# Patient Record
Sex: FEMALE | Race: WHITE | NOT HISPANIC OR LATINO | Employment: UNEMPLOYED | URBAN - METROPOLITAN AREA
[De-identification: names, ages, dates, MRNs, and addresses within clinical notes are randomized per-mention and may not be internally consistent; named-entity substitution may affect disease eponyms.]

---

## 2017-01-04 ENCOUNTER — OFFICE VISIT (OUTPATIENT)
Dept: HEPATOLOGY | Facility: CLINIC | Age: 47
End: 2017-01-04
Payer: MEDICAID

## 2017-01-04 VITALS
HEIGHT: 61 IN | BODY MASS INDEX: 20.39 KG/M2 | WEIGHT: 108 LBS | HEART RATE: 97 BPM | SYSTOLIC BLOOD PRESSURE: 117 MMHG | OXYGEN SATURATION: 98 % | DIASTOLIC BLOOD PRESSURE: 67 MMHG | TEMPERATURE: 99 F | RESPIRATION RATE: 18 BRPM

## 2017-01-04 DIAGNOSIS — K70.31 ALCOHOLIC CIRRHOSIS OF LIVER WITH ASCITES: Primary | ICD-10-CM

## 2017-01-04 PROCEDURE — 99999 PR PBB SHADOW E&M-EST. PATIENT-LVL IV: CPT | Mod: PBBFAC,,, | Performed by: INTERNAL MEDICINE

## 2017-01-04 PROCEDURE — 99214 OFFICE O/P EST MOD 30 MIN: CPT | Mod: PBBFAC | Performed by: INTERNAL MEDICINE

## 2017-01-04 PROCEDURE — 99214 OFFICE O/P EST MOD 30 MIN: CPT | Mod: S$PBB,,, | Performed by: INTERNAL MEDICINE

## 2017-01-04 RX ORDER — MIDODRINE HYDROCHLORIDE 10 MG/1
10 TABLET ORAL DAILY
COMMUNITY
Start: 2016-12-09 | End: 2017-11-30

## 2017-01-04 RX ORDER — BUSPIRONE HYDROCHLORIDE 10 MG/1
10 TABLET ORAL 2 TIMES DAILY
COMMUNITY
Start: 2016-11-06 | End: 2017-08-23

## 2017-01-04 RX ORDER — SPIRONOLACTONE 50 MG/1
50 TABLET, FILM COATED ORAL DAILY
COMMUNITY
Start: 2016-12-18 | End: 2017-11-30

## 2017-01-04 RX ORDER — FUROSEMIDE 40 MG/1
40 TABLET ORAL EVERY OTHER DAY
Status: ON HOLD | COMMUNITY
Start: 2017-01-02 | End: 2017-12-02

## 2017-01-04 RX ORDER — LACTULOSE 10 G/15ML
10 SOLUTION ORAL; RECTAL DAILY
COMMUNITY
Start: 2016-12-26 | End: 2017-11-30

## 2017-01-04 NOTE — LETTER
January 4, 2017      Hi Buitrago MD  [address]  [facility] [address]           [facility] - Hepatology  [address]  [facility] [address]  Phone: [phone]  Fax: [phone]          Patient: Annette Esparza   MR Number: [id number]   YOB: 1970   Date of Visit: 1/4/2017       Dear Dr. iH Buitrago:    Thank you for referring Annette Esparza to me for evaluation. Attached you will find relevant portions of my assessment and plan of care.    If you have questions, please do not hesitate to call me. I look forward to following Annette Esparza along with you.    Sincerely,    Denia Johnson MD    Enclosure  CC:  No Recipients    If you would like to receive this communication electronically, please contact [email or web address] or [phone] to request more information on tic Link access.    For providers and/or their staff who would like to refer a patient to [facility], please contact us through our one-stop-shop provider referral line, [facility] , at [phone].    If you feel you have received this communication in error or would no longer like to receive these types of communications, please e-mail [email or web address]

## 2017-01-04 NOTE — MR AVS SNAPSHOT
[facility] - Hepatology  [address]  [facility] [address]  Phone: [phone]  Fax: [phone]                  Annette Esparza   2017 10:00 AM   Office Visit    Description:  Female : 1970   Provider:  Denia Johnson MD   Department:  [facility]atology           Reason for Visit     Alcoholic cirrhosis of liver with ascites           Diagnoses this Visit        Comments    Alcoholic cirrhosis of liver with ascites    -  Primary            To Do List           Goals (5 Years of Data)     None      [facility] On Call     [facility] On Call Nurse Care Line -  Assistance  Registered nurses in the [facility] On Call Center provide clinical advisement, health education, appointment booking, and other advisory services.  Call for this free service at [phone].             Medications           STOP taking these medications     oxycodone (ROXICODONE) 5 MG immediate release tablet Take 1 tablet (5 mg total) by mouth every 6 (six) hours as needed.           Verify that the below list of medications is an accurate representation of the medications you are currently taking.  If none reported, the list may be blank. If incorrect, please contact your healthcare provider. Carry this list with you in case of emergency.           Current Medications     ascorbic acid (VITAMIN C) 100 MG tablet Take 100 mg by mouth once daily.    buPROPion (WELLBUTRIN SR) 150 MG TBSR 12 hr tablet Take 1 tablet (150 mg total) by mouth every morning.    busPIRone (BUSPAR) 10 MG tablet Take 10 mg by mouth 2 (two) times daily.    ergocalciferol (ERGOCALCIFEROL) 50,000 unit Cap Take 1 capsule (50,000 Units total) by mouth every 7 days.    ferrous gluconate (FERGON) 325 MG Tab Take 1 tablet (325 mg total) by mouth 3 (three) times daily with meals.    fluticasone-salmeterol 100-50 mcg/dose (ADVAIR) 100-50 mcg/dose diskus inhaler Inhale 1 puff into the lungs 2 (two) times daily.    folic acid (FOLVITE) 1 MG tablet Take 1  "tablet (1 mg total) by mouth once daily.    furosemide (LASIX) 40 MG tablet Take 40 mg by mouth once daily.    hydrocodone-acetaminophen 10-325mg (NORCO)  mg Tab Take 1 tablet by mouth every 12 (twelve) hours as needed for Pain.    lactulose (CHRONULAC) 10 gram/15 mL solution Take 10 g by mouth once daily.    levothyroxine (SYNTHROID) 75 MCG tablet Take 1 tablet (75 mcg total) by mouth once daily.    midodrine (PROAMATINE) 10 MG tablet Take 10 mg by mouth once daily.    mirtazapine (REMERON) 30 MG tablet Take 1 tablet (30 mg total) by mouth every evening.    spironolactone (ALDACTONE) 50 MG tablet Take 50 mg by mouth once daily.    alprazolam (XANAX) 0.25 MG tablet Take 1 tablet (0.25 mg total) by mouth 3 (three) times daily as needed for Anxiety.           Clinical Reference Information           Vital Signs - Last Recorded  Most recent update: 1/4/2017  9:31 AM by Ravindra Muñiz MA    BP Pulse Temp Resp Ht Wt    117/67 (BP Location: Left arm, Patient Position: Sitting) 97 99.2 °F (37.3 °C) (Oral) 18 5' 1" (1.549 m) 49 kg (108 lb 0.4 oz)    LMP SpO2 BMI          (LMP Unknown) 98% 20.41 kg/m2        Blood Pressure          Most Recent Value    BP  117/67      Allergies as of 1/4/2017     Morphine      Immunizations Administered on Date of Encounter - 1/4/2017     None      Orders Placed During Today's Visit     Future Labs/Procedures Expected by Expires    US Liver with Doppler  1/4/2017 1/4/2018      Instructions    You have alcoholic cirrhosis, your liver is improving with the avoidance of alcohol.    No change in fluid pills.    Recommend psychiatry and counseling to discuss use of xanax and alcohol counseling.  You would benefit from long-term management for depression/anxiety without use of benzodiazepine such as xanax.    Return to clinic in 3 months with ultrasound for same day.       Smoking Cessation     If you would like to quit smoking:   You may be eligible for free services if you are a " [facility] and started smoking cigarettes before September 1, 1988.  Call the Smoking Cessation [employer] (SCT) toll free at (622) 191-7728 or [phone] [phone].   Call 0-909-QUIT-NOW if you do not meet the above criteria.

## 2017-01-04 NOTE — LETTER
January 4, 2017    Annette Esparza  [address]  [place] [address]             [facility]  [address]  [facility] [address]  Phone: [phone]  Fax: [phone] To Whom it May Concern,    Ms. Esparza is currently under ongoing medical care for a chronic condition which makes her unable to work at this time.  There is improvement in her condition and it is unclear if and when she will be able to return to work full-time but we will make continual assessments of her physical ability.           If you have any questions or concerns, please don't hesitate to call.    Sincerely,        Denia Johnson MD

## 2017-01-04 NOTE — PROGRESS NOTES
Hepatology Consult Note    Referring provider: Dr. Hi Buitrago    Chief complaint:   Chief Complaint   Patient presents with    Alcoholic cirrhosis of liver with ascites       HPI:  Annette Esparza is a 46 y.o. female that presents to hepatology clinic for management of alcoholic cirrhosis.  She is alone.     Patient first learned of the presence of liver disease in 9/2014.  Given uncertainty regarding presence of cirrhosis, she underwent liver biopsy in 10/2014 which confirmed the presence of cirrhosis.  Serologic w/u was negative and etiology is alcoholic liver disease.  The patient continued to consume alcohol until 11/2016.    The patient had a recent hospitalization from 11/28-12/9/16 with issues of volume overload and ascites formation.  Required LVP during hospitalization.  Discharge summary reviewed.  Complications during course include HRS, pneumonia and alcohol withdrawal.  All of these issues are currently resolved.      Since discharge the patient denies any further alcohol consumption.  She is not undergoing any formal rehabilitation.  She is currently residing with her parents.  Patient denies GI bleeding, recurrence of ascites, encephalopathy and jaundice.  Maintained on furosemide 40mg and spironolactone 50mg daily and reports compliance with low sodium diet.      Patient's major complaints at this time are regarding abdominal pain.  Reports constant pain in RUQ and lower quadrants with new stabbing pain (4-5 days) throughout abdomen and in the back.  Not associated with food intake or position.      Cirrhosis HCM:  Hepatitis A vaccination:  Immune   Hepatitis B vaccination: immune   HCC screening: CT from 4/2016  Variceal screening: EGD 6/2016 - no varices present   Pneumococcal vaccination: immune 2016  Influenza vaccination: immune 2016      Patient Active Problem List   Diagnosis    Gallstones    Alcohol dependence in remission    Vitamin D deficiency    Folate deficiency    Iron  deficiency anemia due to chronic blood loss    Portal hypertensive gastropathy    Alcoholic cirrhosis of liver with ascites    Anxiety    Cigarette nicotine dependence without complication    Hyperbilirubinemia    Ascites due to alcoholic cirrhosis    ANDREW (acute kidney injury)    Hepatorenal syndrome    Centrilobular emphysema    Mild single current episode of major depressive disorder       Past Medical History   Diagnosis Date    Acute hypoxemic respiratory failure 2016    ANDREW (acute kidney injury) 2016    Alcohol dependence in remission     Alcoholic cirrhosis of liver with ascites 10/22/2015    Anxiety     Ascites due to alcoholic cirrhosis 2016    Centrilobular emphysema 2016    Cigarette nicotine dependence without complication 2016    Folate deficiency 10/22/2015    Gallstones     Hepatorenal syndrome 2016    Hyperbilirubinemia 2016    Hyponatremia 2016    Iron deficiency anemia due to chronic blood loss 10/22/2015    Portal hypertensive gastropathy 10/22/2015    Subclinical hypothyroidism 10/22/2015       Past Surgical History   Procedure Laterality Date     section      Tonsillectomy      Adenoidectomy      Hysterectomy      Appendectomy      Upper gastrointestinal endoscopy      Liver biopsy      Tubal ligation      Colonoscopy         Family History   Problem Relation Age of Onset    Rheum arthritis Father     Cancer Mother     No Known Problems Sister     No Known Problems Brother     Colon cancer Neg Hx        Social History     Social History    Marital status:      Spouse name: N/A    Number of children: N/A    Years of education: N/A     Social History Main Topics    Smoking status: Current Every Day Smoker     Packs/day: 1.00     Years: 35.00     Types: Cigarettes    Smokeless tobacco: Never Used    Alcohol use No      Comment: hx etoh-quit 2015    Drug use: No    Sexual activity: Yes      Partners: Male     Other Topics Concern    Not on file     Social History Narrative   Patient currently residing with parents.  Not able to work current job as a .  Plan for disability hearing 2/2017.    Current Outpatient Prescriptions   Medication Sig Dispense Refill    ascorbic acid (VITAMIN C) 100 MG tablet Take 100 mg by mouth once daily.      buPROPion (WELLBUTRIN SR) 150 MG TBSR 12 hr tablet Take 1 tablet (150 mg total) by mouth every morning. 90 tablet 4    busPIRone (BUSPAR) 10 MG tablet Take 10 mg by mouth 2 (two) times daily.      ergocalciferol (ERGOCALCIFEROL) 50,000 unit Cap Take 1 capsule (50,000 Units total) by mouth every 7 days. 12 capsule 3    ferrous gluconate (FERGON) 325 MG Tab Take 1 tablet (325 mg total) by mouth 3 (three) times daily with meals. 270 tablet 3    fluticasone-salmeterol 100-50 mcg/dose (ADVAIR) 100-50 mcg/dose diskus inhaler Inhale 1 puff into the lungs 2 (two) times daily. 1 each 12    folic acid (FOLVITE) 1 MG tablet Take 1 tablet (1 mg total) by mouth once daily. 90 tablet 3    furosemide (LASIX) 40 MG tablet Take 40 mg by mouth once daily.      hydrocodone-acetaminophen 10-325mg (NORCO)  mg Tab Take 1 tablet by mouth every 12 (twelve) hours as needed for Pain. 60 tablet 0    lactulose (CHRONULAC) 10 gram/15 mL solution Take 10 g by mouth once daily.      levothyroxine (SYNTHROID) 75 MCG tablet Take 1 tablet (75 mcg total) by mouth once daily. 90 tablet 5    midodrine (PROAMATINE) 10 MG tablet Take 10 mg by mouth once daily.      mirtazapine (REMERON) 30 MG tablet Take 1 tablet (30 mg total) by mouth every evening. 90 tablet 4    spironolactone (ALDACTONE) 50 MG tablet Take 50 mg by mouth once daily.      alprazolam (XANAX) 0.25 MG tablet Take 1 tablet (0.25 mg total) by mouth 3 (three) times daily as needed for Anxiety. 30 tablet 0     No current facility-administered medications for this visit.        Review of patient's allergies indicates:  "  Allergen Reactions    Morphine Nausea And Vomiting       Review of Systems   Constitutional: Positive for malaise/fatigue. Negative for chills, fever and weight loss.   Eyes: Negative.    Respiratory: Negative for cough and shortness of breath.    Cardiovascular: Negative for chest pain and leg swelling.   Gastrointestinal: Positive for abdominal pain, nausea and vomiting. Negative for blood in stool, constipation, diarrhea and heartburn.   Musculoskeletal: Negative for joint pain and myalgias.   Skin: Negative for itching and rash.   Neurological: Positive for weakness. Negative for dizziness, focal weakness and headaches.   Endo/Heme/Allergies: Does not bruise/bleed easily.   Psychiatric/Behavioral: Negative for depression. The patient is nervous/anxious.        Vitals:    01/04/17 0930   BP: 117/67   Pulse: 97   Resp: 18   Temp: 99.2 °F (37.3 °C)   TempSrc: Oral   SpO2: 98%   Weight: 49 kg (108 lb 0.4 oz)   Height: 5' 1" (1.549 m)       Physical Exam   Constitutional: She is oriented to person, place, and time. She appears well-developed. No distress.   Thin female   HENT:   Head: Normocephalic and atraumatic.   Mouth/Throat: Oropharynx is clear and moist. No oropharyngeal exudate.   Eyes: EOM are normal. Pupils are equal, round, and reactive to light. No scleral icterus.   Neck: Normal range of motion. Neck supple. No thyromegaly present.   Cardiovascular: Normal rate, regular rhythm and normal heart sounds.  Exam reveals no gallop and no friction rub.    No murmur heard.  Pulmonary/Chest: Effort normal. No respiratory distress. She has no wheezes. She has no rales.   Abdominal: Soft. Bowel sounds are normal. She exhibits no distension. There is tenderness. There is guarding. There is no rebound.   Musculoskeletal: Normal range of motion. She exhibits no edema.   Lymphadenopathy:     She has no cervical adenopathy.   Neurological: She is alert and oriented to person, place, and time. No cranial nerve deficit. "   Skin: Skin is warm and dry. No rash noted.   Psychiatric: She has a normal mood and affect. Her behavior is normal.   Vitals reviewed.      Lab Results   Component Value Date    ALT 20 01/04/2017    AST 17 01/04/2017     (H) 09/30/2014    ALKPHOS 213 (H) 01/04/2017    BILITOT 0.8 01/04/2017       Lab Results   Component Value Date    WBC 7.33 01/04/2017    HGB 11.1 (L) 01/04/2017    HCT 34.6 (L) 01/04/2017    MCV 95 01/04/2017     01/04/2017       Lab Results   Component Value Date     01/04/2017    K 4.6 01/04/2017     01/04/2017    CO2 20 (L) 01/04/2017    BUN 10 01/04/2017    CREATININE 0.7 01/04/2017    CALCIUM 9.9 01/04/2017    ANIONGAP 10 01/04/2017    ESTGFRAFRICA >60.0 01/04/2017    EGFRNONAA >60.0 01/04/2017     MELD:  8 (INR is from 12/9 - no repeat ordered)  Imaging: CT abd/pelvis w/ contrast    Assessment:  46 y.o. female with alcoholic cirrhosis presenting for ongoing management in the setting of abstinence.  The patient has clinical and biochemical improvement with 2 months of alcohol abstinence.  Discussed the importance of continuing to maintain this abstinence.     Plan:  1. Decompensated cirrhosis:  Patient has improvement in MELD from 16 to 8 in the setting of abstinence.  Continuing low dose diuretics and low sodium diet.  No volume issues at this time.  No other liver related medications required at this time.   No indication for consideration of transplant based on low MELD.  Patient was also felt to require at least 6 months of sobriety and formal rehab, which has not occurred at this time.     Provided letter for work given inability to complete required tasks even in the setting of some clinical improvement.     2.  Alcohol abuse:  Patient reports successful without formal rehab.  She is requesting refill of xanax and discussed that it is not preferable to continue benzo in the setting of alcohol abuse.  Recommend mental health consultation which can address  appropriate treatment of anxiety along with alcohol use.  The patient is open to this and if she requires further resources, will engage social work.  Patient should also have PETH or UDS at next office visit.     3.  HCM:  Patients needs update of HCC screening. U/S ordered with next office visit.      4.  Abdominal pain:  Stab pains new, no obvious etiology.  If continues would refer to GI.      RTC in 3 months

## 2017-01-04 NOTE — PATIENT INSTRUCTIONS
You have alcoholic cirrhosis, your liver is improving with the avoidance of alcohol.    No change in fluid pills.    Recommend psychiatry and counseling to discuss use of xanax and alcohol counseling.  You would benefit from long-term management for depression/anxiety without use of benzodiazepine such as xanax.    Return to clinic in 3 months with ultrasound for same day.

## 2017-02-21 DIAGNOSIS — R52 PAIN: ICD-10-CM

## 2017-02-21 DIAGNOSIS — F41.9 ANXIETY: Chronic | ICD-10-CM

## 2017-02-21 DIAGNOSIS — F32.0 MILD SINGLE CURRENT EPISODE OF MAJOR DEPRESSIVE DISORDER: ICD-10-CM

## 2017-02-21 RX ORDER — HYDROCODONE BITARTRATE AND ACETAMINOPHEN 10; 325 MG/1; MG/1
1 TABLET ORAL EVERY 12 HOURS PRN
Qty: 60 TABLET | Refills: 0 | Status: SHIPPED | OUTPATIENT
Start: 2017-02-21 | End: 2017-03-17 | Stop reason: SDUPTHER

## 2017-02-21 RX ORDER — MIRTAZAPINE 30 MG/1
30 TABLET, FILM COATED ORAL NIGHTLY
Qty: 90 TABLET | Refills: 4 | Status: SHIPPED | OUTPATIENT
Start: 2017-02-21 | End: 2017-11-30

## 2017-03-17 DIAGNOSIS — J43.2 CENTRILOBULAR EMPHYSEMA: Chronic | ICD-10-CM

## 2017-03-17 DIAGNOSIS — R52 PAIN: ICD-10-CM

## 2017-03-17 RX ORDER — FLUTICASONE PROPIONATE AND SALMETEROL 100; 50 UG/1; UG/1
1 POWDER RESPIRATORY (INHALATION) 2 TIMES DAILY
Qty: 1 EACH | Refills: 12 | Status: SHIPPED | OUTPATIENT
Start: 2017-03-17 | End: 2017-05-16

## 2017-03-17 RX ORDER — HYDROCODONE BITARTRATE AND ACETAMINOPHEN 10; 325 MG/1; MG/1
1 TABLET ORAL EVERY 12 HOURS PRN
Qty: 60 TABLET | Refills: 0 | Status: SHIPPED | OUTPATIENT
Start: 2017-03-17 | End: 2017-04-17 | Stop reason: SDUPTHER

## 2017-05-23 ENCOUNTER — LAB VISIT (OUTPATIENT)
Dept: LAB | Facility: HOSPITAL | Age: 47
End: 2017-05-23
Attending: INTERNAL MEDICINE
Payer: MEDICAID

## 2017-05-23 DIAGNOSIS — E87.1 HYPONATREMIA: Primary | ICD-10-CM

## 2017-05-23 DIAGNOSIS — E87.3 ALKALOSIS, METABOLIC: ICD-10-CM

## 2017-05-23 DIAGNOSIS — E87.6 HYPOKALEMIA: ICD-10-CM

## 2017-05-23 LAB
ALBUMIN SERPL BCP-MCNC: 3.5 G/DL
ALP SERPL-CCNC: 291 U/L
ALT SERPL W/O P-5'-P-CCNC: 16 U/L
ANION GAP SERPL CALC-SCNC: 12 MMOL/L
AST SERPL-CCNC: 23 U/L
BILIRUB SERPL-MCNC: 1 MG/DL
BUN SERPL-MCNC: 5 MG/DL
CALCIUM SERPL-MCNC: 8.8 MG/DL
CHLORIDE SERPL-SCNC: 84 MMOL/L
CO2 SERPL-SCNC: 37 MMOL/L
CREAT SERPL-MCNC: 0.8 MG/DL
EST. GFR  (AFRICAN AMERICAN): >60 ML/MIN/1.73 M^2
EST. GFR  (NON AFRICAN AMERICAN): >60 ML/MIN/1.73 M^2
GLUCOSE SERPL-MCNC: 145 MG/DL
POTASSIUM SERPL-SCNC: 2.9 MMOL/L
PROT SERPL-MCNC: 8.3 G/DL
SODIUM SERPL-SCNC: 133 MMOL/L

## 2017-05-23 PROCEDURE — 36415 COLL VENOUS BLD VENIPUNCTURE: CPT

## 2017-05-23 PROCEDURE — 80053 COMPREHEN METABOLIC PANEL: CPT

## 2017-05-26 ENCOUNTER — LAB VISIT (OUTPATIENT)
Dept: LAB | Facility: HOSPITAL | Age: 47
End: 2017-05-26
Attending: INTERNAL MEDICINE
Payer: MEDICAID

## 2017-05-26 DIAGNOSIS — E87.6 HYPOKALEMIA: ICD-10-CM

## 2017-05-26 DIAGNOSIS — D50.0 IRON DEFICIENCY ANEMIA DUE TO CHRONIC BLOOD LOSS: Chronic | ICD-10-CM

## 2017-05-26 DIAGNOSIS — E87.1 HYPONATREMIA: ICD-10-CM

## 2017-05-26 DIAGNOSIS — E87.3 METABOLIC ALKALOSIS: ICD-10-CM

## 2017-05-26 DIAGNOSIS — D53.9 DEFICIENCY ANEMIA: ICD-10-CM

## 2017-05-26 DIAGNOSIS — K70.31 ALCOHOLIC CIRRHOSIS OF LIVER WITH ASCITES: Chronic | ICD-10-CM

## 2017-05-26 DIAGNOSIS — E53.8 FOLATE DEFICIENCY: ICD-10-CM

## 2017-05-26 LAB
ALBUMIN SERPL BCP-MCNC: 3.1 G/DL
ALBUMIN SERPL BCP-MCNC: 3.1 G/DL
ALP SERPL-CCNC: 309 U/L
ALP SERPL-CCNC: 309 U/L
ALT SERPL W/O P-5'-P-CCNC: 15 U/L
ALT SERPL W/O P-5'-P-CCNC: 15 U/L
ANION GAP SERPL CALC-SCNC: 14 MMOL/L
ANION GAP SERPL CALC-SCNC: 14 MMOL/L
AST SERPL-CCNC: 34 U/L
AST SERPL-CCNC: 34 U/L
BASOPHILS # BLD AUTO: 0.05 K/UL
BASOPHILS NFR BLD: 0.4 %
BILIRUB SERPL-MCNC: 0.9 MG/DL
BILIRUB SERPL-MCNC: 0.9 MG/DL
BUN SERPL-MCNC: 3 MG/DL
BUN SERPL-MCNC: 3 MG/DL
CALCIUM SERPL-MCNC: 8.7 MG/DL
CALCIUM SERPL-MCNC: 8.7 MG/DL
CHLORIDE SERPL-SCNC: 90 MMOL/L
CHLORIDE SERPL-SCNC: 90 MMOL/L
CO2 SERPL-SCNC: 28 MMOL/L
CO2 SERPL-SCNC: 28 MMOL/L
CREAT SERPL-MCNC: 0.7 MG/DL
CREAT SERPL-MCNC: 0.7 MG/DL
DIFFERENTIAL METHOD: ABNORMAL
EOSINOPHIL # BLD AUTO: 0.1 K/UL
EOSINOPHIL NFR BLD: 0.6 %
ERYTHROCYTE [DISTWIDTH] IN BLOOD BY AUTOMATED COUNT: 17.4 %
EST. GFR  (AFRICAN AMERICAN): >60 ML/MIN/1.73 M^2
EST. GFR  (AFRICAN AMERICAN): >60 ML/MIN/1.73 M^2
EST. GFR  (NON AFRICAN AMERICAN): >60 ML/MIN/1.73 M^2
EST. GFR  (NON AFRICAN AMERICAN): >60 ML/MIN/1.73 M^2
FERRITIN SERPL-MCNC: 15 NG/ML
FOLATE SERPL-MCNC: 5.5 NG/ML
GLUCOSE SERPL-MCNC: 139 MG/DL
GLUCOSE SERPL-MCNC: 139 MG/DL
HCT VFR BLD AUTO: 33.4 %
HCYS SERPL-SCNC: 18.4 UMOL/L
HGB BLD-MCNC: 11 G/DL
IRON SERPL-MCNC: 36 UG/DL
LYMPHOCYTES # BLD AUTO: 1.6 K/UL
LYMPHOCYTES NFR BLD: 13.8 %
MCH RBC QN AUTO: 30.2 PG
MCHC RBC AUTO-ENTMCNC: 32.9 %
MCV RBC AUTO: 92 FL
MONOCYTES # BLD AUTO: 1.3 K/UL
MONOCYTES NFR BLD: 11.1 %
NEUTROPHILS # BLD AUTO: 8.5 K/UL
NEUTROPHILS NFR BLD: 73.8 %
PLATELET # BLD AUTO: 170 K/UL
PMV BLD AUTO: 9.5 FL
POTASSIUM SERPL-SCNC: 2.5 MMOL/L
POTASSIUM SERPL-SCNC: 2.5 MMOL/L
PROT SERPL-MCNC: 7.1 G/DL
PROT SERPL-MCNC: 7.1 G/DL
RBC # BLD AUTO: 3.64 M/UL
RETICS/RBC NFR AUTO: 2.5 %
SATURATED IRON: 8 %
SODIUM SERPL-SCNC: 132 MMOL/L
SODIUM SERPL-SCNC: 132 MMOL/L
TOTAL IRON BINDING CAPACITY: 472 UG/DL
TRANSFERRIN SERPL-MCNC: 319 MG/DL
VIT B12 SERPL-MCNC: 321 PG/ML
WBC # BLD AUTO: 11.59 K/UL

## 2017-05-26 PROCEDURE — 36415 COLL VENOUS BLD VENIPUNCTURE: CPT

## 2017-05-26 PROCEDURE — 82607 VITAMIN B-12: CPT

## 2017-05-26 PROCEDURE — 83020 HEMOGLOBIN ELECTROPHORESIS: CPT

## 2017-05-26 PROCEDURE — 82746 ASSAY OF FOLIC ACID SERUM: CPT

## 2017-05-26 PROCEDURE — 83090 ASSAY OF HOMOCYSTEINE: CPT

## 2017-05-26 PROCEDURE — 82728 ASSAY OF FERRITIN: CPT

## 2017-05-26 PROCEDURE — 85025 COMPLETE CBC W/AUTO DIFF WBC: CPT

## 2017-05-26 PROCEDURE — 83540 ASSAY OF IRON: CPT

## 2017-05-26 PROCEDURE — 83921 ORGANIC ACID SINGLE QUANT: CPT

## 2017-05-26 PROCEDURE — 83021 HEMOGLOBIN CHROMOTOGRAPHY: CPT

## 2017-05-26 PROCEDURE — 80053 COMPREHEN METABOLIC PANEL: CPT

## 2017-05-26 PROCEDURE — 85045 AUTOMATED RETICULOCYTE COUNT: CPT

## 2017-05-29 LAB
HGB A2 MFR BLD HPLC: 2.1 %
HGB FRACT BLD ELPH-IMP: ABNORMAL
HGB FRACT BLD ELPH-IMP: NORMAL

## 2017-05-31 LAB — METHYLMALONATE SERPL-SCNC: <0.1 UMOL/L

## 2017-06-02 ENCOUNTER — LAB VISIT (OUTPATIENT)
Dept: LAB | Facility: HOSPITAL | Age: 47
End: 2017-06-02
Attending: INTERNAL MEDICINE
Payer: MEDICAID

## 2017-06-02 DIAGNOSIS — E87.3 ALKALOSIS: ICD-10-CM

## 2017-06-02 DIAGNOSIS — K76.7 HEPATORENAL SYNDROME: ICD-10-CM

## 2017-06-02 LAB
ALBUMIN SERPL BCP-MCNC: 3.2 G/DL
ALP SERPL-CCNC: 486 U/L
ALT SERPL W/O P-5'-P-CCNC: 27 U/L
ANION GAP SERPL CALC-SCNC: 11 MMOL/L
AST SERPL-CCNC: 41 U/L
BILIRUB SERPL-MCNC: 1.8 MG/DL
BUN SERPL-MCNC: 5 MG/DL
CALCIUM SERPL-MCNC: 9.3 MG/DL
CHLORIDE SERPL-SCNC: 93 MMOL/L
CO2 SERPL-SCNC: 33 MMOL/L
CREAT SERPL-MCNC: 0.6 MG/DL
EST. GFR  (AFRICAN AMERICAN): >60 ML/MIN/1.73 M^2
EST. GFR  (NON AFRICAN AMERICAN): >60 ML/MIN/1.73 M^2
GLUCOSE SERPL-MCNC: 110 MG/DL
POTASSIUM SERPL-SCNC: 3.2 MMOL/L
PROT SERPL-MCNC: 7.8 G/DL
SODIUM SERPL-SCNC: 137 MMOL/L

## 2017-06-02 PROCEDURE — 80053 COMPREHEN METABOLIC PANEL: CPT

## 2017-06-02 PROCEDURE — 36415 COLL VENOUS BLD VENIPUNCTURE: CPT

## 2017-06-09 ENCOUNTER — LAB VISIT (OUTPATIENT)
Dept: LAB | Facility: HOSPITAL | Age: 47
End: 2017-06-09
Attending: INTERNAL MEDICINE
Payer: MEDICAID

## 2017-06-09 DIAGNOSIS — E87.3 ALKALOSIS: ICD-10-CM

## 2017-06-09 LAB
ALBUMIN SERPL BCP-MCNC: 3.2 G/DL
ALP SERPL-CCNC: 393 U/L
ALT SERPL W/O P-5'-P-CCNC: 21 U/L
ANION GAP SERPL CALC-SCNC: 9 MMOL/L
AST SERPL-CCNC: 41 U/L
BILIRUB SERPL-MCNC: 1.2 MG/DL
BUN SERPL-MCNC: 4 MG/DL
CALCIUM SERPL-MCNC: 8.8 MG/DL
CHLORIDE SERPL-SCNC: 93 MMOL/L
CO2 SERPL-SCNC: 32 MMOL/L
CREAT SERPL-MCNC: 0.7 MG/DL
EST. GFR  (AFRICAN AMERICAN): >60 ML/MIN/1.73 M^2
EST. GFR  (NON AFRICAN AMERICAN): >60 ML/MIN/1.73 M^2
GLUCOSE SERPL-MCNC: 98 MG/DL
POTASSIUM SERPL-SCNC: 2.8 MMOL/L
PROT SERPL-MCNC: 7.5 G/DL
SODIUM SERPL-SCNC: 134 MMOL/L

## 2017-06-09 PROCEDURE — 80053 COMPREHEN METABOLIC PANEL: CPT

## 2017-06-09 PROCEDURE — 36415 COLL VENOUS BLD VENIPUNCTURE: CPT

## 2017-06-16 ENCOUNTER — LAB VISIT (OUTPATIENT)
Dept: LAB | Facility: HOSPITAL | Age: 47
End: 2017-06-16
Attending: INTERNAL MEDICINE
Payer: MEDICAID

## 2017-06-16 DIAGNOSIS — K70.31 ALCOHOLIC CIRRHOSIS OF LIVER WITH ASCITES: Chronic | ICD-10-CM

## 2017-06-16 DIAGNOSIS — E87.3 ALKALOSIS: ICD-10-CM

## 2017-06-16 LAB
ALBUMIN SERPL BCP-MCNC: 3.2 G/DL
ALP SERPL-CCNC: 336 U/L
ALT SERPL W/O P-5'-P-CCNC: 21 U/L
ANION GAP SERPL CALC-SCNC: 9 MMOL/L
AST SERPL-CCNC: 59 U/L
BILIRUB SERPL-MCNC: 1.3 MG/DL
BUN SERPL-MCNC: 4 MG/DL
CALCIUM SERPL-MCNC: 8.8 MG/DL
CHLORIDE SERPL-SCNC: 103 MMOL/L
CO2 SERPL-SCNC: 25 MMOL/L
CREAT SERPL-MCNC: 0.7 MG/DL
EST. GFR  (AFRICAN AMERICAN): >60 ML/MIN/1.73 M^2
EST. GFR  (NON AFRICAN AMERICAN): >60 ML/MIN/1.73 M^2
GLUCOSE SERPL-MCNC: 100 MG/DL
POTASSIUM SERPL-SCNC: 4.6 MMOL/L
PROT SERPL-MCNC: 7.5 G/DL
SODIUM SERPL-SCNC: 137 MMOL/L

## 2017-06-16 PROCEDURE — 80053 COMPREHEN METABOLIC PANEL: CPT

## 2017-06-16 PROCEDURE — 36415 COLL VENOUS BLD VENIPUNCTURE: CPT

## 2017-09-28 ENCOUNTER — HOSPITAL ENCOUNTER (OUTPATIENT)
Dept: RADIOLOGY | Facility: HOSPITAL | Age: 47
Discharge: HOME | End: 2017-09-28
Attending: INTERNAL MEDICINE
Payer: MEDICAID

## 2017-09-28 DIAGNOSIS — Z12.31 VISIT FOR SCREENING MAMMOGRAM: ICD-10-CM

## 2017-09-28 PROCEDURE — 77067 SCR MAMMO BI INCL CAD: CPT | Mod: TC

## 2017-09-28 PROCEDURE — 77067 SCR MAMMO BI INCL CAD: CPT | Mod: 26,,, | Performed by: RADIOLOGY

## 2017-09-28 PROCEDURE — 77063 BREAST TOMOSYNTHESIS BI: CPT | Mod: 26,,, | Performed by: RADIOLOGY

## 2017-10-05 ENCOUNTER — HOSPITAL ENCOUNTER (OUTPATIENT)
Dept: RADIOLOGY | Facility: HOSPITAL | Age: 47
Discharge: HOME | End: 2017-10-05
Attending: INTERNAL MEDICINE
Payer: MEDICAID

## 2017-10-05 DIAGNOSIS — R92.8 ABNORMAL MAMMOGRAM: ICD-10-CM

## 2017-10-05 DIAGNOSIS — N63.10 BREAST MASS, RIGHT: ICD-10-CM

## 2017-10-05 PROCEDURE — 77061 BREAST TOMOSYNTHESIS UNI: CPT | Mod: TC

## 2017-10-05 PROCEDURE — 76642 ULTRASOUND BREAST LIMITED: CPT | Mod: 26,RT,, | Performed by: RADIOLOGY

## 2017-10-05 PROCEDURE — 76642 ULTRASOUND BREAST LIMITED: CPT | Mod: TC,RT

## 2017-10-05 PROCEDURE — 77065 DX MAMMO INCL CAD UNI: CPT | Mod: 26,,, | Performed by: RADIOLOGY

## 2017-10-05 PROCEDURE — 77061 BREAST TOMOSYNTHESIS UNI: CPT | Mod: 26,,, | Performed by: RADIOLOGY

## 2017-11-30 ENCOUNTER — HOSPITAL ENCOUNTER (INPATIENT)
Facility: HOSPITAL | Age: 47
LOS: 2 days | Discharge: HOME | DRG: 872 | End: 2017-12-02
Attending: EMERGENCY MEDICINE | Admitting: HOSPITALIST
Payer: MEDICAID

## 2017-11-30 DIAGNOSIS — E87.6 HYPOKALEMIA: ICD-10-CM

## 2017-11-30 DIAGNOSIS — E86.0 DEHYDRATION: ICD-10-CM

## 2017-11-30 DIAGNOSIS — K70.31 ALCOHOLIC CIRRHOSIS OF LIVER WITH ASCITES: Chronic | ICD-10-CM

## 2017-11-30 DIAGNOSIS — R10.9 ABDOMINAL PAIN: ICD-10-CM

## 2017-11-30 DIAGNOSIS — R05.9 COUGH: ICD-10-CM

## 2017-11-30 DIAGNOSIS — N30.00 ACUTE CYSTITIS WITHOUT HEMATURIA: Primary | ICD-10-CM

## 2017-11-30 DIAGNOSIS — E87.1 HYPONATREMIA: ICD-10-CM

## 2017-11-30 DIAGNOSIS — R55 SYNCOPE, UNSPECIFIED SYNCOPE TYPE: ICD-10-CM

## 2017-11-30 PROBLEM — K76.82 HEPATIC ENCEPHALOPATHY: Status: ACTIVE | Noted: 2017-11-30

## 2017-11-30 PROBLEM — I95.9 HYPOTENSION: Status: ACTIVE | Noted: 2017-11-30

## 2017-11-30 PROBLEM — A49.9 BACTERIAL UTI: Status: ACTIVE | Noted: 2017-11-30

## 2017-11-30 PROBLEM — N39.0 BACTERIAL UTI: Status: ACTIVE | Noted: 2017-11-30

## 2017-11-30 PROBLEM — B37.0 ORAL THRUSH: Status: ACTIVE | Noted: 2017-11-30

## 2017-11-30 PROBLEM — K52.9 ENTERITIS: Status: ACTIVE | Noted: 2017-11-30

## 2017-11-30 LAB
ALBUMIN SERPL BCP-MCNC: 1.9 G/DL
ALP SERPL-CCNC: 327 U/L
ALT SERPL W/O P-5'-P-CCNC: 20 U/L
AMMONIA PLAS-SCNC: 30 UMOL/L
AMPHET+METHAMPHET UR QL: NEGATIVE
ANION GAP SERPL CALC-SCNC: 10 MMOL/L
ANION GAP SERPL CALC-SCNC: 14 MMOL/L
APTT BLDCRRT: 33.4 SEC
AST SERPL-CCNC: 43 U/L
BACTERIA #/AREA URNS HPF: ABNORMAL /HPF
BARBITURATES UR QL SCN>200 NG/ML: NEGATIVE
BASOPHILS # BLD AUTO: 0.02 K/UL
BASOPHILS NFR BLD: 0.2 %
BENZODIAZ UR QL SCN>200 NG/ML: NEGATIVE
BILIRUB SERPL-MCNC: 5.8 MG/DL
BILIRUB UR QL STRIP: ABNORMAL
BUN SERPL-MCNC: 10 MG/DL
BUN SERPL-MCNC: 10 MG/DL
BZE UR QL SCN: NEGATIVE
CALCIUM SERPL-MCNC: 7.3 MG/DL
CALCIUM SERPL-MCNC: 7.7 MG/DL
CANNABINOIDS UR QL SCN: NEGATIVE
CHLORIDE SERPL-SCNC: 74 MMOL/L
CHLORIDE SERPL-SCNC: 82 MMOL/L
CLARITY UR: ABNORMAL
CO2 SERPL-SCNC: 28 MMOL/L
CO2 SERPL-SCNC: 36 MMOL/L
COLOR UR: ABNORMAL
CREAT SERPL-MCNC: 0.6 MG/DL
CREAT SERPL-MCNC: 0.7 MG/DL
CREAT UR-MCNC: 21.4 MG/DL
DIFFERENTIAL METHOD: ABNORMAL
EOSINOPHIL # BLD AUTO: 0 K/UL
EOSINOPHIL NFR BLD: 0 %
ERYTHROCYTE [DISTWIDTH] IN BLOOD BY AUTOMATED COUNT: 20.6 %
EST. GFR  (AFRICAN AMERICAN): >60 ML/MIN/1.73 M^2
EST. GFR  (AFRICAN AMERICAN): >60 ML/MIN/1.73 M^2
EST. GFR  (NON AFRICAN AMERICAN): >60 ML/MIN/1.73 M^2
EST. GFR  (NON AFRICAN AMERICAN): >60 ML/MIN/1.73 M^2
ETHANOL SERPL-MCNC: <10 MG/DL
FLUAV AG SPEC QL IA: NEGATIVE
FLUBV AG SPEC QL IA: NEGATIVE
GLUCOSE SERPL-MCNC: 103 MG/DL
GLUCOSE SERPL-MCNC: 114 MG/DL
GLUCOSE UR QL STRIP: NEGATIVE
HCT VFR BLD AUTO: 26.4 %
HGB BLD-MCNC: 9 G/DL
HGB UR QL STRIP: ABNORMAL
INR PPP: 1.3
KETONES UR QL STRIP: NEGATIVE
LEUKOCYTE ESTERASE UR QL STRIP: ABNORMAL
LYMPHOCYTES # BLD AUTO: 1.2 K/UL
LYMPHOCYTES NFR BLD: 8.9 %
MAGNESIUM SERPL-MCNC: 1.5 MG/DL
MCH RBC QN AUTO: 32.7 PG
MCHC RBC AUTO-ENTMCNC: 34.1 G/DL
MCV RBC AUTO: 96 FL
METHADONE UR QL SCN>300 NG/ML: NEGATIVE
MICROSCOPIC COMMENT: ABNORMAL
MONOCYTES # BLD AUTO: 0.7 K/UL
MONOCYTES NFR BLD: 5.3 %
NEUTROPHILS # BLD AUTO: 11 K/UL
NEUTROPHILS NFR BLD: 84.8 %
NITRITE UR QL STRIP: NEGATIVE
OPIATES UR QL SCN: NORMAL
PCP UR QL SCN>25 NG/ML: NEGATIVE
PH UR STRIP: 6 [PH] (ref 5–8)
PLATELET # BLD AUTO: 141 K/UL
PMV BLD AUTO: 10.8 FL
POCT GLUCOSE: 124 MG/DL (ref 70–110)
POTASSIUM SERPL-SCNC: 3.4 MMOL/L
POTASSIUM SERPL-SCNC: <2 MMOL/L
PROT SERPL-MCNC: 5.6 G/DL
PROT UR QL STRIP: NEGATIVE
PROTHROMBIN TIME: 14 SEC
RBC # BLD AUTO: 2.75 M/UL
RBC #/AREA URNS HPF: 10 /HPF (ref 0–4)
SODIUM SERPL-SCNC: 120 MMOL/L
SODIUM SERPL-SCNC: 124 MMOL/L
SP GR UR STRIP: <=1.005 (ref 1–1.03)
SPECIMEN SOURCE: NORMAL
SQUAMOUS #/AREA URNS HPF: 5 /HPF
TOXICOLOGY INFORMATION: NORMAL
URN SPEC COLLECT METH UR: ABNORMAL
UROBILINOGEN UR STRIP-ACNC: ABNORMAL EU/DL
WBC # BLD AUTO: 13 K/UL
WBC #/AREA URNS HPF: 50 /HPF (ref 0–5)

## 2017-11-30 PROCEDURE — 87400 INFLUENZA A/B EACH AG IA: CPT | Mod: 59

## 2017-11-30 PROCEDURE — 82962 GLUCOSE BLOOD TEST: CPT

## 2017-11-30 PROCEDURE — 99285 EMERGENCY DEPT VISIT HI MDM: CPT | Mod: 25

## 2017-11-30 PROCEDURE — 80048 BASIC METABOLIC PNL TOTAL CA: CPT

## 2017-11-30 PROCEDURE — 81000 URINALYSIS NONAUTO W/SCOPE: CPT

## 2017-11-30 PROCEDURE — 96361 HYDRATE IV INFUSION ADD-ON: CPT

## 2017-11-30 PROCEDURE — 20000000 HC ICU ROOM

## 2017-11-30 PROCEDURE — 63600175 PHARM REV CODE 636 W HCPCS: Performed by: HOSPITALIST

## 2017-11-30 PROCEDURE — P9047 ALBUMIN (HUMAN), 25%, 50ML: HCPCS | Performed by: HOSPITALIST

## 2017-11-30 PROCEDURE — 25500020 PHARM REV CODE 255: Performed by: EMERGENCY MEDICINE

## 2017-11-30 PROCEDURE — 63600175 PHARM REV CODE 636 W HCPCS: Performed by: EMERGENCY MEDICINE

## 2017-11-30 PROCEDURE — 85730 THROMBOPLASTIN TIME PARTIAL: CPT

## 2017-11-30 PROCEDURE — 93005 ELECTROCARDIOGRAM TRACING: CPT

## 2017-11-30 PROCEDURE — S4991 NICOTINE PATCH NONLEGEND: HCPCS | Performed by: NURSE PRACTITIONER

## 2017-11-30 PROCEDURE — 80320 DRUG SCREEN QUANTALCOHOLS: CPT

## 2017-11-30 PROCEDURE — 83735 ASSAY OF MAGNESIUM: CPT

## 2017-11-30 PROCEDURE — 25000003 PHARM REV CODE 250: Performed by: NURSE PRACTITIONER

## 2017-11-30 PROCEDURE — 80307 DRUG TEST PRSMV CHEM ANLYZR: CPT

## 2017-11-30 PROCEDURE — 96367 TX/PROPH/DG ADDL SEQ IV INF: CPT

## 2017-11-30 PROCEDURE — 93010 ELECTROCARDIOGRAM REPORT: CPT | Mod: ,,, | Performed by: INTERNAL MEDICINE

## 2017-11-30 PROCEDURE — 96375 TX/PRO/DX INJ NEW DRUG ADDON: CPT

## 2017-11-30 PROCEDURE — 82140 ASSAY OF AMMONIA: CPT

## 2017-11-30 PROCEDURE — 96366 THER/PROPH/DIAG IV INF ADDON: CPT

## 2017-11-30 PROCEDURE — 85610 PROTHROMBIN TIME: CPT

## 2017-11-30 PROCEDURE — 25000003 PHARM REV CODE 250: Performed by: HOSPITALIST

## 2017-11-30 PROCEDURE — 96365 THER/PROPH/DIAG IV INF INIT: CPT

## 2017-11-30 PROCEDURE — 25000003 PHARM REV CODE 250: Performed by: EMERGENCY MEDICINE

## 2017-11-30 PROCEDURE — 80053 COMPREHEN METABOLIC PANEL: CPT

## 2017-11-30 PROCEDURE — 85025 COMPLETE CBC W/AUTO DIFF WBC: CPT

## 2017-11-30 RX ORDER — MAGNESIUM SULFATE HEPTAHYDRATE 40 MG/ML
2 INJECTION, SOLUTION INTRAVENOUS
Status: COMPLETED | OUTPATIENT
Start: 2017-11-30 | End: 2017-11-30

## 2017-11-30 RX ORDER — LEVOTHYROXINE SODIUM 50 UG/1
50 TABLET ORAL
Status: DISCONTINUED | OUTPATIENT
Start: 2017-12-01 | End: 2017-12-02 | Stop reason: HOSPADM

## 2017-11-30 RX ORDER — IBUPROFEN 200 MG
1 TABLET ORAL DAILY
Status: DISCONTINUED | OUTPATIENT
Start: 2017-11-30 | End: 2017-12-02 | Stop reason: HOSPADM

## 2017-11-30 RX ORDER — ONDANSETRON 8 MG/1
8 TABLET, ORALLY DISINTEGRATING ORAL EVERY 8 HOURS PRN
Status: DISCONTINUED | OUTPATIENT
Start: 2017-11-30 | End: 2017-12-02 | Stop reason: HOSPADM

## 2017-11-30 RX ORDER — HALOPERIDOL 5 MG/ML
2 INJECTION INTRAMUSCULAR
Status: COMPLETED | OUTPATIENT
Start: 2017-11-30 | End: 2017-11-30

## 2017-11-30 RX ORDER — SODIUM CHLORIDE 0.9 % (FLUSH) 0.9 %
5 SYRINGE (ML) INJECTION
Status: DISCONTINUED | OUTPATIENT
Start: 2017-11-30 | End: 2017-12-02 | Stop reason: HOSPADM

## 2017-11-30 RX ORDER — ONDANSETRON 2 MG/ML
4 INJECTION INTRAMUSCULAR; INTRAVENOUS
Status: COMPLETED | OUTPATIENT
Start: 2017-11-30 | End: 2017-11-30

## 2017-11-30 RX ORDER — SODIUM CHLORIDE 9 MG/ML
1000 INJECTION, SOLUTION INTRAVENOUS
Status: COMPLETED | OUTPATIENT
Start: 2017-11-30 | End: 2017-11-30

## 2017-11-30 RX ORDER — POTASSIUM CHLORIDE 20 MEQ/15ML
40 SOLUTION ORAL
Status: COMPLETED | OUTPATIENT
Start: 2017-11-30 | End: 2017-11-30

## 2017-11-30 RX ORDER — ACETAMINOPHEN 500 MG
500 TABLET ORAL EVERY 8 HOURS PRN
Status: DISCONTINUED | OUTPATIENT
Start: 2017-11-30 | End: 2017-12-02 | Stop reason: HOSPADM

## 2017-11-30 RX ORDER — HYDROCODONE BITARTRATE AND ACETAMINOPHEN 10; 325 MG/1; MG/1
1 TABLET ORAL EVERY 12 HOURS PRN
Status: DISCONTINUED | OUTPATIENT
Start: 2017-11-30 | End: 2017-12-02 | Stop reason: HOSPADM

## 2017-11-30 RX ORDER — MIDODRINE HYDROCHLORIDE 5 MG/1
10 TABLET ORAL 2 TIMES DAILY WITH MEALS
Status: DISCONTINUED | OUTPATIENT
Start: 2017-11-30 | End: 2017-12-01

## 2017-11-30 RX ORDER — ALBUMIN HUMAN 250 G/1000ML
25 SOLUTION INTRAVENOUS
Status: COMPLETED | OUTPATIENT
Start: 2017-11-30 | End: 2017-11-30

## 2017-11-30 RX ORDER — SODIUM CHLORIDE 9 MG/ML
INJECTION, SOLUTION INTRAVENOUS CONTINUOUS
Status: ACTIVE | OUTPATIENT
Start: 2017-11-30 | End: 2017-12-01

## 2017-11-30 RX ORDER — NYSTATIN 100000 [USP'U]/ML
500000 SUSPENSION ORAL
Status: DISCONTINUED | OUTPATIENT
Start: 2017-12-01 | End: 2017-12-02 | Stop reason: HOSPADM

## 2017-11-30 RX ORDER — ONDANSETRON 2 MG/ML
4 INJECTION INTRAMUSCULAR; INTRAVENOUS EVERY 8 HOURS PRN
Status: DISCONTINUED | OUTPATIENT
Start: 2017-11-30 | End: 2017-12-02 | Stop reason: HOSPADM

## 2017-11-30 RX ORDER — POTASSIUM CHLORIDE 20 MEQ/15ML
60 SOLUTION ORAL
Status: COMPLETED | OUTPATIENT
Start: 2017-11-30 | End: 2017-11-30

## 2017-11-30 RX ORDER — KETOROLAC TROMETHAMINE 30 MG/ML
15 INJECTION, SOLUTION INTRAMUSCULAR; INTRAVENOUS
Status: COMPLETED | OUTPATIENT
Start: 2017-11-30 | End: 2017-11-30

## 2017-11-30 RX ORDER — LACTULOSE 10 G/15ML
15 SOLUTION ORAL 2 TIMES DAILY
Status: DISCONTINUED | OUTPATIENT
Start: 2017-11-30 | End: 2017-12-02 | Stop reason: HOSPADM

## 2017-11-30 RX ADMIN — HALOPERIDOL LACTATE 2 MG: 5 INJECTION, SOLUTION INTRAMUSCULAR at 02:11

## 2017-11-30 RX ADMIN — SODIUM CHLORIDE 1000 ML: 0.9 INJECTION, SOLUTION INTRAVENOUS at 05:11

## 2017-11-30 RX ADMIN — SODIUM CHLORIDE 1000 ML: 0.9 INJECTION, SOLUTION INTRAVENOUS at 11:11

## 2017-11-30 RX ADMIN — NICOTINE 1 PATCH: 21 PATCH, EXTENDED RELEASE TRANSDERMAL at 09:11

## 2017-11-30 RX ADMIN — SODIUM CHLORIDE: 0.9 INJECTION, SOLUTION INTRAVENOUS at 10:11

## 2017-11-30 RX ADMIN — SODIUM CHLORIDE 1000 ML: 0.9 INJECTION, SOLUTION INTRAVENOUS at 01:11

## 2017-11-30 RX ADMIN — POTASSIUM CHLORIDE 60 MEQ: 20 SOLUTION ORAL at 04:11

## 2017-11-30 RX ADMIN — KETOROLAC TROMETHAMINE 15 MG: 30 INJECTION, SOLUTION INTRAMUSCULAR at 01:11

## 2017-11-30 RX ADMIN — ALBUMIN (HUMAN) 25 G: 12.5 SOLUTION INTRAVENOUS at 05:11

## 2017-11-30 RX ADMIN — CEFTRIAXONE SODIUM 1 G: 1 INJECTION, POWDER, FOR SOLUTION INTRAMUSCULAR; INTRAVENOUS at 07:11

## 2017-11-30 RX ADMIN — MAGNESIUM SULFATE IN WATER 2 G: 40 INJECTION, SOLUTION INTRAVENOUS at 07:11

## 2017-11-30 RX ADMIN — LACTULOSE 15 G: 20 SOLUTION ORAL at 11:11

## 2017-11-30 RX ADMIN — MIDODRINE HYDROCHLORIDE 10 MG: 5 TABLET ORAL at 08:11

## 2017-11-30 RX ADMIN — IOHEXOL 75 ML: 350 INJECTION, SOLUTION INTRAVENOUS at 02:11

## 2017-11-30 RX ADMIN — POTASSIUM CHLORIDE 40 MEQ: 20 SOLUTION ORAL at 01:11

## 2017-11-30 RX ADMIN — ONDANSETRON 4 MG: 2 INJECTION INTRAMUSCULAR; INTRAVENOUS at 01:11

## 2017-11-30 NOTE — ED PROVIDER NOTES
Encounter Date: 11/30/2017    SCRIBE #1 NOTE: I, Nata King, am scribing for, and in the presence of, Dr. Dariel Ye MD.       History     Chief Complaint   Patient presents with    Weakness     near sycope with bodyaches.     The patient is a 47 y.o. female with hx of: alcohol dependence, alcoholic cirrhosis of liver with ascites, ANDREW, and anxiety that presents to the ED with a complaint of generalized weakness that began couple of days ago. She reports feeling extreme weakness, stating she cant walk for the past couple of days. The symptoms are similar to prior experience usually due to low blood glucose. Patient also reports a constant lower abdominal cramping pain and aching back pain for a couple of weeks, worse with movement and certain positions and without alleviating factors, as well as nausea and vomiting a week ago but none since, subjective fever, cough, and decreased urinating for the past week. Patient reports chronic constipation and denies shortness of breath and sore throat. She also states that she is taking Norco BID but does not know what for.       The history is provided by the patient.     Review of patient's allergies indicates:   Allergen Reactions    Morphine Nausea And Vomiting     Past Medical History:   Diagnosis Date    Acute hypoxemic respiratory failure 12/1/2016    ANDREW (acute kidney injury) 11/28/2016    Alcohol dependence in remission     Alcoholic cirrhosis of liver with ascites 10/22/2015    Anxiety     Ascites due to alcoholic cirrhosis 6/13/2016    Centrilobular emphysema 12/12/2016    Cigarette nicotine dependence without complication 6/2/2016    Folate deficiency 10/22/2015    Gallstones     Hepatorenal syndrome 11/29/2016    Hyperbilirubinemia 6/13/2016    Hyponatremia 11/29/2016    Iron deficiency anemia due to chronic blood loss 10/22/2015    Portal hypertensive gastropathy 10/22/2015    Subclinical hypothyroidism 10/22/2015     Past Surgical History:    Procedure Laterality Date    ADENOIDECTOMY      APPENDECTOMY       SECTION      COLONOSCOPY      HYSTERECTOMY      25 yrs old    LIVER BIOPSY      OOPHORECTOMY Left     25 yrs old    OOPHORECTOMY Right     29 yrs old    TONSILLECTOMY      TUBAL LIGATION      UPPER GASTROINTESTINAL ENDOSCOPY       Family History   Problem Relation Age of Onset    Rheum arthritis Father     Cancer Mother     No Known Problems Sister     No Known Problems Brother     Colon cancer Neg Hx      Social History   Substance Use Topics    Smoking status: Current Every Day Smoker     Packs/day: 1.00     Years: 35.00     Types: Cigarettes    Smokeless tobacco: Never Used    Alcohol use No      Comment: hx etoh-quit 2015     Review of Systems   Constitutional: Positive for fever (Subjective). Negative for chills and fatigue.   HENT: Negative for congestion, sore throat and voice change.    Eyes: Negative for photophobia, pain and redness.   Respiratory: Positive for cough. Negative for choking and shortness of breath.    Cardiovascular: Negative for chest pain, palpitations and leg swelling.   Gastrointestinal: Positive for abdominal pain (lower ), constipation (chronic), nausea and vomiting (resolved a week ago).   Genitourinary: Positive for decreased urine volume. Negative for dysuria, frequency and urgency.   Musculoskeletal: Positive for back pain. Negative for neck pain and neck stiffness.   Skin: Negative for rash.   Neurological: Negative for seizures, speech difficulty, numbness and headaches. Weakness: general.   All other systems reviewed and are negative.      Physical Exam     Initial Vitals [17 1105]   BP Pulse Resp Temp SpO2   (!) 88/52 80 18 98.1 °F (36.7 °C) 99 %      MAP       64         Physical Exam    Nursing note and vitals reviewed.  Constitutional: She appears well-developed and well-nourished. No distress.   HENT:   Head: Normocephalic and atraumatic.   Mouth/Throat: Mucous membranes  are dry.   Oropharynx clear   Eyes: EOM are normal. Pupils are equal, round, and reactive to light. Scleral icterus is present.   Neck: Normal range of motion. Neck supple. No tracheal deviation present.   Cardiovascular: Normal rate, regular rhythm, normal heart sounds and intact distal pulses.   Pulmonary/Chest: Breath sounds normal. No respiratory distress. She has no wheezes. She has no rhonchi. She has no rales.   Abdominal: Soft. Bowel sounds are normal. She exhibits no distension. There is tenderness (generalized). There is no rebound and no guarding.   Musculoskeletal: Normal range of motion. She exhibits no edema or tenderness.   Neurological: She is alert and oriented to person, place, and time. She has normal strength. No cranial nerve deficit or sensory deficit.   Skin: Skin is warm and dry. Capillary refill takes less than 2 seconds.         ED Course   Critical Care  Date/Time: 11/30/2017 12:00 PM  Performed by: ADRIEL LOCO  Authorized by: ADRIEL LOCO   Direct patient critical care time: 15 minutes  Additional history critical care time: 15 minutes  Ordering / reviewing critical care time: 15 minutes  Documentation critical care time: 15 minutes  Consulting other physicians critical care time: 15 minutes  Consult with family critical care time: 10 minutes  Total critical care time (exclusive of procedural time) : 85 minutes  Critical care time was exclusive of separately billable procedures and treating other patients.  Critical care was time spent personally by me on the following activities: examination of patient, evaluation of patient's response to treatment, interpretation of cardiac output measurements, obtaining history from patient or surrogate, ordering and performing treatments and interventions, ordering and review of laboratory studies, ordering and review of radiographic studies, pulse oximetry and re-evaluation of patient's condition.        Labs Reviewed   CBC W/ AUTO  DIFFERENTIAL - Abnormal; Notable for the following:        Result Value    WBC 13.00 (*)     RBC 2.75 (*)     Hemoglobin 9.0 (*)     Hematocrit 26.4 (*)     MCH 32.7 (*)     RDW 20.6 (*)     Platelets 141 (*)     Gran # 11.0 (*)     Gran% 84.8 (*)     Lymph% 8.9 (*)     All other components within normal limits   POCT GLUCOSE - Abnormal; Notable for the following:     POCT Glucose 124 (*)     All other components within normal limits   INFLUENZA A AND B ANTIGEN   ALCOHOL,MEDICAL (ETHANOL)   URINALYSIS   DRUG SCREEN PANEL, URINE EMERGENCY   COMPREHENSIVE METABOLIC PANEL   POCT URINE PREGNANCY   POCT GLUCOSE MONITORING CONTINUOUS     EKG Readings: (Independently Interpreted)   Initial Reading: No STEMI. Previous EKG: Compared with most recent EKG Previous EKG Date: 11/14/16 (Minimal change). Rhythm: Normal Sinus Rhythm. Heart Rate: 73. Ectopy: No Ectopy. Conduction: Normal. ST Segments: Normal ST Segments. T Waves: Normal. Axis: Normal.     Imaging Results          X-Ray Chest AP Portable (Final result)  Result time 11/30/17 12:47:30    Final result by Kane Del Rio DO (11/30/17 12:47:30)                 Impression:        See Above       Electronically signed by: KANE DEL RIO DO  Date:     11/30/17  Time:    12:47              Narrative:    Single portable frontal view of the chest    Comparison: 12/04/2016    Results: Small left upper lobe granuloma unchanged with numerous scattered punctate calcified granulomas throughout the lungs bilaterally again identified. There is no new lung consolidation. There is nonspecific elevation of the right lung base. No large pleural effusion or pneumothorax. Stable subcentimeter sclerotic focus left glenoid..                            I have visualized all imaging for this patient, radiology has done the interpretation.       Medical Decision Making:   History:   Old Medical Records: I decided to obtain old medical records.  Initial Assessment:   The patient is a 47 y.o.  female with hx of: alcohol dependence, alcoholic cirrhosis of liver with ascites, and anxiety that presents to the ED with a complaint of generalized weakness that began couple of days ago. On exam patient noted to have scleral icterus, dry mucous membranes and mild generalized abdominal tenderness. Will order EKG, abdominal and chest X-ray. Will order drug screen panel, ETOH level, lipase, CBC, CMP, Urine pregnancy, UA and swab for flu. Will reevaluate pending results.   Differential Diagnosis:   Dehydration, gastroenteritis, diverticulitis, cholecystitis, pancreatitis, appendicitis, obstruction, constipation, urinary tract infection, pyelonephritis, electrolyte dyscrasia  Independently Interpreted Test(s):   I have ordered and independently interpreted X-rays - see prior notes.  I have ordered and independently interpreted EKG Reading(s) - see prior notes  Clinical Tests:   Lab Tests: Reviewed       <> Summary of Lab: Hyponatremia, hypokalemia, urinary tract infection, leukocytosis, relative anemia, dehydration  ED Management:  Patient given IV fluid.  Given symptomatically management for her pain.  Her potassium was repleted orally.  Given antibiotics for a urinary tract infection and urine culture sent.  Discussed the case with [facility] hospitalist will see and admit the patient.  Patient comfortable with admission at this time.                   ED Course      Clinical Impression:   The primary encounter diagnosis was Acute cystitis without hematuria. Diagnoses of Cough, Abdominal pain, Hyponatremia, Hypokalemia, Dehydration, Syncope, unspecified syncope type, and Alcoholic cirrhosis of liver with ascites were also pertinent to this visit.    Disposition:   Disposition: Placed in Observation  Condition: Stable       I, Dr. Dariel Ye, personally performed the services described in this documentation. All medical record entries made by the scribe were at my direction and in my presence.  I have reviewed the  chart and agree that the record reflects my personal performance and is accurate and complete. Dariel Ye MD.  1:55 AM 12/13/2017                     Dariel Ye MD  12/13/17 0202

## 2017-11-30 NOTE — ED NOTES
Pt presents to the ED w/ c/o of generalized body pain. Pt reports abd pain in her RUQ and RLQ. Pt reports n/v. Pt reports that she was having midsternal chest pain pta but has since resolved. Pt reports back pain. Pt reports she has been having a fever and has been hallucinating things. Pt is orientedx4. Pt reports symptoms have been occurring for the past month.

## 2017-12-01 PROBLEM — I95.9 HYPOTENSION: Status: RESOLVED | Noted: 2017-11-30 | Resolved: 2017-12-01

## 2017-12-01 LAB
ALBUMIN SERPL BCP-MCNC: 1.9 G/DL
ALP SERPL-CCNC: 366 U/L
ALT SERPL W/O P-5'-P-CCNC: 23 U/L
ANION GAP SERPL CALC-SCNC: 10 MMOL/L
ANION GAP SERPL CALC-SCNC: 10 MMOL/L
ANION GAP SERPL CALC-SCNC: 12 MMOL/L
ANION GAP SERPL CALC-SCNC: 13 MMOL/L
ANION GAP SERPL CALC-SCNC: 8 MMOL/L
ANION GAP SERPL CALC-SCNC: 9 MMOL/L
AST SERPL-CCNC: 68 U/L
BILIRUB SERPL-MCNC: 6.1 MG/DL
BUN SERPL-MCNC: 5 MG/DL
BUN SERPL-MCNC: 7 MG/DL
BUN SERPL-MCNC: 7 MG/DL
BUN SERPL-MCNC: 8 MG/DL
CALCIUM SERPL-MCNC: 7.3 MG/DL
CALCIUM SERPL-MCNC: 7.4 MG/DL
CALCIUM SERPL-MCNC: 7.4 MG/DL
CALCIUM SERPL-MCNC: 7.6 MG/DL
CALCIUM SERPL-MCNC: 7.7 MG/DL
CALCIUM SERPL-MCNC: 7.9 MG/DL
CHLORIDE SERPL-SCNC: 90 MMOL/L
CHLORIDE SERPL-SCNC: 91 MMOL/L
CHLORIDE SERPL-SCNC: 92 MMOL/L
CHLORIDE SERPL-SCNC: 92 MMOL/L
CHLORIDE SERPL-SCNC: 93 MMOL/L
CHLORIDE SERPL-SCNC: 95 MMOL/L
CO2 SERPL-SCNC: 20 MMOL/L
CO2 SERPL-SCNC: 21 MMOL/L
CO2 SERPL-SCNC: 23 MMOL/L
CO2 SERPL-SCNC: 26 MMOL/L
CO2 SERPL-SCNC: 27 MMOL/L
CO2 SERPL-SCNC: 29 MMOL/L
CREAT SERPL-MCNC: 0.5 MG/DL
CREAT SERPL-MCNC: 0.6 MG/DL
ERYTHROCYTE [DISTWIDTH] IN BLOOD BY AUTOMATED COUNT: 20.9 %
EST. GFR  (AFRICAN AMERICAN): >60 ML/MIN/1.73 M^2
EST. GFR  (NON AFRICAN AMERICAN): >60 ML/MIN/1.73 M^2
GLUCOSE SERPL-MCNC: 102 MG/DL
GLUCOSE SERPL-MCNC: 116 MG/DL
GLUCOSE SERPL-MCNC: 119 MG/DL
GLUCOSE SERPL-MCNC: 154 MG/DL
GLUCOSE SERPL-MCNC: 87 MG/DL
GLUCOSE SERPL-MCNC: 96 MG/DL
HCT VFR BLD AUTO: 23.7 %
HGB BLD-MCNC: 7.9 G/DL
MAGNESIUM SERPL-MCNC: 2 MG/DL
MCH RBC QN AUTO: 32.9 PG
MCHC RBC AUTO-ENTMCNC: 33.3 G/DL
MCV RBC AUTO: 99 FL
PLATELET # BLD AUTO: 125 K/UL
PMV BLD AUTO: 10.9 FL
POTASSIUM SERPL-SCNC: 3.2 MMOL/L
POTASSIUM SERPL-SCNC: 3.2 MMOL/L
POTASSIUM SERPL-SCNC: 3.3 MMOL/L
POTASSIUM SERPL-SCNC: 4.1 MMOL/L
POTASSIUM SERPL-SCNC: 4.5 MMOL/L
POTASSIUM SERPL-SCNC: 5 MMOL/L
PROT SERPL-MCNC: 5.4 G/DL
RBC # BLD AUTO: 2.4 M/UL
SODIUM SERPL-SCNC: 125 MMOL/L
SODIUM SERPL-SCNC: 126 MMOL/L
SODIUM SERPL-SCNC: 127 MMOL/L
SODIUM SERPL-SCNC: 127 MMOL/L
SODIUM SERPL-SCNC: 128 MMOL/L
SODIUM SERPL-SCNC: 128 MMOL/L
WBC # BLD AUTO: 9.66 K/UL

## 2017-12-01 PROCEDURE — 80053 COMPREHEN METABOLIC PANEL: CPT

## 2017-12-01 PROCEDURE — 63600175 PHARM REV CODE 636 W HCPCS: Performed by: HOSPITALIST

## 2017-12-01 PROCEDURE — 83735 ASSAY OF MAGNESIUM: CPT

## 2017-12-01 PROCEDURE — 80048 BASIC METABOLIC PNL TOTAL CA: CPT

## 2017-12-01 PROCEDURE — 25000003 PHARM REV CODE 250: Performed by: NURSE PRACTITIONER

## 2017-12-01 PROCEDURE — 25000003 PHARM REV CODE 250: Performed by: STUDENT IN AN ORGANIZED HEALTH CARE EDUCATION/TRAINING PROGRAM

## 2017-12-01 PROCEDURE — 36415 COLL VENOUS BLD VENIPUNCTURE: CPT

## 2017-12-01 PROCEDURE — S4991 NICOTINE PATCH NONLEGEND: HCPCS | Performed by: NURSE PRACTITIONER

## 2017-12-01 PROCEDURE — 27000221 HC OXYGEN, UP TO 24 HOURS

## 2017-12-01 PROCEDURE — 21400001 HC TELEMETRY ROOM

## 2017-12-01 PROCEDURE — 25000003 PHARM REV CODE 250: Performed by: HOSPITALIST

## 2017-12-01 PROCEDURE — 85027 COMPLETE CBC AUTOMATED: CPT

## 2017-12-01 PROCEDURE — 94761 N-INVAS EAR/PLS OXIMETRY MLT: CPT

## 2017-12-01 RX ORDER — RAMELTEON 8 MG/1
8 TABLET ORAL NIGHTLY PRN
Status: DISCONTINUED | OUTPATIENT
Start: 2017-12-01 | End: 2017-12-02 | Stop reason: HOSPADM

## 2017-12-01 RX ORDER — BISACODYL 5 MG
10 TABLET, DELAYED RELEASE (ENTERIC COATED) ORAL ONCE
Status: COMPLETED | OUTPATIENT
Start: 2017-12-01 | End: 2017-12-01

## 2017-12-01 RX ORDER — POTASSIUM CHLORIDE 20 MEQ/15ML
60 SOLUTION ORAL ONCE
Status: COMPLETED | OUTPATIENT
Start: 2017-12-01 | End: 2017-12-01

## 2017-12-01 RX ORDER — SIMETHICONE 125 MG
125 TABLET,CHEWABLE ORAL 4 TIMES DAILY
Status: DISCONTINUED | OUTPATIENT
Start: 2017-12-01 | End: 2017-12-02 | Stop reason: HOSPADM

## 2017-12-01 RX ORDER — MIDODRINE HYDROCHLORIDE 5 MG/1
10 TABLET ORAL
Status: DISCONTINUED | OUTPATIENT
Start: 2017-12-01 | End: 2017-12-02 | Stop reason: HOSPADM

## 2017-12-01 RX ADMIN — NICOTINE 1 PATCH: 21 PATCH, EXTENDED RELEASE TRANSDERMAL at 09:12

## 2017-12-01 RX ADMIN — BISACODYL 10 MG: 5 TABLET, DELAYED RELEASE ORAL at 12:12

## 2017-12-01 RX ADMIN — SODIUM CHLORIDE, SODIUM LACTATE, POTASSIUM CHLORIDE, AND CALCIUM CHLORIDE 500 ML: .6; .31; .03; .02 INJECTION, SOLUTION INTRAVENOUS at 12:12

## 2017-12-01 RX ADMIN — NYSTATIN 500000 UNITS: 500000 SUSPENSION ORAL at 12:12

## 2017-12-01 RX ADMIN — LACTULOSE 15 G: 20 SOLUTION ORAL at 09:12

## 2017-12-01 RX ADMIN — HYDROCODONE BITARTRATE AND ACETAMINOPHEN 1 TABLET: 10; 325 TABLET ORAL at 04:12

## 2017-12-01 RX ADMIN — SIMETHICONE 125 MG: 125 TABLET, CHEWABLE ORAL at 10:12

## 2017-12-01 RX ADMIN — NYSTATIN 500000 UNITS: 500000 SUSPENSION ORAL at 08:12

## 2017-12-01 RX ADMIN — CEFTRIAXONE SODIUM 1 G: 1 INJECTION, POWDER, FOR SOLUTION INTRAMUSCULAR; INTRAVENOUS at 06:12

## 2017-12-01 RX ADMIN — LEVOTHYROXINE SODIUM 50 MCG: 50 TABLET ORAL at 06:12

## 2017-12-01 RX ADMIN — SODIUM CHLORIDE: 0.9 INJECTION, SOLUTION INTRAVENOUS at 06:12

## 2017-12-01 RX ADMIN — SIMETHICONE 125 MG: 125 TABLET, CHEWABLE ORAL at 12:12

## 2017-12-01 RX ADMIN — MIDODRINE HYDROCHLORIDE 10 MG: 5 TABLET ORAL at 05:12

## 2017-12-01 RX ADMIN — MIDODRINE HYDROCHLORIDE 10 MG: 5 TABLET ORAL at 08:12

## 2017-12-01 RX ADMIN — ACETAMINOPHEN 500 MG: 500 TABLET ORAL at 10:12

## 2017-12-01 RX ADMIN — ACETAMINOPHEN 500 MG: 500 TABLET ORAL at 01:12

## 2017-12-01 RX ADMIN — SODIUM CHLORIDE 500 ML: 0.9 INJECTION, SOLUTION INTRAVENOUS at 04:12

## 2017-12-01 RX ADMIN — MIDODRINE HYDROCHLORIDE 10 MG: 5 TABLET ORAL at 12:12

## 2017-12-01 RX ADMIN — HYDROCODONE BITARTRATE AND ACETAMINOPHEN 1 TABLET: 10; 325 TABLET ORAL at 03:12

## 2017-12-01 RX ADMIN — RAMELTEON 8 MG: 8 TABLET, FILM COATED ORAL at 10:12

## 2017-12-01 RX ADMIN — SIMETHICONE 125 MG: 125 TABLET, CHEWABLE ORAL at 05:12

## 2017-12-01 RX ADMIN — POTASSIUM CHLORIDE 60 MEQ: 20 SOLUTION ORAL at 09:12

## 2017-12-01 RX ADMIN — LACTULOSE 15 G: 20 SOLUTION ORAL at 08:12

## 2017-12-01 RX ADMIN — NYSTATIN 500000 UNITS: 500000 SUSPENSION ORAL at 05:12

## 2017-12-01 NOTE — EICU
Notified of low BP    46 y/o F alcoholic liver cirrhosis presented with generalized weakness with previous nausea and vomiting and poor oral intake, continued to take furosemide every other day and levothyroxine     11/30/2017 12:51 11/30/2017 20:57   Sodium 120 (L) 124 (L)   Potassium <2.0 (LL) 3.4 (L)   Chloride 74 (LL) 82 (L)   CO2 36 (H) 28   Anion Gap 10 14   BUN, Bld 10 10   Creatinine 0.6 0.7   eGFR if non African American >60 >60   eGFR if African American >60 >60   Glucose 114 (H) 103   Calcium 7.3 (L) 7.7 (L)       Camera assessment: appears dry  CXR clear lungs    · Bolus 500 cc LRS  · Check TSH, Ft3, Ft4  · Lactulose to titrate to 1-2 BM /day    Addendum:  BP improved to 94/63 (BP in clinic note )

## 2017-12-01 NOTE — ASSESSMENT & PLAN NOTE
Alcohol dependence in remission  Portal hypertensive gastropathy  Takes furosemide every other day or even less frequently.  Hold for now.  When restarting, should take spironolactone to avoid hypokalemia.  More decompensated with MELD score 26 on admission.

## 2017-12-01 NOTE — ASSESSMENT & PLAN NOTE
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  Continue ceftriaxone.  Follow up urine culture results.

## 2017-12-01 NOTE — ASSESSMENT & PLAN NOTE
Appears dehydrated.  Give another 2 liters of IV saline overnight.  IV albumin given to avoid third spacing.

## 2017-12-01 NOTE — PLAN OF CARE
Problem: Patient Care Overview  Goal: Plan of Care Review  Outcome: Ongoing (interventions implemented as appropriate)  Patient on oxygen with documented flow of 4 lpm.  Will attempt to wean per O2 order protocol. Will continue to monitor.

## 2017-12-01 NOTE — SUBJECTIVE & OBJECTIVE
Interval History: Abdomen feels bigger.  Passing flatus.  Still constipated.    Review of Systems   Constitutional: Negative for chills and fever.   Respiratory: Negative for cough and shortness of breath.    Gastrointestinal: Positive for abdominal distention.     Objective:     Vital Signs (Most Recent):  Temp: 97.5 °F (36.4 °C) (12/01/17 0846)  Pulse: 77 (12/01/17 0815)  Resp: 20 (12/01/17 0815)  BP: (!) 81/54 (12/01/17 0810)  SpO2: (!) 89 % (12/01/17 0815) Vital Signs (24h Range):  Temp:  [97.5 °F (36.4 °C)-101.6 °F (38.7 °C)] 97.5 °F (36.4 °C)  Pulse:  [68-99] 77  Resp:  [12-38] 20  SpO2:  [80 %-98 %] 89 %  BP: (59-99)/(38-63) 81/54     Weight: 51.3 kg (113 lb 1.5 oz)  Body mass index is 21.37 kg/m².    Intake/Output Summary (Last 24 hours) at 12/01/17 1124  Last data filed at 12/01/17 0625   Gross per 24 hour   Intake          3020.83 ml   Output              650 ml   Net          2370.83 ml      Physical Exam   Constitutional: She is oriented to person, place, and time. She appears well-developed. No distress.   Eyes: Scleral icterus is present.   Cardiovascular: Normal rate and regular rhythm.    Pulmonary/Chest: Effort normal. No respiratory distress.   Abdominal: Soft. She exhibits distension. There is no tenderness. There is no guarding.   Neurological: She is alert and oriented to person, place, and time.   Psychiatric: She has a normal mood and affect.   Nursing note and vitals reviewed.      Significant Labs:   CBC:   Recent Labs  Lab 11/30/17  1133 12/01/17  0439   WBC 13.00* 9.66   HGB 9.0* 7.9*   HCT 26.4* 23.7*   * 125*     CMP:   Recent Labs  Lab 11/30/17  1251  12/01/17  0439 12/01/17  0706 12/01/17  0857   *  < > 127* 127* 128*   K <2.0*  < > 3.3* 3.2* 3.2*   CL 74*  < > 90* 92* 91*   CO2 36*  < > 29 26 27   *  < > 119* 154* 116*   BUN 10  < > 8 8 8   CREATININE 0.6  < > 0.6 0.6 0.6   CALCIUM 7.3*  < > 7.4* 7.3* 7.4*   PROT 5.6*  --  5.4*  --   --    ALBUMIN 1.9*  --  1.9*   --   --    BILITOT 5.8*  --  6.1*  --   --    ALKPHOS 327*  --  366*  --   --    AST 43*  --  68*  --   --    ALT 20  --  23  --   --    ANIONGAP 10  < > 8 9 10   EGFRNONAA >60  < > >60 >60 >60   < > = values in this interval not displayed.  All pertinent labs within the past 24 hours have been reviewed.    Significant Imaging: I have reviewed all pertinent imaging results/findings within the past 24 hours.

## 2017-12-01 NOTE — PLAN OF CARE
Problem: Patient Care Overview  Goal: Plan of Care Review  Outcome: Ongoing (interventions implemented as appropriate)  Patient is continuous NS fluids, now on medications to increase pt;s BP and tolerating well, no signs of distress noted, denies any concerns, orders to transfer pt placed, will transfer pt when a bed becomes available. Pt is AAO and resting will in bed.

## 2017-12-01 NOTE — HPI
Annette Esparza is a 47 y.o.  woman with alcoholic cirrhosis with ascites and portal hypertension, iron deficiency anemia with history of blood transfusions, alcohol dependence in remission, cigarette nicotine dependence with centrilobular emphysema, functional constipation, and mood disorder.  Her primary care physician is Dr. Endy Pfeiffer.  Her gastroenterologist is Dr. Maritza García.  They are both at [facility] in [place], [place].  She lives in [place], [place] near [facility], over 50 miles away from there.  She worked as a  but is now disabled.  She has support from her parents, 2 sisters, and a brother.  She has 3 children in their 20s.   Her blood pressure in Dr. Pfeiffer's clinic on 8/23/17 was 119/74.     She presented to [facility] ED on 11/30/17 with a couple of days of worsening generalized weakness with inability to stand.  She also had lower abdominal pain and back pain for a couple of weeks, nausea and vomiting a week prior that resolved, lack of appetite and obstipation since then, subjective fever, upper chest pain, cough, and difficulty urinating for the past week.  Her son also reported confusion and hallucinations.  She was no longer taking midodrine, spironolactone, lactulose, mirtazapine, bupropion, or fluticasone-vilanterol, which were on her medication list on the electronic medical records.  She was only taking furosemide every other day, levothyroxine, and hydrocodone-acetaminophen twice a day.     Initial blood pressure was 88/52 and as low as 59/46.  Contrast CT suggested small bowel wall thickening in the left upper quadrant.  Labs showed leukocytosis (WBC 13,000), hemoglobin 9.0 and hematocrit 26.4 (compared to 11.0 and 33.4 on 5/26/17), hyponatremia (sodium 120), severe hypokalemia (less than 2), hypochloremia (74), metabolic alkalosis (bicarbonate 36), worsened hypoalbuminemia (1.9 compared to 3.2 on  6/16/17), and worsened hyperbilirubinemia (5.8 compared to 1.3 on 6/16/17).  Renal function was normal with a BUN of 10 and creatinine of 0.6.  MELD score was 26 due to her hyperbilirubinemia and hyponatremia.  Drug screen was only positive for opiates, appropriate because she takes chronic hydrocodone-acetaminophen.  Ammonia was normal.  Influenza A and B antigens were negative.  Urinalysis showed high urobilinogen, 50 WBC/hpf, and many bacteria.  She was given ketorolac for her pain, potassium chloride, IV normal saline, haloperidol, ondansetron, and ceftriaxone.  She was admitted to [facility].   On physical exam, she was noted to have white plaques at the back of her upper palate.  Magnesium was checked and was also low.  IV albumin was given to prevent third spacing due to her very low albumin.

## 2017-12-01 NOTE — ASSESSMENT & PLAN NOTE
Ammonia low but had hallucinations.  May be infectious encephalopathy.  Regardless, give lactulose because she has not had a bowel movement in a week.

## 2017-12-01 NOTE — H&P
[facility]  History & Physical    Patient Name: Annette Esparza  MRN: [id number]  Admission Date: 11/30/2017  Attending Physician: Nicolas Shelton MD  Primary Care Provider: Endy Pfeiffer MD         Patient information was obtained from patient, relative(s), past medical records and ER records.     Subjective:     Principal Problem:Hypokalemia    Chief Complaint:   Chief Complaint   Patient presents with    Weakness     near sycope with bodyaches.        HPI: Annette Esparza is a 47 y.o.  woman with alcoholic cirrhosis with ascites and portal hypertension, iron deficiency anemia with history of blood transfusions, alcohol dependence in remission, cigarette nicotine dependence with centrilobular emphysema, functional constipation, and mood disorder.  Her primary care physician is Dr. Endy Pfeiffer.  Her gastroenterologist is Dr. Maritza García.  They are both at [facility] in [place], [place].  She lives in [place], [place] near [facility], over 50 miles away from there.  She worked as a  but is now disabled.  She has support from her parents, 2 sisters, and a brother.  She has 3 children in their 20s.   Her blood pressure in Dr. Pfeiffer's clinic on 8/23/17 was 119/74.     She presented to [facility] ED on 11/30/17 with a couple of days of worsening generalized weakness with inability to stand.  She also had lower abdominal pain and back pain for a couple of weeks, nausea and vomiting a week prior that resolved, lack of appetite and obstipation since then, subjective fever, upper chest pain, cough, and difficulty urinating for the past week.  Her son also reported confusion and hallucinations.  She was no longer taking midodrine, spironolactone, lactulose, mirtazapine, bupropion, or fluticasone-vilanterol, which were on her medication list on the electronic medical records.  She was only  taking furosemide every other day, levothyroxine, and hydrocodone-acetaminophen twice a day.     Initial blood pressure was 88/52 and as low as 59/46.  Contrast CT suggested small bowel wall thickening in the left upper quadrant.  Labs showed leukocytosis (WBC 13,000), hemoglobin 9.0 and hematocrit 26.4 (compared to 11.0 and 33.4 on 5/26/17), hyponatremia (sodium 120), severe hypokalemia (less than 2), hypochloremia (74), metabolic alkalosis (bicarbonate 36), worsened hypoalbuminemia (1.9 compared to 3.2 on 6/16/17), and worsened hyperbilirubinemia (5.8 compared to 1.3 on 6/16/17).  Renal function was normal with a BUN of 10 and creatinine of 0.6.  MELD score was 26 due to her hyperbilirubinemia and hyponatremia.  Drug screen was only positive for opiates, appropriate because she takes chronic hydrocodone-acetaminophen.  Ammonia was normal.  Influenza A and B antigens were negative.  Urinalysis showed high urobilinogen, 50 WBC/hpf, and many bacteria.  She was given ketorolac for her pain, potassium chloride, IV normal saline, haloperidol, ondansetron, and ceftriaxone.  She was admitted to [facility].   On physical exam, she was noted to have white plaques at the back of her upper palate.  Magnesium was checked and was also low.  IV albumin was given to prevent third spacing due to her very low albumin.    Past Medical History:   Diagnosis Date    Acute hypoxemic respiratory failure 12/1/2016    ANDREW (acute kidney injury) 11/28/2016    Alcohol dependence in remission     Alcoholic cirrhosis of liver with ascites 10/22/2015    Anxiety     Ascites due to alcoholic cirrhosis 6/13/2016    Centrilobular emphysema 12/12/2016    Cigarette nicotine dependence without complication 6/2/2016    Folate deficiency 10/22/2015    Gallstones     Hepatorenal syndrome 11/29/2016    Hyperbilirubinemia 6/13/2016    Hyponatremia 11/29/2016    Iron deficiency anemia due to chronic blood loss 10/22/2015     Portal hypertensive gastropathy 10/22/2015    Subclinical hypothyroidism 10/22/2015       Past Surgical History:   Procedure Laterality Date    ADENOIDECTOMY      APPENDECTOMY       SECTION      COLONOSCOPY      HYSTERECTOMY      25 yrs old    LIVER BIOPSY      OOPHORECTOMY Left     25 yrs old    OOPHORECTOMY Right     29 yrs old    TONSILLECTOMY      TUBAL LIGATION      UPPER GASTROINTESTINAL ENDOSCOPY         Review of patient's allergies indicates:   Allergen Reactions    Morphine Nausea And Vomiting       No current facility-administered medications on file prior to encounter.      Current Outpatient Prescriptions on File Prior to Encounter   Medication Sig    alprazolam (XANAX) 0.25 MG tablet Take 1 tablet (0.25 mg total) by mouth 3 (three) times daily as needed for Anxiety.    furosemide (LASIX) 40 MG tablet Take 40 mg by mouth every other day.     hydrocodone-acetaminophen 10-325mg (NORCO)  mg Tab Take 1 tablet by mouth every 8 (eight) hours as needed for Pain. (Patient taking differently: Take 1 tablet by mouth every 12 (twelve) hours as needed for Pain. )    levothyroxine (SYNTHROID) 50 MCG tablet Take 50 mcg by mouth before breakfast.     [DISCONTINUED] ascorbic acid (VITAMIN C) 100 MG tablet Take 100 mg by mouth once daily.    [DISCONTINUED] buPROPion (WELLBUTRIN SR) 150 MG TBSR 12 hr tablet Take 1 tablet (150 mg total) by mouth every morning.    [DISCONTINUED] ergocalciferol (ERGOCALCIFEROL) 50,000 unit Cap Take 1 capsule (50,000 Units total) by mouth every 7 days.    [DISCONTINUED] fluticasone-vilanterol (BREO) 200-25 mcg/dose DsDv diskus inhaler Inhale 1 puff into the lungs once daily. Controller    [DISCONTINUED] lactulose (CHRONULAC) 10 gram/15 mL solution Take 10 g by mouth once daily.    [DISCONTINUED] midodrine (PROAMATINE) 10 MG tablet Take 10 mg by mouth once daily.    [DISCONTINUED] mirtazapine (REMERON) 30 MG tablet Take 1 tablet (30 mg total) by mouth  every evening.    [DISCONTINUED] ondansetron (ZOFRAN-ODT) 8 MG TbDL Take 1 tablet (8 mg total) by mouth every 8 (eight) hours as needed.    [DISCONTINUED] spironolactone (ALDACTONE) 50 MG tablet Take 50 mg by mouth once daily.     Family History     Problem Relation (Age of Onset)    Cancer Mother    No Known Problems Sister, Brother    Rheum arthritis Father        Social History Main Topics    Smoking status: Current Every Day Smoker     Packs/day: 1.00     Years: 35.00     Types: Cigarettes    Smokeless tobacco: Never Used    Alcohol use No      Comment: hx etoh-quit 8/2015    Drug use: No    Sexual activity: Yes     Partners: Male     Review of Systems   Constitutional: Positive for appetite change and fever.   HENT: Negative for sore throat and trouble swallowing.    Eyes: Negative for pain and redness.   Respiratory: Negative for chest tightness and wheezing.    Cardiovascular: Negative for chest pain and leg swelling.   Gastrointestinal: Positive for abdominal pain, constipation and nausea. Negative for diarrhea.   Genitourinary: Negative for hematuria and pelvic pain.   Musculoskeletal: Negative for neck pain and neck stiffness.   Skin: Negative for rash and wound.   Neurological: Negative for seizures and syncope.   Psychiatric/Behavioral: Positive for hallucinations.     Objective:     Vital Signs (Most Recent):  Temp: 97.8 °F (36.6 °C) (11/30/17 1407)  Pulse: 75 (11/30/17 1655)  Resp: 13 (11/30/17 1655)  BP: (!) 85/53 (11/30/17 1655)  SpO2: (!) 93 % (11/30/17 1655) Vital Signs (24h Range):  Temp:  [97.8 °F (36.6 °C)-98.1 °F (36.7 °C)] 97.8 °F (36.6 °C)  Pulse:  [68-80] 75  Resp:  [12-18] 13  SpO2:  [93 %-99 %] 93 %  BP: (59-90)/(45-55) 85/53     Weight: 47.6 kg (105 lb)  Body mass index is 20.51 kg/m².    Physical Exam   Constitutional: She is oriented to person, place, and time. She appears well-developed. No distress.   HENT:   Head: Normocephalic and atraumatic.   White spots on upper palate  (last meal was a Tic Tac)   Eyes: Right eye exhibits no discharge. Left eye exhibits no discharge. Scleral icterus is present.   Neck: Neck supple. No tracheal deviation present.   Cardiovascular: Normal rate and regular rhythm.    Pulmonary/Chest: Effort normal. No respiratory distress.   Abdominal: Soft. There is no rebound and no guarding.   Musculoskeletal: She exhibits no edema or tenderness.   Neurological: She is alert and oriented to person, place, and time.   Skin: Skin is warm and dry.   Psychiatric: She has a normal mood and affect.   Nursing note and vitals reviewed.          Significant Labs: All pertinent labs within the past 24 hours have been reviewed.    Significant Imaging: I have reviewed all pertinent imaging results/findings within the past 24 hours.   X-Ray Chest AP Portable 11/30/17: Small left upper lobe granuloma unchanged with numerous scattered punctate calcified granulomas throughout the lungs bilaterally again identified. There is no new lung consolidation. There is nonspecific elevation of the right lung base. No large pleural effusion or pneumothorax. Stable subcentimeter sclerotic focus left glenoid.  CT Abdomen Pelvis With Contrast 11/30/17:   There is atelectasis at bilateral lung bases greater on the left than right.  There is inhomogeneous attenuation pattern seen throughout the hepatic parenchyma.  There is perihepatic free fluid.  The inhomogeneous attenuation pattern, particularly within the right lobe the liver has progressed well seen in 2016.  Splenomegaly.  The adrenal glands, and pancreas are unremarkable.  There is cholelithiasis.  There is no evidence of bowel obstruction.  There is an inhomogeneous pattern of perfusion involving both kidneys most pronounced on the delayed phase.  There is a suggestion of bowel wall thickening involving small bowel loops in left upper quadrant of the abdomen.  Impression:  1.  There is an inhomogeneous attenuation pattern of the liver  this pronounced in the right lobe liver.  This finding has progressed from was seen in 2016.  While this may be due to cirrhosis infiltrative hepatic process such as malignancy cannot be excluded.  2.  There is an inhomogeneous attenuation pattern the kidneys this pronounced on delayed phase of imaging.  This may be seen with nephritis however identified a process such as malignancy cannot be excluded.  3.  There is a suggestion of small bowel wall thickening involving the small bowel loops the left upper quadrant.  This may be due to inflammatory, infectious or malignant etiologies.    Assessment/Plan:     * Hypokalemia    Hypomagnesemia  Due to vomiting a week ago, almost no oral intake, and loop diuretic use.  Giving potassium chloride and magnesium sulfate.  Monitor.          Enteritis    Already giving ceftriaxone for UTI.  If she worsens, add metronidazole.          Hypotension    Restart midodrine.  Give fluids.          Oral thrush    Give nystatin swish and swallow.          Hepatic encephalopathy    Ammonia low but had hallucinations.  May be infectious encephalopathy.  Regardless, give lactulose because she has not had a bowel movement in a week.          Bacterial UTI    Has had fluoroquinolone-resistant E coli in the past.  Continue ceftriaxone.  Follow up urine culture results.          Centrilobular emphysema    Cigarette nicotine dependence without complication  No longer taking fluticasone-vilanterol.          Hyponatremia    Appears dehydrated.  Give another 2 liters of IV saline overnight.  IV albumin given to avoid third spacing.          Anxiety    No longer taking medications.          Alcoholic cirrhosis of liver with ascites    Alcohol dependence in remission  Portal hypertensive gastropathy  Takes furosemide every other day or even less frequently.  Hold for now.  When restarting, should take spironolactone to avoid hypokalemia.  More decompensated with MELD score 26 on admission.           Iron deficiency anemia due to chronic blood loss    Monitor blood counts.  No indication for transfusion at this time.            VTE Risk Mitigation     None             Nicolas Shelton MD  Department of Hospital Medicine   [facility]

## 2017-12-01 NOTE — ASSESSMENT & PLAN NOTE
Due to vomiting a week ago, almost no oral intake, and loop diuretic use.  Improved.  Give more potassium.

## 2017-12-01 NOTE — SUBJECTIVE & OBJECTIVE
Past Medical History:   Diagnosis Date    Acute hypoxemic respiratory failure 2016    ANDREW (acute kidney injury) 2016    Alcohol dependence in remission     Alcoholic cirrhosis of liver with ascites 10/22/2015    Anxiety     Ascites due to alcoholic cirrhosis 2016    Centrilobular emphysema 2016    Cigarette nicotine dependence without complication 2016    Folate deficiency 10/22/2015    Gallstones     Hepatorenal syndrome 2016    Hyperbilirubinemia 2016    Hyponatremia 2016    Iron deficiency anemia due to chronic blood loss 10/22/2015    Portal hypertensive gastropathy 10/22/2015    Subclinical hypothyroidism 10/22/2015       Past Surgical History:   Procedure Laterality Date    ADENOIDECTOMY      APPENDECTOMY       SECTION      COLONOSCOPY      HYSTERECTOMY      25 yrs old    LIVER BIOPSY      OOPHORECTOMY Left     25 yrs old    OOPHORECTOMY Right     29 yrs old    TONSILLECTOMY      TUBAL LIGATION      UPPER GASTROINTESTINAL ENDOSCOPY         Review of patient's allergies indicates:   Allergen Reactions    Morphine Nausea And Vomiting       No current facility-administered medications on file prior to encounter.      Current Outpatient Prescriptions on File Prior to Encounter   Medication Sig    alprazolam (XANAX) 0.25 MG tablet Take 1 tablet (0.25 mg total) by mouth 3 (three) times daily as needed for Anxiety.    furosemide (LASIX) 40 MG tablet Take 40 mg by mouth every other day.     hydrocodone-acetaminophen 10-325mg (NORCO)  mg Tab Take 1 tablet by mouth every 8 (eight) hours as needed for Pain. (Patient taking differently: Take 1 tablet by mouth every 12 (twelve) hours as needed for Pain. )    levothyroxine (SYNTHROID) 50 MCG tablet Take 50 mcg by mouth before breakfast.     [DISCONTINUED] ascorbic acid (VITAMIN C) 100 MG tablet Take 100 mg by mouth once daily.    [DISCONTINUED] buPROPion (WELLBUTRIN SR) 150 MG TBSR 12  hr tablet Take 1 tablet (150 mg total) by mouth every morning.    [DISCONTINUED] ergocalciferol (ERGOCALCIFEROL) 50,000 unit Cap Take 1 capsule (50,000 Units total) by mouth every 7 days.    [DISCONTINUED] fluticasone-vilanterol (BREO) 200-25 mcg/dose DsDv diskus inhaler Inhale 1 puff into the lungs once daily. Controller    [DISCONTINUED] lactulose (CHRONULAC) 10 gram/15 mL solution Take 10 g by mouth once daily.    [DISCONTINUED] midodrine (PROAMATINE) 10 MG tablet Take 10 mg by mouth once daily.    [DISCONTINUED] mirtazapine (REMERON) 30 MG tablet Take 1 tablet (30 mg total) by mouth every evening.    [DISCONTINUED] ondansetron (ZOFRAN-ODT) 8 MG TbDL Take 1 tablet (8 mg total) by mouth every 8 (eight) hours as needed.    [DISCONTINUED] spironolactone (ALDACTONE) 50 MG tablet Take 50 mg by mouth once daily.     Family History     Problem Relation (Age of Onset)    Cancer Mother    No Known Problems Sister, Brother    Rheum arthritis Father        Social History Main Topics    Smoking status: Current Every Day Smoker     Packs/day: 1.00     Years: 35.00     Types: Cigarettes    Smokeless tobacco: Never Used    Alcohol use No      Comment: hx etoh-quit 8/2015    Drug use: No    Sexual activity: Yes     Partners: Male     Review of Systems   Constitutional: Positive for appetite change and fever.   HENT: Negative for sore throat and trouble swallowing.    Eyes: Negative for pain and redness.   Respiratory: Negative for chest tightness and wheezing.    Cardiovascular: Negative for chest pain and leg swelling.   Gastrointestinal: Positive for abdominal pain, constipation and nausea. Negative for diarrhea.   Genitourinary: Negative for hematuria and pelvic pain.   Musculoskeletal: Negative for neck pain and neck stiffness.   Skin: Negative for rash and wound.   Neurological: Negative for seizures and syncope.   Psychiatric/Behavioral: Positive for hallucinations.     Objective:     Vital Signs (Most  Recent):  Temp: 97.8 °F (36.6 °C) (11/30/17 1407)  Pulse: 75 (11/30/17 1655)  Resp: 13 (11/30/17 1655)  BP: (!) 85/53 (11/30/17 1655)  SpO2: (!) 93 % (11/30/17 1655) Vital Signs (24h Range):  Temp:  [97.8 °F (36.6 °C)-98.1 °F (36.7 °C)] 97.8 °F (36.6 °C)  Pulse:  [68-80] 75  Resp:  [12-18] 13  SpO2:  [93 %-99 %] 93 %  BP: (59-90)/(45-55) 85/53     Weight: 47.6 kg (105 lb)  Body mass index is 20.51 kg/m².    Physical Exam   Constitutional: She is oriented to person, place, and time. She appears well-developed. No distress.   HENT:   Head: Normocephalic and atraumatic.   White spots on upper palate (last meal was a Tic Tac)   Eyes: Right eye exhibits no discharge. Left eye exhibits no discharge. Scleral icterus is present.   Neck: Neck supple. No tracheal deviation present.   Cardiovascular: Normal rate and regular rhythm.    Pulmonary/Chest: Effort normal. No respiratory distress.   Abdominal: Soft. There is no rebound and no guarding.   Musculoskeletal: She exhibits no edema or tenderness.   Neurological: She is alert and oriented to person, place, and time.   Skin: Skin is warm and dry.   Psychiatric: She has a normal mood and affect.   Nursing note and vitals reviewed.          Significant Labs: All pertinent labs within the past 24 hours have been reviewed.    Significant Imaging: I have reviewed all pertinent imaging results/findings within the past 24 hours.   X-Ray Chest AP Portable 11/30/17: Small left upper lobe granuloma unchanged with numerous scattered punctate calcified granulomas throughout the lungs bilaterally again identified. There is no new lung consolidation. There is nonspecific elevation of the right lung base. No large pleural effusion or pneumothorax. Stable subcentimeter sclerotic focus left glenoid.  CT Abdomen Pelvis With Contrast 11/30/17:   There is atelectasis at bilateral lung bases greater on the left than right.  There is inhomogeneous attenuation pattern seen throughout the hepatic  parenchyma.  There is perihepatic free fluid.  The inhomogeneous attenuation pattern, particularly within the right lobe the liver has progressed well seen in 2016.  Splenomegaly.  The adrenal glands, and pancreas are unremarkable.  There is cholelithiasis.  There is no evidence of bowel obstruction.  There is an inhomogeneous pattern of perfusion involving both kidneys most pronounced on the delayed phase.  There is a suggestion of bowel wall thickening involving small bowel loops in left upper quadrant of the abdomen.  Impression:  1.  There is an inhomogeneous attenuation pattern of the liver this pronounced in the right lobe liver.  This finding has progressed from was seen in 2016.  While this may be due to cirrhosis infiltrative hepatic process such as malignancy cannot be excluded.  2.  There is an inhomogeneous attenuation pattern the kidneys this pronounced on delayed phase of imaging.  This may be seen with nephritis however identified a process such as malignancy cannot be excluded.  3.  There is a suggestion of small bowel wall thickening involving the small bowel loops the left upper quadrant.  This may be due to inflammatory, infectious or malignant etiologies.

## 2017-12-01 NOTE — PLAN OF CARE
Problem: Patient Care Overview  Goal: Plan of Care Review  Outcome: Ongoing (interventions implemented as appropriate)  Patient on oxygen with documented flow.  Will attempt to wean per O2 order protocol.  Will continue to monitor.

## 2017-12-01 NOTE — PHYSICIAN QUERY
PT Name: Annette sEparza  MR #: [id number]    Physician Query Form -Systemic Infectious Process Clarification     CDS: Shanda Trinidad RN CCDS                  Contact Information: [email or web address]    This form is a permanent document in the medical record.     Query Date: December 1, 2017     By submitting this query, we are merely seeking further clarification of documentation. Please utilize your independent clinical judgment when addressing the question(s) below.    The Medical record contains the following:     Indicators   Supporting Clinical Findings   Location in Medical Record   X HR RR BP Temp BP= 79/52, 83/51, 85/53  RR= 23- 28 VS flow sheet 12/1    Lactic Acid             Procalcitonin     X WBC                Bands                     CRP WBC= 37556 LAB 11/30    Culture(s)      AMS, Confusion, LOC, etc.      Organ Dysfunction / Failure      Bacteremia or Sepsis / Septic     X Known or Suspected Source of Infection documented Enteritis, UTI, oral thrush H&P    (Failed) Outpatient Treatment     X Medication Nystatin Po  Rocephin IV MAR 12/1  MAR 12/1    Treatment      Other       Provider, please specify diagnosis or diagnoses associated with above clinical findings.    [ x ] Sepsis  [  ] Other Infectious Disease (please specify): _________________________________  [  ] Other: __________________________________  [  ] Clinically Undetermined    Please document in your progress notes daily for the duration of treatment until resolved and include in your discharge summary.

## 2017-12-01 NOTE — ASSESSMENT & PLAN NOTE
Alcohol dependence in remission  Portal hypertensive gastropathy  Takes furosemide every other day or even less frequently.  Hold for now.  When restarting, should take spironolactone to avoid hypokalemia.  More decompensated with MELD score 26 on admission.  Ultrasound abdomen to see if she has built up ascites from IV fluids.  Give simethicone and bisacodyl, and continue lactulose, in case it is just gas or constipation.

## 2017-12-01 NOTE — PROGRESS NOTES
[facility]  Progress Note    Patient Name: Annette Esparza  MRN: [id number]  Patient Class: IP- Inpatient   Admission Date: 11/30/2017  Length of Stay: 1 days  Attending Physician: Nicolas Shelton MD  Primary Care Provider: Endy Pfeiffer MD        Subjective:     Principal Problem:Hypokalemia    HPI:  Annette Esparza is a 47 y.o.  woman with alcoholic cirrhosis with ascites and portal hypertension, iron deficiency anemia with history of blood transfusions, alcohol dependence in remission, cigarette nicotine dependence with centrilobular emphysema, functional constipation, and mood disorder.  Her primary care physician is Dr. Endy Pfeiffer.  Her gastroenterologist is Dr. Maritza García.  They are both at [facility] in [place], [place].  She lives in [place], [place] near [facility], over 50 miles away from there.  She worked as a  but is now disabled.  She has support from her parents, 2 sisters, and a brother.  She has 3 children in their 20s.   Her blood pressure in Dr. Pfeiffer's clinic on 8/23/17 was 119/74.     She presented to [facility] ED on 11/30/17 with a couple of days of worsening generalized weakness with inability to stand.  She also had lower abdominal pain and back pain for a couple of weeks, nausea and vomiting a week prior that resolved, lack of appetite and obstipation since then, subjective fever, upper chest pain, cough, and difficulty urinating for the past week.  Her son also reported confusion and hallucinations.  She was no longer taking midodrine, spironolactone, lactulose, mirtazapine, bupropion, or fluticasone-vilanterol, which were on her medication list on the electronic medical records.  She was only taking furosemide every other day, levothyroxine, and hydrocodone-acetaminophen twice a day.     Initial blood pressure was 88/52 and as low as 59/46.  Contrast CT  suggested small bowel wall thickening in the left upper quadrant.  Labs showed leukocytosis (WBC 13,000), hemoglobin 9.0 and hematocrit 26.4 (compared to 11.0 and 33.4 on 5/26/17), hyponatremia (sodium 120), severe hypokalemia (less than 2), hypochloremia (74), metabolic alkalosis (bicarbonate 36), worsened hypoalbuminemia (1.9 compared to 3.2 on 6/16/17), and worsened hyperbilirubinemia (5.8 compared to 1.3 on 6/16/17).  Renal function was normal with a BUN of 10 and creatinine of 0.6.  MELD score was 26 due to her hyperbilirubinemia and hyponatremia.  Drug screen was only positive for opiates, appropriate because she takes chronic hydrocodone-acetaminophen.  Ammonia was normal.  Influenza A and B antigens were negative.  Urinalysis showed high urobilinogen, 50 WBC/hpf, and many bacteria.  She was given ketorolac for her pain, potassium chloride, IV normal saline, haloperidol, ondansetron, and ceftriaxone.  She was admitted to [facility].   On physical exam, she was noted to have white plaques at the back of her upper palate.  Magnesium was checked and was also low.  IV albumin was given to prevent third spacing due to her very low albumin.    Hospital Course:  She remained significantly hypotensive so was put in the ICU.  Her midodrine was restarted.  She was given another 2 liters of normal saline at 125 mL/hr.  Hyponatremia and hypokalemia improved.  Ceftriaxone was continued for UTI.      Interval History: Abdomen feels bigger.  Passing flatus.  Still constipated.    Review of Systems   Constitutional: Negative for chills and fever.   Respiratory: Negative for cough and shortness of breath.    Gastrointestinal: Positive for abdominal distention.     Objective:     Vital Signs (Most Recent):  Temp: 97.5 °F (36.4 °C) (12/01/17 0846)  Pulse: 77 (12/01/17 0815)  Resp: 20 (12/01/17 0815)  BP: (!) 81/54 (12/01/17 0810)  SpO2: (!) 89 % (12/01/17 0815) Vital Signs (24h Range):  Temp:  [97.5 °F (36.4  °C)-101.6 °F (38.7 °C)] 97.5 °F (36.4 °C)  Pulse:  [68-99] 77  Resp:  [12-38] 20  SpO2:  [80 %-98 %] 89 %  BP: (59-99)/(38-63) 81/54     Weight: 51.3 kg (113 lb 1.5 oz)  Body mass index is 21.37 kg/m².    Intake/Output Summary (Last 24 hours) at 12/01/17 1124  Last data filed at 12/01/17 0625   Gross per 24 hour   Intake          3020.83 ml   Output              650 ml   Net          2370.83 ml      Physical Exam   Constitutional: She is oriented to person, place, and time. She appears well-developed. No distress.   Eyes: Scleral icterus is present.   Cardiovascular: Normal rate and regular rhythm.    Pulmonary/Chest: Effort normal. No respiratory distress.   Abdominal: Soft. She exhibits distension. There is no tenderness. There is no guarding.   Neurological: She is alert and oriented to person, place, and time.   Psychiatric: She has a normal mood and affect.   Nursing note and vitals reviewed.      Significant Labs:   CBC:   Recent Labs  Lab 11/30/17  1133 12/01/17  0439   WBC 13.00* 9.66   HGB 9.0* 7.9*   HCT 26.4* 23.7*   * 125*     CMP:   Recent Labs  Lab 11/30/17  1251  12/01/17  0439 12/01/17  0706 12/01/17  0857   *  < > 127* 127* 128*   K <2.0*  < > 3.3* 3.2* 3.2*   CL 74*  < > 90* 92* 91*   CO2 36*  < > 29 26 27   *  < > 119* 154* 116*   BUN 10  < > 8 8 8   CREATININE 0.6  < > 0.6 0.6 0.6   CALCIUM 7.3*  < > 7.4* 7.3* 7.4*   PROT 5.6*  --  5.4*  --   --    ALBUMIN 1.9*  --  1.9*  --   --    BILITOT 5.8*  --  6.1*  --   --    ALKPHOS 327*  --  366*  --   --    AST 43*  --  68*  --   --    ALT 20  --  23  --   --    ANIONGAP 10  < > 8 9 10   EGFRNONAA >60  < > >60 >60 >60   < > = values in this interval not displayed.  All pertinent labs within the past 24 hours have been reviewed.    Significant Imaging: I have reviewed all pertinent imaging results/findings within the past 24 hours.    Assessment/Plan:      * Hypokalemia    Due to vomiting a week ago, almost no oral intake, and loop  diuretic use.  Improved.  Give more potassium.          Enteritis    Already giving ceftriaxone for UTI.  If she worsens, add metronidazole.          Hypotension    Restarted midodrine.  Giving IV fluids.  Monitor for third spacing.          Oral thrush    Give nystatin swish and swallow.          Bacterial UTI    Has had fluoroquinolone-resistant E coli in the past.  Continue ceftriaxone.  Follow up urine culture results.          Centrilobular emphysema    Cigarette nicotine dependence without complication  No longer taking fluticasone-vilanterol.          Hyponatremia    Appeared dehydrated.  Improving with IV saline.        Anxiety    No longer taking medications.          Alcoholic cirrhosis of liver with ascites    Alcohol dependence in remission  Portal hypertensive gastropathy  Takes furosemide every other day or even less frequently.  Hold for now.  When restarting, should take spironolactone to avoid hypokalemia.  More decompensated with MELD score 26 on admission.  Ultrasound abdomen to see if she has built up ascites from IV fluids.  Give simethicone and bisacodyl, and continue lactulose, in case it is just gas or constipation.          Iron deficiency anemia due to chronic blood loss    Monitor blood counts.  Lower after fluid administration so likely diluted.  No indication for transfusion at this time.            VTE Risk Mitigation         Ordered     Medium Risk of VTE  Once      11/30/17 2237     Place JORGE hose  Until discontinued      11/30/17 2237     Place sequential compression device  Until discontinued      11/30/17 2237        Wants to transfer out of ICU, but depends on hypotension.    Critical care time spent on the evaluation and treatment of severe organ dysfunction, review of pertinent labs and imaging studies, discussions with consulting providers and discussions with patient/family: 45 minutes.    Nicolas Shelton MD  Department of Hospital Medicine   [facility]

## 2017-12-01 NOTE — ASSESSMENT & PLAN NOTE
Monitor blood counts.  Lower after fluid administration so likely diluted.  No indication for transfusion at this time.

## 2017-12-01 NOTE — HOSPITAL COURSE
"She remained significantly hypotensive so was put in the ICU.  Her midodrine was restarted.  She was given another 2 liters of normal saline at 125 mL/hr.  Hyponatremia improved with IV fluids and hypokalemia improved after getting potassium chloride and magnesium sulfate.  Ceftriaxone was continued for UTI.  Hypotension resolved by afternoon on 12/1/17.  Ultrasound showed only trace abdominal ascites and nothing else.  She had a bowel movement after eating and taking lactulose.  On 12/2/17 she felt "like a new person" and was ready to go home.  She was prescribed cefdinir for 5 days to complete a 7-day antibiotic course, nystatin swish and swallow for 12 more days, midodrine, and lactulose for constipation.  She was advised to hold her furosemide due to recovering from dehydration and hypokalemia.  She should discuss with Dr. Pfeiffer when and if to restart furosemide, and if she does, she may want to take it with spironolactone again to prevent hypokalemia.  "

## 2017-12-01 NOTE — PLAN OF CARE
Principal Problem:Hypokalemia     Chief Complaint:        Chief Complaint   Patient presents with    Weakness       near sycope with bodyaches     TN met with pt and several family members in ICU    Pt was independent with ADLs until about a month ago, now requires assistance to get around/dress/bathe. No HH/HME. Lives with father, Laci Bryson  [phone]       12/01/17 1255   Discharge Assessment   Assessment Type Discharge Planning Assessment   Confirmed/corrected address and phone number on facesheet? Yes   Assessment information obtained from? Patient;Other   Expected Length of Stay (days) 3   Communicated expected length of stay with patient/caregiver yes   Prior to hospitilization cognitive status: Alert/Oriented   Prior to hospitalization functional status: Needs Assistance  (Indepndent up until 1 month ago)   Current cognitive status: Alert/Oriented   Current Functional Status: Needs Assistance   Lives With parent(s)   Able to Return to Prior Arrangements yes   Is patient able to care for self after discharge? Yes   Who are your caregiver(s) and their phone number(s)? father: Laci Bryson [phone]   Patient's perception of discharge disposition home or selfcare   Readmission Within The Last 30 Days no previous admission in last 30 days   Patient currently being followed by outpatient case management? No   Patient currently receives any other outside agency services? No   Equipment Currently Used at Home none   Do you have any problems affording any of your prescribed medications? No   Is the patient taking medications as prescribed? yes   Does the patient have transportation home? Yes   Transportation Available family or friend will provide   Dialysis Name and Scheduled days N/A   Does the patient receive services at the [facility]? No   Discharge Plan A Home with family   Discharge Plan B Home Health   Patient/Family In Agreement With Plan yes     Selina Steve RN Transitional Navigator  [phone]  [phone]

## 2017-12-01 NOTE — ASSESSMENT & PLAN NOTE
Hypomagnesemia  Due to vomiting a week ago, almost no oral intake, and loop diuretic use.  Giving potassium chloride and magnesium sulfate.  Monitor.

## 2017-12-01 NOTE — ED NOTES
Spoke to juanito Diggs np and discussed pt. sbp-70-80's after treatments and current admit orders.

## 2017-12-02 VITALS
HEART RATE: 87 BPM | OXYGEN SATURATION: 96 % | SYSTOLIC BLOOD PRESSURE: 92 MMHG | WEIGHT: 113.13 LBS | BODY MASS INDEX: 21.36 KG/M2 | HEIGHT: 61 IN | RESPIRATION RATE: 16 BRPM | TEMPERATURE: 98 F | DIASTOLIC BLOOD PRESSURE: 55 MMHG

## 2017-12-02 PROBLEM — K52.9 ENTERITIS: Status: RESOLVED | Noted: 2017-11-30 | Resolved: 2017-12-02

## 2017-12-02 LAB
ALBUMIN SERPL BCP-MCNC: 1.8 G/DL
ALP SERPL-CCNC: 372 U/L
ALT SERPL W/O P-5'-P-CCNC: 23 U/L
ANION GAP SERPL CALC-SCNC: 10 MMOL/L
ANION GAP SERPL CALC-SCNC: 12 MMOL/L
ANION GAP SERPL CALC-SCNC: 7 MMOL/L
AST SERPL-CCNC: 45 U/L
BILIRUB SERPL-MCNC: 5.3 MG/DL
BUN SERPL-MCNC: 4 MG/DL
BUN SERPL-MCNC: 4 MG/DL
BUN SERPL-MCNC: 5 MG/DL
CALCIUM SERPL-MCNC: 7.5 MG/DL
CALCIUM SERPL-MCNC: 7.6 MG/DL
CALCIUM SERPL-MCNC: 7.8 MG/DL
CHLORIDE SERPL-SCNC: 91 MMOL/L
CHLORIDE SERPL-SCNC: 92 MMOL/L
CHLORIDE SERPL-SCNC: 94 MMOL/L
CO2 SERPL-SCNC: 21 MMOL/L
CO2 SERPL-SCNC: 23 MMOL/L
CO2 SERPL-SCNC: 26 MMOL/L
CREAT SERPL-MCNC: 0.5 MG/DL
CREAT SERPL-MCNC: 0.5 MG/DL
CREAT SERPL-MCNC: 0.6 MG/DL
ERYTHROCYTE [DISTWIDTH] IN BLOOD BY AUTOMATED COUNT: 21.1 %
EST. GFR  (AFRICAN AMERICAN): >60 ML/MIN/1.73 M^2
EST. GFR  (NON AFRICAN AMERICAN): >60 ML/MIN/1.73 M^2
GLUCOSE SERPL-MCNC: 103 MG/DL
GLUCOSE SERPL-MCNC: 109 MG/DL
GLUCOSE SERPL-MCNC: 120 MG/DL
HCT VFR BLD AUTO: 24.6 %
HGB BLD-MCNC: 8.2 G/DL
MAGNESIUM SERPL-MCNC: 1.8 MG/DL
MCH RBC QN AUTO: 33.3 PG
MCHC RBC AUTO-ENTMCNC: 33.3 G/DL
MCV RBC AUTO: 100 FL
PLATELET # BLD AUTO: 141 K/UL
PMV BLD AUTO: 10.2 FL
POTASSIUM SERPL-SCNC: 3.4 MMOL/L
POTASSIUM SERPL-SCNC: 3.4 MMOL/L
POTASSIUM SERPL-SCNC: 3.5 MMOL/L
PROT SERPL-MCNC: 5.4 G/DL
RBC # BLD AUTO: 2.46 M/UL
SODIUM SERPL-SCNC: 124 MMOL/L
SODIUM SERPL-SCNC: 125 MMOL/L
SODIUM SERPL-SCNC: 127 MMOL/L
WBC # BLD AUTO: 12.16 K/UL

## 2017-12-02 PROCEDURE — 83735 ASSAY OF MAGNESIUM: CPT

## 2017-12-02 PROCEDURE — 27000221 HC OXYGEN, UP TO 24 HOURS

## 2017-12-02 PROCEDURE — 94761 N-INVAS EAR/PLS OXIMETRY MLT: CPT

## 2017-12-02 PROCEDURE — 25000003 PHARM REV CODE 250: Performed by: HOSPITALIST

## 2017-12-02 PROCEDURE — 85027 COMPLETE CBC AUTOMATED: CPT

## 2017-12-02 PROCEDURE — 80053 COMPREHEN METABOLIC PANEL: CPT

## 2017-12-02 PROCEDURE — 36415 COLL VENOUS BLD VENIPUNCTURE: CPT

## 2017-12-02 PROCEDURE — 80048 BASIC METABOLIC PNL TOTAL CA: CPT

## 2017-12-02 RX ORDER — LANOLIN ALCOHOL/MO/W.PET/CERES
400 CREAM (GRAM) TOPICAL EVERY 4 HOURS
Status: ACTIVE | OUTPATIENT
Start: 2017-12-02 | End: 2017-12-02

## 2017-12-02 RX ORDER — CEFDINIR 300 MG/1
300 CAPSULE ORAL 2 TIMES DAILY
Qty: 10 CAPSULE | Refills: 0 | Status: SHIPPED | OUTPATIENT
Start: 2017-12-02 | End: 2017-12-02

## 2017-12-02 RX ORDER — LACTULOSE 10 G/15ML
15 SOLUTION ORAL 2 TIMES DAILY PRN
Qty: 450 ML | Refills: 0 | Status: SHIPPED | OUTPATIENT
Start: 2017-12-02 | End: 2017-12-12

## 2017-12-02 RX ORDER — POTASSIUM CHLORIDE 20 MEQ/15ML
40 SOLUTION ORAL ONCE
Status: COMPLETED | OUTPATIENT
Start: 2017-12-02 | End: 2017-12-02

## 2017-12-02 RX ORDER — MIDODRINE HYDROCHLORIDE 10 MG/1
10 TABLET ORAL
Qty: 90 TABLET | Refills: 3 | Status: ON HOLD | OUTPATIENT
Start: 2017-12-02 | End: 2018-07-29

## 2017-12-02 RX ORDER — MIDODRINE HYDROCHLORIDE 10 MG/1
10 TABLET ORAL
Qty: 90 TABLET | Refills: 3 | Status: SHIPPED | OUTPATIENT
Start: 2017-12-02 | End: 2017-12-02

## 2017-12-02 RX ORDER — LACTULOSE 10 G/15ML
15 SOLUTION ORAL 2 TIMES DAILY PRN
Qty: 450 ML | Refills: 0 | Status: SHIPPED | OUTPATIENT
Start: 2017-12-02 | End: 2017-12-02

## 2017-12-02 RX ORDER — FUROSEMIDE 40 MG/1
40 TABLET ORAL EVERY OTHER DAY
Start: 2017-12-02 | End: 2018-03-12 | Stop reason: SDUPTHER

## 2017-12-02 RX ORDER — NYSTATIN 100000 [USP'U]/ML
500000 SUSPENSION ORAL
Qty: 240 ML | Refills: 0 | Status: SHIPPED | OUTPATIENT
Start: 2017-12-02 | End: 2017-12-14

## 2017-12-02 RX ORDER — NYSTATIN 100000 [USP'U]/ML
500000 SUSPENSION ORAL
Qty: 240 ML | Refills: 0 | Status: SHIPPED | OUTPATIENT
Start: 2017-12-02 | End: 2017-12-02

## 2017-12-02 RX ORDER — CEFDINIR 300 MG/1
300 CAPSULE ORAL 2 TIMES DAILY
Qty: 10 CAPSULE | Refills: 0 | Status: SHIPPED | OUTPATIENT
Start: 2017-12-02 | End: 2017-12-07

## 2017-12-02 RX ADMIN — POTASSIUM CHLORIDE 40 MEQ: 20 SOLUTION ORAL at 09:12

## 2017-12-02 RX ADMIN — HYDROCODONE BITARTRATE AND ACETAMINOPHEN 1 TABLET: 10; 325 TABLET ORAL at 05:12

## 2017-12-02 RX ADMIN — MIDODRINE HYDROCHLORIDE 10 MG: 5 TABLET ORAL at 09:12

## 2017-12-02 RX ADMIN — SIMETHICONE 125 MG: 125 TABLET, CHEWABLE ORAL at 05:12

## 2017-12-02 RX ADMIN — LEVOTHYROXINE SODIUM 50 MCG: 50 TABLET ORAL at 05:12

## 2017-12-02 NOTE — PROGRESS NOTES
[facility]  Progress Note    Patient Name: Annette Esparza  MRN: [id number]  Patient Class: IP- Inpatient   Admission Date: 11/30/2017  Length of Stay: 2 days  Attending Physician: Nicolas Shelton MD  Primary Care Provider: Endy Pfeiffer MD        Subjective:     Principal Problem:Hypokalemia    HPI:  Annette Esparza is a 47 y.o.  woman with alcoholic cirrhosis with ascites and portal hypertension, iron deficiency anemia with history of blood transfusions, alcohol dependence in remission, cigarette nicotine dependence with centrilobular emphysema, functional constipation, and mood disorder.  Her primary care physician is Dr. Endy Pfeiffer.  Her gastroenterologist is Dr. Maritza García.  They are both at [facility] in [place], [place].  She lives in [place], [place] near [facility], over 50 miles away from there.  She worked as a  but is now disabled.  She has support from her parents, 2 sisters, and a brother.  She has 3 children in their 20s.   Her blood pressure in Dr. Pfeiffer's clinic on 8/23/17 was 119/74.     She presented to [facility] ED on 11/30/17 with a couple of days of worsening generalized weakness with inability to stand.  She also had lower abdominal pain and back pain for a couple of weeks, nausea and vomiting a week prior that resolved, lack of appetite and obstipation since then, subjective fever, upper chest pain, cough, and difficulty urinating for the past week.  Her son also reported confusion and hallucinations.  She was no longer taking midodrine, spironolactone, lactulose, mirtazapine, bupropion, or fluticasone-vilanterol, which were on her medication list on the electronic medical records.  She was only taking furosemide every other day, levothyroxine, and hydrocodone-acetaminophen twice a day.     Initial blood pressure was 88/52 and as low as 59/46.  Contrast CT  "suggested small bowel wall thickening in the left upper quadrant.  Labs showed leukocytosis (WBC 13,000), hemoglobin 9.0 and hematocrit 26.4 (compared to 11.0 and 33.4 on 5/26/17), hyponatremia (sodium 120), severe hypokalemia (less than 2), hypochloremia (74), metabolic alkalosis (bicarbonate 36), worsened hypoalbuminemia (1.9 compared to 3.2 on 6/16/17), and worsened hyperbilirubinemia (5.8 compared to 1.3 on 6/16/17).  Renal function was normal with a BUN of 10 and creatinine of 0.6.  MELD score was 26 due to her hyperbilirubinemia and hyponatremia.  Drug screen was only positive for opiates, appropriate because she takes chronic hydrocodone-acetaminophen.  Ammonia was normal.  Influenza A and B antigens were negative.  Urinalysis showed high urobilinogen, 50 WBC/hpf, and many bacteria.  She was given ketorolac for her pain, potassium chloride, IV normal saline, haloperidol, ondansetron, and ceftriaxone.  She was admitted to [facility].   On physical exam, she was noted to have white plaques at the back of her upper palate.  Magnesium was checked and was also low.  IV albumin was given to prevent third spacing due to her very low albumin.    Hospital Course:  She remained significantly hypotensive so was put in the ICU.  Her midodrine was restarted.  She was given another 2 liters of normal saline at 125 mL/hr.  Hyponatremia improved with IV fluids and hypokalemia improved after getting potassium chloride and magnesium sulfate.  Ceftriaxone was continued for UTI.  Hypotension resolved by afternoon on 12/1/17.  Ultrasound showed only trace abdominal ascites and nothing else.  She had a bowel movement after eating and taking lactulose.  On 12/2/17 she felt "like a new person" and was ready to go home.  She was prescribed cefdinir for 5 days to complete a 7-day antibiotic course, nystatin swish and swallow for 12 more days, midodrine, and lactulose for constipation.  She was advised to hold her " furosemide due to recovering from dehydration and hypokalemia.  She should discuss with Dr. Pfeiffer when and if to restart furosemide, and if she does, she may want to take it with spironolactone again to prevent hypokalemia.    Interval History: Ready to go home.    Review of Systems   Constitutional: Negative for chills and fever.   Respiratory: Negative for cough and shortness of breath.    Gastrointestinal: Negative for nausea and vomiting.     Objective:     Vital Signs (Most Recent):  Temp: 98 °F (36.7 °C) (12/02/17 0827)  Pulse: 87 (12/02/17 0827)  Resp: 16 (12/02/17 0827)  BP: (!) 92/55 (12/02/17 0827)  SpO2: (!) 92 % (12/02/17 0731) Vital Signs (24h Range):  Temp:  [97.6 °F (36.4 °C)-99.3 °F (37.4 °C)] 98 °F (36.7 °C)  Pulse:  [] 87  Resp:  [14-27] 16  SpO2:  [88 %-100 %] 92 %  BP: ()/(53-70) 92/55     Weight: 51.3 kg (113 lb 1.5 oz)  Body mass index is 21.37 kg/m².    Intake/Output Summary (Last 24 hours) at 12/02/17 0956  Last data filed at 12/01/17 1800   Gross per 24 hour   Intake          1472.92 ml   Output              300 ml   Net          1172.92 ml      Physical Exam   Constitutional: She is oriented to person, place, and time. She appears well-developed. No distress.   Cardiovascular: Normal rate and regular rhythm.    Pulmonary/Chest: Effort normal and breath sounds normal. No respiratory distress.   Abdominal: Soft. There is no tenderness.   Neurological: She is alert and oriented to person, place, and time.   Psychiatric: She has a normal mood and affect.   Nursing note and vitals reviewed.      Significant Labs:   CBC:   Recent Labs  Lab 11/30/17  1133 12/01/17  0439 12/02/17  0451   WBC 13.00* 9.66 12.16   HGB 9.0* 7.9* 8.2*   HCT 26.4* 23.7* 24.6*   * 125* 141*     CMP:   Recent Labs  Lab 11/30/17  1251  12/01/17  0439  12/01/17  2323 12/02/17  0451 12/02/17  0820   *  < > 127*  < > 124* 125* 127*   K <2.0*  < > 3.3*  < > 3.5 3.4* 3.4*   CL 74*  < > 90*  < > 91* 92*  94*   CO2 36*  < > 29  < > 21* 26 23   *  < > 119*  < > 120* 103 109   BUN 10  < > 8  < > 5* 4* 4*   CREATININE 0.6  < > 0.6  < > 0.6 0.5 0.5   CALCIUM 7.3*  < > 7.4*  < > 7.5* 7.6* 7.8*   PROT 5.6*  --  5.4*  --   --  5.4*  --    ALBUMIN 1.9*  --  1.9*  --   --  1.8*  --    BILITOT 5.8*  --  6.1*  --   --  5.3*  --    ALKPHOS 327*  --  366*  --   --  372*  --    AST 43*  --  68*  --   --  45*  --    ALT 20  --  23  --   --  23  --    ANIONGAP 10  < > 8  < > 12 7* 10   EGFRNONAA >60  < > >60  < > >60 >60 >60   < > = values in this interval not displayed.  All pertinent labs within the past 24 hours have been reviewed.    Significant Imaging: I have reviewed all pertinent imaging results/findings within the past 24 hours.   US Abdomen Limited 12/01/17: Sonographic evaluation of the abdomen was performed to evaluate for ascites. There is a small amount of ascites present within the right upper and both lower quadrants of the abdomen. No definite free intraperitoneal air is appreciated, however ultrasound examination is not a sensitive examination for detection of free intraperitoneal air. If clinically indicated, upright radiographs of the abdomen or CT examination of the abdomen and pelvis is recommended.  Impression: Small amount of ascites.  No definite free intraperitoneal air identified although ultrasound examination is not a sensitive examination for detection of free intraperitoneal air.    Assessment/Plan:      Oral thrush    Prescribe nystatin swish and swallow.          Bacterial UTI    Has had fluoroquinolone-resistant E coli in the past.  Prescribe cefdinir.  Follow up urine culture results.          Centrilobular emphysema    Cigarette nicotine dependence without complication  No longer taking fluticasone-vilanterol.  Not short of breath when oxygen saturation was 88%, so no need for home oxygen.          Hyponatremia    Appeared dehydrated on admission.  Improving.        Anxiety    No longer  taking medications.          Alcoholic cirrhosis of liver with ascites    Alcohol dependence in remission  Portal hypertensive gastropathy  Takes furosemide every other day or even less frequently.  Hold for now.  When restarting, should take spironolactone to avoid hypokalemia.  More decompensated with MELD score 26 on admission.            Iron deficiency anemia due to chronic blood loss    Monitor blood counts.  Lower after fluid administration so likely diluted.  No indication for transfusion at this time.            VTE Risk Mitigation         Ordered     Medium Risk of VTE  Once      11/30/17 2237     Place JORGE hose  Until discontinued      11/30/17 2237     Place sequential compression device  Until discontinued      11/30/17 2237              Nicolas Shelton MD  Department of Hospital Medicine   [facility]

## 2017-12-02 NOTE — ASSESSMENT & PLAN NOTE
Cigarette nicotine dependence without complication  No longer taking fluticasone-vilanterol.  Not short of breath when oxygen saturation was 88%, so no need for home oxygen.

## 2017-12-02 NOTE — PLAN OF CARE
Problem: Patient Care Overview  Goal: Plan of Care Review  Outcome: Ongoing (interventions implemented as appropriate)  Pt. On oxygen in no apparent distress..

## 2017-12-02 NOTE — SUBJECTIVE & OBJECTIVE
Interval History: Ready to go home.    Review of Systems   Constitutional: Negative for chills and fever.   Respiratory: Negative for cough and shortness of breath.    Gastrointestinal: Negative for nausea and vomiting.     Objective:     Vital Signs (Most Recent):  Temp: 98 °F (36.7 °C) (12/02/17 0827)  Pulse: 87 (12/02/17 0827)  Resp: 16 (12/02/17 0827)  BP: (!) 92/55 (12/02/17 0827)  SpO2: (!) 92 % (12/02/17 0731) Vital Signs (24h Range):  Temp:  [97.6 °F (36.4 °C)-99.3 °F (37.4 °C)] 98 °F (36.7 °C)  Pulse:  [] 87  Resp:  [14-27] 16  SpO2:  [88 %-100 %] 92 %  BP: ()/(53-70) 92/55     Weight: 51.3 kg (113 lb 1.5 oz)  Body mass index is 21.37 kg/m².    Intake/Output Summary (Last 24 hours) at 12/02/17 0956  Last data filed at 12/01/17 1800   Gross per 24 hour   Intake          1472.92 ml   Output              300 ml   Net          1172.92 ml      Physical Exam   Constitutional: She is oriented to person, place, and time. She appears well-developed. No distress.   Cardiovascular: Normal rate and regular rhythm.    Pulmonary/Chest: Effort normal and breath sounds normal. No respiratory distress.   Abdominal: Soft. There is no tenderness.   Neurological: She is alert and oriented to person, place, and time.   Psychiatric: She has a normal mood and affect.   Nursing note and vitals reviewed.      Significant Labs:   CBC:   Recent Labs  Lab 11/30/17  1133 12/01/17  0439 12/02/17  0451   WBC 13.00* 9.66 12.16   HGB 9.0* 7.9* 8.2*   HCT 26.4* 23.7* 24.6*   * 125* 141*     CMP:   Recent Labs  Lab 11/30/17  1251  12/01/17  0439  12/01/17  2323 12/02/17  0451 12/02/17  0820   *  < > 127*  < > 124* 125* 127*   K <2.0*  < > 3.3*  < > 3.5 3.4* 3.4*   CL 74*  < > 90*  < > 91* 92* 94*   CO2 36*  < > 29  < > 21* 26 23   *  < > 119*  < > 120* 103 109   BUN 10  < > 8  < > 5* 4* 4*   CREATININE 0.6  < > 0.6  < > 0.6 0.5 0.5   CALCIUM 7.3*  < > 7.4*  < > 7.5* 7.6* 7.8*   PROT 5.6*  --  5.4*  --   --   5.4*  --    ALBUMIN 1.9*  --  1.9*  --   --  1.8*  --    BILITOT 5.8*  --  6.1*  --   --  5.3*  --    ALKPHOS 327*  --  366*  --   --  372*  --    AST 43*  --  68*  --   --  45*  --    ALT 20  --  23  --   --  23  --    ANIONGAP 10  < > 8  < > 12 7* 10   EGFRNONAA >60  < > >60  < > >60 >60 >60   < > = values in this interval not displayed.  All pertinent labs within the past 24 hours have been reviewed.    Significant Imaging: I have reviewed all pertinent imaging results/findings within the past 24 hours.   US Abdomen Limited 12/01/17: Sonographic evaluation of the abdomen was performed to evaluate for ascites. There is a small amount of ascites present within the right upper and both lower quadrants of the abdomen. No definite free intraperitoneal air is appreciated, however ultrasound examination is not a sensitive examination for detection of free intraperitoneal air. If clinically indicated, upright radiographs of the abdomen or CT examination of the abdomen and pelvis is recommended.  Impression: Small amount of ascites.  No definite free intraperitoneal air identified although ultrasound examination is not a sensitive examination for detection of free intraperitoneal air.

## 2017-12-02 NOTE — PLAN OF CARE
Discharge orders noted, no HH or HME ordered.    Future Appointments  Date Time Provider Department Center   12/15/2017 9:30 AM [facility] LAB [facility]   12/21/2017 1:00 PM Endy Pfeiffer MD [facility]   4/9/2018 8:00 AM [facility] MAMMO1 [facility] M[facility]MO [facility]   4/9/2018 9:30 AM [facility]       Pt's nurse will go over medications/signs and symptoms prior to discharge       12/02/17 1011   Final Note   Assessment Type Final Discharge Note   Discharge Disposition Home   What phone number can be called within the next 1-3 days to see how you are doing after discharge? [phone]   Hospital Follow Up  Appt(s) scheduled? Yes   Right Care Referral Info   Post Acute Recommendation No Care     Selina Steve, RN Transitional Navigator  [phone]

## 2017-12-02 NOTE — ASSESSMENT & PLAN NOTE
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  Prescribe cefdinir.  Follow up urine culture results.

## 2017-12-02 NOTE — PLAN OF CARE
Problem: Patient Care Overview  Goal: Plan of Care Review  Outcome: Ongoing (interventions implemented as appropriate)  Patient resting. Complaints of pain managed with medication per MD order. Telemetry monitoring in place. VS stable. Patient safety maintained. Will continue to monitor.

## 2017-12-02 NOTE — DISCHARGE SUMMARY
[facility] Hospital Medicine  Discharge Summary      Patient Name: Annette Esparaz  MRN: [id number]  Admission Date: 11/30/2017  Hospital Length of Stay: 2 days  Discharge Date and Time: 12/2/2017  2:04 PM  Attending Physician: Nicolas Shelton MD   Discharging Provider: Nicolas Shelton MD  Primary Care Provider: Endy Pfeiffer MD      HPI:   Annette Esparza is a 47 y.o.  woman with alcoholic cirrhosis with ascites and portal hypertension, iron deficiency anemia with history of blood transfusions, alcohol dependence in remission, cigarette nicotine dependence with centrilobular emphysema, functional constipation, and mood disorder.  Her primary care physician is Dr. Endy Pfeiffer.  Her gastroenterologist is Dr. Maritza García.  They are both at [facility] in [place], [place].  She lives in [place], [place] near [facility], over 50 miles away from there.  She worked as a  but is now disabled.  She has support from her parents, 2 sisters, and a brother.  She has 3 children in their 20s.   Her blood pressure in Dr. Pfeiffer's clinic on 8/23/17 was 119/74.     She presented to [facility] ED on 11/30/17 with a couple of days of worsening generalized weakness with inability to stand.  She also had lower abdominal pain and back pain for a couple of weeks, nausea and vomiting a week prior that resolved, lack of appetite and obstipation since then, subjective fever, upper chest pain, cough, and difficulty urinating for the past week.  Her son also reported confusion and hallucinations.  She was no longer taking midodrine, spironolactone, lactulose, mirtazapine, bupropion, or fluticasone-vilanterol, which were on her medication list on the electronic medical records.  She was only taking furosemide every other day, levothyroxine, and hydrocodone-acetaminophen twice a day.     Initial blood pressure was 88/52 and as low  "as 59/46.  Contrast CT suggested small bowel wall thickening in the left upper quadrant.  Labs showed leukocytosis (WBC 13,000), hemoglobin 9.0 and hematocrit 26.4 (compared to 11.0 and 33.4 on 5/26/17), hyponatremia (sodium 120), severe hypokalemia (less than 2), hypochloremia (74), metabolic alkalosis (bicarbonate 36), worsened hypoalbuminemia (1.9 compared to 3.2 on 6/16/17), and worsened hyperbilirubinemia (5.8 compared to 1.3 on 6/16/17).  Renal function was normal with a BUN of 10 and creatinine of 0.6.  MELD score was 26 due to her hyperbilirubinemia and hyponatremia.  Drug screen was only positive for opiates, appropriate because she takes chronic hydrocodone-acetaminophen.  Ammonia was normal.  Influenza A and B antigens were negative.  Urinalysis showed high urobilinogen, 50 WBC/hpf, and many bacteria.  She was given ketorolac for her pain, potassium chloride, IV normal saline, haloperidol, ondansetron, and ceftriaxone.  She was admitted to [facility].   On physical exam, she was noted to have white plaques at the back of her upper palate.  Magnesium was checked and was also low.  IV albumin was given to prevent third spacing due to her very low albumin.    Hospital Course:   She remained significantly hypotensive so was put in the ICU.  Her midodrine was restarted.  She was given another 2 liters of normal saline at 125 mL/hr.  Hyponatremia improved with IV fluids and hypokalemia improved after getting potassium chloride and magnesium sulfate.  Ceftriaxone was continued for UTI.  Hypotension resolved by afternoon on 12/1/17.  Ultrasound showed only trace abdominal ascites and nothing else.  She had a bowel movement after eating and taking lactulose.  On 12/2/17 she felt "like a new person" and was ready to go home.  She was prescribed cefdinir for 5 days to complete a 7-day antibiotic course, nystatin swish and swallow for 12 more days, midodrine, and lactulose for constipation.  She was " advised to hold her furosemide due to recovering from dehydration and hypokalemia.  She should discuss with Dr. Pfeiffer when and if to restart furosemide, and if she does, she may want to take it with spironolactone again to prevent hypokalemia.     Consults: None    Final Active Diagnoses:    Diagnosis Date Noted POA    Bacterial UTI [N39.0, A49.9] 11/30/2017 Yes    Oral thrush [B37.0] 11/30/2017 Yes    Centrilobular emphysema [J43.2] 12/12/2016 Yes     Chronic    Hyponatremia [E87.1] 11/29/2016 Yes    Cigarette nicotine dependence without complication [F17.210] 06/02/2016 Yes     Chronic    Anxiety [F41.9] 06/02/2016 Yes     Chronic    Alcoholic cirrhosis of liver with ascites [K70.31] 10/22/2015 Yes     Chronic    Iron deficiency anemia due to chronic blood loss [D50.0] 10/22/2015 Yes     Chronic    Portal hypertensive gastropathy [K31.89] 10/22/2015 Yes     Chronic    Alcohol dependence in remission [F10.21]  Yes     Chronic      Problems Resolved During this Admission:    Diagnosis Date Noted Date Resolved POA    PRINCIPAL PROBLEM:  Hypokalemia [E87.6] 06/02/2016 12/02/2017 Yes    Hypotension [I95.9] 11/30/2017 12/01/2017 Yes    Enteritis [K52.9] 11/30/2017 12/02/2017 Yes       Discharged Condition: good    Disposition: Home or Self Care    Follow Up:  Follow-up Information     Endy Pfeiffer MD On 12/21/2017.    Specialty:  Internal Medicine  Contact information:  [address] [place] [address]  [place] [address]  [phone]                 Patient Instructions:     Diet Low Sodium, 2gm     Activity as tolerated     Call MD for:  difficulty breathing or increased cough     Call MD for:  persistent nausea and vomiting or diarrhea         Significant Diagnostic Studies:   X-Ray Chest AP Portable 11/30/17: Small left upper lobe granuloma unchanged with numerous scattered punctate calcified granulomas throughout the lungs bilaterally again identified. There is no new lung consolidation. There is  nonspecific elevation of the right lung base. No large pleural effusion or pneumothorax. Stable subcentimeter sclerotic focus left glenoid.  CT Abdomen Pelvis With Contrast 11/30/17:   There is atelectasis at bilateral lung bases greater on the left than right.  There is inhomogeneous attenuation pattern seen throughout the hepatic parenchyma.  There is perihepatic free fluid.  The inhomogeneous attenuation pattern, particularly within the right lobe the liver has progressed well seen in 2016.  Splenomegaly.  The adrenal glands, and pancreas are unremarkable.  There is cholelithiasis.  There is no evidence of bowel obstruction.  There is an inhomogeneous pattern of perfusion involving both kidneys most pronounced on the delayed phase.  There is a suggestion of bowel wall thickening involving small bowel loops in left upper quadrant of the abdomen.  Impression:  1.  There is an inhomogeneous attenuation pattern of the liver this pronounced in the right lobe liver.  This finding has progressed from was seen in 2016.  While this may be due to cirrhosis infiltrative hepatic process such as malignancy cannot be excluded.  2.  There is an inhomogeneous attenuation pattern the kidneys this pronounced on delayed phase of imaging.  This may be seen with nephritis however identified a process such as malignancy cannot be excluded.  3.  There is a suggestion of small bowel wall thickening involving the small bowel loops the left upper quadrant.  This may be due to inflammatory, infectious or malignant etiologies.  US Abdomen Limited 12/01/17: Sonographic evaluation of the abdomen was performed to evaluate for ascites. There is a small amount of ascites present within the right upper and both lower quadrants of the abdomen. No definite free intraperitoneal air is appreciated, however ultrasound examination is not a sensitive examination for detection of free intraperitoneal air. If clinically indicated, upright radiographs of  the abdomen or CT examination of the abdomen and pelvis is recommended.  Impression: Small amount of ascites.  No definite free intraperitoneal air identified although ultrasound examination is not a sensitive examination for detection of free intraperitoneal air.     Medications:  Reconciled Home Medications:   Current Discharge Medication List      START taking these medications    Details   cefdinir (OMNICEF) 300 MG capsule Take 1 capsule (300 mg total) by mouth 2 (two) times daily.  Qty: 10 capsule, Refills: 0      lactulose (CHRONULAC) 20 gram/30 mL Soln Take 22.5 mLs (15 g total) by mouth 2 (two) times daily as needed (constipation).  Qty: 450 mL, Refills: 0      midodrine (PROAMATINE) 10 MG tablet Take 1 tablet (10 mg total) by mouth 3 (three) times daily with meals.  Qty: 90 tablet, Refills: 3      nystatin (MYCOSTATIN) 100,000 unit/mL suspension Take 5 mLs (500,000 Units total) by mouth 4 (four) times daily with meals and nightly. For thrush.  Qty: 240 mL, Refills: 0         CONTINUE these medications which have CHANGED    Details   furosemide (LASIX) 40 MG tablet Take 1 tablet (40 mg total) by mouth every other day. HOLD this until you talk to Dr. Pfeiffer         CONTINUE these medications which have NOT CHANGED    Details   multivitamin (THERAGRAN) tablet Take 1 tablet by mouth once daily.      alprazolam (XANAX) 0.25 MG tablet Take 1 tablet (0.25 mg total) by mouth 3 (three) times daily as needed for Anxiety.  Qty: 30 tablet, Refills: 0      hydrocodone-acetaminophen 10-325mg (NORCO)  mg Tab Take 1 tablet by mouth every 8 (eight) hours as needed for Pain.  Qty: 90 tablet, Refills: 0    Associated Diagnoses: Pain      levothyroxine (SYNTHROID) 50 MCG tablet Take 50 mcg by mouth before breakfast.              Indwelling Lines/Drains at time of discharge: None    Time spent on the discharge of patient: 35 minutes  Patient was seen and examined on the date of discharge and determined to be suitable for  discharge.         Nicolas Shelton MD  Department of Hospital Medicine  [facility]

## 2017-12-02 NOTE — NURSING TRANSFER
Nursing Transfer Note      12/1/2017     Transfer To: Room 529    Transfer via wheelchair    Transfer with  to O2, cardiac monitoring    Transported by Nurse/transport    Medicines sent: IV antibiotic    Chart send with patient: Yes    Notified: friend, family    Patient reassessed at: 12/1/2017@8414    Upon arrival to floor: patient oriented to room, call bell in reach and bed in lowest position    Nurse aware pt is in the room, did not come in to assess pt.

## 2017-12-02 NOTE — NURSING
Discharge instructions explained and given to patient. Patient verbalized understanding of instructions. IV's discontinued per order. Patient awaiting family to discharge home. No  Distress noted.

## 2017-12-19 ENCOUNTER — LAB VISIT (OUTPATIENT)
Dept: LAB | Facility: HOSPITAL | Age: 47
End: 2017-12-19
Attending: INTERNAL MEDICINE
Payer: MEDICAID

## 2017-12-19 DIAGNOSIS — F41.9 ANXIETY: Chronic | ICD-10-CM

## 2017-12-19 DIAGNOSIS — K76.6 PORTAL HYPERTENSIVE GASTROPATHY: Chronic | ICD-10-CM

## 2017-12-19 DIAGNOSIS — E87.3 METABOLIC ALKALOSIS: ICD-10-CM

## 2017-12-19 DIAGNOSIS — D50.0 IRON DEFICIENCY ANEMIA DUE TO CHRONIC BLOOD LOSS: Chronic | ICD-10-CM

## 2017-12-19 DIAGNOSIS — K31.89 PORTAL HYPERTENSIVE GASTROPATHY: Chronic | ICD-10-CM

## 2017-12-19 DIAGNOSIS — K70.31 ALCOHOLIC CIRRHOSIS OF LIVER WITH ASCITES: Chronic | ICD-10-CM

## 2017-12-19 DIAGNOSIS — E55.9 VITAMIN D DEFICIENCY: ICD-10-CM

## 2017-12-19 DIAGNOSIS — F10.21 ALCOHOL DEPENDENCE IN REMISSION: Chronic | ICD-10-CM

## 2017-12-19 DIAGNOSIS — E53.8 FOLATE DEFICIENCY: ICD-10-CM

## 2017-12-19 LAB
ALBUMIN SERPL BCP-MCNC: 2.7 G/DL
ALP SERPL-CCNC: 500 U/L
ALT SERPL W/O P-5'-P-CCNC: 24 U/L
ANION GAP SERPL CALC-SCNC: 12 MMOL/L
AST SERPL-CCNC: 40 U/L
BASOPHILS # BLD AUTO: 0.04 K/UL
BASOPHILS NFR BLD: 0.6 %
BILIRUB SERPL-MCNC: 2.4 MG/DL
BUN SERPL-MCNC: 4 MG/DL
CALCIUM SERPL-MCNC: 8.7 MG/DL
CHLORIDE SERPL-SCNC: 95 MMOL/L
CHOLEST SERPL-MCNC: 150 MG/DL
CHOLEST/HDLC SERPL: 2.8 {RATIO}
CO2 SERPL-SCNC: 27 MMOL/L
CREAT SERPL-MCNC: 0.6 MG/DL
DIFFERENTIAL METHOD: ABNORMAL
EOSINOPHIL # BLD AUTO: 0.1 K/UL
EOSINOPHIL NFR BLD: 1.5 %
ERYTHROCYTE [DISTWIDTH] IN BLOOD BY AUTOMATED COUNT: 18.9 %
EST. GFR  (AFRICAN AMERICAN): >60 ML/MIN/1.73 M^2
EST. GFR  (NON AFRICAN AMERICAN): >60 ML/MIN/1.73 M^2
GLUCOSE SERPL-MCNC: 112 MG/DL
HCT VFR BLD AUTO: 30.3 %
HDLC SERPL-MCNC: 54 MG/DL
HDLC SERPL: 36 %
HGB BLD-MCNC: 9.3 G/DL
LDLC SERPL CALC-MCNC: 76.2 MG/DL
LYMPHOCYTES # BLD AUTO: 1.1 K/UL
LYMPHOCYTES NFR BLD: 15.4 %
MAGNESIUM SERPL-MCNC: 1.4 MG/DL
MCH RBC QN AUTO: 33.6 PG
MCHC RBC AUTO-ENTMCNC: 30.7 G/DL
MCV RBC AUTO: 109 FL
MONOCYTES # BLD AUTO: 0.7 K/UL
MONOCYTES NFR BLD: 10.1 %
NEUTROPHILS # BLD AUTO: 5.2 K/UL
NEUTROPHILS NFR BLD: 72.1 %
NONHDLC SERPL-MCNC: 96 MG/DL
PHOSPHATE SERPL-MCNC: 3.2 MG/DL
PLATELET # BLD AUTO: 173 K/UL
PMV BLD AUTO: 10 FL
POTASSIUM SERPL-SCNC: 3.3 MMOL/L
PROT SERPL-MCNC: 7.3 G/DL
RBC # BLD AUTO: 2.77 M/UL
SODIUM SERPL-SCNC: 134 MMOL/L
T4 FREE SERPL-MCNC: 1.06 NG/DL
TRIGL SERPL-MCNC: 99 MG/DL
TSH SERPL DL<=0.005 MIU/L-ACNC: 5.62 UIU/ML
URATE SERPL-MCNC: 4.9 MG/DL
WBC # BLD AUTO: 7.2 K/UL

## 2017-12-19 PROCEDURE — 80061 LIPID PANEL: CPT

## 2017-12-19 PROCEDURE — 83735 ASSAY OF MAGNESIUM: CPT

## 2017-12-19 PROCEDURE — 80053 COMPREHEN METABOLIC PANEL: CPT

## 2017-12-19 PROCEDURE — 36415 COLL VENOUS BLD VENIPUNCTURE: CPT

## 2017-12-19 PROCEDURE — 85025 COMPLETE CBC W/AUTO DIFF WBC: CPT

## 2017-12-19 PROCEDURE — 84100 ASSAY OF PHOSPHORUS: CPT

## 2017-12-19 PROCEDURE — 84550 ASSAY OF BLOOD/URIC ACID: CPT

## 2017-12-19 PROCEDURE — 84439 ASSAY OF FREE THYROXINE: CPT

## 2017-12-19 PROCEDURE — 84443 ASSAY THYROID STIM HORMONE: CPT

## 2018-01-01 ENCOUNTER — APPOINTMENT (OUTPATIENT)
Dept: INTERVENTIONAL RADIOLOGY/VASCULAR | Facility: HOSPITAL | Age: 48
End: 2018-01-01
Attending: INTERNAL MEDICINE
Payer: MEDICAID

## 2018-01-01 ENCOUNTER — TELEPHONE (OUTPATIENT)
Dept: TRANSPLANT | Facility: CLINIC | Age: 48
End: 2018-01-01

## 2018-01-01 ENCOUNTER — CONFERENCE (OUTPATIENT)
Dept: TRANSPLANT | Facility: CLINIC | Age: 48
End: 2018-01-01

## 2018-01-01 ENCOUNTER — PATIENT MESSAGE (OUTPATIENT)
Dept: TRANSPLANT | Facility: CLINIC | Age: 48
End: 2018-01-01

## 2018-01-01 ENCOUNTER — CLINICAL SUPPORT (OUTPATIENT)
Dept: INFECTIOUS DISEASES | Facility: CLINIC | Age: 48
End: 2018-01-01
Payer: MEDICAID

## 2018-01-01 ENCOUNTER — TELEPHONE (OUTPATIENT)
Dept: ENDOSCOPY | Facility: HOSPITAL | Age: 48
End: 2018-01-01

## 2018-01-01 ENCOUNTER — HOSPITAL ENCOUNTER (OUTPATIENT)
Dept: RADIOLOGY | Facility: HOSPITAL | Age: 48
Discharge: HOME | End: 2018-10-17
Attending: INTERNAL MEDICINE
Payer: MEDICAID

## 2018-01-01 ENCOUNTER — NUTRITION (OUTPATIENT)
Dept: TRANSPLANT | Facility: CLINIC | Age: 48
End: 2018-01-01
Payer: MEDICAID

## 2018-01-01 ENCOUNTER — HOSPITAL ENCOUNTER (OUTPATIENT)
Dept: RADIOLOGY | Facility: CLINIC | Age: 48
Discharge: HOME | End: 2018-08-20
Attending: INTERNAL MEDICINE
Payer: MEDICAID

## 2018-01-01 ENCOUNTER — TELEPHONE (OUTPATIENT)
Dept: GASTROENTEROLOGY | Facility: CLINIC | Age: 48
End: 2018-01-01

## 2018-01-01 ENCOUNTER — CLINICAL SUPPORT (OUTPATIENT)
Dept: TRANSPLANT | Facility: CLINIC | Age: 48
End: 2018-01-01
Payer: MEDICAID

## 2018-01-01 ENCOUNTER — INITIAL CONSULT (OUTPATIENT)
Dept: TRANSPLANT | Facility: CLINIC | Age: 48
End: 2018-01-01
Payer: MEDICAID

## 2018-01-01 ENCOUNTER — HOSPITAL ENCOUNTER (OUTPATIENT)
Dept: INTERVENTIONAL RADIOLOGY/VASCULAR | Facility: HOSPITAL | Age: 48
Discharge: HOME | End: 2018-08-30
Attending: INTERNAL MEDICINE

## 2018-01-01 ENCOUNTER — HOSPITAL ENCOUNTER (OUTPATIENT)
Facility: HOSPITAL | Age: 48
Discharge: HOME | End: 2018-09-26
Attending: RADIOLOGY | Admitting: RADIOLOGY
Payer: MEDICAID

## 2018-01-01 ENCOUNTER — NURSE TRIAGE (OUTPATIENT)
Dept: ADMINISTRATIVE | Facility: CLINIC | Age: 48
End: 2018-01-01

## 2018-01-01 ENCOUNTER — LAB VISIT (OUTPATIENT)
Dept: LAB | Facility: HOSPITAL | Age: 48
End: 2018-01-01
Attending: INTERNAL MEDICINE
Payer: MEDICAID

## 2018-01-01 ENCOUNTER — OFFICE VISIT (OUTPATIENT)
Dept: TRANSPLANT | Facility: CLINIC | Age: 48
DRG: 683 | End: 2018-01-01
Payer: MEDICAID

## 2018-01-01 ENCOUNTER — SOCIAL WORK (OUTPATIENT)
Dept: TRANSPLANT | Facility: CLINIC | Age: 48
End: 2018-01-01
Payer: MEDICAID

## 2018-01-01 ENCOUNTER — LAB VISIT (OUTPATIENT)
Dept: LAB | Facility: HOSPITAL | Age: 48
End: 2018-01-01
Payer: MEDICAID

## 2018-01-01 ENCOUNTER — OFFICE VISIT (OUTPATIENT)
Dept: TRANSPLANT | Facility: CLINIC | Age: 48
End: 2018-01-01
Payer: MEDICAID

## 2018-01-01 ENCOUNTER — HOSPITAL ENCOUNTER (OUTPATIENT)
Facility: HOSPITAL | Age: 48
Discharge: HOME | End: 2018-08-15
Attending: INTERNAL MEDICINE | Admitting: INTERNAL MEDICINE
Payer: MEDICAID

## 2018-01-01 ENCOUNTER — HOSPITAL ENCOUNTER (OUTPATIENT)
Dept: RADIOLOGY | Facility: HOSPITAL | Age: 48
Discharge: HOME | End: 2018-08-20
Attending: INTERNAL MEDICINE
Payer: MEDICAID

## 2018-01-01 ENCOUNTER — OFFICE VISIT (OUTPATIENT)
Dept: INFECTIOUS DISEASES | Facility: CLINIC | Age: 48
End: 2018-01-01
Payer: MEDICAID

## 2018-01-01 ENCOUNTER — ANESTHESIA (OUTPATIENT)
Dept: ENDOSCOPY | Facility: HOSPITAL | Age: 48
End: 2018-01-01
Payer: MEDICAID

## 2018-01-01 ENCOUNTER — TELEPHONE (OUTPATIENT)
Dept: INTERVENTIONAL RADIOLOGY/VASCULAR | Facility: HOSPITAL | Age: 48
End: 2018-01-01

## 2018-01-01 ENCOUNTER — HOSPITAL ENCOUNTER (OUTPATIENT)
Dept: INTERVENTIONAL RADIOLOGY/VASCULAR | Facility: HOSPITAL | Age: 48
Discharge: HOME | End: 2018-08-09
Attending: INTERNAL MEDICINE
Payer: MEDICAID

## 2018-01-01 ENCOUNTER — OFFICE VISIT (OUTPATIENT)
Dept: URGENT CARE | Facility: CLINIC | Age: 48
End: 2018-01-01
Payer: MEDICAID

## 2018-01-01 ENCOUNTER — HOSPITAL ENCOUNTER (INPATIENT)
Facility: HOSPITAL | Age: 48
LOS: 2 days | Discharge: HOME | DRG: 683 | End: 2018-10-11
Attending: EMERGENCY MEDICINE | Admitting: HOSPITALIST
Payer: MEDICAID

## 2018-01-01 ENCOUNTER — ANESTHESIA EVENT (OUTPATIENT)
Dept: ENDOSCOPY | Facility: HOSPITAL | Age: 48
End: 2018-01-01
Payer: MEDICAID

## 2018-01-01 VITALS
TEMPERATURE: 99 F | SYSTOLIC BLOOD PRESSURE: 89 MMHG | WEIGHT: 118.81 LBS | RESPIRATION RATE: 16 BRPM | HEART RATE: 83 BPM | BODY MASS INDEX: 22.43 KG/M2 | HEART RATE: 78 BPM | TEMPERATURE: 98 F | OXYGEN SATURATION: 99 % | DIASTOLIC BLOOD PRESSURE: 68 MMHG | BODY MASS INDEX: 20.2 KG/M2 | SYSTOLIC BLOOD PRESSURE: 110 MMHG | HEIGHT: 61 IN | WEIGHT: 107 LBS | DIASTOLIC BLOOD PRESSURE: 58 MMHG | RESPIRATION RATE: 18 BRPM | HEIGHT: 61 IN | OXYGEN SATURATION: 98 %

## 2018-01-01 VITALS
RESPIRATION RATE: 16 BRPM | HEART RATE: 71 BPM | OXYGEN SATURATION: 100 % | SYSTOLIC BLOOD PRESSURE: 105 MMHG | DIASTOLIC BLOOD PRESSURE: 66 MMHG

## 2018-01-01 VITALS
BODY MASS INDEX: 18.5 KG/M2 | WEIGHT: 100.5 LBS | HEART RATE: 83 BPM | SYSTOLIC BLOOD PRESSURE: 105 MMHG | BODY MASS INDEX: 18.5 KG/M2 | SYSTOLIC BLOOD PRESSURE: 103 MMHG | OXYGEN SATURATION: 99 % | RESPIRATION RATE: 17 BRPM | TEMPERATURE: 98 F | HEIGHT: 63 IN | HEART RATE: 83 BPM | RESPIRATION RATE: 17 BRPM | BODY MASS INDEX: 18.5 KG/M2 | SYSTOLIC BLOOD PRESSURE: 105 MMHG | WEIGHT: 94.81 LBS | HEART RATE: 78 BPM | DIASTOLIC BLOOD PRESSURE: 70 MMHG | HEIGHT: 62 IN | DIASTOLIC BLOOD PRESSURE: 70 MMHG | OXYGEN SATURATION: 99 % | HEIGHT: 62 IN | WEIGHT: 100.5 LBS | DIASTOLIC BLOOD PRESSURE: 72 MMHG | OXYGEN SATURATION: 99 % | TEMPERATURE: 99 F | HEIGHT: 62 IN | RESPIRATION RATE: 16 BRPM | WEIGHT: 100.5 LBS | BODY MASS INDEX: 16.8 KG/M2 | TEMPERATURE: 99 F

## 2018-01-01 VITALS
WEIGHT: 112.63 LBS | HEART RATE: 72 BPM | OXYGEN SATURATION: 98 % | TEMPERATURE: 98 F | BODY MASS INDEX: 21.27 KG/M2 | SYSTOLIC BLOOD PRESSURE: 96 MMHG | RESPIRATION RATE: 18 BRPM | DIASTOLIC BLOOD PRESSURE: 63 MMHG | HEIGHT: 61 IN

## 2018-01-01 VITALS
HEART RATE: 59 BPM | BODY MASS INDEX: 17.5 KG/M2 | TEMPERATURE: 98 F | SYSTOLIC BLOOD PRESSURE: 99 MMHG | DIASTOLIC BLOOD PRESSURE: 61 MMHG | WEIGHT: 98.75 LBS | HEIGHT: 63 IN

## 2018-01-01 VITALS
HEIGHT: 61 IN | RESPIRATION RATE: 18 BRPM | HEART RATE: 76 BPM | BODY MASS INDEX: 19.83 KG/M2 | DIASTOLIC BLOOD PRESSURE: 46 MMHG | WEIGHT: 105 LBS | TEMPERATURE: 97 F | SYSTOLIC BLOOD PRESSURE: 91 MMHG | OXYGEN SATURATION: 99 %

## 2018-01-01 VITALS
OXYGEN SATURATION: 100 % | HEIGHT: 61 IN | HEART RATE: 95 BPM | DIASTOLIC BLOOD PRESSURE: 74 MMHG | SYSTOLIC BLOOD PRESSURE: 114 MMHG | RESPIRATION RATE: 16 BRPM | BODY MASS INDEX: 20.02 KG/M2 | WEIGHT: 106.06 LBS | TEMPERATURE: 99 F

## 2018-01-01 VITALS
RESPIRATION RATE: 16 BRPM | DIASTOLIC BLOOD PRESSURE: 55 MMHG | OXYGEN SATURATION: 96 % | TEMPERATURE: 98 F | SYSTOLIC BLOOD PRESSURE: 91 MMHG | HEART RATE: 87 BPM

## 2018-01-01 VITALS
WEIGHT: 112 LBS | TEMPERATURE: 99 F | RESPIRATION RATE: 18 BRPM | SYSTOLIC BLOOD PRESSURE: 91 MMHG | HEART RATE: 68 BPM | DIASTOLIC BLOOD PRESSURE: 53 MMHG | OXYGEN SATURATION: 97 % | BODY MASS INDEX: 21.14 KG/M2 | HEIGHT: 61 IN

## 2018-01-01 DIAGNOSIS — K70.31 ASCITES DUE TO ALCOHOLIC CIRRHOSIS: ICD-10-CM

## 2018-01-01 DIAGNOSIS — K76.82 HEPATIC ENCEPHALOPATHY: ICD-10-CM

## 2018-01-01 DIAGNOSIS — F10.10 ALCOHOL ABUSE: ICD-10-CM

## 2018-01-01 DIAGNOSIS — K74.60 HEPATIC CIRRHOSIS, UNSPECIFIED HEPATIC CIRRHOSIS TYPE, UNSPECIFIED WHETHER ASCITES PRESENT: Primary | ICD-10-CM

## 2018-01-01 DIAGNOSIS — K70.31 ALCOHOLIC CIRRHOSIS OF LIVER WITH ASCITES: Primary | Chronic | ICD-10-CM

## 2018-01-01 DIAGNOSIS — E87.1 HYPONATREMIA: ICD-10-CM

## 2018-01-01 DIAGNOSIS — F10.21 ALCOHOL USE DISORDER, SEVERE, IN EARLY REMISSION: ICD-10-CM

## 2018-01-01 DIAGNOSIS — E43 SEVERE MALNUTRITION: ICD-10-CM

## 2018-01-01 DIAGNOSIS — C22.0 HCC (HEPATOCELLULAR CARCINOMA): ICD-10-CM

## 2018-01-01 DIAGNOSIS — R10.11 RIGHT UPPER QUADRANT ABDOMINAL PAIN: Primary | ICD-10-CM

## 2018-01-01 DIAGNOSIS — R52 PAIN: ICD-10-CM

## 2018-01-01 DIAGNOSIS — Z12.11 SPECIAL SCREENING FOR MALIGNANT NEOPLASMS, COLON: Primary | ICD-10-CM

## 2018-01-01 DIAGNOSIS — K70.31 ALCOHOLIC CIRRHOSIS OF LIVER WITH ASCITES: Primary | ICD-10-CM

## 2018-01-01 DIAGNOSIS — N17.9 AKI (ACUTE KIDNEY INJURY): ICD-10-CM

## 2018-01-01 DIAGNOSIS — R10.9 ABDOMINAL PAIN: ICD-10-CM

## 2018-01-01 DIAGNOSIS — F41.9 ANXIETY: Chronic | ICD-10-CM

## 2018-01-01 DIAGNOSIS — C22.0 HCC (HEPATOCELLULAR CARCINOMA): Primary | ICD-10-CM

## 2018-01-01 DIAGNOSIS — K70.31 ALCOHOLIC CIRRHOSIS OF LIVER WITH ASCITES: Chronic | ICD-10-CM

## 2018-01-01 DIAGNOSIS — M81.0 OSTEOPOROSIS OF MULTIPLE SITES: Primary | ICD-10-CM

## 2018-01-01 DIAGNOSIS — Z01.818 PRE-TRANSPLANT EVALUATION FOR LIVER TRANSPLANT: Primary | ICD-10-CM

## 2018-01-01 DIAGNOSIS — Z01.818 PRE-TRANSPLANT EVALUATION FOR LIVER TRANSPLANT: ICD-10-CM

## 2018-01-01 DIAGNOSIS — I95.89 OTHER SPECIFIED HYPOTENSION: ICD-10-CM

## 2018-01-01 DIAGNOSIS — R79.89 ELEVATED SERUM CREATININE: ICD-10-CM

## 2018-01-01 DIAGNOSIS — Z12.31 SCREENING MAMMOGRAM, ENCOUNTER FOR: ICD-10-CM

## 2018-01-01 DIAGNOSIS — E43 SEVERE MALNUTRITION: Primary | ICD-10-CM

## 2018-01-01 DIAGNOSIS — Z76.82 ORGAN TRANSPLANT CANDIDATE: ICD-10-CM

## 2018-01-01 DIAGNOSIS — I95.9 HYPOTENSION, UNSPECIFIED HYPOTENSION TYPE: ICD-10-CM

## 2018-01-01 DIAGNOSIS — R10.11 RUQ PAIN: Primary | ICD-10-CM

## 2018-01-01 DIAGNOSIS — Z76.82 AWAITING ORGAN TRANSPLANT STATUS: ICD-10-CM

## 2018-01-01 DIAGNOSIS — K72.90 DECOMPENSATED HEPATIC CIRRHOSIS: ICD-10-CM

## 2018-01-01 DIAGNOSIS — K74.60 DECOMPENSATED HEPATIC CIRRHOSIS: ICD-10-CM

## 2018-01-01 DIAGNOSIS — Z76.82 AWAITING ORGAN TRANSPLANT STATUS: Primary | ICD-10-CM

## 2018-01-01 DIAGNOSIS — D63.8 ANEMIA OF CHRONIC DISEASE: ICD-10-CM

## 2018-01-01 DIAGNOSIS — M62.50 MUSCULAR ATROPHY, UNSPECIFIED SITE: ICD-10-CM

## 2018-01-01 DIAGNOSIS — K74.60 CIRRHOSIS: ICD-10-CM

## 2018-01-01 LAB
ALBUMIN SERPL BCP-MCNC: 3.1 G/DL
ALBUMIN SERPL BCP-MCNC: 3.1 G/DL
ALBUMIN SERPL BCP-MCNC: 3.3 G/DL
ALBUMIN SERPL BCP-MCNC: 3.4 G/DL
ALBUMIN SERPL BCP-MCNC: 3.5 G/DL
ALP SERPL-CCNC: 143 U/L
ALP SERPL-CCNC: 161 U/L
ALP SERPL-CCNC: 182 U/L
ALP SERPL-CCNC: 194 U/L
ALP SERPL-CCNC: 226 U/L
ALT SERPL W/O P-5'-P-CCNC: 13 U/L
ALT SERPL W/O P-5'-P-CCNC: 17 U/L
ALT SERPL W/O P-5'-P-CCNC: 21 U/L
ALT SERPL W/O P-5'-P-CCNC: 25 U/L
ALT SERPL W/O P-5'-P-CCNC: 30 U/L
AMMONIA PLAS-SCNC: 80 UMOL/L
AMPHET+METHAMPHET UR QL: NEGATIVE
ANION GAP SERPL CALC-SCNC: 11 MMOL/L
ANION GAP SERPL CALC-SCNC: 12 MMOL/L
ANION GAP SERPL CALC-SCNC: 13 MMOL/L
ANION GAP SERPL CALC-SCNC: 13 MMOL/L
ANION GAP SERPL CALC-SCNC: 9 MMOL/L
AST SERPL-CCNC: 14 U/L
AST SERPL-CCNC: 17 U/L
AST SERPL-CCNC: 25 U/L
AST SERPL-CCNC: 29 U/L
AST SERPL-CCNC: 29 U/L
BACTERIA #/AREA URNS AUTO: ABNORMAL /HPF
BACTERIA SPEC AEROBE CULT: NO GROWTH
BACTERIA SPEC ANAEROBE CULT: NORMAL
BACTERIA UR CULT: NORMAL
BARBITURATES UR QL SCN>200 NG/ML: NEGATIVE
BASOPHILS # BLD AUTO: 0.03 K/UL
BASOPHILS # BLD AUTO: 0.04 K/UL
BASOPHILS # BLD AUTO: 0.05 K/UL
BASOPHILS # BLD AUTO: 0.06 K/UL
BASOPHILS # BLD AUTO: 0.07 K/UL
BASOPHILS NFR BLD: 0.4 %
BASOPHILS NFR BLD: 0.7 %
BASOPHILS NFR BLD: 0.8 %
BENZODIAZ UR QL SCN>200 NG/ML: NEGATIVE
BILIRUB SERPL-MCNC: 1.3 MG/DL
BILIRUB SERPL-MCNC: 2.5 MG/DL
BILIRUB SERPL-MCNC: 4.3 MG/DL
BILIRUB UR QL STRIP: NEGATIVE
BUN SERPL-MCNC: 13 MG/DL
BUN SERPL-MCNC: 19 MG/DL
BUN SERPL-MCNC: 19 MG/DL
BUN SERPL-MCNC: 24 MG/DL
BUN SERPL-MCNC: 26 MG/DL
BZE UR QL SCN: NEGATIVE
CALCIUM SERPL-MCNC: 10 MG/DL
CALCIUM SERPL-MCNC: 9 MG/DL
CALCIUM SERPL-MCNC: 9.3 MG/DL
CALCIUM SERPL-MCNC: 9.4 MG/DL
CALCIUM SERPL-MCNC: 9.9 MG/DL
CANNABINOIDS UR QL SCN: NEGATIVE
CHLORIDE SERPL-SCNC: 102 MMOL/L
CHLORIDE SERPL-SCNC: 107 MMOL/L
CHLORIDE SERPL-SCNC: 93 MMOL/L
CHLORIDE SERPL-SCNC: 95 MMOL/L
CHLORIDE SERPL-SCNC: 99 MMOL/L
CLARITY UR REFRACT.AUTO: ABNORMAL
CO2 SERPL-SCNC: 19 MMOL/L
CO2 SERPL-SCNC: 20 MMOL/L
CO2 SERPL-SCNC: 21 MMOL/L
CO2 SERPL-SCNC: 23 MMOL/L
CO2 SERPL-SCNC: 25 MMOL/L
COLOR UR AUTO: YELLOW
CREAT SERPL-MCNC: 0.7 MG/DL
CREAT SERPL-MCNC: 0.8 MG/DL
CREAT SERPL-MCNC: 1 MG/DL
CREAT SERPL-MCNC: 1.2 MG/DL
CREAT SERPL-MCNC: 1.5 MG/DL
CREAT UR-MCNC: 67 MG/DL
DIFFERENTIAL METHOD: ABNORMAL
EOSINOPHIL # BLD AUTO: 0 K/UL
EOSINOPHIL # BLD AUTO: 0.1 K/UL
EOSINOPHIL # BLD AUTO: 0.2 K/UL
EOSINOPHIL NFR BLD: 0.6 %
EOSINOPHIL NFR BLD: 1.8 %
EOSINOPHIL NFR BLD: 2.3 %
EOSINOPHIL NFR BLD: 2.9 %
EOSINOPHIL NFR BLD: 3.3 %
ERYTHROCYTE [DISTWIDTH] IN BLOOD BY AUTOMATED COUNT: 13.8 %
ERYTHROCYTE [DISTWIDTH] IN BLOOD BY AUTOMATED COUNT: 14 %
ERYTHROCYTE [DISTWIDTH] IN BLOOD BY AUTOMATED COUNT: 14 %
ERYTHROCYTE [DISTWIDTH] IN BLOOD BY AUTOMATED COUNT: 14.8 %
ERYTHROCYTE [DISTWIDTH] IN BLOOD BY AUTOMATED COUNT: 18.4 %
EST. GFR  (AFRICAN AMERICAN): 46.8 ML/MIN/1.73 M^2
EST. GFR  (AFRICAN AMERICAN): >60 ML/MIN/1.73 M^2
EST. GFR  (NON AFRICAN AMERICAN): 40.6 ML/MIN/1.73 M^2
EST. GFR  (NON AFRICAN AMERICAN): 53.2 ML/MIN/1.73 M^2
EST. GFR  (NON AFRICAN AMERICAN): >60 ML/MIN/1.73 M^2
ETHANOL UR-MCNC: <10 MG/DL
FUNGUS SPEC CULT: NORMAL
GLUCOSE SERPL-MCNC: 104 MG/DL
GLUCOSE SERPL-MCNC: 108 MG/DL
GLUCOSE SERPL-MCNC: 77 MG/DL
GLUCOSE SERPL-MCNC: 79 MG/DL
GLUCOSE SERPL-MCNC: 89 MG/DL
GLUCOSE UR QL STRIP: NEGATIVE
HCT VFR BLD AUTO: 29.8 %
HCT VFR BLD AUTO: 31.2 %
HCT VFR BLD AUTO: 31.4 %
HCT VFR BLD AUTO: 32 %
HCT VFR BLD AUTO: 34.7 %
HGB BLD-MCNC: 10.2 G/DL
HGB BLD-MCNC: 10.3 G/DL
HGB BLD-MCNC: 11.5 G/DL
HGB BLD-MCNC: 9.6 G/DL
HGB BLD-MCNC: 9.9 G/DL
HGB UR QL STRIP: NEGATIVE
IMM GRANULOCYTES # BLD AUTO: 0.02 K/UL
IMM GRANULOCYTES # BLD AUTO: 0.03 K/UL
IMM GRANULOCYTES # BLD AUTO: 0.04 K/UL
IMM GRANULOCYTES # BLD AUTO: 0.04 K/UL
IMM GRANULOCYTES # BLD AUTO: 0.05 K/UL
IMM GRANULOCYTES NFR BLD AUTO: 0.3 %
IMM GRANULOCYTES NFR BLD AUTO: 0.5 %
IMM GRANULOCYTES NFR BLD AUTO: 0.5 %
IMM GRANULOCYTES NFR BLD AUTO: 0.6 %
IMM GRANULOCYTES NFR BLD AUTO: 0.6 %
INR PPP: 1.2
INR PPP: 1.3
KETONES UR QL STRIP: NEGATIVE
LEUKOCYTE ESTERASE UR QL STRIP: ABNORMAL
LIPASE SERPL-CCNC: 53 U/L
LYMPHOCYTES # BLD AUTO: 0.8 K/UL
LYMPHOCYTES # BLD AUTO: 0.9 K/UL
LYMPHOCYTES # BLD AUTO: 1 K/UL
LYMPHOCYTES # BLD AUTO: 1.2 K/UL
LYMPHOCYTES # BLD AUTO: 1.3 K/UL
LYMPHOCYTES NFR BLD: 11.4 %
LYMPHOCYTES NFR BLD: 12.9 %
LYMPHOCYTES NFR BLD: 14.1 %
LYMPHOCYTES NFR BLD: 15.1 %
LYMPHOCYTES NFR BLD: 16.5 %
MAGNESIUM SERPL-MCNC: 2.6 MG/DL
MCH RBC QN AUTO: 32.1 PG
MCH RBC QN AUTO: 33.1 PG
MCH RBC QN AUTO: 33.6 PG
MCH RBC QN AUTO: 33.8 PG
MCH RBC QN AUTO: 34 PG
MCHC RBC AUTO-ENTMCNC: 30.8 G/DL
MCHC RBC AUTO-ENTMCNC: 32.2 G/DL
MCHC RBC AUTO-ENTMCNC: 32.5 G/DL
MCHC RBC AUTO-ENTMCNC: 33.1 G/DL
MCHC RBC AUTO-ENTMCNC: 33.2 G/DL
MCV RBC AUTO: 100 FL
MCV RBC AUTO: 102 FL
MCV RBC AUTO: 103 FL
MCV RBC AUTO: 104 FL
MCV RBC AUTO: 106 FL
METHADONE UR QL SCN>300 NG/ML: NEGATIVE
MICROSCOPIC COMMENT: ABNORMAL
MONOCYTES # BLD AUTO: 0.8 K/UL
MONOCYTES # BLD AUTO: 0.9 K/UL
MONOCYTES # BLD AUTO: 0.9 K/UL
MONOCYTES # BLD AUTO: 1 K/UL
MONOCYTES # BLD AUTO: 1.1 K/UL
MONOCYTES NFR BLD: 10.7 %
MONOCYTES NFR BLD: 11.2 %
MONOCYTES NFR BLD: 12.8 %
MONOCYTES NFR BLD: 13.4 %
MONOCYTES NFR BLD: 14.3 %
NEUTROPHILS # BLD AUTO: 4 K/UL
NEUTROPHILS # BLD AUTO: 4.7 K/UL
NEUTROPHILS # BLD AUTO: 5.2 K/UL
NEUTROPHILS # BLD AUTO: 5.3 K/UL
NEUTROPHILS # BLD AUTO: 7.2 K/UL
NEUTROPHILS NFR BLD: 65.9 %
NEUTROPHILS NFR BLD: 66.1 %
NEUTROPHILS NFR BLD: 72.4 %
NEUTROPHILS NFR BLD: 73 %
NEUTROPHILS NFR BLD: 73.5 %
NITRITE UR QL STRIP: POSITIVE
NRBC BLD-RTO: 0 /100 WBC
OPIATES UR QL SCN: NORMAL
OSMOLALITY SERPL: 285 MOSM/KG
PCP UR QL SCN>25 NG/ML: NEGATIVE
PH UR STRIP: 6 [PH] (ref 5–8)
PHOSPHATE SERPL-MCNC: 3.3 MG/DL
PHOSPHATE SERPL-MCNC: 4.4 MG/DL
PHOSPHATE SERPL-MCNC: 4.8 MG/DL
PHOSPHATIDYLETHANOL (PETH): 139 NG/ML
PHOSPHATIDYLETHANOL (PETH): NEGATIVE NG/ML
PLATELET # BLD AUTO: 130 K/UL
PLATELET # BLD AUTO: 151 K/UL
PLATELET # BLD AUTO: 152 K/UL
PLATELET # BLD AUTO: 154 K/UL
PLATELET # BLD AUTO: 178 K/UL
PMV BLD AUTO: 10.1 FL
PMV BLD AUTO: 10.1 FL
PMV BLD AUTO: 10.3 FL
PMV BLD AUTO: 10.6 FL
PMV BLD AUTO: 9.2 FL
POCT GLUCOSE: 128 MG/DL (ref 70–110)
POCT GLUCOSE: 145 MG/DL (ref 70–110)
POTASSIUM SERPL-SCNC: 3.8 MMOL/L
POTASSIUM SERPL-SCNC: 3.8 MMOL/L
POTASSIUM SERPL-SCNC: 4.1 MMOL/L
POTASSIUM SERPL-SCNC: 4.2 MMOL/L
POTASSIUM SERPL-SCNC: 4.3 MMOL/L
PROCALCITONIN SERPL IA-MCNC: 0.2 NG/ML
PROT SERPL-MCNC: 6.9 G/DL
PROT SERPL-MCNC: 7.1 G/DL
PROT SERPL-MCNC: 8.3 G/DL
PROT SERPL-MCNC: 8.5 G/DL
PROT SERPL-MCNC: 8.5 G/DL
PROT UR QL STRIP: NEGATIVE
PROT UR-MCNC: 10 MG/DL
PROT/CREAT UR: 0.15 MG/G{CREAT}
PROTHROMBIN TIME: 12 SEC
PROTHROMBIN TIME: 12.3 SEC
PROTHROMBIN TIME: 12.4 SEC
PROTHROMBIN TIME: 12.7 SEC
RBC # BLD AUTO: 2.99 M/UL
RBC # BLD AUTO: 2.99 M/UL
RBC # BLD AUTO: 3.03 M/UL
RBC # BLD AUTO: 3.04 M/UL
RBC # BLD AUTO: 3.4 M/UL
RBC #/AREA URNS AUTO: 2 /HPF (ref 0–4)
SODIUM SERPL-SCNC: 130 MMOL/L
SODIUM SERPL-SCNC: 131 MMOL/L
SODIUM SERPL-SCNC: 131 MMOL/L
SODIUM SERPL-SCNC: 134 MMOL/L
SODIUM SERPL-SCNC: 136 MMOL/L
SODIUM UR-SCNC: 31 MMOL/L
SP GR UR STRIP: 1.01 (ref 1–1.03)
SQUAMOUS #/AREA URNS AUTO: 1 /HPF
TOXICOLOGY INFORMATION: NORMAL
URN SPEC COLLECT METH UR: ABNORMAL
UROBILINOGEN UR STRIP-ACNC: 2 EU/DL
UUN UR-MCNC: 417 MG/DL
WBC # BLD AUTO: 6.1 K/UL
WBC # BLD AUTO: 7.16 K/UL
WBC # BLD AUTO: 7.18 K/UL
WBC # BLD AUTO: 7.18 K/UL
WBC # BLD AUTO: 9.95 K/UL
WBC #/AREA URNS AUTO: 24 /HPF (ref 0–5)
WBC CLUMPS UR QL AUTO: ABNORMAL

## 2018-01-01 PROCEDURE — 85025 COMPLETE CBC W/AUTO DIFF WBC: CPT | Mod: NTX

## 2018-01-01 PROCEDURE — 85025 COMPLETE CBC W/AUTO DIFF WBC: CPT | Mod: TXP

## 2018-01-01 PROCEDURE — 99214 OFFICE O/P EST MOD 30 MIN: CPT | Mod: S$GLB,TXP,, | Performed by: PHYSICIAN ASSISTANT

## 2018-01-01 PROCEDURE — 63600175 PHARM REV CODE 636 W HCPCS: Mod: NTX | Performed by: HOSPITALIST

## 2018-01-01 PROCEDURE — 63600175 PHARM REV CODE 636 W HCPCS: Mod: TXP | Performed by: INTERNAL MEDICINE

## 2018-01-01 PROCEDURE — 71046 X-RAY EXAM CHEST 2 VIEWS: CPT | Mod: 26,TXP,, | Performed by: RADIOLOGY

## 2018-01-01 PROCEDURE — 77080 DXA BONE DENSITY AXIAL: CPT | Mod: TC,TXP

## 2018-01-01 PROCEDURE — 25000003 PHARM REV CODE 250: Mod: NTX | Performed by: HOSPITALIST

## 2018-01-01 PROCEDURE — 99999 PR PBB SHADOW E&M-EST. PATIENT-LVL III: CPT | Mod: PBBFAC,TXP,, | Performed by: INTERNAL MEDICINE

## 2018-01-01 PROCEDURE — 63600175 PHARM REV CODE 636 W HCPCS: Mod: TXP | Performed by: RADIOLOGY

## 2018-01-01 PROCEDURE — 77067 SCR MAMMO BI INCL CAD: CPT | Mod: TC,NTX

## 2018-01-01 PROCEDURE — 36415 COLL VENOUS BLD VENIPUNCTURE: CPT | Mod: TXP

## 2018-01-01 PROCEDURE — 90471 IMMUNIZATION ADMIN: CPT | Mod: PBBFAC,TXP

## 2018-01-01 PROCEDURE — 99233 SBSQ HOSP IP/OBS HIGH 50: CPT | Mod: NTX,,, | Performed by: HOSPITALIST

## 2018-01-01 PROCEDURE — 83735 ASSAY OF MAGNESIUM: CPT | Mod: NTX

## 2018-01-01 PROCEDURE — 99285 EMERGENCY DEPT VISIT HI MDM: CPT | Mod: 27,NTX

## 2018-01-01 PROCEDURE — 83930 ASSAY OF BLOOD OSMOLALITY: CPT | Mod: NTX

## 2018-01-01 PROCEDURE — 99214 OFFICE O/P EST MOD 30 MIN: CPT | Mod: PBBFAC,TXP | Performed by: NURSE PRACTITIONER

## 2018-01-01 PROCEDURE — 88313 SPECIAL STAINS GROUP 2: CPT | Mod: 26,TXP,, | Performed by: PATHOLOGY

## 2018-01-01 PROCEDURE — 99222 1ST HOSP IP/OBS MODERATE 55: CPT | Mod: AF,HB,NTX, | Performed by: PSYCHIATRY & NEUROLOGY

## 2018-01-01 PROCEDURE — 25000003 PHARM REV CODE 250: Mod: TXP | Performed by: RADIOLOGY

## 2018-01-01 PROCEDURE — 96372 THER/PROPH/DIAG INJ SC/IM: CPT | Mod: NTX

## 2018-01-01 PROCEDURE — 20600001 HC STEP DOWN PRIVATE ROOM: Mod: NTX

## 2018-01-01 PROCEDURE — D9220A PRA ANESTHESIA: Mod: ANES,NTX,, | Performed by: ANESTHESIOLOGY

## 2018-01-01 PROCEDURE — 93010 ELECTROCARDIOGRAM REPORT: CPT | Mod: NTX,,, | Performed by: INTERNAL MEDICINE

## 2018-01-01 PROCEDURE — 99214 OFFICE O/P EST MOD 30 MIN: CPT | Mod: PBBFAC,TXP,25 | Performed by: NURSE PRACTITIONER

## 2018-01-01 PROCEDURE — 99213 OFFICE O/P EST LOW 20 MIN: CPT | Mod: PBBFAC,27,TXP | Performed by: SURGERY

## 2018-01-01 PROCEDURE — 83690 ASSAY OF LIPASE: CPT | Mod: NTX

## 2018-01-01 PROCEDURE — 99212 OFFICE O/P EST SF 10 MIN: CPT | Mod: PBBFAC,27,TXP | Performed by: DIETITIAN, REGISTERED

## 2018-01-01 PROCEDURE — 84156 ASSAY OF PROTEIN URINE: CPT | Mod: NTX

## 2018-01-01 PROCEDURE — 99999 PR PBB SHADOW E&M-EST. PATIENT-LVL III: CPT | Mod: PBBFAC,TXP,,

## 2018-01-01 PROCEDURE — 87077 CULTURE AEROBIC IDENTIFY: CPT | Mod: NTX

## 2018-01-01 PROCEDURE — 85610 PROTHROMBIN TIME: CPT | Mod: NTX

## 2018-01-01 PROCEDURE — 90472 IMMUNIZATION ADMIN EACH ADD: CPT | Mod: PBBFAC,TXP

## 2018-01-01 PROCEDURE — 82962 GLUCOSE BLOOD TEST: CPT | Mod: NTX

## 2018-01-01 PROCEDURE — 99999 PR PBB SHADOW E&M-EST. PATIENT-LVL II: CPT | Mod: PBBFAC,TXP,,

## 2018-01-01 PROCEDURE — 84100 ASSAY OF PHOSPHORUS: CPT | Mod: NTX

## 2018-01-01 PROCEDURE — 80321 ALCOHOLS BIOMARKERS 1OR 2: CPT | Mod: NTX

## 2018-01-01 PROCEDURE — P9047 ALBUMIN (HUMAN), 25%, 50ML: HCPCS | Mod: JG,NTX | Performed by: HOSPITALIST

## 2018-01-01 PROCEDURE — 99204 OFFICE O/P NEW MOD 45 MIN: CPT | Mod: S$PBB,TXP,, | Performed by: SURGERY

## 2018-01-01 PROCEDURE — 99214 OFFICE O/P EST MOD 30 MIN: CPT | Mod: S$PBB,TXP,, | Performed by: INTERNAL MEDICINE

## 2018-01-01 PROCEDURE — 84300 ASSAY OF URINE SODIUM: CPT | Mod: NTX

## 2018-01-01 PROCEDURE — 80321 ALCOHOLS BIOMARKERS 1OR 2: CPT | Mod: TXP

## 2018-01-01 PROCEDURE — 88307 TISSUE EXAM BY PATHOLOGIST: CPT | Mod: 26,TXP,, | Performed by: PATHOLOGY

## 2018-01-01 PROCEDURE — 80321 ALCOHOLS BIOMARKERS 1OR 2: CPT | Mod: 91,NTX

## 2018-01-01 PROCEDURE — 25500020 PHARM REV CODE 255: Mod: TXP | Performed by: INTERNAL MEDICINE

## 2018-01-01 PROCEDURE — 81001 URINALYSIS AUTO W/SCOPE: CPT | Mod: NTX

## 2018-01-01 PROCEDURE — 36415 COLL VENOUS BLD VENIPUNCTURE: CPT | Mod: NTX

## 2018-01-01 PROCEDURE — 27201068 IR BIOPSY LIVER: Mod: TXP

## 2018-01-01 PROCEDURE — G8979 MOBILITY GOAL STATUS: HCPCS | Mod: CH,NTX

## 2018-01-01 PROCEDURE — 80053 COMPREHEN METABOLIC PANEL: CPT | Mod: 91,NTX

## 2018-01-01 PROCEDURE — 80307 DRUG TEST PRSMV CHEM ANLYZR: CPT | Mod: NTX

## 2018-01-01 PROCEDURE — 25000003 PHARM REV CODE 250: Mod: NTX | Performed by: PHYSICIAN ASSISTANT

## 2018-01-01 PROCEDURE — 96375 TX/PRO/DX INJ NEW DRUG ADDON: CPT | Mod: NTX

## 2018-01-01 PROCEDURE — 99213 OFFICE O/P EST LOW 20 MIN: CPT | Mod: PBBFAC,TXP | Performed by: INTERNAL MEDICINE

## 2018-01-01 PROCEDURE — 88313 SPECIAL STAINS GROUP 2: CPT | Mod: TXP | Performed by: PATHOLOGY

## 2018-01-01 PROCEDURE — 80053 COMPREHEN METABOLIC PANEL: CPT | Mod: NTX

## 2018-01-01 PROCEDURE — 99214 OFFICE O/P EST MOD 30 MIN: CPT | Mod: S$PBB,TXP,, | Performed by: NURSE PRACTITIONER

## 2018-01-01 PROCEDURE — 99205 OFFICE O/P NEW HI 60 MIN: CPT | Mod: S$PBB,TXP,, | Performed by: INTERNAL MEDICINE

## 2018-01-01 PROCEDURE — 99222 1ST HOSP IP/OBS MODERATE 55: CPT | Mod: NTX,,, | Performed by: HOSPITALIST

## 2018-01-01 PROCEDURE — 87186 SC STD MICRODIL/AGAR DIL: CPT | Mod: NTX

## 2018-01-01 PROCEDURE — 99215 OFFICE O/P EST HI 40 MIN: CPT | Mod: S$PBB,TXP,, | Performed by: NURSE PRACTITIONER

## 2018-01-01 PROCEDURE — 99999 PR PBB SHADOW E&M-EST. PATIENT-LVL III: CPT | Mod: PBBFAC,TXP,, | Performed by: SURGERY

## 2018-01-01 PROCEDURE — 99999 PR PBB SHADOW E&M-EST. PATIENT-LVL II: CPT | Mod: PBBFAC,TXP,, | Performed by: DIETITIAN, REGISTERED

## 2018-01-01 PROCEDURE — 77012 CT SCAN FOR NEEDLE BIOPSY: CPT | Mod: 26,TXP,, | Performed by: RADIOLOGY

## 2018-01-01 PROCEDURE — 88399 UNLISTED SURGICAL PATH PX: CPT | Mod: TXP | Performed by: PATHOLOGY

## 2018-01-01 PROCEDURE — 85610 PROTHROMBIN TIME: CPT | Mod: TXP

## 2018-01-01 PROCEDURE — 88333 PATH CONSLTJ SURG CYTO XM 1: CPT | Mod: 26,TXP,, | Performed by: PATHOLOGY

## 2018-01-01 PROCEDURE — 87086 URINE CULTURE/COLONY COUNT: CPT | Mod: NTX

## 2018-01-01 PROCEDURE — 99213 OFFICE O/P EST LOW 20 MIN: CPT | Mod: PBBFAC,TXP

## 2018-01-01 PROCEDURE — 96361 HYDRATE IV INFUSION ADD-ON: CPT | Mod: NTX

## 2018-01-01 PROCEDURE — 97165 OT EVAL LOW COMPLEX 30 MIN: CPT | Mod: NTX

## 2018-01-01 PROCEDURE — G8978 MOBILITY CURRENT STATUS: HCPCS | Mod: CH,NTX

## 2018-01-01 PROCEDURE — 80053 COMPREHEN METABOLIC PANEL: CPT | Mod: TXP

## 2018-01-01 PROCEDURE — 87088 URINE BACTERIA CULTURE: CPT | Mod: NTX

## 2018-01-01 PROCEDURE — 90746 HEPB VACCINE 3 DOSE ADULT IM: CPT | Mod: PBBFAC,TXP

## 2018-01-01 PROCEDURE — 99999 PR PBB SHADOW E&M-EST. PATIENT-LVL IV: CPT | Mod: PBBFAC,TXP,, | Performed by: NURSE PRACTITIONER

## 2018-01-01 PROCEDURE — D9220A PRA ANESTHESIA: Mod: CRNA,NTX,, | Performed by: NURSE ANESTHETIST, CERTIFIED REGISTERED

## 2018-01-01 PROCEDURE — G8980 MOBILITY D/C STATUS: HCPCS | Mod: CH,NTX

## 2018-01-01 PROCEDURE — 63600175 PHARM REV CODE 636 W HCPCS: Mod: TXP | Performed by: NURSE ANESTHETIST, CERTIFIED REGISTERED

## 2018-01-01 PROCEDURE — 77080 DXA BONE DENSITY AXIAL: CPT | Mod: 26,TXP,, | Performed by: INTERNAL MEDICINE

## 2018-01-01 PROCEDURE — 84145 PROCALCITONIN (PCT): CPT | Mod: NTX

## 2018-01-01 PROCEDURE — 85025 COMPLETE CBC W/AUTO DIFF WBC: CPT | Mod: 91,NTX

## 2018-01-01 PROCEDURE — 96374 THER/PROPH/DIAG INJ IV PUSH: CPT | Mod: NTX

## 2018-01-01 PROCEDURE — 71046 X-RAY EXAM CHEST 2 VIEWS: CPT | Mod: TC,FY,TXP

## 2018-01-01 PROCEDURE — 84540 ASSAY OF URINE/UREA-N: CPT | Mod: NTX

## 2018-01-01 PROCEDURE — 77067 SCR MAMMO BI INCL CAD: CPT | Mod: 26,TXP,, | Performed by: RADIOLOGY

## 2018-01-01 PROCEDURE — 36430 TRANSFUSION BLD/BLD COMPNT: CPT | Mod: NTX

## 2018-01-01 PROCEDURE — 99233 SBSQ HOSP IP/OBS HIGH 50: CPT | Mod: NTX,,, | Performed by: INTERNAL MEDICINE

## 2018-01-01 PROCEDURE — 97802 MEDICAL NUTRITION INDIV IN: CPT | Mod: PBBFAC,TXP | Performed by: DIETITIAN, REGISTERED

## 2018-01-01 PROCEDURE — 97161 PT EVAL LOW COMPLEX 20 MIN: CPT | Mod: NTX

## 2018-01-01 PROCEDURE — 82140 ASSAY OF AMMONIA: CPT | Mod: NTX

## 2018-01-01 PROCEDURE — 63600175 PHARM REV CODE 636 W HCPCS: Mod: JG,NTX | Performed by: HOSPITALIST

## 2018-01-01 PROCEDURE — 76705 ECHO EXAM OF ABDOMEN: CPT | Mod: TC,NTX

## 2018-01-01 PROCEDURE — 47000 NEEDLE BIOPSY OF LIVER PERQ: CPT | Mod: TXP,,, | Performed by: RADIOLOGY

## 2018-01-01 PROCEDURE — 99212 OFFICE O/P EST SF 10 MIN: CPT | Mod: PBBFAC,TXP

## 2018-01-01 PROCEDURE — 99284 EMERGENCY DEPT VISIT MOD MDM: CPT | Mod: NTX,,, | Performed by: PHYSICIAN ASSISTANT

## 2018-01-01 PROCEDURE — 99215 OFFICE O/P EST HI 40 MIN: CPT | Mod: S$PBB,TXP,, | Performed by: INTERNAL MEDICINE

## 2018-01-01 PROCEDURE — 63600175 PHARM REV CODE 636 W HCPCS: Mod: NTX | Performed by: PHYSICIAN ASSISTANT

## 2018-01-01 PROCEDURE — 76705 ECHO EXAM OF ABDOMEN: CPT | Mod: 26,NTX,, | Performed by: RADIOLOGY

## 2018-01-01 RX ORDER — SODIUM CHLORIDE 9 MG/ML
500 INJECTION, SOLUTION INTRAVENOUS ONCE
Status: DISCONTINUED | OUTPATIENT
Start: 2018-01-01 | End: 2018-01-01 | Stop reason: HOSPADM

## 2018-01-01 RX ORDER — MIDAZOLAM HYDROCHLORIDE 1 MG/ML
INJECTION INTRAMUSCULAR; INTRAVENOUS CODE/TRAUMA/SEDATION MEDICATION
Status: COMPLETED | OUTPATIENT
Start: 2018-01-01 | End: 2018-01-01

## 2018-01-01 RX ORDER — IBUPROFEN 200 MG
800 TABLET ORAL 2 TIMES DAILY
Status: ON HOLD | COMMUNITY
End: 2018-01-01 | Stop reason: HOSPADM

## 2018-01-01 RX ORDER — FENTANYL CITRATE 50 UG/ML
50 INJECTION, SOLUTION INTRAMUSCULAR; INTRAVENOUS
Status: DISCONTINUED | OUTPATIENT
Start: 2018-01-01 | End: 2018-01-01 | Stop reason: HOSPADM

## 2018-01-01 RX ORDER — OCTREOTIDE ACETATE 100 UG/ML
100 INJECTION, SOLUTION INTRAVENOUS; SUBCUTANEOUS EVERY 8 HOURS
Status: DISCONTINUED | OUTPATIENT
Start: 2018-01-01 | End: 2018-01-01

## 2018-01-01 RX ORDER — HEPARIN SODIUM 5000 [USP'U]/ML
5000 INJECTION, SOLUTION INTRAVENOUS; SUBCUTANEOUS EVERY 8 HOURS
Status: DISCONTINUED | OUTPATIENT
Start: 2018-01-01 | End: 2018-01-01

## 2018-01-01 RX ORDER — FENTANYL CITRATE 50 UG/ML
50 INJECTION, SOLUTION INTRAMUSCULAR; INTRAVENOUS
Status: COMPLETED | OUTPATIENT
Start: 2018-01-01 | End: 2018-01-01

## 2018-01-01 RX ORDER — FUROSEMIDE 80 MG/1
80 TABLET ORAL 2 TIMES DAILY
Status: ON HOLD | COMMUNITY
End: 2018-01-01 | Stop reason: HOSPADM

## 2018-01-01 RX ORDER — FOLIC ACID 1 MG/1
1 TABLET ORAL DAILY
Status: DISCONTINUED | OUTPATIENT
Start: 2018-01-01 | End: 2018-01-01 | Stop reason: HOSPADM

## 2018-01-01 RX ORDER — FUROSEMIDE 80 MG/1
40 TABLET ORAL DAILY
Qty: 120 TABLET | Refills: 2
Start: 2018-01-01 | End: 2018-01-01 | Stop reason: SDUPTHER

## 2018-01-01 RX ORDER — OXYCODONE HYDROCHLORIDE 5 MG/1
5 TABLET ORAL ONCE
Status: COMPLETED | OUTPATIENT
Start: 2018-01-01 | End: 2018-01-01

## 2018-01-01 RX ORDER — GLUCAGON 1 MG
1 KIT INJECTION
Status: DISCONTINUED | OUTPATIENT
Start: 2018-01-01 | End: 2018-01-01 | Stop reason: HOSPADM

## 2018-01-01 RX ORDER — PROPOFOL 10 MG/ML
VIAL (ML) INTRAVENOUS
Status: DISCONTINUED | OUTPATIENT
Start: 2018-01-01 | End: 2018-01-01

## 2018-01-01 RX ORDER — RAMELTEON 8 MG/1
8 TABLET ORAL NIGHTLY PRN
Status: DISCONTINUED | OUTPATIENT
Start: 2018-01-01 | End: 2018-01-01 | Stop reason: HOSPADM

## 2018-01-01 RX ORDER — MIDODRINE HYDROCHLORIDE 10 MG/1
10 TABLET ORAL
COMMUNITY

## 2018-01-01 RX ORDER — LACTULOSE 10 G/15ML
15 SOLUTION ORAL 3 TIMES DAILY
Qty: 2025 ML | Refills: 0 | Status: SHIPPED | OUTPATIENT
Start: 2018-01-01 | End: 2018-01-01

## 2018-01-01 RX ORDER — FUROSEMIDE 80 MG/1
TABLET ORAL
Qty: 120 TABLET | Refills: 2 | Status: ON HOLD | OUTPATIENT
Start: 2018-01-01 | End: 2018-01-01 | Stop reason: SDUPTHER

## 2018-01-01 RX ORDER — MIDAZOLAM HYDROCHLORIDE 1 MG/ML
1 INJECTION INTRAMUSCULAR; INTRAVENOUS
Status: DISCONTINUED | OUTPATIENT
Start: 2018-01-01 | End: 2018-01-01 | Stop reason: HOSPADM

## 2018-01-01 RX ORDER — ONDANSETRON 4 MG/1
4 TABLET, ORALLY DISINTEGRATING ORAL EVERY 8 HOURS PRN
Status: DISCONTINUED | OUTPATIENT
Start: 2018-01-01 | End: 2018-01-01 | Stop reason: HOSPADM

## 2018-01-01 RX ORDER — MIDODRINE HYDROCHLORIDE 5 MG/1
15 TABLET ORAL
Status: DISCONTINUED | OUTPATIENT
Start: 2018-01-01 | End: 2018-01-01 | Stop reason: HOSPADM

## 2018-01-01 RX ORDER — HEPARIN SODIUM 5000 [USP'U]/ML
5000 INJECTION, SOLUTION INTRAVENOUS; SUBCUTANEOUS EVERY 12 HOURS
Status: DISCONTINUED | OUTPATIENT
Start: 2018-01-01 | End: 2018-01-01 | Stop reason: HOSPADM

## 2018-01-01 RX ORDER — LIDOCAINE HCL/PF 100 MG/5ML
SYRINGE (ML) INTRAVENOUS
Status: DISCONTINUED | OUTPATIENT
Start: 2018-01-01 | End: 2018-01-01

## 2018-01-01 RX ORDER — ACETAMINOPHEN 325 MG/1
650 TABLET ORAL EVERY 6 HOURS PRN
Status: DISCONTINUED | OUTPATIENT
Start: 2018-01-01 | End: 2018-01-01 | Stop reason: HOSPADM

## 2018-01-01 RX ORDER — ONDANSETRON 2 MG/ML
4 INJECTION INTRAMUSCULAR; INTRAVENOUS
Status: COMPLETED | OUTPATIENT
Start: 2018-01-01 | End: 2018-01-01

## 2018-01-01 RX ORDER — CHOLECALCIFEROL (VITAMIN D3) 25 MCG
1000 TABLET ORAL DAILY
Status: DISCONTINUED | OUTPATIENT
Start: 2018-01-01 | End: 2018-01-01 | Stop reason: HOSPADM

## 2018-01-01 RX ORDER — TRAMADOL HYDROCHLORIDE 50 MG/1
50 TABLET ORAL EVERY 6 HOURS PRN
Qty: 15 TABLET | Refills: 0 | Status: SHIPPED | OUTPATIENT
Start: 2018-01-01 | End: 2018-01-01

## 2018-01-01 RX ORDER — FERROUS SULFATE 325(65) MG
325 TABLET ORAL DAILY
COMMUNITY

## 2018-01-01 RX ORDER — FENTANYL CITRATE 50 UG/ML
INJECTION, SOLUTION INTRAMUSCULAR; INTRAVENOUS CODE/TRAUMA/SEDATION MEDICATION
Status: COMPLETED | OUTPATIENT
Start: 2018-01-01 | End: 2018-01-01

## 2018-01-01 RX ORDER — ALBUMIN HUMAN 250 G/1000ML
25 SOLUTION INTRAVENOUS 2 TIMES DAILY
Status: DISCONTINUED | OUTPATIENT
Start: 2018-01-01 | End: 2018-01-01 | Stop reason: HOSPADM

## 2018-01-01 RX ORDER — ALBUMIN HUMAN 250 G/1000ML
25 SOLUTION INTRAVENOUS 2 TIMES DAILY
Status: DISCONTINUED | OUTPATIENT
Start: 2018-01-01 | End: 2018-01-01

## 2018-01-01 RX ORDER — IBUPROFEN 200 MG
16 TABLET ORAL
Status: DISCONTINUED | OUTPATIENT
Start: 2018-01-01 | End: 2018-01-01 | Stop reason: HOSPADM

## 2018-01-01 RX ORDER — SPIRONOLACTONE 100 MG/1
100 TABLET, FILM COATED ORAL DAILY
Qty: 90 TABLET | Refills: 3
Start: 2018-01-01 | End: 2018-01-01 | Stop reason: SDUPTHER

## 2018-01-01 RX ORDER — LACTULOSE 10 G/15ML
15 SOLUTION ORAL 3 TIMES DAILY
Status: DISCONTINUED | OUTPATIENT
Start: 2018-01-01 | End: 2018-01-01 | Stop reason: HOSPADM

## 2018-01-01 RX ORDER — IBUPROFEN 200 MG
24 TABLET ORAL
Status: DISCONTINUED | OUTPATIENT
Start: 2018-01-01 | End: 2018-01-01 | Stop reason: HOSPADM

## 2018-01-01 RX ORDER — LANOLIN ALCOHOL/MO/W.PET/CERES
1000 CREAM (GRAM) TOPICAL DAILY
Status: DISCONTINUED | OUTPATIENT
Start: 2018-01-01 | End: 2018-01-01 | Stop reason: HOSPADM

## 2018-01-01 RX ORDER — ALENDRONATE SODIUM 70 MG/1
70 TABLET ORAL
Qty: 4 TABLET | Refills: 11 | Status: SHIPPED | OUTPATIENT
Start: 2018-01-01 | End: 2019-08-29

## 2018-01-01 RX ORDER — MIDODRINE HYDROCHLORIDE 5 MG/1
5 TABLET ORAL ONCE
Status: COMPLETED | OUTPATIENT
Start: 2018-01-01 | End: 2018-01-01

## 2018-01-01 RX ORDER — LACTULOSE 10 G/15ML
SOLUTION ORAL; RECTAL
Status: ON HOLD | COMMUNITY
Start: 2018-01-01 | End: 2018-01-01

## 2018-01-01 RX ORDER — CEFTRIAXONE 1 G/1
1 INJECTION, POWDER, FOR SOLUTION INTRAMUSCULAR; INTRAVENOUS
Status: DISCONTINUED | OUTPATIENT
Start: 2018-01-01 | End: 2018-01-01

## 2018-01-01 RX ORDER — ALBUMIN HUMAN 250 G/1000ML
1 SOLUTION INTRAVENOUS ONCE
Status: COMPLETED | OUTPATIENT
Start: 2018-01-01 | End: 2018-01-01

## 2018-01-01 RX ORDER — FUROSEMIDE 40 MG/1
TABLET ORAL
COMMUNITY
Start: 2018-01-01 | End: 2018-01-01

## 2018-01-01 RX ORDER — PROPOFOL 10 MG/ML
VIAL (ML) INTRAVENOUS CONTINUOUS PRN
Status: DISCONTINUED | OUTPATIENT
Start: 2018-01-01 | End: 2018-01-01

## 2018-01-01 RX ORDER — ONDANSETRON HYDROCHLORIDE 4 MG/5ML
4 SOLUTION ORAL ONCE
Status: DISCONTINUED | OUTPATIENT
Start: 2018-01-01 | End: 2018-01-01

## 2018-01-01 RX ORDER — OXYCODONE HYDROCHLORIDE 5 MG/1
5 TABLET ORAL EVERY 6 HOURS PRN
Status: DISCONTINUED | OUTPATIENT
Start: 2018-01-01 | End: 2018-01-01 | Stop reason: HOSPADM

## 2018-01-01 RX ORDER — POLYETHYLENE GLYCOL 3350, SODIUM SULFATE ANHYDROUS, SODIUM BICARBONATE, SODIUM CHLORIDE, POTASSIUM CHLORIDE 236; 22.74; 6.74; 5.86; 2.97 G/4L; G/4L; G/4L; G/4L; G/4L
4 POWDER, FOR SOLUTION ORAL ONCE
Qty: 4000 ML | Refills: 0 | Status: SHIPPED | OUTPATIENT
Start: 2018-01-01 | End: 2018-01-01

## 2018-01-01 RX ORDER — SODIUM CHLORIDE 0.9 % (FLUSH) 0.9 %
5 SYRINGE (ML) INJECTION
Status: DISCONTINUED | OUTPATIENT
Start: 2018-01-01 | End: 2018-01-01 | Stop reason: HOSPADM

## 2018-01-01 RX ORDER — TRAMADOL HYDROCHLORIDE 50 MG/1
50 TABLET ORAL EVERY 6 HOURS PRN
Status: DISCONTINUED | OUTPATIENT
Start: 2018-01-01 | End: 2018-01-01 | Stop reason: HOSPADM

## 2018-01-01 RX ORDER — HEPARIN SODIUM 200 [USP'U]/100ML
500 INJECTION, SOLUTION INTRAVENOUS CONTINUOUS
Status: DISCONTINUED | OUTPATIENT
Start: 2018-01-01 | End: 2018-01-01 | Stop reason: HOSPADM

## 2018-01-01 RX ORDER — ONDANSETRON 2 MG/ML
4 INJECTION INTRAMUSCULAR; INTRAVENOUS ONCE
Status: COMPLETED | OUTPATIENT
Start: 2018-01-01 | End: 2018-01-01

## 2018-01-01 RX ORDER — HYDROCODONE BITARTRATE AND ACETAMINOPHEN 5; 325 MG/1; MG/1
2 TABLET ORAL ONCE
Status: DISCONTINUED | OUTPATIENT
Start: 2018-01-01 | End: 2018-01-01

## 2018-01-01 RX ADMIN — FENTANYL CITRATE 25 MCG: 50 INJECTION, SOLUTION INTRAMUSCULAR; INTRAVENOUS at 01:08

## 2018-01-01 RX ADMIN — MIDAZOLAM HYDROCHLORIDE 1 MG: 1 INJECTION, SOLUTION INTRAMUSCULAR; INTRAVENOUS at 08:09

## 2018-01-01 RX ADMIN — PROPOFOL 100 MG: 10 INJECTION, EMULSION INTRAVENOUS at 01:08

## 2018-01-01 RX ADMIN — LACTULOSE 15 G: 20 SOLUTION ORAL at 03:10

## 2018-01-01 RX ADMIN — TRAMADOL HYDROCHLORIDE 50 MG: 50 TABLET, FILM COATED ORAL at 08:10

## 2018-01-01 RX ADMIN — SODIUM CHLORIDE 250 ML: 0.9 INJECTION, SOLUTION INTRAVENOUS at 05:10

## 2018-01-01 RX ADMIN — MIDODRINE HYDROCHLORIDE 15 MG: 5 TABLET ORAL at 09:10

## 2018-01-01 RX ADMIN — FENTANYL CITRATE 25 MCG: 50 INJECTION, SOLUTION INTRAMUSCULAR; INTRAVENOUS at 08:09

## 2018-01-01 RX ADMIN — TRAMADOL HYDROCHLORIDE 50 MG: 50 TABLET, FILM COATED ORAL at 12:10

## 2018-01-01 RX ADMIN — ALBUMIN HUMAN 50.8 G: 0.25 SOLUTION INTRAVENOUS at 09:10

## 2018-01-01 RX ADMIN — LIDOCAINE HYDROCHLORIDE 40 MG: 20 INJECTION, SOLUTION INTRAVENOUS at 01:08

## 2018-01-01 RX ADMIN — OXYCODONE HYDROCHLORIDE 5 MG: 5 TABLET ORAL at 05:10

## 2018-01-01 RX ADMIN — FOLIC ACID 1 MG: 1 TABLET ORAL at 10:10

## 2018-01-01 RX ADMIN — ACETAMINOPHEN 650 MG: 325 TABLET, FILM COATED ORAL at 02:10

## 2018-01-01 RX ADMIN — OXYCODONE HYDROCHLORIDE 5 MG: 5 TABLET ORAL at 12:10

## 2018-01-01 RX ADMIN — CYANOCOBALAMIN TAB 1000 MCG 1000 MCG: 1000 TAB at 10:10

## 2018-01-01 RX ADMIN — CYANOCOBALAMIN TAB 1000 MCG 1000 MCG: 1000 TAB at 08:10

## 2018-01-01 RX ADMIN — MIDAZOLAM HYDROCHLORIDE 0.5 MG: 1 INJECTION, SOLUTION INTRAMUSCULAR; INTRAVENOUS at 01:08

## 2018-01-01 RX ADMIN — SODIUM CHLORIDE 250 ML: 0.9 INJECTION, SOLUTION INTRAVENOUS at 08:10

## 2018-01-01 RX ADMIN — LACTULOSE 15 G: 20 SOLUTION ORAL at 08:10

## 2018-01-01 RX ADMIN — ALBUMIN HUMAN 25 G: 0.25 SOLUTION INTRAVENOUS at 09:10

## 2018-01-01 RX ADMIN — SODIUM CHLORIDE 250 ML: 0.9 INJECTION, SOLUTION INTRAVENOUS at 01:10

## 2018-01-01 RX ADMIN — OCTREOTIDE ACETATE 100 MCG: 100 INJECTION, SOLUTION INTRAVENOUS; SUBCUTANEOUS at 05:10

## 2018-01-01 RX ADMIN — ONDANSETRON 4 MG: 2 INJECTION, SOLUTION INTRAMUSCULAR; INTRAVENOUS at 12:08

## 2018-01-01 RX ADMIN — PROPOFOL 40 MG: 10 INJECTION, EMULSION INTRAVENOUS at 01:08

## 2018-01-01 RX ADMIN — THERA TABS 1 TABLET: TAB at 10:10

## 2018-01-01 RX ADMIN — HEPARIN SODIUM 5000 UNITS: 5000 INJECTION, SOLUTION INTRAVENOUS; SUBCUTANEOUS at 05:10

## 2018-01-01 RX ADMIN — ONDANSETRON 4 MG: 2 INJECTION INTRAMUSCULAR; INTRAVENOUS at 05:10

## 2018-01-01 RX ADMIN — MIDODRINE HYDROCHLORIDE 5 MG: 5 TABLET ORAL at 01:10

## 2018-01-01 RX ADMIN — OXYCODONE HYDROCHLORIDE 5 MG: 5 TABLET ORAL at 08:10

## 2018-01-01 RX ADMIN — OXYCODONE HYDROCHLORIDE 5 MG: 5 TABLET ORAL at 07:10

## 2018-01-01 RX ADMIN — FOLIC ACID 1 MG: 1 TABLET ORAL at 08:10

## 2018-01-01 RX ADMIN — HEPARIN SODIUM 5000 UNITS: 5000 INJECTION, SOLUTION INTRAVENOUS; SUBCUTANEOUS at 09:10

## 2018-01-01 RX ADMIN — MIDODRINE HYDROCHLORIDE 15 MG: 5 TABLET ORAL at 05:10

## 2018-01-01 RX ADMIN — IOHEXOL 20 ML: 300 INJECTION, SOLUTION INTRAVENOUS at 01:08

## 2018-01-01 RX ADMIN — MIDODRINE HYDROCHLORIDE 15 MG: 5 TABLET ORAL at 08:10

## 2018-01-01 RX ADMIN — TRAMADOL HYDROCHLORIDE 50 MG: 50 TABLET, FILM COATED ORAL at 02:10

## 2018-01-01 RX ADMIN — TRAMADOL HYDROCHLORIDE 50 MG: 50 TABLET, FILM COATED ORAL at 05:10

## 2018-01-01 RX ADMIN — FENTANYL CITRATE 50 MCG: 50 INJECTION, SOLUTION INTRAMUSCULAR; INTRAVENOUS at 08:09

## 2018-01-01 RX ADMIN — OXYCODONE HYDROCHLORIDE 5 MG: 5 TABLET ORAL at 10:09

## 2018-01-01 RX ADMIN — LACTULOSE 15 G: 20 SOLUTION ORAL at 09:10

## 2018-01-01 RX ADMIN — VITAMIN D, TAB 1000IU (100/BT) 1000 UNITS: 25 TAB at 10:10

## 2018-01-01 RX ADMIN — LACTULOSE 15 G: 20 SOLUTION ORAL at 10:10

## 2018-01-01 RX ADMIN — TRAMADOL HYDROCHLORIDE 50 MG: 50 TABLET, FILM COATED ORAL at 11:10

## 2018-01-01 RX ADMIN — VITAMIN D, TAB 1000IU (100/BT) 1000 UNITS: 25 TAB at 08:10

## 2018-01-01 RX ADMIN — MIDODRINE HYDROCHLORIDE 15 MG: 5 TABLET ORAL at 01:10

## 2018-01-01 RX ADMIN — PROPOFOL 17 MCG/KG/MIN: 10 INJECTION, EMULSION INTRAVENOUS at 01:08

## 2018-01-01 RX ADMIN — HEPARIN SODIUM 5000 UNITS: 5000 INJECTION, SOLUTION INTRAVENOUS; SUBCUTANEOUS at 08:10

## 2018-01-01 RX ADMIN — THERA TABS 1 TABLET: TAB at 08:10

## 2018-01-01 RX ADMIN — FENTANYL CITRATE 50 MCG: 50 INJECTION INTRAMUSCULAR; INTRAVENOUS at 05:10

## 2018-01-01 RX ADMIN — MIDODRINE HYDROCHLORIDE 15 MG: 5 TABLET ORAL at 07:10

## 2018-01-01 RX ADMIN — ALBUMIN HUMAN 25 G: 0.25 SOLUTION INTRAVENOUS at 10:10

## 2018-01-01 RX ADMIN — OCTREOTIDE ACETATE 100 MCG: 100 INJECTION, SOLUTION INTRAVENOUS; SUBCUTANEOUS at 09:10

## 2018-01-01 RX ADMIN — ALBUMIN HUMAN 25 G: 0.25 SOLUTION INTRAVENOUS at 08:10

## 2018-04-09 ENCOUNTER — HOSPITAL ENCOUNTER (OUTPATIENT)
Dept: RADIOLOGY | Facility: HOSPITAL | Age: 48
Discharge: HOME | End: 2018-04-09
Attending: INTERNAL MEDICINE
Payer: MEDICAID

## 2018-04-09 VITALS — WEIGHT: 108 LBS | BODY MASS INDEX: 20.39 KG/M2 | HEIGHT: 61 IN

## 2018-04-09 DIAGNOSIS — R92.8 ABNORMAL MAMMOGRAM OF RIGHT BREAST: ICD-10-CM

## 2018-04-09 DIAGNOSIS — N63.10 BREAST MASS, RIGHT: ICD-10-CM

## 2018-04-09 PROCEDURE — 77061 BREAST TOMOSYNTHESIS UNI: CPT | Mod: TC

## 2018-04-09 PROCEDURE — 77065 DX MAMMO INCL CAD UNI: CPT | Mod: 26,,, | Performed by: RADIOLOGY

## 2018-04-09 PROCEDURE — 77061 BREAST TOMOSYNTHESIS UNI: CPT | Mod: 26,,, | Performed by: RADIOLOGY

## 2018-04-09 PROCEDURE — 77065 DX MAMMO INCL CAD UNI: CPT | Mod: TC

## 2018-04-16 ENCOUNTER — LAB VISIT (OUTPATIENT)
Dept: LAB | Facility: HOSPITAL | Age: 48
End: 2018-04-16
Attending: INTERNAL MEDICINE
Payer: MEDICAID

## 2018-04-16 DIAGNOSIS — E03.8 SUBCLINICAL HYPOTHYROIDISM: ICD-10-CM

## 2018-04-16 DIAGNOSIS — K70.31 ALCOHOLIC CIRRHOSIS OF LIVER WITH ASCITES: Chronic | ICD-10-CM

## 2018-04-16 DIAGNOSIS — K76.6 PORTAL HYPERTENSIVE GASTROPATHY: Chronic | ICD-10-CM

## 2018-04-16 DIAGNOSIS — K31.89 PORTAL HYPERTENSIVE GASTROPATHY: Chronic | ICD-10-CM

## 2018-04-16 DIAGNOSIS — D53.9 DEFICIENCY ANEMIA: ICD-10-CM

## 2018-04-16 LAB
ALBUMIN SERPL BCP-MCNC: 3 G/DL
ALP SERPL-CCNC: 370 U/L
ALT SERPL W/O P-5'-P-CCNC: 27 U/L
ANION GAP SERPL CALC-SCNC: 10 MMOL/L
AST SERPL-CCNC: 62 U/L
BILIRUB SERPL-MCNC: 1.4 MG/DL
BUN SERPL-MCNC: 2 MG/DL
CALCIUM SERPL-MCNC: 9 MG/DL
CHLORIDE SERPL-SCNC: 98 MMOL/L
CO2 SERPL-SCNC: 27 MMOL/L
CREAT SERPL-MCNC: 0.5 MG/DL
EST. GFR  (AFRICAN AMERICAN): >60 ML/MIN/1.73 M^2
EST. GFR  (NON AFRICAN AMERICAN): >60 ML/MIN/1.73 M^2
FERRITIN SERPL-MCNC: 31 NG/ML
FOLATE SERPL-MCNC: 4.7 NG/ML
GLUCOSE SERPL-MCNC: 103 MG/DL
IRON SERPL-MCNC: 28 UG/DL
POTASSIUM SERPL-SCNC: 3.1 MMOL/L
PROT SERPL-MCNC: 7.2 G/DL
RETICS/RBC NFR AUTO: 2.7 %
SATURATED IRON: 7 %
SODIUM SERPL-SCNC: 135 MMOL/L
T4 FREE SERPL-MCNC: 1.11 NG/DL
TOTAL IRON BINDING CAPACITY: 411 UG/DL
TRANSFERRIN SERPL-MCNC: 278 MG/DL
TSH SERPL DL<=0.005 MIU/L-ACNC: 5.12 UIU/ML
VIT B12 SERPL-MCNC: 426 PG/ML

## 2018-04-16 PROCEDURE — 83540 ASSAY OF IRON: CPT

## 2018-04-16 PROCEDURE — 80053 COMPREHEN METABOLIC PANEL: CPT

## 2018-04-16 PROCEDURE — 36415 COLL VENOUS BLD VENIPUNCTURE: CPT

## 2018-04-16 PROCEDURE — 84443 ASSAY THYROID STIM HORMONE: CPT

## 2018-04-16 PROCEDURE — 85045 AUTOMATED RETICULOCYTE COUNT: CPT

## 2018-04-16 PROCEDURE — 82746 ASSAY OF FOLIC ACID SERUM: CPT

## 2018-04-16 PROCEDURE — 82607 VITAMIN B-12: CPT

## 2018-04-16 PROCEDURE — 84439 ASSAY OF FREE THYROXINE: CPT

## 2018-04-16 PROCEDURE — 82728 ASSAY OF FERRITIN: CPT

## 2018-04-18 PROBLEM — N39.0 BACTERIAL UTI: Status: RESOLVED | Noted: 2017-11-30 | Resolved: 2018-04-18

## 2018-04-18 PROBLEM — A49.9 BACTERIAL UTI: Status: RESOLVED | Noted: 2017-11-30 | Resolved: 2018-04-18

## 2018-07-15 ENCOUNTER — ANESTHESIA (OUTPATIENT)
Dept: EMERGENCY MEDICINE | Facility: HOSPITAL | Age: 48
DRG: 442 | End: 2018-07-15
Payer: MEDICAID

## 2018-07-15 ENCOUNTER — ANESTHESIA EVENT (OUTPATIENT)
Dept: EMERGENCY MEDICINE | Facility: HOSPITAL | Age: 48
DRG: 442 | End: 2018-07-15
Payer: MEDICAID

## 2018-07-15 ENCOUNTER — HOSPITAL ENCOUNTER (INPATIENT)
Facility: HOSPITAL | Age: 48
LOS: 4 days | Discharge: LEFT BEFORE DISCHARGE | DRG: 442 | End: 2018-07-19
Attending: EMERGENCY MEDICINE | Admitting: INTERNAL MEDICINE
Payer: MEDICAID

## 2018-07-15 DIAGNOSIS — R41.82 ALTERED MENTAL STATUS, UNSPECIFIED ALTERED MENTAL STATUS TYPE: ICD-10-CM

## 2018-07-15 DIAGNOSIS — K76.82 HEPATIC ENCEPHALOPATHY: Primary | ICD-10-CM

## 2018-07-15 DIAGNOSIS — E87.1 HYPONATREMIA: ICD-10-CM

## 2018-07-15 DIAGNOSIS — E83.42 HYPOMAGNESEMIA: ICD-10-CM

## 2018-07-15 DIAGNOSIS — R41.82 ALTERED MENTAL STATUS: ICD-10-CM

## 2018-07-15 DIAGNOSIS — E87.6 HYPOKALEMIA: ICD-10-CM

## 2018-07-15 DIAGNOSIS — R53.1 GENERAL WEAKNESS: ICD-10-CM

## 2018-07-15 DIAGNOSIS — I95.9 HYPOTENSION, UNSPECIFIED HYPOTENSION TYPE: ICD-10-CM

## 2018-07-15 DIAGNOSIS — N39.0 URINARY TRACT INFECTION WITHOUT HEMATURIA, SITE UNSPECIFIED: ICD-10-CM

## 2018-07-15 DIAGNOSIS — Z45.2 ENCOUNTER FOR CENTRAL LINE PLACEMENT: ICD-10-CM

## 2018-07-15 DIAGNOSIS — R17 JAUNDICE: ICD-10-CM

## 2018-07-15 LAB
ALBUMIN SERPL BCP-MCNC: 1.9 G/DL
ALBUMIN SERPL BCP-MCNC: 2.2 G/DL
ALP SERPL-CCNC: 377 U/L
ALT SERPL W/O P-5'-P-CCNC: 20 U/L
AMMONIA PLAS-SCNC: 78 UMOL/L
AMPHET+METHAMPHET UR QL: NEGATIVE
ANION GAP SERPL CALC-SCNC: 11 MMOL/L
ANION GAP SERPL CALC-SCNC: ABNORMAL MMOL/L
ANISOCYTOSIS BLD QL SMEAR: SLIGHT
AST SERPL-CCNC: 34 U/L
BACTERIA #/AREA URNS HPF: ABNORMAL /HPF
BARBITURATES UR QL SCN>200 NG/ML: NEGATIVE
BASOPHILS # BLD AUTO: 0.02 K/UL
BASOPHILS NFR BLD: 0.1 %
BENZODIAZ UR QL SCN>200 NG/ML: NEGATIVE
BILIRUB SERPL-MCNC: 13.7 MG/DL
BILIRUB UR QL STRIP: ABNORMAL
BUN SERPL-MCNC: 10 MG/DL
BUN SERPL-MCNC: 9 MG/DL
BZE UR QL SCN: NEGATIVE
CALCIUM SERPL-MCNC: 6.2 MG/DL
CALCIUM SERPL-MCNC: 7 MG/DL
CANNABINOIDS UR QL SCN: NEGATIVE
CHLORIDE SERPL-SCNC: 74 MMOL/L
CHLORIDE SERPL-SCNC: <70 MMOL/L
CLARITY UR: CLEAR
CO2 SERPL-SCNC: 39 MMOL/L
CO2 SERPL-SCNC: 44 MMOL/L
COLOR UR: ABNORMAL
CREAT SERPL-MCNC: 0.6 MG/DL
CREAT SERPL-MCNC: 0.8 MG/DL
CREAT UR-MCNC: 135.9 MG/DL
DIFFERENTIAL METHOD: ABNORMAL
EOSINOPHIL # BLD AUTO: 0 K/UL
EOSINOPHIL NFR BLD: 0 %
ERYTHROCYTE [DISTWIDTH] IN BLOOD BY AUTOMATED COUNT: 24.3 %
EST. GFR  (AFRICAN AMERICAN): >60 ML/MIN/1.73 M^2
EST. GFR  (AFRICAN AMERICAN): >60 ML/MIN/1.73 M^2
EST. GFR  (NON AFRICAN AMERICAN): >60 ML/MIN/1.73 M^2
EST. GFR  (NON AFRICAN AMERICAN): >60 ML/MIN/1.73 M^2
ETHANOL SERPL-MCNC: <10 MG/DL
FERRITIN SERPL-MCNC: 66 NG/ML
GLUCOSE SERPL-MCNC: 106 MG/DL
GLUCOSE SERPL-MCNC: 128 MG/DL
GLUCOSE UR QL STRIP: ABNORMAL
HCT VFR BLD AUTO: 28.7 %
HGB BLD-MCNC: 9.5 G/DL
HGB UR QL STRIP: NEGATIVE
HYPOCHROMIA BLD QL SMEAR: ABNORMAL
INR PPP: 1.6
KETONES UR QL STRIP: ABNORMAL
LACTATE SERPL-SCNC: 0.7 MMOL/L
LACTATE SERPL-SCNC: 1.5 MMOL/L
LEUKOCYTE ESTERASE UR QL STRIP: ABNORMAL
LYMPHOCYTES # BLD AUTO: 1.2 K/UL
LYMPHOCYTES NFR BLD: 6.1 %
MAGNESIUM SERPL-MCNC: 0.9 MG/DL
MAGNESIUM SERPL-MCNC: 1.3 MG/DL
MCH RBC QN AUTO: 30.7 PG
MCHC RBC AUTO-ENTMCNC: 33.1 G/DL
MCV RBC AUTO: 93 FL
METHADONE UR QL SCN>300 NG/ML: NEGATIVE
MICROSCOPIC COMMENT: ABNORMAL
MONOCYTES # BLD AUTO: 1.1 K/UL
MONOCYTES NFR BLD: 5.6 %
NEUTROPHILS # BLD AUTO: 17 K/UL
NEUTROPHILS NFR BLD: 88.2 %
NITRITE UR QL STRIP: POSITIVE
OPIATES UR QL SCN: NORMAL
PCP UR QL SCN>25 NG/ML: NEGATIVE
PH UR STRIP: 6 [PH] (ref 5–8)
PHOSPHATE SERPL-MCNC: 1.7 MG/DL
PHOSPHATE SERPL-MCNC: 2.3 MG/DL
PLATELET # BLD AUTO: 139 K/UL
PMV BLD AUTO: 11 FL
POIKILOCYTOSIS BLD QL SMEAR: SLIGHT
POLYCHROMASIA BLD QL SMEAR: ABNORMAL
POTASSIUM SERPL-SCNC: <2 MMOL/L
POTASSIUM SERPL-SCNC: <2 MMOL/L
PROCALCITONIN SERPL IA-MCNC: 0.62 NG/ML
PROT SERPL-MCNC: 6.5 G/DL
PROT UR QL STRIP: ABNORMAL
PROTHROMBIN TIME: 17.3 SEC
RBC # BLD AUTO: 3.09 M/UL
RETICS/RBC NFR AUTO: 6.2 %
SODIUM SERPL-SCNC: 123 MMOL/L
SODIUM SERPL-SCNC: 124 MMOL/L
SP GR UR STRIP: 1.01 (ref 1–1.03)
SPHEROCYTES BLD QL SMEAR: ABNORMAL
STOMATOCYTES BLD QL SMEAR: PRESENT
TARGETS BLD QL SMEAR: ABNORMAL
TOXICOLOGY INFORMATION: NORMAL
URN SPEC COLLECT METH UR: ABNORMAL
UROBILINOGEN UR STRIP-ACNC: >=8 EU/DL
WBC # BLD AUTO: 19.43 K/UL
WBC #/AREA URNS HPF: 75 /HPF (ref 0–5)

## 2018-07-15 PROCEDURE — 84145 PROCALCITONIN (PCT): CPT

## 2018-07-15 PROCEDURE — 87086 URINE CULTURE/COLONY COUNT: CPT

## 2018-07-15 PROCEDURE — 96366 THER/PROPH/DIAG IV INF ADDON: CPT

## 2018-07-15 PROCEDURE — 25000003 PHARM REV CODE 250: Performed by: EMERGENCY MEDICINE

## 2018-07-15 PROCEDURE — 82607 VITAMIN B-12: CPT

## 2018-07-15 PROCEDURE — 36415 COLL VENOUS BLD VENIPUNCTURE: CPT

## 2018-07-15 PROCEDURE — 87077 CULTURE AEROBIC IDENTIFY: CPT

## 2018-07-15 PROCEDURE — 96367 TX/PROPH/DG ADDL SEQ IV INF: CPT | Mod: 59

## 2018-07-15 PROCEDURE — 82746 ASSAY OF FOLIC ACID SERUM: CPT

## 2018-07-15 PROCEDURE — 87088 URINE BACTERIA CULTURE: CPT

## 2018-07-15 PROCEDURE — 96365 THER/PROPH/DIAG IV INF INIT: CPT | Mod: 59

## 2018-07-15 PROCEDURE — 82306 VITAMIN D 25 HYDROXY: CPT

## 2018-07-15 PROCEDURE — 83735 ASSAY OF MAGNESIUM: CPT | Mod: 91

## 2018-07-15 PROCEDURE — 83540 ASSAY OF IRON: CPT

## 2018-07-15 PROCEDURE — 83605 ASSAY OF LACTIC ACID: CPT | Mod: 91

## 2018-07-15 PROCEDURE — 80053 COMPREHEN METABOLIC PANEL: CPT

## 2018-07-15 PROCEDURE — 20000000 HC ICU ROOM

## 2018-07-15 PROCEDURE — 85025 COMPLETE CBC W/AUTO DIFF WBC: CPT

## 2018-07-15 PROCEDURE — 87186 SC STD MICRODIL/AGAR DIL: CPT | Mod: 59

## 2018-07-15 PROCEDURE — 80069 RENAL FUNCTION PANEL: CPT

## 2018-07-15 PROCEDURE — 96376 TX/PRO/DX INJ SAME DRUG ADON: CPT

## 2018-07-15 PROCEDURE — 96368 THER/DIAG CONCURRENT INF: CPT

## 2018-07-15 PROCEDURE — 93005 ELECTROCARDIOGRAM TRACING: CPT

## 2018-07-15 PROCEDURE — C1751 CATH, INF, PER/CENT/MIDLINE: HCPCS | Performed by: ANESTHESIOLOGY

## 2018-07-15 PROCEDURE — 63600175 PHARM REV CODE 636 W HCPCS: Performed by: EMERGENCY MEDICINE

## 2018-07-15 PROCEDURE — 96361 HYDRATE IV INFUSION ADD-ON: CPT

## 2018-07-15 PROCEDURE — 82140 ASSAY OF AMMONIA: CPT

## 2018-07-15 PROCEDURE — 51702 INSERT TEMP BLADDER CATH: CPT | Mod: 59

## 2018-07-15 PROCEDURE — 80307 DRUG TEST PRSMV CHEM ANLYZR: CPT

## 2018-07-15 PROCEDURE — 36556 INSERT NON-TUNNEL CV CATH: CPT | Mod: RT

## 2018-07-15 PROCEDURE — 85045 AUTOMATED RETICULOCYTE COUNT: CPT

## 2018-07-15 PROCEDURE — 99285 EMERGENCY DEPT VISIT HI MDM: CPT | Mod: 25

## 2018-07-15 PROCEDURE — 83735 ASSAY OF MAGNESIUM: CPT

## 2018-07-15 PROCEDURE — 80320 DRUG SCREEN QUANTALCOHOLS: CPT

## 2018-07-15 PROCEDURE — 84100 ASSAY OF PHOSPHORUS: CPT

## 2018-07-15 PROCEDURE — 63600175 PHARM REV CODE 636 W HCPCS: Performed by: INTERNAL MEDICINE

## 2018-07-15 PROCEDURE — 81000 URINALYSIS NONAUTO W/SCOPE: CPT | Mod: 59

## 2018-07-15 PROCEDURE — 87040 BLOOD CULTURE FOR BACTERIA: CPT | Mod: 59

## 2018-07-15 PROCEDURE — 85610 PROTHROMBIN TIME: CPT

## 2018-07-15 PROCEDURE — 76937 US GUIDE VASCULAR ACCESS: CPT | Performed by: ANESTHESIOLOGY

## 2018-07-15 PROCEDURE — 82728 ASSAY OF FERRITIN: CPT

## 2018-07-15 PROCEDURE — 99291 CRITICAL CARE FIRST HOUR: CPT | Mod: 25

## 2018-07-15 PROCEDURE — 36556 INSERT NON-TUNNEL CV CATH: CPT | Performed by: ANESTHESIOLOGY

## 2018-07-15 RX ORDER — SODIUM CHLORIDE 9 MG/ML
1000 INJECTION, SOLUTION INTRAVENOUS
Status: DISCONTINUED | OUTPATIENT
Start: 2018-07-15 | End: 2018-07-15

## 2018-07-15 RX ORDER — POTASSIUM CHLORIDE 14.9 MG/ML
20 INJECTION INTRAVENOUS ONCE
Status: COMPLETED | OUTPATIENT
Start: 2018-07-16 | End: 2018-07-16

## 2018-07-15 RX ORDER — MIDODRINE HYDROCHLORIDE 5 MG/1
10 TABLET ORAL ONCE
Status: COMPLETED | OUTPATIENT
Start: 2018-07-15 | End: 2018-07-15

## 2018-07-15 RX ORDER — SODIUM CHLORIDE AND POTASSIUM CHLORIDE 150; 900 MG/100ML; MG/100ML
1000 INJECTION, SOLUTION INTRAVENOUS
Status: COMPLETED | OUTPATIENT
Start: 2018-07-15 | End: 2018-07-15

## 2018-07-15 RX ORDER — MAGNESIUM SULFATE HEPTAHYDRATE 40 MG/ML
2 INJECTION, SOLUTION INTRAVENOUS
Status: DISCONTINUED | OUTPATIENT
Start: 2018-07-15 | End: 2018-07-15

## 2018-07-15 RX ORDER — POTASSIUM CHLORIDE 29.8 MG/ML
40 INJECTION INTRAVENOUS
Status: COMPLETED | OUTPATIENT
Start: 2018-07-15 | End: 2018-07-15

## 2018-07-15 RX ORDER — NOREPINEPHRINE BITARTRATE/D5W 16MG/250ML
0.02 PLASTIC BAG, INJECTION (ML) INTRAVENOUS CONTINUOUS
Status: DISCONTINUED | OUTPATIENT
Start: 2018-07-15 | End: 2018-07-16

## 2018-07-15 RX ORDER — VANCOMYCIN HCL IN 5 % DEXTROSE 1G/250ML
20 PLASTIC BAG, INJECTION (ML) INTRAVENOUS
Status: COMPLETED | OUTPATIENT
Start: 2018-07-15 | End: 2018-07-15

## 2018-07-15 RX ORDER — SODIUM,POTASSIUM PHOSPHATES 280-250MG
1 POWDER IN PACKET (EA) ORAL ONCE
Status: COMPLETED | OUTPATIENT
Start: 2018-07-16 | End: 2018-07-16

## 2018-07-15 RX ORDER — MAGNESIUM SULFATE HEPTAHYDRATE 40 MG/ML
4 INJECTION, SOLUTION INTRAVENOUS ONCE
Status: DISCONTINUED | OUTPATIENT
Start: 2018-07-16 | End: 2018-07-16

## 2018-07-15 RX ORDER — SODIUM CHLORIDE 9 MG/ML
INJECTION, SOLUTION INTRAVENOUS CONTINUOUS
Status: DISCONTINUED | OUTPATIENT
Start: 2018-07-16 | End: 2018-07-18

## 2018-07-15 RX ORDER — CEFEPIME HYDROCHLORIDE 2 G/50ML
2 INJECTION, SOLUTION INTRAVENOUS
Status: COMPLETED | OUTPATIENT
Start: 2018-07-15 | End: 2018-07-15

## 2018-07-15 RX ORDER — POTASSIUM CHLORIDE 20 MEQ/1
20 TABLET, EXTENDED RELEASE ORAL
Status: COMPLETED | OUTPATIENT
Start: 2018-07-16 | End: 2018-07-16

## 2018-07-15 RX ORDER — HEPARIN SODIUM 5000 [USP'U]/ML
5000 INJECTION, SOLUTION INTRAVENOUS; SUBCUTANEOUS EVERY 8 HOURS
Status: DISCONTINUED | OUTPATIENT
Start: 2018-07-15 | End: 2018-07-19 | Stop reason: HOSPADM

## 2018-07-15 RX ADMIN — MIDODRINE HYDROCHLORIDE 10 MG: 5 TABLET ORAL at 05:07

## 2018-07-15 RX ADMIN — VANCOMYCIN HYDROCHLORIDE 1000 MG: 1 INJECTION, POWDER, LYOPHILIZED, FOR SOLUTION INTRAVENOUS at 06:07

## 2018-07-15 RX ADMIN — CEFEPIME HYDROCHLORIDE 2 G: 2 INJECTION, SOLUTION INTRAVENOUS at 08:07

## 2018-07-15 RX ADMIN — POTASSIUM CHLORIDE 40 MEQ: 400 INJECTION, SOLUTION INTRAVENOUS at 08:07

## 2018-07-15 RX ADMIN — MAGNESIUM SULFATE HEPTAHYDRATE: 500 INJECTION, SOLUTION INTRAMUSCULAR; INTRAVENOUS at 07:07

## 2018-07-15 RX ADMIN — Medication 0.02 MCG/KG/MIN: at 08:07

## 2018-07-15 RX ADMIN — HEPARIN SODIUM 5000 UNITS: 5000 INJECTION, SOLUTION INTRAVENOUS; SUBCUTANEOUS at 11:07

## 2018-07-15 RX ADMIN — SODIUM CHLORIDE AND POTASSIUM CHLORIDE 1000 ML: .9; .15 SOLUTION INTRAVENOUS at 06:07

## 2018-07-15 RX ADMIN — SODIUM CHLORIDE 1497 ML: 0.9 INJECTION, SOLUTION INTRAVENOUS at 05:07

## 2018-07-15 RX ADMIN — SODIUM CHLORIDE 500 ML: 0.9 INJECTION, SOLUTION INTRAVENOUS at 06:07

## 2018-07-15 NOTE — ED PROVIDER NOTES
Encounter Date: 7/15/2018       History     Chief Complaint   Patient presents with    Altered Mental Status     c/o confusion, hallucinations, and weakness x2 weeks. Pt also c/o jaundice for the past few days     Annette Esparza is a 48 y.o. female who  has a past medical history of Acute hypoxemic respiratory failure (12/1/2016); ANDREW (acute kidney injury) (11/28/2016); Alcohol dependence in remission; Alcoholic cirrhosis of liver with ascites (10/22/2015); Anxiety; Ascites due to alcoholic cirrhosis (6/13/2016); Centrilobular emphysema (12/12/2016); Cigarette nicotine dependence without complication (6/2/2016); Folate deficiency (10/22/2015); Gallstones; Hepatorenal syndrome (11/29/2016); Hyperbilirubinemia (6/13/2016); Hyponatremia (11/29/2016); Iron deficiency anemia due to chronic blood loss (10/22/2015); Portal hypertensive gastropathy (10/22/2015); and Subclinical hypothyroidism (10/22/2015).    The patient presents to the ED due to generalized weakness, confusion, and hallucinations.  She has history of alcoholic cirrhosis.  She reports she has not had a bowel movement over 5 days, and states she is not currently on lactulose.  She reports a similar episode last year, requiring admission to the hospital.  She states she was started on lactulose then but has not been continuing it since.  She reports pain everywhere, but denies any significant focal complaints.  She denies any recent fever, nausea/vomiting, recent illness, or sick contacts.  She denies any recent diarrhea, urinary complaints, or dark/bloody stool.  She reports she has not had any alcohol in over 2 years.          Review of patient's allergies indicates:   Allergen Reactions    Morphine Nausea And Vomiting     Past Medical History:   Diagnosis Date    Acute hypoxemic respiratory failure 12/1/2016    ANDREW (acute kidney injury) 11/28/2016    Alcohol dependence in remission     Alcoholic cirrhosis of liver with ascites 10/22/2015     Anxiety     Ascites due to alcoholic cirrhosis 2016    Centrilobular emphysema 2016    Cigarette nicotine dependence without complication 2016    Folate deficiency 10/22/2015    Gallstones     Hepatorenal syndrome 2016    Hyperbilirubinemia 2016    Hyponatremia 2016    Iron deficiency anemia due to chronic blood loss 10/22/2015    Portal hypertensive gastropathy 10/22/2015    Subclinical hypothyroidism 10/22/2015     Past Surgical History:   Procedure Laterality Date    ADENOIDECTOMY      APPENDECTOMY       SECTION      COLONOSCOPY      HYSTERECTOMY      25 yrs old    LIVER BIOPSY      OOPHORECTOMY Left     25 yrs old    OOPHORECTOMY Right     29 yrs old    TONSILLECTOMY      TUBAL LIGATION      UPPER GASTROINTESTINAL ENDOSCOPY       Family History   Problem Relation Age of Onset    Rheum arthritis Father     Cancer Mother     No Known Problems Sister     No Known Problems Brother     Colon cancer Neg Hx      Social History   Substance Use Topics    Smoking status: Current Every Day Smoker     Packs/day: 1.00     Years: 35.00     Types: Cigarettes    Smokeless tobacco: Never Used    Alcohol use No      Comment: hx etoh-quit 2015     Review of Systems   Constitutional: Negative for chills and fever.   HENT: Negative for sore throat.    Respiratory: Negative for cough and shortness of breath.    Cardiovascular: Negative for chest pain.   Gastrointestinal: Negative for nausea and vomiting.   Genitourinary: Negative for dysuria, frequency and urgency.   Musculoskeletal: Negative for back pain.   Skin: Negative for rash and wound.   Neurological: Negative for syncope and weakness.   Hematological: Does not bruise/bleed easily.   Psychiatric/Behavioral: Positive for confusion and hallucinations. Negative for agitation, behavioral problems, sleep disturbance and suicidal ideas.       Physical Exam     Initial Vitals [07/15/18 1606]   BP Pulse Resp Temp  SpO2   (!) 85/50 99 20 99.5 °F (37.5 °C) (!) 87 %      MAP       --         Physical Exam    Nursing note and vitals reviewed.  Constitutional: She appears well-developed and well-nourished. She is not diaphoretic. No distress.   Appears older than stated age.   HENT:   Head: Normocephalic and atraumatic.   Mouth/Throat: Oropharynx is clear and moist.   Eyes: EOM are normal. Pupils are equal, round, and reactive to light. Right conjunctiva has no hemorrhage. Left conjunctiva has no hemorrhage. Scleral icterus is present.   Neck: No tracheal deviation present.   Cardiovascular: Normal rate, regular rhythm, normal heart sounds and intact distal pulses.   Pulmonary/Chest: Breath sounds normal. No stridor. No respiratory distress. She has no wheezes.   Abdominal: Soft. Bowel sounds are normal. She exhibits no distension and no mass. There is no tenderness. There is no rigidity, no rebound, no guarding and no CVA tenderness.   No focal tenderness.   Musculoskeletal: Normal range of motion. She exhibits no edema.   Neurological: She is alert and oriented to person, place, and time. She has normal strength. No cranial nerve deficit or sensory deficit.   Skin: Skin is warm and dry. Capillary refill takes less than 2 seconds. No pallor.   Jaundiced.   Psychiatric: She has a normal mood and affect. Her behavior is normal. Thought content normal.         ED Course   Procedures  Labs Reviewed   AMMONIA - Abnormal; Notable for the following:        Result Value    Ammonia 78 (*)     All other components within normal limits   CBC W/ AUTO DIFFERENTIAL - Abnormal; Notable for the following:     WBC 19.43 (*)     RBC 3.09 (*)     Hemoglobin 9.5 (*)     Hematocrit 28.7 (*)     RDW 24.3 (*)     Platelets 139 (*)     Gran # (ANC) 17.0 (*)     Mono # 1.1 (*)     Gran% 88.2 (*)     Lymph% 6.1 (*)     All other components within normal limits   COMPREHENSIVE METABOLIC PANEL - Abnormal; Notable for the following:     Sodium 123 (*)      Potassium <2.0 (*)     Chloride <70 (*)     CO2 44 (*)     Calcium 7.0 (*)     Albumin 2.2 (*)     Total Bilirubin 13.7 (*)     Alkaline Phosphatase 377 (*)     All other components within normal limits    Narrative:     K, CL, CO2, MG, critical result(s) called and verbal readback   obtained from Milton Mcmillan RN on 07/15/18 at 17:57 by Mariela Portillo.,   07/15/2018 17:57   URINALYSIS - Abnormal; Notable for the following:     Color, UA Orange (*)     Protein, UA Trace (*)     Glucose, UA Trace (*)     Ketones, UA Trace (*)     Bilirubin (UA) 3+ (*)     Nitrite, UA Positive (*)     Urobilinogen, UA >=8.0 (*)     Leukocytes, UA 1+ (*)     All other components within normal limits   MAGNESIUM - Abnormal; Notable for the following:     Magnesium 0.9 (*)     All other components within normal limits    Narrative:     K, CL, CO2, MG, critical result(s) called and verbal readback   obtained from Milton Mcmillan RN on 07/15/18 at 17:57 by Mariela Portillo.,   07/15/2018 17:57   PHOSPHORUS - Abnormal; Notable for the following:     Phosphorus 2.3 (*)     All other components within normal limits   PROTIME-INR - Abnormal; Notable for the following:     Prothrombin Time 17.3 (*)     INR 1.6 (*)     All other components within normal limits   URINALYSIS MICROSCOPIC - Abnormal; Notable for the following:     WBC, UA 75 (*)     Bacteria, UA Many (*)     All other components within normal limits   CULTURE, BLOOD   CULTURE, BLOOD   CULTURE, URINE   LACTIC ACID, PLASMA   DRUG SCREEN PANEL, URINE EMERGENCY   ALCOHOL,MEDICAL (ETHANOL)   LACTIC ACID, PLASMA   PROCALCITONIN     EKG Readings: (Independently Interpreted)   Initial Reading: No STEMI. Previous EKG: Compared with most recent EKG Rhythm: Normal Sinus Rhythm.   Normal sinus rhythm, rate 86, nonspecific ST changes lateral leads, no ST elevation or other signs of ischemia, prolonged .  Compared to prior EKG 11/2017, grossly stable without significant change.         Imaging Results           X-Ray Chest 1 View (In process)                X-Ray Chest AP Portable (Final result)  Result time 07/15/18 17:12:40    Final result by Urbano Rain MD (07/15/18 17:12:40)                 Impression:      1. Grossly stable multifocal pulmonary parenchymal nodular foci, no large focal consolidation.      Electronically signed by: Urbano Rain MD  Date:    07/15/2018  Time:    17:12             Narrative:    EXAMINATION:  XR CHEST AP PORTABLE    CLINICAL HISTORY:  Sepsis;    TECHNIQUE:  Single frontal view of the chest was performed.    COMPARISON:  11/30/2017    FINDINGS:  The cardiomediastinal silhouette is not enlarged, similar to the previous exam noting possible calcification at the aortic arch..  There is no pleural effusion.  The trachea is midline.  The lungs are symmetrically expanded bilaterally with bilateral reticulonodular density, and scattered miliary type density, unchanged.  There is a prominent focus of calcification projected over the left upper lung zone, stable..  No large focal consolidation seen.  There is no pneumothorax.  The osseous structures are remarkable for degenerative change..                                 Medical Decision Making:   Initial Assessment:   48-year-old female with history of alcoholic cirrhosis presents with altered mental status, confusion, hallucinations.  Reports no bowel movement for the last 5 days, not currently on lactulose.  On arrival, patient jaundiced, hypotensive to 80s/50s.  Will obtain infectious workup and labs including ammonia/lactate, give IV fluids, and continue to monitor.  Will anticipate admission for hepatic encephalopathy, decompensated cirrhosis.  Differential Diagnosis:   Differential Diagnosis includes, but is not limited to:  CVA/TIA, seizure, status epilepticus, post-ictal state, meningitis/encephalitis, sepsis, MI/ACS, arrhythmia, syncope, intracranial mass/hemorrhage, head trauma, anaphylaxis, substance abuse, alcohol  intoxication/withdrawal, medication reaction, intentional medication overdose, neuroleptic malignant syndrome, serotonin syndrome, CO poisoning, hypoxia/hypercapnea, hepatic encephalopathy, metabolic disturbance, thyroid disease, hypoglycemia.    Clinical Tests:   Lab Tests: Ordered and Reviewed  Medical Tests: Ordered and Reviewed  ED Management:  EKG without acute process.    6:18 PM  Labs with elevated WBC 19k. Sepsis considered, ABX ordered.  Labs also with significant hyponatremia, hypokalemia, hypomagnesemia.  Ammonia elevated.  After IV fluids, patient remains persistently hypotensive.  CVL placed in ED by anesthesia team, and Levophed started.    ICU admission requested.  [facility] medicine contacted for admission.  Upon re-evaluation, the patient's status has remained critical.  At this time, I believe the patient should be admitted to the hospital for further evaluation and management of hepatic encephalopathy, hypotension.    [facility] service was contacted and the case was discussed. The consulting physician agrees with plan and will admit under their service. Additional recommendations at this time: none    The patient and family were updated with test results, overall impression, and further plan of care. All questions were answered. The patient expressed understanding and agreed with the current plan.                  Attending Attestation:         Attending Critical Care:   Critical Care Times:   Direct Patient Care (initial evaluation, reassessments, and time considering the case)................................................................15 minutes.   Additional History from reviewing old medical records or taking additional history from the family, EMS, PCP, etc.......................10 minutes.   Ordering, Reviewing, and Interpreting Diagnostic Studies...............................................................................................................10 minutes.    Documentation..................................................................................................................................................................................15 minutes.   Consultation with other Physicians. .................................................................................................................................................10 minutes.   Consultation with the patient's family directly relating to the patient's condition, care, and DNR status (when patient unable)......10 minutes.   ==============================================================  · Total Critical Care Time - exclusive of procedural time: 70 minutes.  ==============================================================  Critical care was necessary to treat or prevent imminent or life-threatening deterioration of the following conditions: sepsis and hypotension.   Critical care was time spent personally by me on the following activities: examination of patient, review of old charts, obtaining history from patient or relative, ordering and performing treatments and interventions, discussion with consultants and interpretation of cardiac measurements.   Critical Care Condition: critical                  Clinical Impression:   The primary encounter diagnosis was Hepatic encephalopathy. Diagnoses of Altered mental status, Encounter for central line placement, Urinary tract infection without hematuria, site unspecified, Hypotension, unspecified hypotension type, Hyponatremia, Hypokalemia, Hypomagnesemia, General weakness, and Jaundice were also pertinent to this visit.                             Ace Mitchell MD  07/15/18 2022

## 2018-07-15 NOTE — ED NOTES
APPEARANCE: Alert, oriented and in no acute distress, very frail in appearance  CARDIAC: Normal rate and rhythm, no murmur heard. Hypotension  PERIPHERAL VASCULAR: peripheral pulses present. Normal cap refill. No edema. Warm to touch.    RESPIRATORY:Normal rate and effort, breath sounds clear bilaterally throughout chest. Respirations are equal and unlabored no obvious signs of distress.  GASTRO: soft, bowel sounds normal, no tenderness, no abdominal distention.  MUSC: Limited ROM due to weakness. No bony tenderness or soft tissue tenderness. No obvious deformity.  SKIN: Skin is warm and dry, normal skin turgor, mucous membranes moist. Yellowish hue to skin  NEURO: 5/5 strength major flexors/extensors bilaterally. Sensory intact to light touch bilaterally. Lapine coma scale: eyes open spontaneously-4, oriented & converses-5, obeys commands-6. No neurological abnormalities.   MENTAL STATUS: awake, alert and aware of environment.  EYE: PERRL, both eyes: pupils brisk and reactive to light. Normal size. Sclera is yellow.   ENT: EARS: no obvious drainage. NOSE: no active bleeding.   Pt complains of altered mental status and hallucinations.

## 2018-07-15 NOTE — ED NOTES
Dr. Mitchell informed of pt labs at this time and low BP. IV fluids placed on pressure bag at this time. Heart rate 78. SR up and CB in reach. Pt alert but lethargic. Will continue to monitor.

## 2018-07-15 NOTE — ED NOTES
Pt is very lethargic. BP is lower than on arrival despite 1000ml of IV fluids having infused. Pt on cardiac monitor. Dr. Mitchell informed.

## 2018-07-16 LAB
25(OH)D3+25(OH)D2 SERPL-MCNC: 8 NG/ML
ALBUMIN FLD-MCNC: 0.3 G/DL
ALBUMIN SERPL BCP-MCNC: 1.7 G/DL
ALBUMIN SERPL BCP-MCNC: 1.9 G/DL
ALBUMIN SERPL BCP-MCNC: 2 G/DL
ALP SERPL-CCNC: 360 U/L
ALT SERPL W/O P-5'-P-CCNC: 16 U/L
ANION GAP SERPL CALC-SCNC: 10 MMOL/L
ANION GAP SERPL CALC-SCNC: 11 MMOL/L
ANION GAP SERPL CALC-SCNC: 11 MMOL/L
ANION GAP SERPL CALC-SCNC: 14 MMOL/L
ANION GAP SERPL CALC-SCNC: 7 MMOL/L
ANION GAP SERPL CALC-SCNC: 9 MMOL/L
APPEARANCE FLD: CLEAR
APTT BLDCRRT: 37 SEC
AST SERPL-CCNC: 36 U/L
BASOPHILS # BLD AUTO: 0.02 K/UL
BASOPHILS NFR BLD: 0.1 %
BILIRUB DIRECT SERPL-MCNC: 8.6 MG/DL
BILIRUB SERPL-MCNC: 12 MG/DL
BODY FLD TYPE: NORMAL
BODY FLUID SOURCE, LDH: NORMAL
BUN SERPL-MCNC: 6 MG/DL
BUN SERPL-MCNC: 7 MG/DL
BUN SERPL-MCNC: 7 MG/DL
BUN SERPL-MCNC: 8 MG/DL
BUN SERPL-MCNC: 8 MG/DL
BUN SERPL-MCNC: 9 MG/DL
CALCIUM SERPL-MCNC: 6.4 MG/DL
CALCIUM SERPL-MCNC: 6.5 MG/DL
CALCIUM SERPL-MCNC: 6.7 MG/DL
CALCIUM SERPL-MCNC: 7 MG/DL
CALCIUM SERPL-MCNC: 7.1 MG/DL
CALCIUM SERPL-MCNC: 7.1 MG/DL
CHLORIDE SERPL-SCNC: 76 MMOL/L
CHLORIDE SERPL-SCNC: 77 MMOL/L
CHLORIDE SERPL-SCNC: 80 MMOL/L
CHLORIDE SERPL-SCNC: 82 MMOL/L
CHLORIDE SERPL-SCNC: 82 MMOL/L
CHLORIDE SERPL-SCNC: 84 MMOL/L
CK SERPL-CCNC: 48 U/L
CO2 SERPL-SCNC: 29 MMOL/L
CO2 SERPL-SCNC: 31 MMOL/L
CO2 SERPL-SCNC: 36 MMOL/L
CO2 SERPL-SCNC: 37 MMOL/L
COLOR FLD: YELLOW
CREAT SERPL-MCNC: 0.6 MG/DL
CREAT SERPL-MCNC: 0.7 MG/DL
DIFFERENTIAL METHOD: ABNORMAL
EOSINOPHIL # BLD AUTO: 0 K/UL
EOSINOPHIL NFR BLD: 0 %
EOSINOPHIL NFR FLD MANUAL: 1 %
ERYTHROCYTE [DISTWIDTH] IN BLOOD BY AUTOMATED COUNT: 24.8 %
EST. GFR  (AFRICAN AMERICAN): >60 ML/MIN/1.73 M^2
EST. GFR  (NON AFRICAN AMERICAN): >60 ML/MIN/1.73 M^2
FOLATE SERPL-MCNC: 6 NG/ML
GLUCOSE FLD-MCNC: 176 MG/DL
GLUCOSE SERPL-MCNC: 123 MG/DL
GLUCOSE SERPL-MCNC: 126 MG/DL
GLUCOSE SERPL-MCNC: 128 MG/DL
GLUCOSE SERPL-MCNC: 138 MG/DL
GLUCOSE SERPL-MCNC: 139 MG/DL
GLUCOSE SERPL-MCNC: 169 MG/DL
GRAM STN SPEC: NORMAL
GRAM STN SPEC: NORMAL
HCT VFR BLD AUTO: 23.1 %
HGB BLD-MCNC: 7.6 G/DL
INR PPP: 1.6
IRON SERPL-MCNC: 63 UG/DL
LDH FLD L TO P-CCNC: 32 U/L
LYMPHOCYTES # BLD AUTO: 1.2 K/UL
LYMPHOCYTES NFR BLD: 5.6 %
LYMPHOCYTES NFR FLD MANUAL: 7 %
MAGNESIUM SERPL-MCNC: 1.7 MG/DL
MAGNESIUM SERPL-MCNC: 2 MG/DL
MAGNESIUM SERPL-MCNC: 2.1 MG/DL
MAGNESIUM SERPL-MCNC: 2.2 MG/DL
MAGNESIUM SERPL-MCNC: 2.3 MG/DL
MAGNESIUM SERPL-MCNC: 2.5 MG/DL
MCH RBC QN AUTO: 30.6 PG
MCHC RBC AUTO-ENTMCNC: 32.9 G/DL
MCV RBC AUTO: 93 FL
MESOTHL CELL NFR FLD MANUAL: 88 %
MONOCYTES # BLD AUTO: 1.3 K/UL
MONOCYTES NFR BLD: 5.8 %
MONOS+MACROS NFR FLD MANUAL: 1 %
NEUTROPHILS # BLD AUTO: 18.8 K/UL
NEUTROPHILS NFR BLD: 87.9 %
NEUTROPHILS NFR FLD MANUAL: 3 %
PHOSPHATE SERPL-MCNC: 1.3 MG/DL
PHOSPHATE SERPL-MCNC: 1.3 MG/DL
PHOSPHATE SERPL-MCNC: 1.6 MG/DL
PHOSPHATE SERPL-MCNC: 1.8 MG/DL
PLATELET # BLD AUTO: 134 K/UL
PMV BLD AUTO: 10 FL
POTASSIUM SERPL-SCNC: 2.2 MMOL/L
POTASSIUM SERPL-SCNC: 2.5 MMOL/L
POTASSIUM SERPL-SCNC: 3 MMOL/L
POTASSIUM SERPL-SCNC: 3 MMOL/L
POTASSIUM SERPL-SCNC: 3.1 MMOL/L
POTASSIUM SERPL-SCNC: 3.2 MMOL/L
PROT FLD-MCNC: <1 G/DL
PROT SERPL-MCNC: 5.8 G/DL
PROTHROMBIN TIME: 16.5 SEC
RBC # BLD AUTO: 2.48 M/UL
SATURATED IRON: 30 %
SODIUM SERPL-SCNC: 124 MMOL/L
SODIUM SERPL-SCNC: 124 MMOL/L
SODIUM SERPL-SCNC: 125 MMOL/L
SODIUM SERPL-SCNC: 125 MMOL/L
SODIUM SERPL-SCNC: 126 MMOL/L
SODIUM SERPL-SCNC: 126 MMOL/L
SPECIMEN SOURCE: NORMAL
TOTAL IRON BINDING CAPACITY: 207 UG/DL
TRANSFERRIN SERPL-MCNC: 140 MG/DL
VIT B12 SERPL-MCNC: 1805 PG/ML
WBC # BLD AUTO: 21.42 K/UL
WBC # FLD: 164 /CU MM

## 2018-07-16 PROCEDURE — 94761 N-INVAS EAR/PLS OXIMETRY MLT: CPT

## 2018-07-16 PROCEDURE — 87070 CULTURE OTHR SPECIMN AEROBIC: CPT

## 2018-07-16 PROCEDURE — 20000000 HC ICU ROOM

## 2018-07-16 PROCEDURE — 82042 OTHER SOURCE ALBUMIN QUAN EA: CPT

## 2018-07-16 PROCEDURE — 63600175 PHARM REV CODE 636 W HCPCS: Performed by: INTERNAL MEDICINE

## 2018-07-16 PROCEDURE — 83615 LACTATE (LD) (LDH) ENZYME: CPT

## 2018-07-16 PROCEDURE — 85025 COMPLETE CBC W/AUTO DIFF WBC: CPT

## 2018-07-16 PROCEDURE — 25000003 PHARM REV CODE 250: Performed by: INTERNAL MEDICINE

## 2018-07-16 PROCEDURE — 27000221 HC OXYGEN, UP TO 24 HOURS

## 2018-07-16 PROCEDURE — 0W9G3ZZ DRAINAGE OF PERITONEAL CAVITY, PERCUTANEOUS APPROACH: ICD-10-PCS | Performed by: RADIOLOGY

## 2018-07-16 PROCEDURE — 83735 ASSAY OF MAGNESIUM: CPT | Mod: 91

## 2018-07-16 PROCEDURE — 97161 PT EVAL LOW COMPLEX 20 MIN: CPT

## 2018-07-16 PROCEDURE — 85610 PROTHROMBIN TIME: CPT

## 2018-07-16 PROCEDURE — 97165 OT EVAL LOW COMPLEX 30 MIN: CPT

## 2018-07-16 PROCEDURE — 82945 GLUCOSE OTHER FLUID: CPT

## 2018-07-16 PROCEDURE — 97535 SELF CARE MNGMENT TRAINING: CPT

## 2018-07-16 PROCEDURE — 85730 THROMBOPLASTIN TIME PARTIAL: CPT

## 2018-07-16 PROCEDURE — 84155 ASSAY OF PROTEIN SERUM: CPT

## 2018-07-16 PROCEDURE — 97802 MEDICAL NUTRITION INDIV IN: CPT

## 2018-07-16 PROCEDURE — 82247 BILIRUBIN TOTAL: CPT

## 2018-07-16 PROCEDURE — 80069 RENAL FUNCTION PANEL: CPT | Mod: 91

## 2018-07-16 PROCEDURE — 89051 BODY FLUID CELL COUNT: CPT

## 2018-07-16 PROCEDURE — 87075 CULTR BACTERIA EXCEPT BLOOD: CPT

## 2018-07-16 PROCEDURE — 87205 SMEAR GRAM STAIN: CPT

## 2018-07-16 PROCEDURE — 84157 ASSAY OF PROTEIN OTHER: CPT

## 2018-07-16 PROCEDURE — 82550 ASSAY OF CK (CPK): CPT

## 2018-07-16 PROCEDURE — 87102 FUNGUS ISOLATION CULTURE: CPT

## 2018-07-16 RX ORDER — ERGOCALCIFEROL 1.25 MG/1
50000 CAPSULE ORAL
Status: DISCONTINUED | OUTPATIENT
Start: 2018-07-16 | End: 2018-07-19 | Stop reason: HOSPADM

## 2018-07-16 RX ORDER — POTASSIUM CHLORIDE 29.8 MG/ML
40 INJECTION INTRAVENOUS
Status: COMPLETED | OUTPATIENT
Start: 2018-07-16 | End: 2018-07-16

## 2018-07-16 RX ORDER — POTASSIUM CHLORIDE 20 MEQ/1
20 TABLET, EXTENDED RELEASE ORAL
Status: COMPLETED | OUTPATIENT
Start: 2018-07-16 | End: 2018-07-16

## 2018-07-16 RX ORDER — TRAMADOL HYDROCHLORIDE 50 MG/1
50 TABLET ORAL EVERY 6 HOURS PRN
Status: DISCONTINUED | OUTPATIENT
Start: 2018-07-16 | End: 2018-07-18

## 2018-07-16 RX ORDER — NOREPINEPHRINE BITARTRATE/D5W 16MG/250ML
0.02 PLASTIC BAG, INJECTION (ML) INTRAVENOUS CONTINUOUS
Status: DISCONTINUED | OUTPATIENT
Start: 2018-07-16 | End: 2018-07-17

## 2018-07-16 RX ORDER — LACTULOSE 10 G/15ML
20 SOLUTION ORAL 2 TIMES DAILY
Status: DISCONTINUED | OUTPATIENT
Start: 2018-07-16 | End: 2018-07-19 | Stop reason: HOSPADM

## 2018-07-16 RX ORDER — MIDODRINE HYDROCHLORIDE 5 MG/1
10 TABLET ORAL 2 TIMES DAILY WITH MEALS
Status: DISCONTINUED | OUTPATIENT
Start: 2018-07-16 | End: 2018-07-19 | Stop reason: HOSPADM

## 2018-07-16 RX ORDER — SODIUM,POTASSIUM PHOSPHATES 280-250MG
1 POWDER IN PACKET (EA) ORAL ONCE
Status: COMPLETED | OUTPATIENT
Start: 2018-07-16 | End: 2018-07-16

## 2018-07-16 RX ORDER — NOREPINEPHRINE BITARTRATE/D5W 16MG/250ML
0.02 PLASTIC BAG, INJECTION (ML) INTRAVENOUS CONTINUOUS
Status: DISCONTINUED | OUTPATIENT
Start: 2018-07-16 | End: 2018-07-16

## 2018-07-16 RX ADMIN — CEFTRIAXONE SODIUM 2 G: 2 INJECTION, POWDER, FOR SOLUTION INTRAMUSCULAR; INTRAVENOUS at 08:07

## 2018-07-16 RX ADMIN — POTASSIUM PHOSPHATE, MONOBASIC AND POTASSIUM PHOSPHATE, DIBASIC 15 MMOL: 224; 236 INJECTION, SOLUTION, CONCENTRATE INTRAVENOUS at 03:07

## 2018-07-16 RX ADMIN — LACTULOSE 20 G: 20 SOLUTION ORAL at 08:07

## 2018-07-16 RX ADMIN — POTASSIUM CHLORIDE 20 MEQ: 20 TABLET, EXTENDED RELEASE ORAL at 08:07

## 2018-07-16 RX ADMIN — POTASSIUM CHLORIDE 20 MEQ: 20 TABLET, EXTENDED RELEASE ORAL at 04:07

## 2018-07-16 RX ADMIN — HEPARIN SODIUM 5000 UNITS: 5000 INJECTION, SOLUTION INTRAVENOUS; SUBCUTANEOUS at 10:07

## 2018-07-16 RX ADMIN — POTASSIUM CHLORIDE 20 MEQ: 1500 TABLET, EXTENDED RELEASE ORAL at 04:07

## 2018-07-16 RX ADMIN — POTASSIUM CHLORIDE 20 MEQ: 20 TABLET, EXTENDED RELEASE ORAL at 10:07

## 2018-07-16 RX ADMIN — POTASSIUM CHLORIDE 20 MEQ: 1500 TABLET, EXTENDED RELEASE ORAL at 08:07

## 2018-07-16 RX ADMIN — POTASSIUM CHLORIDE 40 MEQ: 400 INJECTION, SOLUTION INTRAVENOUS at 08:07

## 2018-07-16 RX ADMIN — MIDODRINE HYDROCHLORIDE 10 MG: 5 TABLET ORAL at 04:07

## 2018-07-16 RX ADMIN — SODIUM CHLORIDE: 0.9 INJECTION, SOLUTION INTRAVENOUS at 01:07

## 2018-07-16 RX ADMIN — POTASSIUM CHLORIDE 20 MEQ: 200 INJECTION, SOLUTION INTRAVENOUS at 01:07

## 2018-07-16 RX ADMIN — POTASSIUM CHLORIDE 20 MEQ: 1500 TABLET, EXTENDED RELEASE ORAL at 09:07

## 2018-07-16 RX ADMIN — TRAMADOL HYDROCHLORIDE 50 MG: 50 TABLET, COATED ORAL at 11:07

## 2018-07-16 RX ADMIN — ERGOCALCIFEROL 50000 UNITS: 1.25 CAPSULE ORAL at 08:07

## 2018-07-16 RX ADMIN — POTASSIUM CHLORIDE 20 MEQ: 1500 TABLET, EXTENDED RELEASE ORAL at 05:07

## 2018-07-16 RX ADMIN — TRAMADOL HYDROCHLORIDE 50 MG: 50 TABLET, COATED ORAL at 04:07

## 2018-07-16 RX ADMIN — MAGNESIUM SULFATE HEPTAHYDRATE 4 G: 500 INJECTION, SOLUTION INTRAMUSCULAR; INTRAVENOUS at 01:07

## 2018-07-16 RX ADMIN — HEPARIN SODIUM 5000 UNITS: 5000 INJECTION, SOLUTION INTRAVENOUS; SUBCUTANEOUS at 05:07

## 2018-07-16 RX ADMIN — POTASSIUM & SODIUM PHOSPHATES POWDER PACK 280-160-250 MG 1 PACKET: 280-160-250 PACK at 10:07

## 2018-07-16 RX ADMIN — SODIUM CHLORIDE: 0.9 INJECTION, SOLUTION INTRAVENOUS at 04:07

## 2018-07-16 RX ADMIN — POTASSIUM CHLORIDE 20 MEQ: 1500 TABLET, EXTENDED RELEASE ORAL at 01:07

## 2018-07-16 RX ADMIN — POTASSIUM & SODIUM PHOSPHATES POWDER PACK 280-160-250 MG 1 PACKET: 280-160-250 PACK at 01:07

## 2018-07-16 NOTE — PROCEDURES
Radiology Post-Procedure Note    Pre Op Diagnosis: Ascites  Post Op Diagnosis: Same    Procedure: Paracentesis    Procedure performed by: Regino Washington MD    Written Informed Consent Obtained: Yes  Specimen Removed: YES serous ascites. See dictation for total volume  Estimated Blood Loss: Minimal    Findings:   Successful paracentesis.      Patient tolerated procedure well.    Regino Washington MD  Department of Radiology  Pager: [phone]

## 2018-07-16 NOTE — PLAN OF CARE
Problem: Patient Care Overview  Goal: Plan of Care Review  Outcome: Ongoing (interventions implemented as appropriate)  Pt's SpO2 90% on 3 lpm NC. Increase to 5 lpm with SpO2 91%. Pt have nail polish on all fingers that can cause inaccurate SpO2 reading. No respiratory distress noted. Will continue to monitor SpO2.

## 2018-07-16 NOTE — PROGRESS NOTES
"[facility] Medicine Resident HO-III Progress Note    Subjective:      Pt feeling better this AM overall; less confused. However, experiencing abd pain and tightness. Denies n/v. Endorses subjective fever. Denies cp and sob.     Objective:   Last 24 Hour Vital Signs:  BP  Min: 70/30  Max: 124/59  Temp  Av.9 °F (37.2 °C)  Min: 98.6 °F (37 °C)  Max: 99.5 °F (37.5 °C)  Pulse  Av.3  Min: 69  Max: 99  Resp  Av.5  Min: 9  Max: 40  SpO2  Av.2 %  Min: 87 %  Max: 100 %  Height  Av' 1" (154.9 cm)  Min: 5' 1" (154.9 cm)  Max: 5' 1" (154.9 cm)  Weight  Av.7 kg (113 lb 15.6 oz)  Min: 49.9 kg (110 lb)  Max: 53.5 kg (117 lb 15.1 oz)  I/O last 3 completed shifts:  In: 3481.7 [P.O.:200; I.V.:584.7; IV Piggyback:2697]  Out: 495 [Urine:495]    Physical Examination:  General:          sitting up in bed  nad  Head:               Normocephalic and atraumatic  Eyes:               PERRL; EOMi with icteric sclera and clear conjunctivae  Mouth:             Oropharynx clear and without exudate  Cardio:             Normal rate and regular rhythm with normal S1 and S2; no murmurs or rubs  Resp:               CTAB; respirations unlabored; no wheezes, crackles or rhonchi  Abdom:            Soft, diffusely tender to palpation  mildly distended with normoactive bowel sounds  +shifting dullness  Extrem:            Warm and well-perfused with no clubbing, cyanosis or edema  Skin:                No rashes, lesions  +jaundice  Pulses:            2+ and symmetric distally  Neuro:             AAOx3  face symmetric  moves all extremities  Psych:  Pleasant and cooperative    Laboratory:  Laboratory Data Reviewed: yes  Pertinent Findings:    Recent Labs  Lab 07/15/18  1648  18  0557 18  0926 18  1313 18  1701   WBC 19.43*  --  21.42*  --   --   --    HGB 9.5*  --  7.6*  --   --   --    HCT 28.7*  --  23.1*  --   --   --    *  --  134*  --   --   --    MCV 93  --  93  --   --   --    RDW 24.3*  --  " 24.8*  --   --   --    *  < > 124* 126* 126* 125*   K <2.0*  < > 2.2* 3.0* 3.1* 3.2*   CL <70*  < > 77* 80* 82* 82*   CO2 44*  < > 36* 37* 37* 29   BUN 10  < > 8 8 7 7   CREATININE 0.8  < > 0.6 0.6 0.6 0.7     < > 139* 169* 138* 128*   PROT 6.5  --   --  5.8*  --   --    ALBUMIN 2.2*  < > 1.9* 1.9*  1.9* 2.0* 1.9*   BILITOT 13.7*  --   --  12.0*  --   --    AST 34  --   --  36  --   --    ALKPHOS 377*  --   --  360*  --   --    ALT 20  --   --  16  --   --    < > = values in this interval not displayed.      Microbiology Data Reviewed: yes  Pertinent Findings:  Microbiology Results (last 7 days)     Procedure Component Value Units Date/Time    Urine culture - High Risk [[id number]] Collected:  07/15/18 1814    Order Status:  Completed Specimen:  Urine from Urine, Catheterized Updated:  07/16/18 2048     Urine Culture, Routine --     PRESUMPTIVE E COLI  >100,000 cfu/ml  Identification and susceptibility pending      Fungus culture [[id number]] Collected:  07/16/18 1538    Order Status:  Sent Specimen:  Body Fluid from Ascites Updated:  07/16/18 1851    Aerobic culture [[id number]] Collected:  07/16/18 1538    Order Status:  Sent Specimen:  Body Fluid from Ascites Updated:  07/16/18 1851    Gram stain [[id number]] Collected:  07/16/18 1538    Order Status:  Sent Specimen:  Body Fluid from Ascites Updated:  07/16/18 1851    Culture, Anaerobic [[id number]] Collected:  07/16/18 1538    Order Status:  Sent Specimen:  Body Fluid from Ascites Updated:  07/16/18 1851    Blood culture x two cultures. Draw prior to antibiotics. [[id number]] Collected:  07/15/18 1635    Order Status:  Completed Specimen:  Blood from Peripheral, Hand, Left Updated:  07/16/18 1112     Blood Culture, Routine Gram stain daron bottle: Gram negative rods      Blood Culture, Routine Results called to and read back by:Corinna Fischer RN 07/16/2018  11:10    Narrative:       Aerobic and anaerobic    Blood culture x two cultures. Draw prior to  antibiotics. [[id number]] Collected:  07/15/18 1619    Order Status:  Completed Specimen:  Blood from Peripheral, Hand, Right Updated:  07/16/18 0515     Blood Culture, Routine No Growth to date    Narrative:       Aerobic and anaerobic          Other Results:  EKG (my interpretation): old reviewed, no new    Radiology Data Reviewed: yes  Pertinent Findings:      Current Medications:     Infusions:   sodium chloride 0.9% 60 mL/hr at 07/16/18 0653    norepinephrine bitartrate-D5W 0.35 mcg/kg/min (07/16/18 0638)        Scheduled:   cefTRIAXone (ROCEPHIN) IVPB  2 g Intravenous Q24H    ergocalciferol  50,000 Units Oral Q7 Days    heparin (porcine)  5,000 Units Subcutaneous Q8H    lactulose  20 g Oral BID    potassium chloride in water  40 mEq Intravenous ED 1 Time    potassium chloride  20 mEq Oral Q2H        PRN:      Antibiotics and Day Number of Therapy:  ceftriaxone    Lines and Day Number of Therapy:  RIJ central line  [place]    Assessment:     Annette Esparza is a 48 y.o.female with  Patient Active Problem List    Diagnosis Date Noted    Hyponatremia 07/15/2018    Oral thrush 11/30/2017    Centrilobular emphysema 12/12/2016    Mild single current episode of major depressive disorder 12/12/2016    Hyperbilirubinemia 06/13/2016    Anxiety 06/02/2016    Cigarette nicotine dependence without complication 06/02/2016    Vitamin D deficiency 10/22/2015    Folate deficiency 10/22/2015    Iron deficiency anemia due to chronic blood loss 10/22/2015    Portal hypertensive gastropathy 10/22/2015    Alcoholic cirrhosis of liver with ascites 10/22/2015    Subclinical hypothyroidism 10/22/2015    Gallstones 10/06/2015    Alcohol dependence in remission         Plan:     Acute encephalopathy, resolved  -likely mutlifactorial with hepatic encephalopathy and hyponatremia and possible infection contributing to currrent state  -cont to correct electrolyte derangements (below)  -cont to provide lactulose  for 2-3 BM goal  -cont treating UTI and SBP with ceftriaxone      Hypovolemic hyponatremia  -Na 123 on admit  baseline 135  -provided NS at 50cc/hr initially and 126 this AM  will increase rate to 60cc/hr  monitor sodium with q4 renal function studies to make sure correction does not occur too quickly     Hyokalemia, hypomagnesmia, hypophosphatemia  -replete     Normocytic anemia  -Hgb 9.5  -obtain iron studies     UTI  -UA with nitrites, WBC, bacteria  UCx with GNR  -empirically covering with ceftriaxone  -1/4 bottles BCx with GNR  +procal     Alcoholic hepatitis  -PT/INR 17.3/1.6  Tbili 13.7  -Discriminate function > 32  given evidence of infection, will hold on steroids  -shifting dullness on PE   IR performe para to evaluate for SBP  -empirically covering SBP with ceftriaxone     VitD deficiency  -cont supplementation       Naseem Willett  [facility] HO-III  [facility] Hospitalists Service Team B    [facility] Hospitalist Pager numbers:   [facility] Hospitalist Medicine Team A (Diana/Patria): [phone]  [facility] Hospitalist Medicine Team B (Dennis/Sami):  [phone]

## 2018-07-16 NOTE — NURSING
Successful paracentesis to RLQ. 1525 mL of clear yellow abdominal fluid removed. Pt tolerated well and stable. VSS, NAD noted. Report given to DREW Weinstein.

## 2018-07-16 NOTE — CONSULTS
[facility]    CC: Ascites     HPI 48 y.o. female with a previous medical history of cirrhosis who was admitted for acute encephalopathy. She reports worsening, generalized abdominal pain for the past 2 weeks with associated abdominal distention. She reports her last bowel movement to be one week ago. She denies any hematemesis, coffee ground emesis, hematochezia, or melena. She denies any fevers but reports chills.     Patient's cirrhosis was diagnosed with liver biopsy in . Her cirrhosis has been attributed to alcohol intake. Her last drink was 2 years ago. She has had multiple hospitalizations for large volume ascites requiring paracentesis. Last EGD in 2016 without esophageal varices but she found to have portal hypertensive gastropathy. She has been on midodrine for hypotension.     Past Medical History:   Diagnosis Date    Acute hypoxemic respiratory failure 2016    ANDREW (acute kidney injury) 2016    Alcohol dependence in remission     Alcoholic cirrhosis of liver with ascites 10/22/2015    Anxiety     Ascites due to alcoholic cirrhosis 2016    Centrilobular emphysema 2016    Cigarette nicotine dependence without complication 2016    Folate deficiency 10/22/2015    Gallstones     Hepatorenal syndrome 2016    Hyperbilirubinemia 2016    Hyponatremia 2016    Iron deficiency anemia due to chronic blood loss 10/22/2015    Portal hypertensive gastropathy 10/22/2015    Subclinical hypothyroidism 10/22/2015         Past Surgical History:   Procedure Laterality Date    ADENOIDECTOMY      APPENDECTOMY       SECTION      COLONOSCOPY      HYSTERECTOMY      25 yrs old    LIVER BIOPSY      OOPHORECTOMY Left     25 yrs old    OOPHORECTOMY Right     29 yrs old    TONSILLECTOMY      TUBAL LIGATION      UPPER GASTROINTESTINAL ENDOSCOPY         Social History  Social History   Substance Use Topics    Smoking status: Current Every Day  "Smoker     Packs/day: 1.00     Years: 35.00     Types: Cigarettes    Smokeless tobacco: Never Used    Alcohol use No      Comment: hx etoh-quit 8/2015         Family History   Problem Relation Age of Onset    Rheum arthritis Father     Cancer Mother     No Known Problems Sister     No Known Problems Brother     Colon cancer Neg Hx        Review of Systems  General ROS: positive for chills, no fever or weight loss  Psychological ROS: negative for hallucination, depression or suicidal ideation  Ophthalmic ROS: negative for blurry vision, photophobia or eye pain  ENT ROS: negative for epistaxis, sore throat or rhinorrhea  Respiratory ROS: no cough, shortness of breath, or wheezing  Cardiovascular ROS: no chest pain or dyspnea on exertion  Gastrointestinal ROS: positive for abdominal pain and constipation  Genito-Urinary ROS: no dysuria, trouble voiding, or hematuria  Musculoskeletal ROS: negative for gait disturbance or muscular weakness  Neurological ROS: no syncope or seizures; no ataxia  Dermatological ROS: negative for pruritis, rash and jaundice    Physical Examination  BP (!) 95/55   Pulse 81   Temp 97.7 °F (36.5 °C) (Oral)   Resp (!) 21   Ht 5' 1" (1.549 m)   Wt 53.5 kg (117 lb 15.1 oz)   LMP  (LMP Unknown)   SpO2 (!) 91%   Breastfeeding? No   BMI 22.29 kg/m²   General appearance: alert, cooperative, no distress  HENT: Normocephalic, atraumatic, neck symmetrical, no nasal discharge   Eyes: scleral icterus present, PERRL, EOM's intact  Lungs: clear to auscultation bilaterally, no dullness to percussion bilaterally  Heart: regular rate and rhythm without rub; no displacement of the PMI   Abdomen: distended, generalized tenderness to palpation, bowel sounds normoactive  Extremities: extremities symmetric; no clubbing, cyanosis, or edema  Integument: Skin color, texture, turgor normal; no rashes; hair distrubution normal  Neurologic: Alert and oriented X 3, normal strength  Psychiatric: no pressured " speech; normal affect; no evidence of impaired cognition     Labs:  H/H 7.6/ 23.1  Platelets 134  WBC 21  Sodium 126   T bili 12  INR 1.6  BCx gram negative rods    Imaging:  Abdominal ultrasound: There is large amount of ascitic fluid in the right lower quadrant, the left lower quadrant and moderate-sized ascites in the right upper quadrant in the small amount of ascites in the left upper quadrant.    All other medical records reviewed.     Assessment:   48 y.o. female with a previous medical history of cirrhosis who presents with worsening ascites and gram negative bacteremia concerning for SBP.     Plan:   -Continue ceftriaxone   -Continue lactulose. Can consider rifaximin.   -Follow up on fluid analysis from paracentesis     Michelle Jean   PGY-1 Categorical Medicine  Gastroenterology Service

## 2018-07-16 NOTE — PLAN OF CARE
Problem: Physical Therapy Goal  Goal: Physical Therapy Goal  Goals to be met by: 2018     Patient will increase functional independence with mobility by performin. Supine to sit with Modified [place]  2. Sit to supine with Modified [place]  3. Sit to stand transfer with Stand-by Assistance  4. Gait  x 150 feet with Stand-by Assistance with or without AD   5. Ascend/descend 15 stair with bilateral Handrails Stand-by Assistance   6. Lower extremity exercise program x10 reps with independence     Outcome: Ongoing (interventions implemented as appropriate)  Patient evaluated by PT/OT; continue with ongoing assessment of d/c needs.

## 2018-07-16 NOTE — PLAN OF CARE
Problem: Occupational Therapy Goal  Goal: Occupational Therapy Goal  Goals to be met by: 8/15     Patient will increase functional independence with ADLs by performing:    UE Dressing with Stand-by Assistance.  LE Dressing with Minimal Assistance.  Grooming while standing with Stand-by Assistance.  Toileting from bedside commode with Minimal Assistance for hygiene and clothing management.   Toilet transfer to bedside commode with Stand-by Assistance.  Upper extremity exercise program x10 reps per handout, with supervision.    Outcome: Ongoing (interventions implemented as appropriate)  OT eval performed, report to follow    Pt will benefit from ongoing assessment of post acute therapy needs

## 2018-07-16 NOTE — PT/OT/SLP EVAL
Physical Therapy Evaluation    Patient Name:  Annette Esparza   MRN:  [id number]    Recommendations:     Discharge Recommendations:   (TBD)   Discharge Equipment Recommendations:  (TBD)   Barriers to discharge: Inaccessible home    Assessment:     Annette Esparza is a 48 y.o. female admitted with a medical diagnosis of Hyponatremia.  She presents with the following impairments/functional limitations:  weakness, impaired endurance, gait instability, impaired functional mobilty, impaired self care skills, impaired balance, decreased lower extremity function, decreased upper extremity function, pain, impaired cognition, impaired cardiopulmonary response to activity .    Rehab Prognosis:  good; patient would benefit from acute skilled PT services to address these deficits and reach maximum level of function.      Recent Surgery: * No surgery found *      Plan:     During this hospitalization, patient to be seen 6 x/week to address the above listed problems via gait training, therapeutic activities, therapeutic exercises  · Plan of Care Expires:  08/16/18   Plan of Care Reviewed with: patient    Subjective     Communicated with nurse prior to session.  Patient found supine with HOB elevated upon PT entry to room, agreeable to evaluation.      Chief Complaint: back and abdominal pain  Patient comments/goals: return to PLOF  Pain/Comfort:  Pain Rating 1: 10/10  Location - Orientation 1: generalized  Location 1: back  Pain Addressed 1: Reposition, Distraction, Nurse notified  Pain Rating Post-Intervention 1: 10/10  Pain Rating 2: 7/10  Location - Orientation 2: generalized  Location 2: abdomen  Pain Addressed 2: Reposition, Distraction, Nurse notified  Pain Rating Post-Intervention 2: 7/10    Patients cultural, spiritual, Jainism conflicts given the current situation: no    Living Environment:  Pt lives in 2nd floor apt ~15 steps [employer] with her BF; tub/shower combo  Previous level of function: indep; drives,  cooks, cleans  Equipment Owned:  none  Assistance upon Discharge: family    Objective:     Patient found with: peripheral IV, oxygen, SCD, central line, angeles catheter (ICU monitoring)     General Precautions: Standard, fall, respiratory   Orthopedic Precautions:N/A   Braces: N/A     Exams:  · A&Ox4, follows commands with delayed responses  · BLE AROM WFL, strength~4-/5 grossly  · Pt reports tingling B feet; tactile sensation intact    Functional Mobility:  · Bed Mobility:     · Rolling Right: contact guard assistance  · Scooting: contact guard assistance  · Supine to Sit: contact guard assistance and minimum assistance  · Transfers:     · Sit to Stand:  contact guard assistance and HHA with no AD  · Bed to Chair: contact guard assistance with  hand-held assist  using  Step Transfer  · Balance: sit~Fair+ to good; stand static~fair; dynamic stand/amb~fair- to fair    AM-PAC 6 CLICK MOBILITY  Total Score:14       Therapeutic Activities and Exercises:   Pt educated on role of PT/POC, educated on BLE AROM ex program    Patient left up in chair with all lines intact, call button in reach and nurse notified.    GOALS:    Physical Therapy Goals        Problem: Physical Therapy Goal    Goal Priority Disciplines Outcome Goal Variances Interventions   Physical Therapy Goal     PT/OT, PT Ongoing (interventions implemented as appropriate)     Description:  Goals to be met by: 2018     Patient will increase functional independence with mobility by performin. Supine to sit with Modified [place]  2. Sit to supine with Modified [place]  3. Sit to stand transfer with Stand-by Assistance  4. Gait  x 150 feet with Stand-by Assistance with or without AD   5. Ascend/descend 15 stair with bilateral Handrails Stand-by Assistance   6. Lower extremity exercise program x10 reps with independence                      History:     Past Medical History:   Diagnosis Date    Acute hypoxemic respiratory failure 2016     ANDREW (acute kidney injury) 2016    Alcohol dependence in remission     Alcoholic cirrhosis of liver with ascites 10/22/2015    Anxiety     Ascites due to alcoholic cirrhosis 2016    Centrilobular emphysema 2016    Cigarette nicotine dependence without complication 2016    Folate deficiency 10/22/2015    Gallstones     Hepatorenal syndrome 2016    Hyperbilirubinemia 2016    Hyponatremia 2016    Iron deficiency anemia due to chronic blood loss 10/22/2015    Portal hypertensive gastropathy 10/22/2015    Subclinical hypothyroidism 10/22/2015       Past Surgical History:   Procedure Laterality Date    ADENOIDECTOMY      APPENDECTOMY       SECTION      COLONOSCOPY      HYSTERECTOMY      25 yrs old    LIVER BIOPSY      OOPHORECTOMY Left     25 yrs old    OOPHORECTOMY Right     29 yrs old    TONSILLECTOMY      TUBAL LIGATION      UPPER GASTROINTESTINAL ENDOSCOPY         Clinical Decision Making:     History  Co-morbidities and personal factors that may impact the plan of care Examination  Body Structures and Functions, activity limitations and participation restrictions that may impact the plan of care Clinical Presentation   Decision Making/ Complexity Score   Co-morbidities:   [] Time since onset of injury / illness / exacerbation  [x] Status of current condition  []Patient's cognitive status and safety concerns    [x] Multiple Medical Problems (see med hx)  Personal Factors:   [] Patient's age  [] Prior Level of function   [] Patient's home situation (environment and family support)  [] Patient's level of motivation  [] Expected progression of patient      HISTORY:(criteria)    [] 69114 - no personal factors/history    [x] 82114 - has 1-2 personal factor/comorbidity     [] 58979 - has >3 personal factor/comorbidity     Body Regions:  [x] Objective examination findings  [] Head     []  Neck  [] Trunk   [] Upper Extremity  [] Lower Extremity    Body  Systems:  [x] For communication ability, affect, cognition, language, and learning style: the assessment of the ability to make needs known, consciousness, orientation (person, place, and time), expected emotional /behavioral responses, and learning preferences (eg, learning barriers, education  needs)  [x] For the neuromuscular system: a general assessment of gross coordinated movement (eg, balance, gait, locomotion, transfers, and transitions) and motor function  (motor control and motor learning)  [x] For the musculoskeletal system: the assessment of gross symmetry, gross range of motion, gross strength, height, and weight  [] For the integumentary system: the assessment of pliability(texture), presence of scar formation, skin color, and skin integrity  [x] For cardiovascular/pulmonary system: the assessment of heart rate, respiratory rate, blood pressure, and edema     Activity limitations:    [] Patient's cognitive status and saf ety concerns          [x] Status of current condition      [] Weight bearing restriction  [] Cardiopulmunary Restriction    Participation Restrictions:   [] Goals and goal agreement with the patient     [] Rehab potential (prognosis) and probable outcome      Examination of Body System: (criteria)    [] 53307 - addressing 1-2 elements    [] 37811 - addressing a total of 3 or more elements     [x] 64929 -  Addressing a total of 4 or more elements         Clinical Presentation: (criteria)  Stable - 40561     On examination of body system using standardized tests and measures patient presents with 4 or more elements from any of the following: body structures and functions, activity limitations, and/or participation restrictions.  Leading to a clinical presentation that is considered stable and/or uncomplicated                              Clinical Decision Making  (Eval Complexity):  Low- 98215     Time Tracking:     PT Received On: 07/16/18  PT Start Time: 1136     PT Stop Time: 1201  PT  Total Time (min): 25 min with OT    Billable Minutes: Evaluation 15 minutes      Cait Bautista, PT  07/16/2018

## 2018-07-16 NOTE — PLAN OF CARE
Problem: Nutrition, Imbalanced: Inadequate Oral Intake (Adult)  Goal: Improved Oral Intake  Patient will demonstrate the desired outcomes by discharge/transition of care.   Outcome: Ongoing (interventions implemented as appropriate)  Recommendation/Intervention:   1. Change diet to Regular (Na decreased no need to limit Na in diet).   2. Encourage good intake at meals as tolerated.   3. Monitor need for supplements.  4. Electrolyte management    Goals:   Pt will consume at least 50-75% intake at meals  Nutrition Goal Status: new  Communication of RD Recs: discussed on rounds

## 2018-07-16 NOTE — PLAN OF CARE
Problem: Patient Care Overview  Goal: Plan of Care Review  Outcome: Ongoing (interventions implemented as appropriate)  Pt being actively weaned off of levo gtt as tolerated to maintain a MAP of >65. US of abd show ascites. Paracentesis done with cultures sent. Pt complaining of back and abd pain that resolves with positioning. Up to chair with PT/OT. Eating a Na restricted diet. IV NS provided to increase Na. IV electrolytes provided to correct. Frequent lab checks done to check electrolyte replacement. Pt AAOx4. Blood cultures positive, awaiting body fluid cultures. Being given antibiotic treatment. Safety maintained. Side rails up x2. Non-skid socks on when OOB. Personal belongings within reach. Call bell within reach. Family at bedside. Updated on plan of care. Educated on importance of taking scheduled home medications.

## 2018-07-16 NOTE — CONSULTS
"  [facility]  Adult Nutrition  Consult Note    SUMMARY     Recommendations    Recommendation/Intervention:   1. Change diet to Regular (Na decreased no need to limit Na in diet).   2. Encourage good intake at meals as tolerated.   3. Monitor need for supplements.  4. Electrolyte management    Goals:   Pt will consume at least 50-75% intake at meals  Nutrition Goal Status: new  Communication of RD Recs: discussed on rounds    Reason for Assessment  Reason for Assessment: consult (at risk of pressure injury)  Diagnosis:  (hyponatremia)  Relevant Medical History: gallstones, resp failure, alcoholic cirrhosis of liver, ascites, appendectomy  General Information Comments: Pt on low Na diet with 1000 ml fluid restriction. No BM x 5 days.    Nutrition Risk Screen  Nutrition Risk Screen: no indicators present    Nutrition/Diet History  Food Preferences: no Rastafarian or cultural food prefs identified  Do you have any cultural, spiritual, Rastafarian conflicts, given your current situation?: No  Factors Affecting Nutritional Intake: decreased appetite    Anthropometrics  Temp: 97.7 °F (36.5 °C)  Height Method: Stated  Height: 5' 1" (154.9 cm)  Height (inches): 61 in  Weight Method: Bed Scale  Weight: 53.5 kg (117 lb 15.1 oz)  Weight (lb): 117.95 lb  Ideal Body Weight (IBW), Female: 105 lb  % Ideal Body Weight, Female (lb): 112.33 lb  BMI (Calculated): 22.3  BMI Grade: 18.5-24.9 - normal     Lab/Procedures/Meds  Pertinent Labs Reviewed: reviewed  Pertinent Labs Comments: Na 126L, K 3.1L, Glu 138H, Ca 7.0L, Phos 1.3L, Alb 2.0L  Pertinent Medications Reviewed: reviewed  Pertinent Medications Comments: rocephin, lactulose    Physical Findings/Assessment  Overall Physical Appearance: weak  Tubes:  (none)  Oral/Mouth Cavity: WDL  Skin:  (Parminder 18-intact)    Estimated/Assessed Needs  Weight Used For Calorie Calculations: 53.5 kg (117 lb 15.1 oz)  Energy Calorie Requirements (kcal): 1873  Energy Need Method: Kcal/kg " (35 kcal/kg)  Protein Requirements: 64g (1.2g/kg)  Weight Used For Protein Calculations: 53.5 kg (117 lb 15.1 oz)  Fluid Need Method: RDA Method  RDA Method (mL): 1873     Nutrition Prescription Ordered  Current Diet Order: low Na 1000ml fluid restriction    Evaluation of Received Nutrient/Fluid Intake  Energy Calories Required: not meeting needs  Protein Required: not meeting needs  Fluid Required: meeting needs  Comments: No BM x 5 days  % Intake of Estimated Energy Needs: 25 - 50 %  % Meal Intake: 25 - 50 %    Nutrition Risk  Level of Risk/Frequency of Follow-up:  (2xweekly)     Assessment and Plan  Nutrition Problem  Altered nutrition related labs    Related to (etiology):   Hx/dx    Signs and Symptoms (as evidenced by):   Na 126L, K 3.1L, Ca 7.0L, Phos 1.3L    Interventions/Recommendations (treatment strategy):  See recs    Nutrition Diagnosis Status:   New     Monitor and Evaluation  Food and Nutrient Intake: food and beverage intake  Food and Nutrient Adminstration: diet order  Physical Activity and Function: nutrition-related ADLs and IADLs  Anthropometric Measurements: weight  Biochemical Data, Medical Tests and Procedures: electrolyte and renal panel  Nutrition-Focused Physical Findings: overall appearance     Nutrition Follow-Up  RD Follow-up?: Yes

## 2018-07-16 NOTE — PLAN OF CARE
Pt reports she lives with her S.O. ( Orestes Rick [phone]) . Pt denies use of DME and no hh. Pt stated her S.O can provide assistance and transportation as needed. Pt given d/c brochure and business card and encouraged to call for any needs.    Tn to follow.       07/16/18 1423   Discharge Assessment   Assessment Type Discharge Planning Assessment   Confirmed/corrected address and phone number on facesheet? Yes   Assessment information obtained from? Patient   Expected Length of Stay (days) 2   Communicated expected length of stay with patient/caregiver yes   Prior to hospitalization functional status: Independent   Current cognitive status: Alert/Oriented   Current Functional Status: Needs Assistance   Lives With significant other   Able to Return to Prior Arrangements yes   Is patient able to care for self after discharge? Yes   Who are your caregiver(s) and their phone number(s)? Orestes Styles (S.O.) [phone]   Patient's perception of discharge disposition home or selfcare   Readmission Within The Last 30 Days no previous admission in last 30 days   Patient currently being followed by outpatient case management? No   Patient currently receives any other outside agency services? No   Equipment Currently Used at Home none   Do you have any problems affording any of your prescribed medications? No   Is the patient taking medications as prescribed? yes   Does the patient have transportation home? Yes  (S.O.)   Transportation Available family or friend will provide;car   Does the patient receive services at the [facility]? No   Discharge Plan A Home   Discharge Plan B Home with family   Patient/Family In Agreement With Plan yes

## 2018-07-16 NOTE — PT/OT/SLP EVAL
Occupational Therapy   Evaluation    Name: Annette Esparza  MRN: [id number]  Admitting Diagnosis:  Hyponatremia      Recommendations:     Discharge Recommendations: other (see comments) (TBD)  Discharge Equipment Recommendations:  other (see comments) (TBD)  Barriers to discharge:  Inaccessible home environment    History:     Occupational Profile:  Living Environment: Pt lives in 2nd floor apt ~15 steps [employer] with her BF; tub/shower combo  Previous level of function: indep  Roles and Routines: drives, cooks, cleans  Equipment Owned:  none  Assistance upon Discharge: family    Past Medical History:   Diagnosis Date    Acute hypoxemic respiratory failure 2016    ANDREW (acute kidney injury) 2016    Alcohol dependence in remission     Alcoholic cirrhosis of liver with ascites 10/22/2015    Anxiety     Ascites due to alcoholic cirrhosis 2016    Centrilobular emphysema 2016    Cigarette nicotine dependence without complication 2016    Folate deficiency 10/22/2015    Gallstones     Hepatorenal syndrome 2016    Hyperbilirubinemia 2016    Hyponatremia 2016    Iron deficiency anemia due to chronic blood loss 10/22/2015    Portal hypertensive gastropathy 10/22/2015    Subclinical hypothyroidism 10/22/2015       Past Surgical History:   Procedure Laterality Date    ADENOIDECTOMY      APPENDECTOMY       SECTION      COLONOSCOPY      HYSTERECTOMY      25 yrs old    LIVER BIOPSY      OOPHORECTOMY Left     25 yrs old    OOPHORECTOMY Right     29 yrs old    TONSILLECTOMY      TUBAL LIGATION      UPPER GASTROINTESTINAL ENDOSCOPY         Subjective     Chief Complaint: back and abdominal pain  Patient/Family stated goals:  Return to PLOF  Communicated with: nurse prior to session.  Pain/Comfort:  · Pain Rating 1: 10/10  · Location - Orientation 1: generalized  · Location 1: back  · Pain Addressed 1: Reposition, Distraction, Nurse notified  · Pain Rating  Post-Intervention 1: 10/10  · Pain Rating 2: 7/10  · Location - Orientation 2: generalized  · Location 2: abdomen  · Pain Addressed 2: Reposition, Distraction, Nurse notified  · Pain Rating Post-Intervention 2: 7/10    Patients cultural, spiritual, Zoroastrianism conflicts given the current situation:      Objective:     Patient found with: peripheral IV, oxygen, SCD, telemetry, blood pressure cuff, angeles catheter, central line    General Precautions: Standard, fall, respiratory   Orthopedic Precautions:    Braces:       Occupational Performance:    Bed Mobility:    · Patient completed Rolling/Turning to Right with contact guard assistance  · Patient completed Scooting/Bridging with contact guard assistance  · Patient completed Supine to Sit with contact guard assistance and minimum assistance    Functional Mobility/Transfers:  · Patient completed Sit <> Stand Transfer with contact guard assistance  with  hand-held assist   · Patient completed Bed <> Chair Transfer using Step Transfer technique with contact guard assistance with hand-held assist  · Functional Mobility: NT    Activities of Daily Living:  · Grooming: stand by assistance oral care seated  · Lower Body Dressing: total assistance socks    Cognitive/Visual Perceptual:  Alert, oriented x4, however delayed w/responses    Physical Exam:  BUE AROM WFL, strength grossly 3+/5  Good/Fair+ sit balance, fair stand balance    Patient left up in chair with all lines intact, call button in reach and nurse notified    AMPA 6 Click:  AMPAC Total Score: 14    Treatment & Education:  Pt educated on role of OT/POC, educated on BUE AROM ex program. Performed G/H as above.  Education:    Assessment:     Annette Esparza is a 48 y.o. female with a medical diagnosis of Hyponatremia.  She presents with performance deficits affecting function are weakness, impaired functional mobilty, impaired endurance, gait instability, impaired self care skills, impaired balance,  "decreased lower extremity function, decreased upper extremity function, pain, impaired cognition, impaired cardiopulmonary response to activity.      Rehab Prognosis:  good; patient would benefit from acute skilled OT services to address these deficits and reach maximum level of function.         Clinical Decision Makin.  OT Low:  "Pt evaluation falls under low complexity for evaluation coding due to performance deficits noted in 1-3 areas as stated above and 0 co-morbities affecting current functional status. Data obtained from problem focused assessments. No modifications or assistance was required for completion of evaluation. Only brief occupational profile and history review completed."     Plan:     Patient to be seen 5 x/week to address the above listed problems via self-care/home management, therapeutic activities, therapeutic exercises  · Plan of Care Expires: 18  · Plan of Care Reviewed with: patient    This Plan of care has been discussed with the patient who was involved in its development and understands and is in agreement with the identified goals and treatment plan    GOALS:    Occupational Therapy Goals        Problem: Occupational Therapy Goal    Goal Priority Disciplines Outcome Interventions   Occupational Therapy Goal     OT, PT/OT Ongoing (interventions implemented as appropriate)    Description:  Goals to be met by: 8/15     Patient will increase functional independence with ADLs by performing:    UE Dressing with Stand-by Assistance.  LE Dressing with Minimal Assistance.  Grooming while standing with Stand-by Assistance.  Toileting from bedside commode with Minimal Assistance for hygiene and clothing management.   Toilet transfer to bedside commode with Stand-by Assistance.  Upper extremity exercise program x10 reps per handout, with supervision.                      Time Tracking:     OT Date of Treatment: 18  OT Start Time: 1136  OT Stop Time: 1201  OT Total Time (min): " 25 min w/PT    Billable Minutes:Evaluation 15  Self Care/Home Management 8    Saw Salvador OT  7/16/2018

## 2018-07-16 NOTE — H&P
[facility] Internal Medicine History and Physical - Resident Note    Admitting Team: [facility] Internal Medicine Team B  Attending Physician: Dr. Collins  Resident: Dr. Willett  Interns: Dr. Caputo    Date of Admit: 7/15/2018    Chief Complaint     Confusion and hallucinations x 2 weeks    Subjective:      History of Present Illness:  Annette Esparza is a 48 y.o. female who  has a past medical history of Acute hypoxemic respiratory failure (12/1/2016); ANDREW (acute kidney injury) (11/28/2016); Alcohol dependence in remission; Alcoholic cirrhosis of liver with ascites (10/22/2015); Anxiety; Ascites due to alcoholic cirrhosis (6/13/2016); Centrilobular emphysema (12/12/2016); Cigarette nicotine dependence without complication (6/2/2016); Folate deficiency (10/22/2015); Gallstones; Hepatorenal syndrome (11/29/2016); Hyperbilirubinemia (6/13/2016); Hyponatremia (11/29/2016); Iron deficiency anemia due to chronic blood loss (10/22/2015); Portal hypertensive gastropathy (10/22/2015); and Subclinical hypothyroidism (10/22/2015).. The patient presented to [facility] on 7/15/2018 with a primary complaint of Altered Mental Status (c/o confusion, hallucinations, and weakness x2 weeks. Pt also c/o jaundice for the past few days)    Pt endorses respiratory infection about a month PTA which she states is when she started feeling poorly. Before this incident pt was able to ambulate without a walker. After this infection, pt and family states that she was unable to lift herself from the bed and had to be carried. Pt's PO intake was never great and decreased during this illness and never recovered. Pt endorses subjective fevers during the respiratory illness and cough with yellow sputum production. The respiratory illness resolved after a couple of weeks, but weakness remained and pt never fully recovered. Over the past 2 weeks, pt has endorsed worsening abd distention with greater confusion and hallucinations along with  yellowing of skin.    Past Medical History:  Past Medical History:   Diagnosis Date    Acute hypoxemic respiratory failure 2016    ANDREW (acute kidney injury) 2016    Alcohol dependence in remission     Alcoholic cirrhosis of liver with ascites 10/22/2015    Anxiety     Ascites due to alcoholic cirrhosis 2016    Centrilobular emphysema 2016    Cigarette nicotine dependence without complication 2016    Folate deficiency 10/22/2015    Gallstones     Hepatorenal syndrome 2016    Hyperbilirubinemia 2016    Hyponatremia 2016    Iron deficiency anemia due to chronic blood loss 10/22/2015    Portal hypertensive gastropathy 10/22/2015    Subclinical hypothyroidism 10/22/2015       Past Surgical History:  Past Surgical History:   Procedure Laterality Date    ADENOIDECTOMY      APPENDECTOMY       SECTION      COLONOSCOPY      HYSTERECTOMY      25 yrs old    LIVER BIOPSY      OOPHORECTOMY Left     25 yrs old    OOPHORECTOMY Right     29 yrs old    TONSILLECTOMY      TUBAL LIGATION      UPPER GASTROINTESTINAL ENDOSCOPY         Allergies:  Review of patient's allergies indicates:   Allergen Reactions    Morphine Nausea And Vomiting       Home Medications:  Prior to Admission medications    Medication Sig Start Date End Date Taking? Authorizing Provider   furosemide (LASIX) 40 MG tablet TAKE 1 TABLET (40 MG TOTAL) BY MOUTH ONCE DAILY. 3/13/18  Yes Endy Pfeiffer MD   HYDROcodone-acetaminophen (NORCO)  mg per tablet Take 1 tablet by mouth every 8 (eight) hours as needed for Pain. 18  Yes Endy Pfeiffer MD   midodrine (PROAMATINE) 10 MG tablet Take 1 tablet (10 mg total) by mouth 3 (three) times daily with meals. 17 Yes Nicolas Shelton MD   multivitamin (THERAGRAN) tablet Take 1 tablet by mouth once daily.   Yes Historical Provider, MD       Family History:  Family History   Problem Relation Age of Onset    Rheum arthritis  "Father     Cancer Mother     No Known Problems Sister     No Known Problems Brother     Colon cancer Neg Hx        Social History:  Social History   Substance Use Topics    Smoking status: Current Every Day Smoker     Packs/day: 1.00     Years: 35.00     Types: Cigarettes    Smokeless tobacco: Never Used    Alcohol use No      Comment: hx etoh-quit 2015       Review of Systems:  Pertinent positives and negatives listed in HPI. All other systems are reviewed and are negative.    Health Maintaince :   Primary Care Physician: Endy Pfeiffer  Immunizations:   TDap, flu, pneumo unknown  Cancer Screening:  PAP, mammo, cscope unknown     Objective:   Last 24 Hour Vital Signs:  BP  Min: 70/30  Max: 124/59  Temp  Av °F (37.2 °C)  Min: 98.6 °F (37 °C)  Max: 99.5 °F (37.5 °C)  Pulse  Av.9  Min: 69  Max: 99  Resp  Av.6  Min: 9  Max: 40  SpO2  Av.7 %  Min: 87 %  Max: 100 %  Height  Av' 1" (154.9 cm)  Min: 5' 1" (154.9 cm)  Max: 5' 1" (154.9 cm)  Weight  Av.7 kg (113 lb 15.6 oz)  Min: 49.9 kg (110 lb)  Max: 53.5 kg (117 lb 15.1 oz)  Body mass index is 22.29 kg/m².  No intake/output data recorded.    Physical Examination:  General: Alert and awake in no apparent distress  Head:  Normocephalic and atraumatic  Eyes:  PERRL; EOMi with icteric sclera and clear conjunctivae  Mouth:  Oropharynx clear and without exudate; dry mucous membranes  Cardio:  Normal rate and regular rhythm with normal S1 and S2; no murmurs or rubs  Resp:  CTAB; respirations unlabored; no wheezes, crackles or rhonchi  Abdom: Soft, NT  mildly distended with normoactive bowel sounds  +shifting dullness  Extrem: Warm and well-perfused with no clubbing, cyanosis or edema  Skin:  No rashes, lesions, or color changes  Pulses: 2+ and symmetric distally  Neuro:  AAOx3; cooperative and pleasant with no focal deficits    Laboratory:  Most Recent Data:  CBC:   Lab Results   Component Value Date    WBC 19.43 (H) 07/15/2018    HGB " 9.5 (L) 07/15/2018    HCT 28.7 (L) 07/15/2018     (L) 07/15/2018    MCV 93 07/15/2018    RDW 24.3 (H) 07/15/2018     BMP:   Lab Results   Component Value Date     (L) 07/16/2018    K 2.5 (LL) 07/16/2018    CL 76 (L) 07/16/2018    CO2 37 (H) 07/16/2018    BUN 9 07/16/2018    CREATININE 0.6 07/16/2018     (H) 07/16/2018    CALCIUM 6.4 (LL) 07/16/2018    MG 1.7 07/16/2018    PHOS 1.8 (L) 07/16/2018     LFTs:   Lab Results   Component Value Date    PROT 6.5 07/15/2018    ALBUMIN 1.9 (L) 07/16/2018    BILITOT 13.7 (H) 07/15/2018    AST 34 07/15/2018    ALKPHOS 377 (H) 07/15/2018    ALT 20 07/15/2018     (H) 09/30/2014     Coags:   Lab Results   Component Value Date    INR 1.6 (H) 07/15/2018     FLP:   Lab Results   Component Value Date    CHOL 150 12/19/2017    HDL 54 12/19/2017    LDLCALC 76.2 12/19/2017    TRIG 99 12/19/2017    CHOLHDL 36.0 12/19/2017     DM:   Lab Results   Component Value Date    HGBA1C Invalid (A) 10/16/2015    HGBA1C 4.2 (L) 10/06/2015    LDLCALC 76.2 12/19/2017    CREATININE 0.6 07/16/2018     Thyroid:   Lab Results   Component Value Date    TSH 5.119 (H) 04/16/2018    FREET4 1.11 04/16/2018     Anemia:   Lab Results   Component Value Date    IRON 63 07/15/2018    TIBC 207 (L) 07/15/2018    FERRITIN 66 07/15/2018    CHYSRMZE15 1805 (H) 07/15/2018    FOLATE 6.0 07/15/2018     Cardiac:   Lab Results   Component Value Date    TROPONINI 0.013 10/05/2015     (H) 12/04/2016     Urinalysis:   Lab Results   Component Value Date    LABURIN ESCHERICHIA COLI  >100,000 cfu/ml   06/02/2016    COLORU [place] (A) 07/15/2018    SPECGRAV 1.015 07/15/2018    NITRITE Positive (A) 07/15/2018    KETONESU Trace (A) 07/15/2018    UROBILINOGEN >=8.0 (A) 07/15/2018    WBCUA 75 (H) 07/15/2018       Other Results:  EKG (my interpretation): NSR  no changes concerning for acute ischemia or arrhythmia    Radiology:  Imaging Results          X-Ray Chest 1 View (Final result)  Result time  07/15/18 20:20:31    Final result by Ger Mays MD (07/15/18 20:20:31)                 Impression:      Interval placement of a right IJ central venous catheter, with the tip overlying the right brachiocephalic/SVC junction.      Electronically signed by: Ger Mays MD  Date:    07/15/2018  Time:    20:20             Narrative:    EXAMINATION:  XR CHEST 1 VIEW    CLINICAL HISTORY:  Encounter for adjustment and management of vascular access device    TECHNIQUE:  One view of the chest.    COMPARISON:  07/15/2018 at 5:06 p.m.    FINDINGS:  Cardiac wires overlie the chest.  Interval placement of a right IJ central venous catheter with the distal tip overlying the right brachiocephalic/SVC junction.  Cardiac silhouette is not enlarged.  Mild bibasilar subsegmental atelectasis, left side greater than right.  Suspect trace left pleural fluid.  Stable micronodular pattern throughout the lung fields and biapical emphysema.  No pneumothorax.                               X-Ray Chest AP Portable (Final result)  Result time 07/15/18 17:12:40    Final result by Urabno Rain MD (07/15/18 17:12:40)                 Impression:      1. Grossly stable multifocal pulmonary parenchymal nodular foci, no large focal consolidation.      Electronically signed by: Urbano Rain MD  Date:    07/15/2018  Time:    17:12             Narrative:    EXAMINATION:  XR CHEST AP PORTABLE    CLINICAL HISTORY:  Sepsis;    TECHNIQUE:  Single frontal view of the chest was performed.    COMPARISON:  11/30/2017    FINDINGS:  The cardiomediastinal silhouette is not enlarged, similar to the previous exam noting possible calcification at the aortic arch..  There is no pleural effusion.  The trachea is midline.  The lungs are symmetrically expanded bilaterally with bilateral reticulonodular density, and scattered miliary type density, unchanged.  There is a prominent focus of calcification projected over the left upper lung zone, stable..   No large focal consolidation seen.  There is no pneumothorax.  The osseous structures are remarkable for degenerative change..                                   Assessment:     Annette Esparza is a 48 y.o. female with:  Patient Active Problem List    Diagnosis Date Noted    Hyponatremia 07/15/2018    Oral thrush 11/30/2017    Centrilobular emphysema 12/12/2016    Mild single current episode of major depressive disorder 12/12/2016    Hyperbilirubinemia 06/13/2016    Anxiety 06/02/2016    Cigarette nicotine dependence without complication 06/02/2016    Vitamin D deficiency 10/22/2015    Folate deficiency 10/22/2015    Iron deficiency anemia due to chronic blood loss 10/22/2015    Portal hypertensive gastropathy 10/22/2015    Alcoholic cirrhosis of liver with ascites 10/22/2015    Subclinical hypothyroidism 10/22/2015    Gallstones 10/06/2015    Alcohol dependence in remission         Plan:     Acute encephalopathy  -likely mutlifactorial with hepatic encephalopathy and hyponatremia and possible infection contributing to currrent state  -will correct electrolyte derangements (below)  -will provide lactulose for 2-3 BM goal  -concern for SBP given apparent worsening ascites  treat empirically     Hypovolemic hyponatremia  -Na 123 on admit  baseline 135  -will provide NS at 50cc/hr  monitor sodium with q4 renal function studies to make sure correction does not occur too quickly    Hyokalemia, hypomagnesmia, hypophosphatemia  -replete    Normocytic anemia  -Hgb 9.5  -obtain iron studies    UTI  -UA with nitrites, WBC, bacteria  -empirically covering with ceftriaxone  -urine cultures obtained    Alcoholic hepatitis  -PT/INR 17.3/1.6  Tbili 13.7  -Discriminate function > 32  if workup negative for SBP or infection will provide steroids  -shifting dullness on PE  will obtain abd US  consult IR for para to r/o SBP if ascites present  -empirically covering SBP with ceftriaxone    VitD  deficiency  -will replete      Code Status:   Diet: low sodium with fluid restriction  DVT PPX: heparin  Dispo: pending resolution of electrolyte disturbance and PT/OT evaluation  Est Length of Stay: 2-3 days      Naseem Willett MD  [facility] Internal [facility]  [facility] Internal Medicine Service Team B    [facility] Medicine Hospitalist Pager numbers:   [facility] Hospitalist Medicine Team A (Diana/Patria): [phone]  [facility] Hospitalist Medicine Team B (Dennis/Sami):  [phone]

## 2018-07-16 NOTE — CONSULTS
Inpatient Radiology Pre-procedure Note    History of Present Illness:  Annette Esparza is a 48 y.o. female who presents for paracentesis.  Admission H&P reviewed.  Past Medical History:   Diagnosis Date    Acute hypoxemic respiratory failure 2016    ANDREW (acute kidney injury) 2016    Alcohol dependence in remission     Alcoholic cirrhosis of liver with ascites 10/22/2015    Anxiety     Ascites due to alcoholic cirrhosis 2016    Centrilobular emphysema 2016    Cigarette nicotine dependence without complication 2016    Folate deficiency 10/22/2015    Gallstones     Hepatorenal syndrome 2016    Hyperbilirubinemia 2016    Hyponatremia 2016    Iron deficiency anemia due to chronic blood loss 10/22/2015    Portal hypertensive gastropathy 10/22/2015    Subclinical hypothyroidism 10/22/2015     Past Surgical History:   Procedure Laterality Date    ADENOIDECTOMY      APPENDECTOMY       SECTION      COLONOSCOPY      HYSTERECTOMY      25 yrs old    LIVER BIOPSY      OOPHORECTOMY Left     25 yrs old    OOPHORECTOMY Right     29 yrs old    TONSILLECTOMY      TUBAL LIGATION      UPPER GASTROINTESTINAL ENDOSCOPY         Review of Systems:   As documented in primary team H&P    Home Meds:   Prior to Admission medications    Medication Sig Start Date End Date Taking? Authorizing Provider   furosemide (LASIX) 40 MG tablet TAKE 1 TABLET (40 MG TOTAL) BY MOUTH ONCE DAILY. 3/13/18  Yes Endy Pfeiffer MD   HYDROcodone-acetaminophen (NORCO)  mg per tablet Take 1 tablet by mouth every 8 (eight) hours as needed for Pain. 18  Yes Endy Pfeiffer MD   midodrine (PROAMATINE) 10 MG tablet Take 1 tablet (10 mg total) by mouth 3 (three) times daily with meals. 17 Yes Nicolas Shelton MD   multivitamin (THERAGRAN) tablet Take 1 tablet by mouth once daily.   Yes Historical Provider, MD     Scheduled Meds:    cefTRIAXone (ROCEPHIN) IVPB   2 g Intravenous Q24H    ergocalciferol  50,000 Units Oral Q7 Days    heparin (porcine)  5,000 Units Subcutaneous Q8H    lactulose  20 g Oral BID    midodrine  10 mg Oral BID WM    potassium phosphate IVPB  15 mmol Intravenous Once     Continuous Infusions:    sodium chloride 0.9% 60 mL/hr at 07/16/18 0653    norepinephrine bitartrate-D5W       PRN Meds:traMADol  Anticoagulants/Antiplatelets: no anticoagulation    Allergies:   Review of patient's allergies indicates:   Allergen Reactions    Morphine Nausea And Vomiting     Sedation Hx: have not been any systemic reactions    Labs:    Recent Labs  Lab 07/16/18  1424   INR 1.6*       Recent Labs  Lab 07/16/18  0557   WBC 21.42*   HGB 7.6*   HCT 23.1*   MCV 93   *      Recent Labs  Lab 07/16/18  0926 07/16/18  1313   * 138*   * 126*   K 3.0* 3.1*   CL 80* 82*   CO2 37* 37*   BUN 8 7   CREATININE 0.6 0.6   CALCIUM 6.7* 7.0*   MG 2.3 2.2   ALT 16  --    AST 36  --    ALBUMIN 1.9*  1.9* 2.0*   BILITOT 12.0*  --    BILIDIR 8.6*  --          Vitals:  Temp: 97.7 °F (36.5 °C) (07/16/18 1103)  Pulse: 81 (07/16/18 1545)  Resp: (!) 24 (07/16/18 1545)  BP: (!) 91/56 (07/16/18 1545)  SpO2: (!) 92 % (07/16/18 1545)     Physical Exam:      General: no acute distress  Mental Status: alert and oriented to person, place and time  HEENT: normocephalic, atraumatic  Chest: unlabored breathing  Heart: regular heart rate  Abdomen:  Mildly distended  Extremity: moves all extremities    Plan: Paracentesis. Informed consent obtained  Sedation Plan: Local    Regino Washington MD  Department of Radiology  Pager: [phone]

## 2018-07-16 NOTE — ANESTHESIA PROCEDURE NOTES
Central Line    Diagnosis: dehydration  Patient location during procedure: ED  Procedure start time: 7/15/2018 6:00 PM  Timeout: 7/15/2018 6:00 PM  Procedure end time: 7/15/2018 6:10 PM  Staffing  Anesthesiologist: TAMI MAHER  Performed: anesthesiologist   Anesthesiologist was present at the time of the procedure.  Preanesthetic Checklist  Completed: patient identified, site marked, pre-op evaluation, timeout performed, IV checked, risks and benefits discussed, monitors and equipment checked and anesthesia consent given  Indication  Indication: vascular access, hemodynamic monitoring, med administration     Anesthesia   local infiltration  Local Infiltration: lidocaine 1% without epinephrine    Central Line  Skin Prep: skin prepped with ChloraPrep, skin prep agent completely dried prior to procedure  maximum sterile barriers used during central venous catheter insertion  hand hygiene performed prior to central venous catheter insertion  Location: right internal jugular,   Catheter type: triple lumen  Catheter Size: 7 Fr  Inserted Catheter Length: 11 cm  Ultrasound: vascular probe with ultrasound  Vessel Caliber: small, patent  Vascular Doppler:  not done, compressibility poor  Needle advanced into vessel with real time Ultrasound guidance.  Guidewire confirmed in vessel.  Sterile sheath used.  Image recorded and saved.  Manometry: none  Insertion Attempts: 1   Securement:chlorhexidine patch, line sutured, sterile dressing applied and blood return through all ports     Post-Procedure  X-Ray: no pneumothorax on x-ray, placement verified by x-ray and successful placement  Adverse Events:none

## 2018-07-17 PROBLEM — D64.9 NORMOCYTIC ANEMIA: Status: ACTIVE | Noted: 2018-07-17

## 2018-07-17 PROBLEM — E83.39 HYPOPHOSPHATEMIA: Status: ACTIVE | Noted: 2018-07-17

## 2018-07-17 LAB
ABO + RH BLD: NORMAL
ALBUMIN SERPL BCP-MCNC: 1.6 G/DL
ALBUMIN SERPL BCP-MCNC: 1.7 G/DL
ALBUMIN SERPL BCP-MCNC: 1.7 G/DL
ALBUMIN SERPL BCP-MCNC: 1.8 G/DL
ALBUMIN SERPL BCP-MCNC: 1.8 G/DL
ALP SERPL-CCNC: 310 U/L
ALT SERPL W/O P-5'-P-CCNC: 16 U/L
ANION GAP SERPL CALC-SCNC: 10 MMOL/L
ANION GAP SERPL CALC-SCNC: 12 MMOL/L
ANION GAP SERPL CALC-SCNC: 8 MMOL/L
ANION GAP SERPL CALC-SCNC: 8 MMOL/L
APTT BLDCRRT: 39 SEC
AST SERPL-CCNC: 28 U/L
BACTERIA UR CULT: NORMAL
BASOPHILS # BLD AUTO: 0.01 K/UL
BASOPHILS NFR BLD: 0.1 %
BILIRUB DIRECT SERPL-MCNC: 6.8 MG/DL
BILIRUB SERPL-MCNC: 9.4 MG/DL
BLD GP AB SCN CELLS X3 SERPL QL: NORMAL
BUN SERPL-MCNC: 4 MG/DL
BUN SERPL-MCNC: 5 MG/DL
CALCIUM SERPL-MCNC: 7 MG/DL
CALCIUM SERPL-MCNC: 7.2 MG/DL
CALCIUM SERPL-MCNC: 7.5 MG/DL
CALCIUM SERPL-MCNC: 7.5 MG/DL
CHLORIDE SERPL-SCNC: 85 MMOL/L
CHLORIDE SERPL-SCNC: 87 MMOL/L
CO2 SERPL-SCNC: 27 MMOL/L
CO2 SERPL-SCNC: 27 MMOL/L
CO2 SERPL-SCNC: 30 MMOL/L
CO2 SERPL-SCNC: 31 MMOL/L
CREAT SERPL-MCNC: 0.6 MG/DL
CREAT SERPL-MCNC: 0.6 MG/DL
CREAT SERPL-MCNC: 0.7 MG/DL
CREAT SERPL-MCNC: 0.7 MG/DL
DIFFERENTIAL METHOD: ABNORMAL
EOSINOPHIL # BLD AUTO: 0.1 K/UL
EOSINOPHIL NFR BLD: 0.4 %
ERYTHROCYTE [DISTWIDTH] IN BLOOD BY AUTOMATED COUNT: 25.2 %
EST. GFR  (AFRICAN AMERICAN): >60 ML/MIN/1.73 M^2
EST. GFR  (NON AFRICAN AMERICAN): >60 ML/MIN/1.73 M^2
GLUCOSE SERPL-MCNC: 106 MG/DL
GLUCOSE SERPL-MCNC: 111 MG/DL
GLUCOSE SERPL-MCNC: 133 MG/DL
GLUCOSE SERPL-MCNC: 97 MG/DL
HCT VFR BLD AUTO: 21 %
HGB BLD-MCNC: 6.9 G/DL
INR PPP: 1.5
LYMPHOCYTES # BLD AUTO: 0.8 K/UL
LYMPHOCYTES NFR BLD: 7.1 %
MAGNESIUM SERPL-MCNC: 1.8 MG/DL
MAGNESIUM SERPL-MCNC: 1.9 MG/DL
MCH RBC QN AUTO: 31.2 PG
MCHC RBC AUTO-ENTMCNC: 32.9 G/DL
MCV RBC AUTO: 95 FL
MONOCYTES # BLD AUTO: 1.3 K/UL
MONOCYTES NFR BLD: 11 %
NEUTROPHILS # BLD AUTO: 9.4 K/UL
NEUTROPHILS NFR BLD: 80.8 %
PHOSPHATE SERPL-MCNC: 1.2 MG/DL
PHOSPHATE SERPL-MCNC: 1.3 MG/DL
PHOSPHATE SERPL-MCNC: 1.6 MG/DL
PHOSPHATE SERPL-MCNC: 1.8 MG/DL
PLATELET # BLD AUTO: 107 K/UL
PMV BLD AUTO: 9.3 FL
POTASSIUM SERPL-SCNC: 2.8 MMOL/L
POTASSIUM SERPL-SCNC: 3 MMOL/L
POTASSIUM SERPL-SCNC: 3 MMOL/L
POTASSIUM SERPL-SCNC: 3.6 MMOL/L
PROT SERPL-MCNC: 5.2 G/DL
PROTHROMBIN TIME: 15.4 SEC
RBC # BLD AUTO: 2.21 M/UL
SODIUM SERPL-SCNC: 124 MMOL/L
SODIUM SERPL-SCNC: 124 MMOL/L
SODIUM SERPL-SCNC: 125 MMOL/L
SODIUM SERPL-SCNC: 126 MMOL/L
WBC # BLD AUTO: 11.66 K/UL

## 2018-07-17 PROCEDURE — 97535 SELF CARE MNGMENT TRAINING: CPT

## 2018-07-17 PROCEDURE — 94761 N-INVAS EAR/PLS OXIMETRY MLT: CPT

## 2018-07-17 PROCEDURE — 36430 TRANSFUSION BLD/BLD COMPNT: CPT

## 2018-07-17 PROCEDURE — 83735 ASSAY OF MAGNESIUM: CPT | Mod: 91

## 2018-07-17 PROCEDURE — 80069 RENAL FUNCTION PANEL: CPT

## 2018-07-17 PROCEDURE — 84155 ASSAY OF PROTEIN SERUM: CPT

## 2018-07-17 PROCEDURE — 25000003 PHARM REV CODE 250: Performed by: INTERNAL MEDICINE

## 2018-07-17 PROCEDURE — 63600175 PHARM REV CODE 636 W HCPCS: Performed by: INTERNAL MEDICINE

## 2018-07-17 PROCEDURE — 36415 COLL VENOUS BLD VENIPUNCTURE: CPT

## 2018-07-17 PROCEDURE — 85025 COMPLETE CBC W/AUTO DIFF WBC: CPT

## 2018-07-17 PROCEDURE — 87040 BLOOD CULTURE FOR BACTERIA: CPT

## 2018-07-17 PROCEDURE — P9021 RED BLOOD CELLS UNIT: HCPCS

## 2018-07-17 PROCEDURE — 97110 THERAPEUTIC EXERCISES: CPT

## 2018-07-17 PROCEDURE — 82247 BILIRUBIN TOTAL: CPT

## 2018-07-17 PROCEDURE — 97116 GAIT TRAINING THERAPY: CPT

## 2018-07-17 PROCEDURE — 86850 RBC ANTIBODY SCREEN: CPT

## 2018-07-17 PROCEDURE — 85730 THROMBOPLASTIN TIME PARTIAL: CPT

## 2018-07-17 PROCEDURE — 21400001 HC TELEMETRY ROOM

## 2018-07-17 PROCEDURE — 86920 COMPATIBILITY TEST SPIN: CPT

## 2018-07-17 PROCEDURE — 85610 PROTHROMBIN TIME: CPT

## 2018-07-17 RX ORDER — SODIUM,POTASSIUM PHOSPHATES 280-250MG
1 POWDER IN PACKET (EA) ORAL
Status: DISCONTINUED | OUTPATIENT
Start: 2018-07-17 | End: 2018-07-17

## 2018-07-17 RX ORDER — FERROUS SULFATE 325(65) MG
325 TABLET, DELAYED RELEASE (ENTERIC COATED) ORAL 2 TIMES DAILY
Status: DISCONTINUED | OUTPATIENT
Start: 2018-07-17 | End: 2018-07-19 | Stop reason: HOSPADM

## 2018-07-17 RX ORDER — THIAMINE HCL 100 MG
100 TABLET ORAL DAILY
Status: DISCONTINUED | OUTPATIENT
Start: 2018-07-17 | End: 2018-07-19 | Stop reason: HOSPADM

## 2018-07-17 RX ORDER — POTASSIUM CHLORIDE 14.9 MG/ML
20 INJECTION INTRAVENOUS ONCE
Status: COMPLETED | OUTPATIENT
Start: 2018-07-17 | End: 2018-07-17

## 2018-07-17 RX ORDER — FOLIC ACID 1 MG/1
1 TABLET ORAL DAILY
Status: DISCONTINUED | OUTPATIENT
Start: 2018-07-17 | End: 2018-07-19 | Stop reason: HOSPADM

## 2018-07-17 RX ORDER — MAGNESIUM SULFATE HEPTAHYDRATE 40 MG/ML
2 INJECTION, SOLUTION INTRAVENOUS ONCE
Status: DISCONTINUED | OUTPATIENT
Start: 2018-07-18 | End: 2018-07-17

## 2018-07-17 RX ORDER — LANOLIN ALCOHOL/MO/W.PET/CERES
1000 CREAM (GRAM) TOPICAL DAILY
Status: DISCONTINUED | OUTPATIENT
Start: 2018-07-17 | End: 2018-07-19 | Stop reason: HOSPADM

## 2018-07-17 RX ORDER — SODIUM,POTASSIUM PHOSPHATES 280-250MG
1 POWDER IN PACKET (EA) ORAL
Status: DISCONTINUED | OUTPATIENT
Start: 2018-07-17 | End: 2018-07-18

## 2018-07-17 RX ORDER — HYDROCODONE BITARTRATE AND ACETAMINOPHEN 500; 5 MG/1; MG/1
TABLET ORAL
Status: DISCONTINUED | OUTPATIENT
Start: 2018-07-17 | End: 2018-07-19 | Stop reason: HOSPADM

## 2018-07-17 RX ORDER — MAGNESIUM SULFATE 1 G/100ML
1 INJECTION INTRAVENOUS ONCE
Status: DISCONTINUED | OUTPATIENT
Start: 2018-07-18 | End: 2018-07-17

## 2018-07-17 RX ADMIN — CYANOCOBALAMIN TAB 1000 MCG 1000 MCG: 1000 TAB at 02:07

## 2018-07-17 RX ADMIN — LACTULOSE 20 G: 20 SOLUTION ORAL at 08:07

## 2018-07-17 RX ADMIN — TRAMADOL HYDROCHLORIDE 50 MG: 50 TABLET, COATED ORAL at 10:07

## 2018-07-17 RX ADMIN — POTASSIUM & SODIUM PHOSPHATES POWDER PACK 280-160-250 MG 1 PACKET: 280-160-250 PACK at 08:07

## 2018-07-17 RX ADMIN — Medication 100 MG: at 02:07

## 2018-07-17 RX ADMIN — THERA TABS 1 TABLET: TAB at 02:07

## 2018-07-17 RX ADMIN — MIDODRINE HYDROCHLORIDE 10 MG: 5 TABLET ORAL at 04:07

## 2018-07-17 RX ADMIN — HEPARIN SODIUM 5000 UNITS: 5000 INJECTION, SOLUTION INTRAVENOUS; SUBCUTANEOUS at 10:07

## 2018-07-17 RX ADMIN — POTASSIUM CHLORIDE 20 MEQ: 200 INJECTION, SOLUTION INTRAVENOUS at 11:07

## 2018-07-17 RX ADMIN — TRAMADOL HYDROCHLORIDE 50 MG: 50 TABLET, COATED ORAL at 04:07

## 2018-07-17 RX ADMIN — SODIUM CHLORIDE: 0.9 INJECTION, SOLUTION INTRAVENOUS at 06:07

## 2018-07-17 RX ADMIN — POTASSIUM & SODIUM PHOSPHATES POWDER PACK 280-160-250 MG 1 PACKET: 280-160-250 PACK at 04:07

## 2018-07-17 RX ADMIN — SODIUM CHLORIDE: 0.9 INJECTION, SOLUTION INTRAVENOUS at 08:07

## 2018-07-17 RX ADMIN — POTASSIUM CHLORIDE 20 MEQ: 14.9 INJECTION, SOLUTION INTRAVENOUS at 08:07

## 2018-07-17 RX ADMIN — HEPARIN SODIUM 5000 UNITS: 5000 INJECTION, SOLUTION INTRAVENOUS; SUBCUTANEOUS at 02:07

## 2018-07-17 RX ADMIN — CEFTRIAXONE SODIUM 2 G: 2 INJECTION, POWDER, FOR SOLUTION INTRAMUSCULAR; INTRAVENOUS at 08:07

## 2018-07-17 RX ADMIN — TRAMADOL HYDROCHLORIDE 50 MG: 50 TABLET, COATED ORAL at 11:07

## 2018-07-17 RX ADMIN — FOLIC ACID 1 MG: 1 TABLET ORAL at 02:07

## 2018-07-17 RX ADMIN — FERROUS SULFATE TAB EC 325 MG (65 MG FE EQUIVALENT) 325 MG: 325 (65 FE) TABLET DELAYED RESPONSE at 08:07

## 2018-07-17 RX ADMIN — HEPARIN SODIUM 5000 UNITS: 5000 INJECTION, SOLUTION INTRAVENOUS; SUBCUTANEOUS at 06:07

## 2018-07-17 RX ADMIN — MIDODRINE HYDROCHLORIDE 10 MG: 5 TABLET ORAL at 08:07

## 2018-07-17 NOTE — PLAN OF CARE
Problem: Physical Therapy Goal  Goal: Physical Therapy Goal  Goals to be met by: 2018     Patient will increase functional independence with mobility by performin. Supine to sit with Modified [place]  2. Sit to supine with Modified [place]  3. Sit to stand transfer with Stand-by Assistance  4. Gait  x 150 feet with Stand-by Assistance with or without AD   5. Ascend/descend 15 stair with bilateral Handrails Stand-by Assistance   6. Lower extremity exercise program x10 reps with independence     Outcome: Ongoing (interventions implemented as appropriate)  Patient with improved mobility; amb 130ft and 100ft with CGA/Araceli with unsteadiness, mild SOB, fatigue, increased dizziness; initial sitting BP 94/63 HR 90 O2 sats 93%, post session /57 HR 84 O2 sats 92%; amb with 3L O2 since with activity earlier this AM, O2 sats dropped to the 80's with minimal exertion; probable home with post acute therapy- HH vs OP PT; will address if there are any equip needs.

## 2018-07-17 NOTE — PLAN OF CARE
Problem: Patient Care Overview  Goal: Plan of Care Review  Outcome: Ongoing (interventions implemented as appropriate)  Pt is in bed resting with no S/S of pain or distress. Went over plan of care with pt., verbalized understanding. Pt is NSR on telemetry, with HR in the 70s. She went for an US of abdomen. Pt is on fall precautions. She has NS infusing at 70 ml/hr. Pt is stable and will continue to monitor. Will give report to oncoming nurse.

## 2018-07-17 NOTE — PHYSICIAN QUERY
PT Name: Annette Esparza  MR #: [id number]    Physician Query Form -Systemic Infectious Process Clarification     CDS Cornelia Braun RN, BSN        Contact Information:  [phone]    [email or web address]         This form is a permanent document in the medical record.     Query Date: July 17, 2018     By submitting this query, we are merely seeking further clarification of documentation. Please utilize your independent clinical judgment when addressing the question(s) below.    The Medical record contains the following:     Indicators   Supporting Clinical Findings   Location in Medical Record   X   HR RR BP Temp HR: 77 - 99  RR:  18 - 22  BP: (87 - 93) / (43 - 56)  Temp:  97.7 - 99.5  Flow sheets    X   Lactic Acid             Procalcitonin Lactic Acid:  1.5 --> 0.7   Procal:  0.62 Labs 7/15    X   WBC                Bands                     CRP WBC:  19.43 --> 21.42 --> 11.66 Labs: 7/15 --> 7/17   X   Culture(s) Blood culture:  ESCHERICHIA COLI    Urine culture:  PRESUMPTIVE E COLI   >100,000 cfu/ml  Micro 7/15    X   AMS, Confusion, LOC, etc. Confusion and hallucinations x 2 weeks H&P    Organ Dysfunction / Failure     X   Bacteremia or Sepsis / Septic Differential Diagnosis: sepsis    gram negative bacteremia which may be related to urosepsis with positive urine culture ER MD note     7/17 GI PN filed 2:37pm    Known or Suspected Source of Infection documented      (Failed) Outpatient Treatment     X   Medication (ROCEPHIN) 2 g   norepinephrine bitartrate-D5W 16 mg/250 mL (64 mcg/mL) infusion        MAR    Treatment     X   Other Acute encephalopathy  -likely mutlifactorial with hepatic encephalopathy and hyponatremia and possible infection contributing to currrent state H&P     Provider, please specify diagnosis or diagnoses associated with above clinical findings.    [  ] Sepsis  Please specify organism: ______________________________________    [  ] Severe Sepsis with Acute Organ  Dysfunction/Failure (please specify         organ dysfunction/failure): ___________________    [  ] Sepsis with Septic Shock    [  ] Sepsis ruled out     [ x ] Other Infectious Disease (please specify): Ecoli bacteremia 2/2 UTI    [  ] Other: __________________________________    [  ] Clinically Undetermined    Please document in your progress notes daily for the duration of treatment until resolved and include in your discharge summary.

## 2018-07-17 NOTE — MEDICAL/APP STUDENT
"[facility] Medicine Progress Note    Primary Team: [facility] [place] Hospitalist Team B  Attending Physician: Eros Gallardo MD  Resident: Antonio Baker DO  Intern: Jose Manuel Bullard MD    Subjective:      Annette Esparza is a 49 yo female that presented to the ED with generalized weakness, confusion, and hallucinations for 2 weeks    Isaias is feeling better today. She denies confusion or hallucinations. She has some shortness of breath randomly since she had these symptoms. She denies fever, cough, vomiting, diarrhea, dysuria. She says her back has been hurting that she has attributed to the hospital beds.     Objective:     Last 24 Hour Vital Signs:  BP  Min: 76/50  Max: 127/77  Temp  Av.8 °F (36.6 °C)  Min: 97.7 °F (36.5 °C)  Max: 98 °F (36.7 °C)  Pulse  Av.9  Min: 72  Max: 97  Resp  Av.6  Min: 16  Max: 45  SpO2  Av.8 %  Min: 84 %  Max: 100 %  Height  Av' 1" (154.9 cm)  Min: 5' 1" (154.9 cm)  Max: 5' 1" (154.9 cm)  Weight  Av.7 kg (122 lb 12.7 oz)  Min: 53.5 kg (117 lb 15.1 oz)  Max: 57.9 kg (127 lb 10.3 oz)  I/O last 3 completed shifts:  In: 5720.8 [P.O.:400; I.V.:2223.8; IV Piggyback:3097]  Out: 2435 [Urine:910; Other:1525]    Physical Examination:  General appearance: alert, appears stated age and cooperative  Head: Normocephalic, without obvious abnormality, atraumatic  Heart: regular rate and rhythm, S1, S2 normal, no murmur, click, rub or gallop  Abdomen: abdomninal distention, no tenderness to palpation  Extremities: no tenderness on palpation    Neurological: A&O x3     Laboratory:  Laboratory Data Reviewed: yes  Pertinent Findings:  Na 124  K 3  Cl 87  HCO3 27  Ca (corrected) 9  Mg 1.9  Phosphate 1.3    Microbiology Data Reviewed: yes  Pertinent Findings:  UA presumptive for E coli    Other Results:  EKG (my interpretation): .    Radiology Data Reviewed: yes  Pertinent Findings:  Large amount of ascitic fluid in abdomen    Current Medications:     Infusions:   sodium chloride 0.9% " 70 mL/hr at 07/17/18 0615    norepinephrine bitartrate-D5W Stopped (07/16/18 1630)        Scheduled:   cefTRIAXone (ROCEPHIN) IVPB  2 g Intravenous Q24H    ergocalciferol  50,000 Units Oral Q7 Days    heparin (porcine)  5,000 Units Subcutaneous Q8H    lactulose  20 g Oral BID    midodrine  10 mg Oral BID WM    potassium chloride in water  20 mEq Intravenous Once    potassium, sodium phosphates  1 packet Oral QID (WM & HS)        PRN:  traMADol    Antibiotics and Day Number of Therapy:  Ceftriaxone, Day 2    Lines and Day Number of Therapy:  Central line right internal jugular placed 7/15  PIV left hand placed 7/15    Assessment:     Annette Esparza is a 48 y.o.female with  Patient Active Problem List    Diagnosis Date Noted    Hyponatremia 07/15/2018    Oral thrush 11/30/2017    Centrilobular emphysema 12/12/2016    Mild single current episode of major depressive disorder 12/12/2016    Hyperbilirubinemia 06/13/2016    Anxiety 06/02/2016    Cigarette nicotine dependence without complication 06/02/2016    Vitamin D deficiency 10/22/2015    Folate deficiency 10/22/2015    Iron deficiency anemia due to chronic blood loss 10/22/2015    Portal hypertensive gastropathy 10/22/2015    Alcoholic cirrhosis of liver with ascites 10/22/2015    Subclinical hypothyroidism 10/22/2015    Gallstones 10/06/2015    Alcohol dependence in remission         Plan:     Hyponatremia:  -Patient presented to ED with a Na of 123. Current Na is 124  -She had confusion, weakness, and hallucinations  -Pt has a history of alcoholic cirrhosis  -Her has abdominal distended and firm  -US shows ascitic fluid in abdomen  -Given ns at 70mL/hr and sodium phosphates (280-160-250mg 1 packet)    Normocytic Anemia:  H/H = 9.5/28.7 on admit. H/H = 6.9/21.0 on 7/17  Iron = 63, TIBC = 207, transferrin = 140    Alcoholic Cirrhosis:  -Patient has history of alcoholic cirrhosis (Dx 2014 via liver biopsy)  -PT=17.3, INR=1.6, total  bilirubin=13.7 on admit  -PT=15.4, INR=1.5, PTT=39, total bilirubin=9.4 on 7/17  -urine bilirubin=3+  -continues lactulose for increased ammonia  -GI consulted      Hypokalemia, hypophosphatemia, hypomagnesia:  -K<2, PO4=1.7, Mg=1.3 on admit  -K=3, PO4=1.3, Mg=1.9 on 7/17    UTI:  -UA growing presumptive E. Coli  -Pt on ceftriaxone, Day 2 of medication          Perry Sorenson L3  [facility] Internal Medicine    [facility] Medicine Hospitalist Pager numbers:   U Hospitalist Medicine Team A (Diana/Patria): [phone]  [facility] Hospitalist Medicine Team B (Dennis/Sami):  [phone]

## 2018-07-17 NOTE — PROGRESS NOTES
"[facility] Medicine Resident HO-III Progress Note    Subjective:      Pt feeling better this AM overall; less confused. However, experiencing abd pain and tightness. Denies n/v. Endorses subjective fever. Denies cp and sob.     Objective:   Last 24 Hour Vital Signs:  BP  Min: 76/50  Max: 127/77  Temp  Av.8 °F (36.6 °C)  Min: 97.7 °F (36.5 °C)  Max: 98 °F (36.7 °C)  Pulse  Av.8  Min: 72  Max: 97  Resp  Av.5  Min: 16  Max: 45  SpO2  Av.7 %  Min: 84 %  Max: 100 %  Height  Av' 1" (154.9 cm)  Min: 5' 1" (154.9 cm)  Max: 5' 1" (154.9 cm)  Weight  Av.7 kg (122 lb 12.7 oz)  Min: 53.5 kg (117 lb 15.1 oz)  Max: 57.9 kg (127 lb 10.3 oz)  I/O last 3 completed shifts:  In: 5720.8 [P.O.:400; I.V.:2223.8; IV Piggyback:3097]  Out: 2435 [Urine:910; Other:1525]    Physical Examination:  General:          sitting up in bed  nad  Head:               Normocephalic and atraumatic  Eyes:               PERRL; EOMi with icteric sclera and clear conjunctivae  Mouth:             Oropharynx clear and without exudate  Cardio:             Normal rate and regular rhythm with normal S1 and S2; no murmurs or rubs  Resp:               CTAB; respirations unlabored; no wheezes, crackles or rhonchi  Abdom:            Soft, diffusely tender to palpation  mildly distended with normoactive bowel sounds  +shifting dullness  Extrem:            Warm and well-perfused with no clubbing, cyanosis or edema  Skin:                No rashes, lesions  +jaundice  Pulses:            2+ and symmetric distally  Neuro:             AAOx3  face symmetric  moves all extremities  Psych:  Pleasant and cooperative    Laboratory:  Laboratory Data Reviewed: yes  Pertinent Findings:    Recent Labs  Lab 07/15/18  1648  18  0557 18  0926  18  2217 18  0228 18  0609   WBC 19.43*  --  21.42*  --   --   --   --  11.66   HGB 9.5*  --  7.6*  --   --   --   --  6.9*   HCT 28.7*  --  23.1*  --   --   --   --  21.0*   *  --  " 134*  --   --   --   --  107*   MCV 93  --  93  --   --   --   --  95   RDW 24.3*  --  24.8*  --   --   --   --  25.2*   *  < > 124* 126*  < > 125* 124* 124*   K <2.0*  < > 2.2* 3.0*  < > 3.0* 3.0* 3.0*   CL <70*  < > 77* 80*  < > 84* 85* 87*   CO2 44*  < > 36* 37*  < > 31* 27 27   BUN 10  < > 8 8  < > 6 5* 5*   CREATININE 0.8  < > 0.6 0.6  < > 0.6 0.7 0.6     < > 139* 169*  < > 123* 133* 106   PROT 6.5  --   --  5.8*  --   --   --  5.2*   ALBUMIN 2.2*  < > 1.9* 1.9*  1.9*  < > 1.7* 1.6* 1.7*  1.7*   BILITOT 13.7*  --   --  12.0*  --   --   --  9.4*   AST 34  --   --  36  --   --   --  28   ALKPHOS 377*  --   --  360*  --   --   --  310*   ALT 20  --   --  16  --   --   --  16   < > = values in this interval not displayed.      Microbiology Data Reviewed: yes  Pertinent Findings:  Microbiology Results (last 7 days)     Procedure Component Value Units Date/Time    Culture, Anaerobic [[id number]] Collected:  07/16/18 1538    Order Status:  Completed Specimen:  Body Fluid from Ascites Updated:  07/17/18 0706     Anaerobic Culture Culture in progress    Blood culture x two cultures. Draw prior to antibiotics. [[id number]] Collected:  07/15/18 1619    Order Status:  Completed Specimen:  Blood from Peripheral, Hand, Right Updated:  07/16/18 2312     Blood Culture, Routine No Growth to date     Blood Culture, Routine No Growth to date    Narrative:       Aerobic and anaerobic    Gram stain [[id number]] Collected:  07/16/18 1538    Order Status:  Completed Specimen:  Body Fluid from Ascites Updated:  07/16/18 2151     Gram Stain Result Rare WBC's      No organisms seen    Urine culture - High Risk [[id number]] Collected:  07/15/18 1814    Order Status:  Completed Specimen:  Urine from Urine, Catheterized Updated:  07/16/18 2048     Urine Culture, Routine --     PRESUMPTIVE E COLI  >100,000 cfu/ml  Identification and susceptibility pending      Fungus culture [[id number]] Collected:  07/16/18 1538    Order Status:   Sent Specimen:  Body Fluid from Ascites Updated:  07/16/18 1851    Aerobic culture [[id number]] Collected:  07/16/18 1538    Order Status:  Sent Specimen:  Body Fluid from Ascites Updated:  07/16/18 1851    Blood culture x two cultures. Draw prior to antibiotics. [[id number]] Collected:  07/15/18 1635    Order Status:  Completed Specimen:  Blood from Peripheral, Hand, Left Updated:  07/16/18 1112     Blood Culture, Routine Gram stain daron bottle: Gram negative rods      Blood Culture, Routine Results called to and read back by:Corinna Fischer RN 07/16/2018  11:10    Narrative:       Aerobic and anaerobic          Other Results:  EKG (my interpretation): old reviewed, no new    Radiology Data Reviewed: yes  Pertinent Findings:      Current Medications:     Infusions:   sodium chloride 0.9% 70 mL/hr at 07/17/18 0615    norepinephrine bitartrate-D5W Stopped (07/16/18 1630)        Scheduled:   cefTRIAXone (ROCEPHIN) IVPB  2 g Intravenous Q24H    ergocalciferol  50,000 Units Oral Q7 Days    heparin (porcine)  5,000 Units Subcutaneous Q8H    lactulose  20 g Oral BID    midodrine  10 mg Oral BID WM    potassium, sodium phosphates  1 packet Oral QID (WM & HS)        PRN:      Antibiotics and Day Number of Therapy:  ceftriaxone    Lines and Day Number of Therapy:  RIJ central line  [place]    Assessment:     Annette Esparza is a 48 y.o.female with  Patient Active Problem List    Diagnosis Date Noted    Hyponatremia 07/15/2018    Oral thrush 11/30/2017    Centrilobular emphysema 12/12/2016    Mild single current episode of major depressive disorder 12/12/2016    Hyperbilirubinemia 06/13/2016    Anxiety 06/02/2016    Cigarette nicotine dependence without complication 06/02/2016    Vitamin D deficiency 10/22/2015    Folate deficiency 10/22/2015    Iron deficiency anemia due to chronic blood loss 10/22/2015    Portal hypertensive gastropathy 10/22/2015    Alcoholic cirrhosis of liver with ascites 10/22/2015     Subclinical hypothyroidism 10/22/2015    Gallstones 10/06/2015    Alcohol dependence in remission         Plan:     Hypovolemic hyponatremia  -Na 123 on admit  baseline 135  -cont NS  increased rate from 50cc/hr to 70cc/hr  Na 126 this AM  monitor Na q12hr and adjust accordingly    Alcoholic cirrhosis of liver with ascites  -history of EtOH abuse  quit 2 years prior  -PT/INR 17.3/1.6  Tbili 13.7  -Discriminate function > 32  given infection, will hold on steroids  -shifting dullness on PE   IR performed para to evaluate for SBP  studies negative for SBP  -GI consulted, appreciate recs  -worsening anemia  f/u FOBT studies  may require scope     Hyokalemia, hypomagnesmia, hypophosphatemia  -cont to replete    Hypotension  -placed on norepi gtt in ED  discontinued and resumed home midodrine     Normocytic anemia  -Hgb 9.5 on admit  trending down to 6.9 this AM  -check FOBT  no s/s of active bleeding  possibly dilutional  -past history of JUNE and FA and B12 deficiency  will provide supplementation     Ecoli UTI and bacteremia  -UA with nitrites, WBC, bacteria  UCx with Ecoli   -1/4 bottles BCx with GNR  +procal  -will repeat blood cultures  -afebrile  resolved leukocytosis  -cont ceftriaxone  f/u sensitivities     VitD deficiency  -cont supplementation    Acute encephalopathy, resolved  -likely mutlifactorial with hepatic encephalopathy and hyponatremia and possible infection contributing to currrent state  -cont to correct electrolyte derangements (above)  -cont to provide lactulose for 2-3 BM goal  -cont treating UTI with ceftriaxone        Naseem Willett  [facility] Internal Medicine HO-III  U Hospitalists Service Team B    [facility] Medicine Hospitalist Pager numbers:   [facility] Hospitalist Medicine Team A (Diana/Patria): [phone]  [facility] Hospitalist Medicine Team B (Dennis/Sami):  [phone]

## 2018-07-17 NOTE — NURSING TRANSFER
Nursing Transfer Note      7/17/2018     Transfer To: 460    Transfer via wheelchair    Transfer with cardiac monitoring    Transported by RN and transport    Medicines sent: n/a    Chart send with patient: Yes    Patient reassessed at: 1220    Upon arrival to floor: cardiac monitor applied, patient oriented to room, call bell in reach and bed in lowest position

## 2018-07-17 NOTE — PLAN OF CARE
Problem: Occupational Therapy Goal  Goal: Occupational Therapy Goal  Goals to be met by: 8/15     Patient will increase functional independence with ADLs by performing:    UE Dressing with Stand-by Assistance.  LE Dressing with Minimal Assistance.  Grooming while standing with Stand-by Assistance.  Toileting from bedside commode with Minimal Assistance for hygiene and clothing management.   Toilet transfer to bedside commode with Stand-by Assistance.  Upper extremity exercise program x10 reps per handout, with supervision.     Outcome: Ongoing (interventions implemented as appropriate)  Annette Esparza is a 48 y.o. female with a medical diagnosis of Hyponatremia.  She presents with decreased overall strength, endurance, balance and Logan and safety with ADL's and fx mobility.  Performance deficits affecting function are weakness, impaired endurance, impaired self care skills, impaired functional mobilty, gait instability, impaired balance, decreased lower extremity function, decreased safety awareness, pain, impaired cognition, impaired cardiopulmonary response to activity. Improved tolerance/participation with therapy.  Pt progressing towards goals. Continues with balance issues and SOB with min exertion. Continue OT services to address functional goals, progressing as able.    RYAN Chavez/L

## 2018-07-17 NOTE — PROGRESS NOTES
[facility] Gastroenterology    CC: Ascites     HPI 48 y.o. female with a previous medical history of cirrhosis who was admitted for acute encephalopathy. She reports worsening, generalized abdominal pain for the past 2 weeks with associated abdominal distention. She reports her last bowel movement to be one week ago. She denies any hematemesis, coffee ground emesis, hematochezia, or melena. She denies any fevers but reports chills.      Patient's cirrhosis was diagnosed with liver biopsy in 2014. Her cirrhosis has been attributed to alcohol intake. Her last drink was 2 years ago. She has had multiple hospitalizations for large volume ascites requiring paracentesis. Last EGD in 6/2016 without esophageal varices but she found to have portal hypertensive gastropathy. She has been on midodrine for hypotension.    Interval History    Patient reports some improvement in her abdominal pain. Last bowel movement was this morning. Denies any melena or hematochezia.    Past Medical History:   Diagnosis Date    Acute hypoxemic respiratory failure 12/1/2016    ANDREW (acute kidney injury) 11/28/2016    Alcohol dependence in remission     Alcoholic cirrhosis of liver with ascites 10/22/2015    Anxiety     Ascites due to alcoholic cirrhosis 6/13/2016    Centrilobular emphysema 12/12/2016    Cigarette nicotine dependence without complication 6/2/2016    Folate deficiency 10/22/2015    Gallstones     Hepatorenal syndrome 11/29/2016    Hyperbilirubinemia 6/13/2016    Hyponatremia 11/29/2016    Iron deficiency anemia due to chronic blood loss 10/22/2015    Portal hypertensive gastropathy 10/22/2015    Subclinical hypothyroidism 10/22/2015         Review of Systems  General ROS: negative for chills, fever or weight loss  Cardiovascular ROS: no chest pain or dyspnea on exertion  Gastrointestinal ROS: positive for mild abdominal pain, no change in bowel habits, or black/ bloody stools    Physical Examination  BP (!) 89/58   Pulse  "89   Temp 97.8 °F (36.6 °C) (Oral)   Resp (!) 27   Ht 5' 1" (1.549 m)   Wt 57.9 kg (127 lb 10.3 oz)   LMP  (LMP Unknown)   SpO2 (!) 84%   Breastfeeding? No   BMI 24.12 kg/m²   General appearance: alert, cooperative, no distress  HENT: Normocephalic, atraumatic, neck symmetrical, scleral icterus present   Lungs: clear to auscultation bilaterally, no dullness to percussion bilaterally  Heart: regular rate and rhythm without rub; no displacement of the PMI   Abdomen: distended, generalized tenderness; bowel sounds normoactive  Extremities: extremities symmetric; no clubbing, cyanosis, or edema  Neurologic: Alert and oriented X 3, normal strength    Labs:  H/H 6.9/21  WBC 11  Platelets 107  Sodium 124   T bili 9.4  INR 1.6  Albumin 1.7    BCx gram negative rods  UCx e.coli     Imaging:  Abdominal US: There is large amount of ascitic fluid in the right lower quadrant, the left lower quadrant and moderate-sized ascites in the right upper quadrant in the small amount of ascites in the left upper quadrant.    Assessment:   48 y.o. female with a previous medical history of cirrhosis who presents with worsening ascites and gram negative bacteremia. S/P paracentesis with 1.5L removal. Fluid analysis not suggestive of SBP.     Plan:  -Source of bacteremia possibly urine as urine culture positive for E. Coli. Will investigate for possible biliary source.   -Abdominal ultrasound ordered.   -Continue ceftriaxone for coverage of UTI  -Patient with recent drop in H/H. May be dilutional    -Transfuse for Hb <7    Michelle Jean  PGY-1 Categorical Medicine  Gastroenterology Service           "

## 2018-07-17 NOTE — PT/OT/SLP PROGRESS
Physical Therapy Treatment    Patient Name:  Annette Esparza   MRN:  [id number]    Recommendations:     Discharge Recommendations:   (probable home with post acute therapy-HH vs OP )   Discharge Equipment Recommendations:  (TBD)   Barriers to discharge: Inaccessible home    Assessment:     Annette Esparza is a 48 y.o. female admitted with a medical diagnosis of Hyponatremia.  She presents with the following impairments/functional limitations:  weakness, impaired endurance, gait instability, impaired functional mobilty, impaired self care skills, impaired balance, decreased safety awareness, pain, impaired cognition, impaired cardiopulmonary response to activity Patient with improved mobility; amb 130ft and 100ft with CGA/Araceli with unsteadiness, mild SOB, fatigue, increased dizziness; initial sitting BP 94/63 HR 90 O2 sats 93%, post session /57 HR 84 O2 sats 92%; amb with 3L O2 since with activity earlier this AM, O2 sats dropped to the 80's with minimal exertion; probable home; will address if there are any equip needs..    Rehab Prognosis:  good; patient would benefit from acute skilled PT services to address these deficits and reach maximum level of function.      Recent Surgery: * No surgery found *      Plan:     During this hospitalization, patient to be seen 6 x/week to address the above listed problems via gait training, therapeutic activities, therapeutic exercises, neuromuscular re-education  · Plan of Care Expires:  08/16/18   Plan of Care Reviewed with: patient    Subjective     Communicated with nurse prior to session.  Patient found up in chair upon PT entry to room, agreeable to treatment.      Patient comments/goals: Patient feeling better today  Pain/Comfort:  Pain Rating 1: 8/10  Location - Side 1: Bilateral  Location - Orientation 1: lower  Location 1: back  Pain Addressed 1: Reposition, Distraction, Pre-medicate for activity  Pain Rating Post-Intervention 1: 8/10    Patients  cultural, spiritual, Hindu conflicts given the current situation: no    Objective:     Patient found with: oxygen, central line (ICU monitoring)     General Precautions: Standard, fall, respiratory   Orthopedic Precautions:N/A   Braces: N/A     Functional Mobility:  · Bed Mobility:  N/a-pt sitting up in chair  · Transfers:     · Sit to Stand:  contact guard assistance with no AD  · Gait: Pt amb 130ft and 100ft with CGA and occasional Araceli for unsteadiness; seated rest between amb trials 2/2 c/o dizziness and encouraged pursed lip breathing for mild SOB; initially, pt begins with wide SHANAE and foot flat, short step length and improves as she amb; however, decreased fluidity of movement throughout  · Balance: sit~normal; static stand~fair; dynamic stand/amb~fair to fair-      AM-PAC 6 CLICK MOBILITY  Turning over in bed (including adjusting bedclothes, sheets and blankets)?: 3  Sitting down on and standing up from a chair with arms (e.g., wheelchair, bedside commode, etc.): 3  Moving from lying on back to sitting on the side of the bed?: 3  Moving to and from a bed to a chair (including a wheelchair)?: 3  Need to walk in hospital room?: 3  Climbing 3-5 steps with a railing?: 3  Basic Mobility Total Score: 18       Therapeutic Activities and Exercises:   Activities as described above along with gait; see above assessment for vitals; pt instructed and demonstrated knowledge of BLE AROM ex while in chair.    Patient left up in chair with all lines intact, call button in reach and nurse notified..    GOALS:    Physical Therapy Goals        Problem: Physical Therapy Goal    Goal Priority Disciplines Outcome Goal Variances Interventions   Physical Therapy Goal     PT/OT, PT Ongoing (interventions implemented as appropriate)     Description:  Goals to be met by: 2018     Patient will increase functional independence with mobility by performin. Supine to sit with Modified [place]  2. Sit to supine with  Modified [place]  3. Sit to stand transfer with Stand-by Assistance  4. Gait  x 150 feet with Stand-by Assistance with or without AD   5. Ascend/descend 15 stair with bilateral Handrails Stand-by Assistance   6. Lower extremity exercise program x10 reps with independence                      Time Tracking:     PT Received On: 07/17/18  PT Start Time: 1057     PT Stop Time: 1116  PT Total Time (min): 19 min     Billable Minutes: Gait Training 19 minutes    Treatment Type: Treatment  PT/PTA: PT     PTA Visit Number: 0     Cait Bautista, PT  07/17/2018

## 2018-07-17 NOTE — PT/OT/SLP PROGRESS
Occupational Therapy   Treatment    Name: Annette Esparza  MRN: [id number]  Admitting Diagnosis:  Hyponatremia       Recommendations:     Discharge Recommendations:  (TBD)  Discharge Equipment Recommendations:   (TBD)  Barriers to discharge:  Inaccessible home environment    Subjective     Communicated with: RN prior to session.  Pain/Comfort:  · Pain Rating 1: 10/10  · Location - Side 1: Bilateral  · Location - Orientation 1: lower  · Location 1: back  · Pain Addressed 1: Reposition, Distraction, Nurse notified (RN provided pain med during tx.)  · Pain Rating Post-Intervention 1: 10/10    Patients cultural, spiritual, Anabaptism conflicts given the current situation:      Objective:     Patient found with: oxygen, central line (multiple ICU lines and monitors)    General Precautions: Standard, fall, respiratory   Orthopedic Precautions:N/A   Braces: N/A     Occupational Performance:    Bed Mobility:    · Patient completed Rolling/Turning to Right with contact guard assistance and with side rail  · Patient completed Scooting/Bridging with contact guard assistance and scoot seated to EOB  · Patient completed Supine to Sit with contact guard assistance and vc's for effective technique, increased effort.     Functional Mobility/Transfers:  · Patient completed Sit <> Stand Transfer with minimum assistance  with  no assistive device and Min A secondary to LOB posteriorly upon standing.   · Patient completed Bed <> Chair Transfer using Stand Pivot technique with contact guard assistance and minimum assistance with no assistive device  · Functional Mobility: Pt ambulated room distances with CGA-Min A with HHA.  Pt unsteady on feet.    Activities of Daily Living:  · Grooming: contact guard assistance standing at sink  · Toileting: contact guard assistance standing for clothing mgmt    Patient left up in chair with all lines intact, call button in reach and RN notified    AMPA 6 Click:  AMPA Total Score:  18    Treatment & Education:  RN removed O2 prior to tx, however, pt desatting standing at sink to low 80's and reporting SOB.  Donned 2L of O2 and sats improved to 92%.  Pt performed BUE AROM ex 2 x 10 reps all major jts/planes with short rest breaks between sets.  Education:    Assessment:     Annette Esparza is a 48 y.o. female with a medical diagnosis of Hyponatremia.  She presents with decreased overall strength, endurance, balance and [place] and safety with ADL's and fx mobility.  Performance deficits affecting function are weakness, impaired endurance, impaired self care skills, impaired functional mobilty, gait instability, impaired balance, decreased lower extremity function, decreased safety awareness, pain, impaired cognition, impaired cardiopulmonary response to activity. Improved tolerance/participation with therapy.  Pt progressing towards goals. Continues with balance issues and SOB with min exertion. Continue OT services to address functional goals, progressing as able.      Rehab Prognosis:  good; patient would benefit from acute skilled OT services to address these deficits and reach maximum level of function.       Plan:     Patient to be seen 5 x/week to address the above listed problems via self-care/home management, therapeutic activities, therapeutic exercises  · Plan of Care Expires: 08/16/18  · Plan of Care Reviewed with: patient    This Plan of care has been discussed with the patient who was involved in its development and understands and is in agreement with the identified goals and treatment plan    GOALS:    Occupational Therapy Goals        Problem: Occupational Therapy Goal    Goal Priority Disciplines Outcome Interventions   Occupational Therapy Goal     OT, PT/OT Ongoing (interventions implemented as appropriate)    Description:  Goals to be met by: 8/15     Patient will increase functional independence with ADLs by performing:    UE Dressing with Stand-by  Assistance.  LE Dressing with Minimal Assistance.  Grooming while standing with Stand-by Assistance.  Toileting from bedside commode with Minimal Assistance for hygiene and clothing management.   Toilet transfer to bedside commode with Stand-by Assistance.  Upper extremity exercise program x10 reps per handout, with supervision.                      Time Tracking:     OT Date of Treatment: 07/17/18  OT Start Time: 1023  OT Stop Time: 1052  OT Total Time (min): 29 min    Billable Minutes:Self Care/Home Management 19  Therapeutic Exercise 10    MARIOLA Chavez  7/17/2018

## 2018-07-18 LAB
ALBUMIN SERPL BCP-MCNC: 1.9 G/DL
ALBUMIN SERPL BCP-MCNC: 2 G/DL
ALP SERPL-CCNC: 331 U/L
ALT SERPL W/O P-5'-P-CCNC: 21 U/L
ANION GAP SERPL CALC-SCNC: 6 MMOL/L
ANION GAP SERPL CALC-SCNC: 8 MMOL/L
ANION GAP SERPL CALC-SCNC: 9 MMOL/L
ANION GAP SERPL CALC-SCNC: 9 MMOL/L
ANISOCYTOSIS BLD QL SMEAR: SLIGHT
APTT BLDCRRT: 43.6 SEC
AST SERPL-CCNC: 38 U/L
BACTERIA BLD CULT: NORMAL
BASOPHILS # BLD AUTO: 0.01 K/UL
BASOPHILS NFR BLD: 0.1 %
BILIRUB SERPL-MCNC: 11.4 MG/DL
BUN SERPL-MCNC: 3 MG/DL
BUN SERPL-MCNC: 4 MG/DL
CALCIUM SERPL-MCNC: 7.8 MG/DL
CALCIUM SERPL-MCNC: 7.9 MG/DL
CALCIUM SERPL-MCNC: 7.9 MG/DL
CALCIUM SERPL-MCNC: 8 MG/DL
CHLORIDE SERPL-SCNC: 85 MMOL/L
CHLORIDE SERPL-SCNC: 86 MMOL/L
CHLORIDE SERPL-SCNC: 87 MMOL/L
CHLORIDE SERPL-SCNC: 89 MMOL/L
CO2 SERPL-SCNC: 28 MMOL/L
CO2 SERPL-SCNC: 29 MMOL/L
CO2 SERPL-SCNC: 31 MMOL/L
CO2 SERPL-SCNC: 32 MMOL/L
CREAT SERPL-MCNC: 0.6 MG/DL
CREAT SERPL-MCNC: 0.7 MG/DL
DIFFERENTIAL METHOD: ABNORMAL
EOSINOPHIL # BLD AUTO: 0.1 K/UL
EOSINOPHIL NFR BLD: 0.7 %
ERYTHROCYTE [DISTWIDTH] IN BLOOD BY AUTOMATED COUNT: 23.3 %
EST. GFR  (AFRICAN AMERICAN): >60 ML/MIN/1.73 M^2
EST. GFR  (NON AFRICAN AMERICAN): >60 ML/MIN/1.73 M^2
GLUCOSE SERPL-MCNC: 102 MG/DL
GLUCOSE SERPL-MCNC: 125 MG/DL
GLUCOSE SERPL-MCNC: 92 MG/DL
GLUCOSE SERPL-MCNC: 95 MG/DL
HCT VFR BLD AUTO: 26.7 %
HGB BLD-MCNC: 8.9 G/DL
HYPOCHROMIA BLD QL SMEAR: ABNORMAL
INR PPP: 1.3
LYMPHOCYTES # BLD AUTO: 0.6 K/UL
LYMPHOCYTES NFR BLD: 7 %
MAGNESIUM SERPL-MCNC: 1.8 MG/DL
MAGNESIUM SERPL-MCNC: 2 MG/DL
MAGNESIUM SERPL-MCNC: 2.2 MG/DL
MCH RBC QN AUTO: 30.7 PG
MCHC RBC AUTO-ENTMCNC: 33.3 G/DL
MCV RBC AUTO: 92 FL
MONOCYTES # BLD AUTO: 1.1 K/UL
MONOCYTES NFR BLD: 11.6 %
NEUTROPHILS # BLD AUTO: 7.3 K/UL
NEUTROPHILS NFR BLD: 80.6 %
PHOSPHATE SERPL-MCNC: 2.6 MG/DL
PHOSPHATE SERPL-MCNC: 3.5 MG/DL
PHOSPHATE SERPL-MCNC: 4.2 MG/DL
PLATELET # BLD AUTO: 106 K/UL
PLATELET BLD QL SMEAR: ABNORMAL
PMV BLD AUTO: 10 FL
POIKILOCYTOSIS BLD QL SMEAR: SLIGHT
POLYCHROMASIA BLD QL SMEAR: ABNORMAL
POTASSIUM SERPL-SCNC: 3.1 MMOL/L
POTASSIUM SERPL-SCNC: 3.4 MMOL/L
POTASSIUM SERPL-SCNC: 3.5 MMOL/L
POTASSIUM SERPL-SCNC: 3.7 MMOL/L
PROT SERPL-MCNC: 5.5 G/DL
PROTHROMBIN TIME: 14.2 SEC
RBC # BLD AUTO: 2.9 M/UL
SODIUM SERPL-SCNC: 123 MMOL/L
SODIUM SERPL-SCNC: 123 MMOL/L
SODIUM SERPL-SCNC: 126 MMOL/L
SODIUM SERPL-SCNC: 127 MMOL/L
TARGETS BLD QL SMEAR: ABNORMAL
WBC # BLD AUTO: 9.16 K/UL

## 2018-07-18 PROCEDURE — 63600175 PHARM REV CODE 636 W HCPCS: Performed by: STUDENT IN AN ORGANIZED HEALTH CARE EDUCATION/TRAINING PROGRAM

## 2018-07-18 PROCEDURE — 63600175 PHARM REV CODE 636 W HCPCS: Performed by: INTERNAL MEDICINE

## 2018-07-18 PROCEDURE — 83735 ASSAY OF MAGNESIUM: CPT | Mod: 91

## 2018-07-18 PROCEDURE — 94761 N-INVAS EAR/PLS OXIMETRY MLT: CPT

## 2018-07-18 PROCEDURE — 80069 RENAL FUNCTION PANEL: CPT | Mod: 91

## 2018-07-18 PROCEDURE — 25000003 PHARM REV CODE 250: Performed by: STUDENT IN AN ORGANIZED HEALTH CARE EDUCATION/TRAINING PROGRAM

## 2018-07-18 PROCEDURE — 80053 COMPREHEN METABOLIC PANEL: CPT

## 2018-07-18 PROCEDURE — 97530 THERAPEUTIC ACTIVITIES: CPT

## 2018-07-18 PROCEDURE — 36415 COLL VENOUS BLD VENIPUNCTURE: CPT

## 2018-07-18 PROCEDURE — 85730 THROMBOPLASTIN TIME PARTIAL: CPT

## 2018-07-18 PROCEDURE — 85025 COMPLETE CBC W/AUTO DIFF WBC: CPT

## 2018-07-18 PROCEDURE — 25000003 PHARM REV CODE 250: Performed by: INTERNAL MEDICINE

## 2018-07-18 PROCEDURE — 85610 PROTHROMBIN TIME: CPT

## 2018-07-18 PROCEDURE — 97116 GAIT TRAINING THERAPY: CPT

## 2018-07-18 PROCEDURE — 21400001 HC TELEMETRY ROOM

## 2018-07-18 PROCEDURE — 27000221 HC OXYGEN, UP TO 24 HOURS

## 2018-07-18 RX ORDER — CIPROFLOXACIN 500 MG/1
500 TABLET ORAL EVERY 12 HOURS
Status: DISCONTINUED | OUTPATIENT
Start: 2018-07-18 | End: 2018-07-19 | Stop reason: HOSPADM

## 2018-07-18 RX ORDER — SPIRONOLACTONE 25 MG/1
50 TABLET ORAL DAILY
Status: DISCONTINUED | OUTPATIENT
Start: 2018-07-18 | End: 2018-07-19

## 2018-07-18 RX ORDER — FUROSEMIDE 20 MG/1
20 TABLET ORAL DAILY
Status: DISCONTINUED | OUTPATIENT
Start: 2018-07-18 | End: 2018-07-19

## 2018-07-18 RX ORDER — TRAMADOL HYDROCHLORIDE 50 MG/1
100 TABLET ORAL EVERY 6 HOURS PRN
Status: DISCONTINUED | OUTPATIENT
Start: 2018-07-18 | End: 2018-07-19 | Stop reason: HOSPADM

## 2018-07-18 RX ADMIN — POTASSIUM PHOSPHATE, MONOBASIC AND POTASSIUM PHOSPHATE, DIBASIC 15 MMOL: 224; 236 INJECTION, SOLUTION, CONCENTRATE INTRAVENOUS at 09:07

## 2018-07-18 RX ADMIN — FOLIC ACID 1 MG: 1 TABLET ORAL at 08:07

## 2018-07-18 RX ADMIN — TRAMADOL HYDROCHLORIDE 50 MG: 50 TABLET, COATED ORAL at 06:07

## 2018-07-18 RX ADMIN — CYANOCOBALAMIN TAB 1000 MCG 1000 MCG: 1000 TAB at 08:07

## 2018-07-18 RX ADMIN — FERROUS SULFATE TAB EC 325 MG (65 MG FE EQUIVALENT) 325 MG: 325 (65 FE) TABLET DELAYED RESPONSE at 09:07

## 2018-07-18 RX ADMIN — MIDODRINE HYDROCHLORIDE 10 MG: 5 TABLET ORAL at 08:07

## 2018-07-18 RX ADMIN — Medication 100 MG: at 08:07

## 2018-07-18 RX ADMIN — MIDODRINE HYDROCHLORIDE 10 MG: 5 TABLET ORAL at 05:07

## 2018-07-18 RX ADMIN — CIPROFLOXACIN 500 MG: 500 TABLET, FILM COATED ORAL at 09:07

## 2018-07-18 RX ADMIN — HEPARIN SODIUM 5000 UNITS: 5000 INJECTION, SOLUTION INTRAVENOUS; SUBCUTANEOUS at 01:07

## 2018-07-18 RX ADMIN — FERROUS SULFATE TAB EC 325 MG (65 MG FE EQUIVALENT) 325 MG: 325 (65 FE) TABLET DELAYED RESPONSE at 08:07

## 2018-07-18 RX ADMIN — LACTULOSE 20 G: 20 SOLUTION ORAL at 08:07

## 2018-07-18 RX ADMIN — TRAMADOL HYDROCHLORIDE 100 MG: 50 TABLET, COATED ORAL at 11:07

## 2018-07-18 RX ADMIN — POTASSIUM & SODIUM PHOSPHATES POWDER PACK 280-160-250 MG 1 PACKET: 280-160-250 PACK at 11:07

## 2018-07-18 RX ADMIN — LACTULOSE 20 G: 20 SOLUTION ORAL at 09:07

## 2018-07-18 RX ADMIN — CEFTRIAXONE SODIUM 2 G: 2 INJECTION, POWDER, FOR SOLUTION INTRAMUSCULAR; INTRAVENOUS at 08:07

## 2018-07-18 RX ADMIN — SPIRONOLACTONE 50 MG: 25 TABLET, FILM COATED ORAL at 01:07

## 2018-07-18 RX ADMIN — POTASSIUM & SODIUM PHOSPHATES POWDER PACK 280-160-250 MG 1 PACKET: 280-160-250 PACK at 08:07

## 2018-07-18 RX ADMIN — MAGNESIUM SULFATE HEPTAHYDRATE 2 G: 500 INJECTION, SOLUTION INTRAMUSCULAR; INTRAVENOUS at 12:07

## 2018-07-18 RX ADMIN — THERA TABS 1 TABLET: TAB at 08:07

## 2018-07-18 RX ADMIN — HEPARIN SODIUM 5000 UNITS: 5000 INJECTION, SOLUTION INTRAVENOUS; SUBCUTANEOUS at 09:07

## 2018-07-18 RX ADMIN — TRAMADOL HYDROCHLORIDE 100 MG: 50 TABLET, COATED ORAL at 05:07

## 2018-07-18 RX ADMIN — HEPARIN SODIUM 5000 UNITS: 5000 INJECTION, SOLUTION INTRAVENOUS; SUBCUTANEOUS at 06:07

## 2018-07-18 RX ADMIN — FUROSEMIDE 20 MG: 20 TABLET ORAL at 01:07

## 2018-07-18 NOTE — PLAN OF CARE
Problem: Physical Therapy Goal  Goal: Physical Therapy Goal  Goals to be met by: 2018     Patient will increase functional independence with mobility by performin. Supine to sit with Modified [place]  2. Sit to supine with Modified [place]  3. Sit to stand transfer with Stand-by Assistance MET 18  4. Gait  x 150 feet with Stand-by Assistance with or without AD MET 18  5. Ascend/descend 15 stair with bilateral Handrails Stand-by Assistance   6. Lower extremity exercise program x10 reps with independence     Outcome: Ongoing (interventions implemented as appropriate)  Goals 3 and 4 met

## 2018-07-18 NOTE — PLAN OF CARE
Problem: Patient Care Overview  Goal: Plan of Care Review  Outcome: Revised  Patient received upon rounds, lying in bed on low folwler's position. Conscious , coherent. GCS 15, with ascites. Patient can ambulate to the toilet independently. With ongoing IVF NSS 70cc/hr, infusing well on the right  Triple lumen IJ catheter. Magnesium drip given as ordered. Potassium Drip given as ordered. Status post 1unit PRBC, patient referred to Dr. Cano's team early in the morning around 0130H after the first unit of blood transfusion. Initially second unit was about to be transfused, but patient had episodes of low oxygen saturation dropping to 88%, no dyspnea but there is changes with the lung sounds. RLL and LLL both had diminished lung sounds. No crackles, no wheezes. Patient's oxygen saturation would peak up to 92-94% when lying on her right side. Patient is on 2lpm oxygen via NC. Second unit of Blood was hold for the meantime. Hgb in the morning is 8.9. Tramadol PRN given as ordered. Advised patient to call the nurse for any assistance, bed alarm on, side rails kept secure at all times. Call bell within reach. Bed brake on. Safety fall precaution measures noted. Intentional rounding rendered. Will continue to monitor patient. On telemetry NSR. No subjective complaint of chest pain.

## 2018-07-18 NOTE — MEDICAL/APP STUDENT
U Internal Medicine Resident HO-II Progress Note    Subjective:      Annette Esparza is a 47 yo female that presented to the ED with generalized weakness, confusion, and hallucinations for 2 weeks    Isaias feels that her confusion and weakness are gone. She denies chest pain, shortness of breath, nausea, vomiting. She has back pain that is very painful when she moves. She says she fell out of her bed and landed on her back either Thursday or Friday ( or )    Objective:   Last 24 Hour Vital Signs:  BP  Min: 83/56  Max: 99/64  Temp  Av.4 °F (36.3 °C)  Min: 96.2 °F (35.7 °C)  Max: 98.4 °F (36.9 °C)  Pulse  Av.2  Min: 48  Max: 94  Resp  Av.5  Min: 14  Max: 27  SpO2  Av.3 %  Min: 82 %  Max: 100 %  Weight  Av.5 kg (122 lb 5.7 oz)  Min: 55.5 kg (122 lb 5.7 oz)  Max: 55.5 kg (122 lb 5.7 oz)  I/O last 3 completed shifts:  In: 2961 [P.O.:850; I.V.:1591; Blood:470; IV Piggyback:50]  Out: 500 [Urine:500]    Physical Examination:  General: Alert, Awake, Oriented x 3  Head: normocephalic, atraumatic  Cardiovascular: regular rate with regular rhythm, no murmurs, rubs, or S3, S4, normal apical impulse.  Gastrointestinal: soft, nontender, distended, normoactive bowel sounds.     Laboratory:  Laboratory Data Reviewed: yes  Trended Lab Data:  Uk=887  K=3.1  Cl=89  HCO3=32  BUN=3  Creatinine=0.6  Ca(corrected)=9.5  PO4=1.8 ()  Mg=1.8 ()    PT=14.2  PTT=43.6  INR=1.3    Recent Labs  Lab 18  0557 18  0926  18  0609 18  1036 18  0406   WBC 21.42*  --   --  11.66  --   --  9.16   HGB 7.6*  --   --  6.9*  --   --  8.9*   HCT 23.1*  --   --  21.0*  --   --  26.7*   *  --   --  107*  --   --  106*   MCV 93  --   --  95  --   --  92   RDW 24.8*  --   --  25.2*  --   --  23.3*   * 126*  < > 124* 126* 125* 127*   K 2.2* 3.0*  < > 3.0* 3.6 2.8* 3.1*   CL 77* 80*  < > 87* 87* 87* 89*   CO2 36* 37*  < > 27 31* 30* 32*   BUN 8 8  < > 5* 5* 4* 3*    CREATININE 0.6 0.6  < > 0.6 0.7 0.6 0.6   * 169*  < > 106 111* 97 92   PROT  --  5.8*  --  5.2*  --   --  5.5*   ALBUMIN 1.9* 1.9*  1.9*  < > 1.7*  1.7* 1.8* 1.8* 1.9*   BILITOT  --  12.0*  --  9.4*  --   --  11.4*   AST  --  36  --  28  --   --  38   ALKPHOS  --  360*  --  310*  --   --  331*   ALT  --  16  --  16  --   --  21   < > = values in this interval not displayed.    Trended Cardiac Data:  No results for input(s): TROPONINI, CKTOTAL, CKMB, BNP in the last 168 hours.    Microbiology Data   Microbiology Results (last 7 days)     Procedure Component Value Units Date/Time    Blood culture [[id number]] Collected:  07/17/18 1645    Order Status:  Completed Specimen:  Blood from Peripheral, Forearm, Left Updated:  07/18/18 0345     Blood Culture, Routine No Growth to date    Blood culture x two cultures. Draw prior to antibiotics. [[id number]] Collected:  07/15/18 1619    Order Status:  Completed Specimen:  Blood from Peripheral, Hand, Right Updated:  07/17/18 2312     Blood Culture, Routine No Growth to date     Blood Culture, Routine No Growth to date     Blood Culture, Routine No Growth to date    Narrative:       Aerobic and anaerobic    Urine culture - High Risk [[id number]]  (Susceptibility) Collected:  07/15/18 1814    Order Status:  Completed Specimen:  Urine from Urine, Catheterized Updated:  07/17/18 2203     Urine Culture, Routine --     ESCHERICHIA COLI  >100,000 cfu/ml      Blood culture [[id number]] Collected:  07/17/18 1606    Order Status:  Sent Specimen:  Blood from Line, Subclavian, Right Updated:  07/17/18 1607    Blood culture x two cultures. Draw prior to antibiotics. [[id number]] Collected:  07/15/18 1635    Order Status:  Completed Specimen:  Blood from Peripheral, Hand, Left Updated:  07/17/18 1224     Blood Culture, Routine Gram stain daron bottle: Gram negative rods      Blood Culture, Routine Results called to and read back by:Corinna Fischer RN 07/16/2018  11:10     Blood Culture,  Routine --     ESCHERICHIA COLI  Susceptibility pending      Narrative:       Aerobic and anaerobic    Aerobic culture [[id number]] Collected:  07/16/18 1538    Order Status:  Completed Specimen:  Body Fluid from Ascites Updated:  07/17/18 0724     Aerobic Bacterial Culture No growth    Culture, Anaerobic [[id number]] Collected:  07/16/18 1538    Order Status:  Completed Specimen:  Body Fluid from Ascites Updated:  07/17/18 0706     Anaerobic Culture Culture in progress    Gram stain [[id number]] Collected:  07/16/18 1538    Order Status:  Completed Specimen:  Body Fluid from Ascites Updated:  07/16/18 2151     Gram Stain Result Rare WBC's      No organisms seen    Fungus culture [[id number]] Collected:  07/16/18 1538    Order Status:  Sent Specimen:  Body Fluid from Ascites Updated:  07/16/18 1851          Radiology:  Imaging Results          X-Ray Chest 1 View (Final result)  Result time 07/15/18 20:20:31    Final result by Ger Mays MD (07/15/18 20:20:31)                 Impression:      Interval placement of a right IJ central venous catheter, with the tip overlying the right brachiocephalic/SVC junction.      Electronically signed by: Ger Mays MD  Date:    07/15/2018  Time:    20:20             Narrative:    EXAMINATION:  XR CHEST 1 VIEW    CLINICAL HISTORY:  Encounter for adjustment and management of vascular access device    TECHNIQUE:  One view of the chest.    COMPARISON:  07/15/2018 at 5:06 p.m.    FINDINGS:  Cardiac wires overlie the chest.  Interval placement of a right IJ central venous catheter with the distal tip overlying the right brachiocephalic/SVC junction.  Cardiac silhouette is not enlarged.  Mild bibasilar subsegmental atelectasis, left side greater than right.  Suspect trace left pleural fluid.  Stable micronodular pattern throughout the lung fields and biapical emphysema.  No pneumothorax.                               X-Ray Chest AP Portable (Final result)  Result time 07/15/18  17:12:40    Final result by Urbano Rain MD (07/15/18 17:12:40)                 Impression:      1. Grossly stable multifocal pulmonary parenchymal nodular foci, no large focal consolidation.      Electronically signed by: Urbano Rain MD  Date:    07/15/2018  Time:    17:12             Narrative:    EXAMINATION:  XR CHEST AP PORTABLE    CLINICAL HISTORY:  Sepsis;    TECHNIQUE:  Single frontal view of the chest was performed.    COMPARISON:  11/30/2017    FINDINGS:  The cardiomediastinal silhouette is not enlarged, similar to the previous exam noting possible calcification at the aortic arch..  There is no pleural effusion.  The trachea is midline.  The lungs are symmetrically expanded bilaterally with bilateral reticulonodular density, and scattered miliary type density, unchanged.  There is a prominent focus of calcification projected over the left upper lung zone, stable..  No large focal consolidation seen.  There is no pneumothorax.  The osseous structures are remarkable for degenerative change..                                  Current Medications:     Infusions:   sodium chloride 0.9% 80 mL/hr at 07/18/18 0305        Scheduled:   cefTRIAXone (ROCEPHIN) IVPB  2 g Intravenous Q24H    cyanocobalamin  1,000 mcg Oral Daily    ergocalciferol  50,000 Units Oral Q7 Days    ferrous sulfate  325 mg Oral BID    folic acid  1 mg Oral Daily    heparin (porcine)  5,000 Units Subcutaneous Q8H    lactulose  20 g Oral BID    midodrine  10 mg Oral BID WM    multivitamin  1 tablet Oral Daily    potassium phosphate IVPB  15 mmol Intravenous Once    potassium, sodium phosphates  1 packet Oral QID (WM & HS)    thiamine  100 mg Oral Daily        PRN:  sodium chloride, traMADol    Antibiotics and Day Number of Therapy:  Ceftriaxone 2g, Day 3    Lines and Day Number of Therapy:  Central venous line  PIV    Assessment and Plan     Hyponatremia:  -Patient presented to ED with a Na of 123. Current Na is 127  -She  had confusion, weakness, and hallucinations on admit but these symptoms have improved  -Pt has a history of alcoholic cirrhosis  -Her has abdominal distended and firm  -US shows ascitic fluid in abdomen     Normocytic Anemia:  H/H = 9.5/28.7 on admit. H/H = 8.9/26.7 on 7/18  Iron = 63, TIBC = 207, transferrin = 140 on admit  Pt given 235mL of packed RBCs on 7/17 at 2045     Alcoholic Cirrhosis:  -Patient has history of alcoholic cirrhosis (Dx 2014 via liver biopsy)  -PT=17.3, INR=1.6, total bilirubin=13.7 on admit  -PT=14.4, INR=1.53, PTT=43.6, total bilirubin=9.4 on 7/18  -urine bilirubin=3+  -continues lactulose for increased ammonia  -1.5L fluid removed via paracentesis, not suggestive for SBP        Hypokalemia, hypophosphatemia, hypomagnesia:  -K<2, PO4=1.7, Mg=1.3 on admit  -K=3.1, PO4=1.8, Mg=1.8 on 7/18     UTI:  -UA growing E. Coli  -urine positive for nitrites  -Pt on 2g of ceftriaxone, Day 3 of medication    Vit D deficiency  -Vit D=8  -continue vit D supplementation         Perry Sorenson L3  [facility] Internal Medicine  U Hospitalitis Service Team    [facility] Medicine Hospitalist Pager numbers:   U Hospitalist Medicine Team A (Diana/Patria): [phone]  U Hospitalist Medicine Team B (Dennis/Sami):  [phone]

## 2018-07-18 NOTE — PLAN OF CARE
Problem: Patient Care Overview  Goal: Plan of Care Review  Outcome: Ongoing (interventions implemented as appropriate)  POC discussed with pt. Pt is awake and alert,oriented X4. No distress noted. Denies any pain. Continues to be NSR on tele with HR in 60-70's. Bed in lowest position. Safety/Fall precautions maintained. Call bell in reach. All questions answered. Verbalized understanding.Will continue to monitor.

## 2018-07-18 NOTE — PROGRESS NOTES
[facility] Resident HO-III Progress Note    Subjective:      Continues to feel better. Received one unit pRBC overnight; tolerated well without complications. Pt complains of chronic back pain. Abd pain improved without black or bloody stools. Denies n/v, fevers/chills/sweats, cp, sob. Tolerating PO diet. Voiding without difficulty. Pt ready to go home.     Objective:   Last 24 Hour Vital Signs:  BP  Min: 83/56  Max: 99/64  Temp  Av.4 °F (36.3 °C)  Min: 96.2 °F (35.7 °C)  Max: 98.4 °F (36.9 °C)  Pulse  Av.9  Min: 48  Max: 98  Resp  Av.1  Min: 14  Max: 38  SpO2  Av.5 %  Min: 82 %  Max: 100 %  Weight  Av.5 kg (122 lb 5.7 oz)  Min: 55.5 kg (122 lb 5.7 oz)  Max: 55.5 kg (122 lb 5.7 oz)  I/O last 3 completed shifts:  In: 2961 [P.O.:850; I.V.:1591; Blood:470; IV Piggyback:50]  Out: 500 [Urine:500]    Physical Examination:  General:          sitting up in bed  nad  Head:               Normocephalic and atraumatic  Eyes:               PERRL; EOMi with icteric sclera and clear conjunctivae  Mouth:             Oropharynx clear and without exudate  Cardio:             Normal rate and regular rhythm with normal S1 and S2; no murmurs or rubs  no LE edema  pulses 2+ and symmetric distally  Resp:               CTAB; respirations unlabored; no wheezes, crackles or rhonchi  Abdom:            Soft  mildly distended with normoactive bowel sounds  nontender to palpation  Extrem:            Symmetric and without atrophy  warm to touch  no cyanosis or edema  Skin:                No rashes, lesions  +jaundice  Neuro:             AAOx3  face symmetric  moves all extremities  Psych:  Pleasant and cooperative    Laboratory:  Laboratory Data Reviewed: yes  Pertinent Findings:    Recent Labs  Lab 18  0557 18  0926  18  0609 18  1036 18  0406   WBC 21.42*  --   --  11.66  --   --  9.16   HGB 7.6*  --   --  6.9*  --   --  8.9*   HCT 23.1*  --   --  21.0*  --   --   26.7*   *  --   --  107*  --   --  106*   MCV 93  --   --  95  --   --  92   RDW 24.8*  --   --  25.2*  --   --  23.3*   * 126*  < > 124* 126* 125* 127*   K 2.2* 3.0*  < > 3.0* 3.6 2.8* 3.1*   CL 77* 80*  < > 87* 87* 87* 89*   CO2 36* 37*  < > 27 31* 30* 32*   BUN 8 8  < > 5* 5* 4* 3*   CREATININE 0.6 0.6  < > 0.6 0.7 0.6 0.6   * 169*  < > 106 111* 97 92   PROT  --  5.8*  --  5.2*  --   --  5.5*   ALBUMIN 1.9* 1.9*  1.9*  < > 1.7*  1.7* 1.8* 1.8* 1.9*   BILITOT  --  12.0*  --  9.4*  --   --  11.4*   AST  --  36  --  28  --   --  38   ALKPHOS  --  360*  --  310*  --   --  331*   ALT  --  16  --  16  --   --  21   < > = values in this interval not displayed.      Microbiology Data Reviewed: yes  Pertinent Findings:  Microbiology Results (last 7 days)     Procedure Component Value Units Date/Time    Blood culture [[id number]] Collected:  07/17/18 1645    Order Status:  Completed Specimen:  Blood from Peripheral, Forearm, Left Updated:  07/18/18 0345     Blood Culture, Routine No Growth to date    Blood culture x two cultures. Draw prior to antibiotics. [[id number]] Collected:  07/15/18 1619    Order Status:  Completed Specimen:  Blood from Peripheral, Hand, Right Updated:  07/17/18 2312     Blood Culture, Routine No Growth to date     Blood Culture, Routine No Growth to date     Blood Culture, Routine No Growth to date    Narrative:       Aerobic and anaerobic    Urine culture - High Risk [[id number]]  (Susceptibility) Collected:  07/15/18 1814    Order Status:  Completed Specimen:  Urine from Urine, Catheterized Updated:  07/17/18 2203     Urine Culture, Routine --     ESCHERICHIA COLI  >100,000 cfu/ml      Blood culture [[id number]] Collected:  07/17/18 1606    Order Status:  Sent Specimen:  Blood from Line, Subclavian, Right Updated:  07/17/18 1607    Blood culture x two cultures. Draw prior to antibiotics. [[id number]] Collected:  07/15/18 1635    Order Status:  Completed Specimen:  Blood from  Peripheral, Hand, Left Updated:  07/17/18 1224     Blood Culture, Routine Gram stain daron bottle: Gram negative rods      Blood Culture, Routine Results called to and read back by:Corinna Fischer RN 07/16/2018  11:10     Blood Culture, Routine --     ESCHERICHIA COLI  Susceptibility pending      Narrative:       Aerobic and anaerobic    Aerobic culture [[id number]] Collected:  07/16/18 1538    Order Status:  Completed Specimen:  Body Fluid from Ascites Updated:  07/17/18 0724     Aerobic Bacterial Culture No growth    Culture, Anaerobic [[id number]] Collected:  07/16/18 1538    Order Status:  Completed Specimen:  Body Fluid from Ascites Updated:  07/17/18 0706     Anaerobic Culture Culture in progress    Gram stain [[id number]] Collected:  07/16/18 1538    Order Status:  Completed Specimen:  Body Fluid from Ascites Updated:  07/16/18 2151     Gram Stain Result Rare WBC's      No organisms seen    Fungus culture [[id number]] Collected:  07/16/18 1538    Order Status:  Sent Specimen:  Body Fluid from Ascites Updated:  07/16/18 1851          Other Results:  EKG (my interpretation): old reviewed, no new    Radiology Data Reviewed: yes  Pertinent Findings:  Abd US: hepatomegaly with reversed main portal vein  CBD not dilated    Current Medications:     Infusions:   sodium chloride 0.9% 80 mL/hr at 07/18/18 0305        Scheduled:   cefTRIAXone (ROCEPHIN) IVPB  2 g Intravenous Q24H    cyanocobalamin  1,000 mcg Oral Daily    ergocalciferol  50,000 Units Oral Q7 Days    ferrous sulfate  325 mg Oral BID    folic acid  1 mg Oral Daily    heparin (porcine)  5,000 Units Subcutaneous Q8H    lactulose  20 g Oral BID    midodrine  10 mg Oral BID WM    multivitamin  1 tablet Oral Daily    potassium phosphate IVPB  15 mmol Intravenous Once    potassium, sodium phosphates  1 packet Oral QID (WM & HS)    thiamine  100 mg Oral Daily        PRN:      Antibiotics and Day Number of Therapy:  ceftriaxone    Lines and Day Number of  Therapy:  RIJ central line  [place]    Assessment:     Annette Esparza is a 48 y.o.female with  Patient Active Problem List    Diagnosis Date Noted    Normocytic anemia 07/17/2018    Hypophosphatemia 07/17/2018    Altered mental status     General weakness     Hepatic encephalopathy     Hypomagnesemia     Hyponatremia 07/15/2018    Oral thrush 11/30/2017    Hypotension 11/30/2017    Centrilobular emphysema 12/12/2016    Mild single current episode of major depressive disorder 12/12/2016    Hyperbilirubinemia 06/13/2016    Anxiety 06/02/2016    Cigarette nicotine dependence without complication 06/02/2016    Vitamin D deficiency 10/22/2015    Folate deficiency 10/22/2015    Iron deficiency anemia due to chronic blood loss 10/22/2015    Portal hypertensive gastropathy 10/22/2015    Alcoholic cirrhosis of liver with ascites 10/22/2015    Subclinical hypothyroidism 10/22/2015    Gallstones 10/06/2015    Alcohol dependence in remission         Plan:     Ecoli UTI and bacteremia  -afebrile, WBC 20 on admit  -UA with nitrites, WBC, bacteria  UCx with Ecoli   -1/4 bottles BCx with Ecoli  +procal  -repeat blood cultures pending  -afebrile  resolved leukocytosis  -cont ceftriaxone  f/u sensitivities    Hypovolemic hyponatremia  -Na 123 on admit  baseline 135  -Na 127 this AM  cont NS  monitor Na q6 and adjust accordingly    Alcoholic cirrhosis of liver with ascites  -history of EtOH abuse  quit 2 years prior  -PT/INR 17.3/1.6  Tbili 13.7  -Discriminate function > 32  given infection, will hold on steroids  -shifting dullness on PE   IR performed para to evaluate for SBP  studies negative for SBP  -GI consulted, appreciate recs  -worsening anemia  f/u FOBT studies  may require scope     Hyokalemia, hypomagnesmia, hypophosphatemia  -cont to replete     Hypotension  -placed on norepi gtt in ED  discontinued and resumed home midodrine     Normocytic anemia  -Hgb 9.5 on admit  trending  down to 6.9  transfused 1u pRBC with appropriate response  -check FOBT  no s/s of active bleeding  possibly dilutional  -past history of JUNE and FA and B12 deficiency  will provide supplementation     VitD deficiency  -cont supplementation    Acute encephalopathy, resolved  -likely mutlifactorial with hepatic encephalopathy and hyponatremia and possible infection contributing to currrent state  -cont to correct electrolyte derangements (above)  -cont to provide lactulose for 2-3 BM goal  -cont treating bacteremia and UTI with ceftriaxone     Code: full  Diet: Regular  DVT: heparin  Dispo: pending culture sensitivities and continued electrolyte correction       Naseem Willett  [facility] Internal Medicine HO-III  [facility] Hospitalists Service Team B    [facility] Medicine Hospitalist Pager numbers:   [facility] Hospitalist Medicine Team A ([place]/Patria): [phone]  [facility] Hospitalist Medicine Team B (Dennis/Sami):  [phone]

## 2018-07-18 NOTE — PLAN OF CARE
Problem: Patient Care Overview  Goal: Plan of Care Review  Patient on oxygen with documented flow. Will attempt to wean per O2 order protocol.

## 2018-07-18 NOTE — PT/OT/SLP PROGRESS
Occupational Therapy   Treatment    Name: Annette Esparza  MRN: [id number]  Admitting Diagnosis:  Hyponatremia       Recommendations:     Discharge Recommendations: outpatient PT  Discharge Equipment Recommendations:  walker, rolling  Barriers to discharge:  Inaccessible home environment    Subjective     Communicated with: Polo santos prior to session.  Pain/Comfort:  · Pain Rating 1: 8/10  · Location - Side 1: Bilateral  · Location - Orientation 1: lower  · Location 1: back  · Pain Addressed 1: Cessation of Activity  · Pain Rating Post-Intervention 1: 7/10    Patients cultural, spiritual, Baptist conflicts given the current situation:  (none reported)    Objective:     Patient found with: central line    General Precautions: Standard, fall   Orthopedic Precautions:N/A   Braces: N/A     Bed Mobility:    N/A - up in chair    Functional Mobility/Transfers:  · N/A    Activities of Daily Living:  · N/A    Patient left up in chair with all lines intact, call button in reach, RN notified and family member present    [facility] 6 Click:  [facility] Total Score: 18    Treatment & Education:  Patient UIC and declining ADLs (completed already). Completed BUE AROM therex, 1 x 10 reps: bicep curls, horizontal sh abd/add - declined further exercise. Patient then educated on energy conservation and post-acute therapy (OP therapy). Follow-up with handout/HEP.  Education:    Assessment:   Patient tolerated up in chair this date - reported it improved her back pain. Patient educated on energy conservation and importance of UE exercises. Patient will benefit from continued skilled OT to address functional deficits.     Annette Esparza is a 48 y.o. female with a medical diagnosis of Hyponatremia. Performance deficits affecting function are weakness, impaired endurance, impaired self care skills, impaired functional mobilty, gait instability, impaired balance, pain.      Rehab Prognosis:  Good; patient would benefit from acute  skilled OT services to address these deficits and reach maximum level of function.       Plan:     Patient to be seen 5 x/week to address the above listed problems via self-care/home management, therapeutic activities, therapeutic exercises  · Plan of Care Expires: 08/16/18  · Plan of Care Reviewed with: patient, family    This Plan of care has been discussed with the patient who was involved in its development and understands and is in agreement with the identified goals and treatment plan    GOALS:    Occupational Therapy Goals        Problem: Occupational Therapy Goal    Goal Priority Disciplines Outcome Interventions   Occupational Therapy Goal     OT, PT/OT Ongoing (interventions implemented as appropriate)    Description:  Goals to be met by: 8/15     Patient will increase functional independence with ADLs by performing:    UE Dressing with Stand-by Assistance.  LE Dressing with Minimal Assistance.  Grooming while standing with Stand-by Assistance.  Toileting from bedside commode with Minimal Assistance for hygiene and clothing management.   Toilet transfer to bedside commode with Stand-by Assistance.  Upper extremity exercise program x10 reps per handout, with supervision.                      Time Tracking:     OT Date of Treatment: 07/18/18  OT Start Time: 1500  OT Stop Time: 1513  OT Total Time (min): 13 min    Billable Minutes:Therapeutic Activity 13    MARIOLA Lewis  7/18/2018

## 2018-07-18 NOTE — PROGRESS NOTES
[facility]    CC: Ascites     HPI 48 y.o. female with a previous medical history of cirrhosis who was admitted for acute encephalopathy. She reports worsening, generalized abdominal pain for the past 2 weeks with associated abdominal distention. She reports her last bowel movement to be one week ago. She denies any hematemesis, coffee ground emesis, hematochezia, or melena. She denies any fevers but reports chills.      Patient's cirrhosis was diagnosed with liver biopsy in 2014. Her cirrhosis has been attributed to alcohol intake. Her last drink was 2 years ago. She has had multiple hospitalizations for large volume ascites requiring paracentesis. Last EGD in 6/2016 without esophageal varices but she found to have portal hypertensive gastropathy. She has been on midodrine for hypotension.    Interval History    Denies any abdominal pain. Complaining of back pain which she reports as chronic. Last bowel movement was this morning. Denies any bleeding. Received 1 unit of pRBCs    Past Medical History:   Diagnosis Date    Acute hypoxemic respiratory failure 12/1/2016    ANDREW (acute kidney injury) 11/28/2016    Alcohol dependence in remission     Alcoholic cirrhosis of liver with ascites 10/22/2015    Anxiety     Ascites due to alcoholic cirrhosis 6/13/2016    Centrilobular emphysema 12/12/2016    Cigarette nicotine dependence without complication 6/2/2016    Folate deficiency 10/22/2015    Gallstones     Hepatorenal syndrome 11/29/2016    Hyperbilirubinemia 6/13/2016    Hyponatremia 11/29/2016    Iron deficiency anemia due to chronic blood loss 10/22/2015    Portal hypertensive gastropathy 10/22/2015    Subclinical hypothyroidism 10/22/2015         Review of Systems  General ROS: negative for chills, fever or weight loss  Cardiovascular ROS: no chest pain or dyspnea on exertion  Gastrointestinal ROS: no abdominal pain, no change in bowel habits, or black/ bloody stools    Physical  "Examination  BP (!) 87/56 (BP Location: Left arm, Patient Position: Sitting)   Pulse 84   Temp 98.2 °F (36.8 °C) (Oral)   Resp 20   Ht 5' 1" (1.549 m)   Wt 55.5 kg (122 lb 5.7 oz)   LMP  (LMP Unknown)   SpO2 (!) 90%   Breastfeeding? No   BMI 23.12 kg/m²   General appearance: alert, cooperative, no distress  HENT: Normocephalic, atraumatic, neck symmetrical, scleral icterus present   Lungs: clear to auscultation bilaterally, no dullness to percussion bilaterally  Heart: regular rate and rhythm without rub; no displacement of the PMI   Abdomen: distended, generalized tenderness to palpation; bowel sounds normoactive  Extremities: extremities symmetric; no clubbing, cyanosis, or edema  Neurologic: Alert and oriented X 3, normal strength    Labs:  H/H 8.9/26.7  WBC 9.16  Platelets 106  Sodium 127  T bili 11.4  INR 1.3  Albumin 1.9    BCx e.coli, repeat BCx NGTD  UCx e.coli     Imaging:  RUQ US: Sludge and tiny stones noted within the contracted gallbladder.    Assessment:   48 y.o. female with a previous medical history of cirrhosis who presents with worsening ascites and gram negative bacteremia. S/P paracentesis with 1.5L removal. Fluid analysis not suggestive of SBP.     Plan:  -Source of bacteremia possibly urine as urine culture positive for E. Coli.   -RUQ ultrasound not suggestive of cholecystitis or CBD obstruction  -Continue ceftriaxone for coverage of UTI  -Monitor H/H   -Transfuse for Hb <7    Michelle Jean  PGY-1 Categorical Medicine  Gastroenterology Service           "

## 2018-07-18 NOTE — PLAN OF CARE
Problem: Occupational Therapy Goal  Goal: Occupational Therapy Goal  Goals to be met by: 8/15     Patient will increase functional independence with ADLs by performing:    UE Dressing with Stand-by Assistance.  LE Dressing with Minimal Assistance.  Grooming while standing with Stand-by Assistance.  Toileting from bedside commode with Minimal Assistance for hygiene and clothing management.   Toilet transfer to bedside commode with Stand-by Assistance.  Upper extremity exercise program x10 reps per handout, with supervision.     Outcome: Ongoing (interventions implemented as appropriate)  Patient tolerated up in chair this date - reported it improved her back pain. Patient educated on energy conservation and importance of UE exercises. Patient will benefit from continued skilled OT to address functional deficits.

## 2018-07-18 NOTE — PT/OT/SLP PROGRESS
Physical Therapy Treatment    Patient Name:  Annette Esparza   MRN:  [id number]    Recommendations:     Discharge Recommendations:   (HH vs out pt)   Discharge Equipment Recommendations:  (possible RW)   Barriers to discharge: decreased mobilty,strength and endurance    Assessment:     Annette Esparza is a 48 y.o. female admitted with a medical diagnosis of Hyponatremia.  She presents with the following impairments/functional limitations:  weakness, impaired endurance, impaired functional mobilty, gait instability, impaired balance, decreased lower extremity function, pain pt appears to feel safe using a RW with gait at this time and will benefit from continuing PT services upon discharge,will attempt SC with gait tomorrow.    Rehab Prognosis:  Good; patient would benefit from acute skilled PT services to address these deficits and reach maximum level of function.      Recent Surgery: * No surgery found *      Plan:     During this hospitalization, patient to be seen 6 x/week to address the above listed problems via gait training, therapeutic activities, therapeutic exercises, neuromuscular re-education  · Plan of Care Expires:  08/16/18   Plan of Care Reviewed with: patient    Subjective     Communicated with nsg prior to session.  Patient found up in chair upon PT entry to room, agreeable to treatment.      Chief Complaint: n/a  Patient comments/goals: pt feels secure using RW with gait  Pain/Comfort:  · Pain Rating 1: 4/10  · Location - Orientation 1: lower  · Location 1: back  · Pain Addressed 1: Reposition, Distraction  · Pain Rating Post-Intervention 1: 4/10    Patients cultural, spiritual, [employer] conflicts given the current situation: no    Objective:     Patient found with: central line     General Precautions: Standard, fall   Orthopedic Precautions:N/A   Braces: N/A     Functional Mobility:  · Transfers:     · Sit to Stand:  supervision and stand by assistance with hand-held  assist  · Toilet Transfer: stand by assistance with  hand-held assist  using  ambulation  · Gait: amb ~175' X 2 with RW and SBA with IV in tow  · Balance: good sitting balance      AM-PAC 6 CLICK MOBILITY  Turning over in bed (including adjusting bedclothes, sheets and blankets)?: 4  Sitting down on and standing up from a chair with arms (e.g., wheelchair, bedside commode, etc.): 3  Moving from lying on back to sitting on the side of the bed?: 3  Moving to and from a bed to a chair (including a wheelchair)?: 3  Need to walk in hospital room?: 3  Climbing 3-5 steps with a railing?: 3  Basic Mobility Total Score: 19       Therapeutic Activities and Exercises:        Patient left up in chair with all lines intact, call button in reach and spouse present..    GOALS: see general POC   Physical Therapy Goals        Problem: Physical Therapy Goal    Goal Priority Disciplines Outcome Goal Variances Interventions   Physical Therapy Goal     PT/OT, PT Ongoing (interventions implemented as appropriate)     Description:  Goals to be met by: 2018     Patient will increase functional independence with mobility by performin. Supine to sit with Modified [place]  2. Sit to supine with Modified [place]  3. Sit to stand transfer with Stand-by Assistance MET 18  4. Gait  x 150 feet with Stand-by Assistance with or without AD MET 18  5. Ascend/descend 15 stair with bilateral Handrails Stand-by Assistance   6. Lower extremity exercise program x10 reps with independence                       Time Tracking:     PT Received On: 18  PT Start Time: 1244 (also 1402)     PT Stop Time: 1255 (also 1416)  PT Total Time (min): 11 min     Billable Minutes: Gait Training 14 and Therapeutic Activity 11    Treatment Type: Treatment  PT/PTA: PTA     PTA Visit Number: 1     Surjit Zimmerman, PTA  2018

## 2018-07-18 NOTE — PLAN OF CARE
Rounded on patient.    This  put name on white board and explained blue discharge folder to patient. Discharge planning brochure and/or business card given to card to patient. Patient verbalized understanding.    Sent epic message to PCP office to obtain followup appt    PT/OT recommending outpaitient therapy vs home health. Patient has medicaid and would qualify for outpatient therapy. Discussed outpatient therapy preferences with patient and she would like to go to [facility] [place] [facility]. Will ask MD to write ambulatory referral to PT/OT upon discharge.    Will check with PT/OT to see if patient needs any home DME needs.       07/18/18 1041   Discharge Reassessment   Assessment Type Discharge Planning Reassessment   Provided patient/caregiver education on the expected discharge date and the discharge plan Yes   Do you have any problems affording any of your prescribed medications? No   Discharge Plan A Home     Jennifer Sierra, RN, CCM, CMSRN  RN Transition Navigator  [phone]

## 2018-07-18 NOTE — PT/OT/SLP PROGRESS
Occupational Therapy  Visit Attempt    Patient Name:  Annette Esparza   MRN:  [id number]    Patient not seen today secondary to 10/10 low back pain. RN notified of same. Will follow-up later, as time permits.    MARIOLA Lewis  7/18/2018

## 2018-07-19 ENCOUNTER — DOCUMENTATION ONLY (OUTPATIENT)
Dept: TRANSPLANT | Facility: CLINIC | Age: 48
End: 2018-07-19

## 2018-07-19 ENCOUNTER — TELEPHONE (OUTPATIENT)
Dept: TRANSPLANT | Facility: CLINIC | Age: 48
End: 2018-07-19

## 2018-07-19 ENCOUNTER — TELEPHONE (OUTPATIENT)
Dept: NEUROLOGY | Facility: HOSPITAL | Age: 48
End: 2018-07-19

## 2018-07-19 VITALS
HEART RATE: 102 BPM | RESPIRATION RATE: 17 BRPM | DIASTOLIC BLOOD PRESSURE: 63 MMHG | BODY MASS INDEX: 23.22 KG/M2 | WEIGHT: 123 LBS | OXYGEN SATURATION: 94 % | SYSTOLIC BLOOD PRESSURE: 98 MMHG | HEIGHT: 61 IN | TEMPERATURE: 99 F

## 2018-07-19 LAB
ALBUMIN SERPL BCP-MCNC: 1.8 G/DL
ALBUMIN SERPL BCP-MCNC: 1.8 G/DL
ALBUMIN SERPL BCP-MCNC: 1.9 G/DL
ALBUMIN SERPL BCP-MCNC: 1.9 G/DL
ALP SERPL-CCNC: 343 U/L
ALT SERPL W/O P-5'-P-CCNC: 25 U/L
ANION GAP SERPL CALC-SCNC: 11 MMOL/L
ANION GAP SERPL CALC-SCNC: 11 MMOL/L
ANION GAP SERPL CALC-SCNC: 8 MMOL/L
ANISOCYTOSIS BLD QL SMEAR: SLIGHT
APTT BLDCRRT: 42.9 SEC
AST SERPL-CCNC: 46 U/L
BACTERIA SPEC AEROBE CULT: NO GROWTH
BASOPHILS # BLD AUTO: 0.02 K/UL
BASOPHILS NFR BLD: 0.1 %
BILIRUB DIRECT SERPL-MCNC: 7.4 MG/DL
BILIRUB SERPL-MCNC: 10.8 MG/DL
BUN SERPL-MCNC: 5 MG/DL
BUN SERPL-MCNC: 5 MG/DL
BUN SERPL-MCNC: 6 MG/DL
CALCIUM SERPL-MCNC: 7.9 MG/DL
CALCIUM SERPL-MCNC: 8 MG/DL
CALCIUM SERPL-MCNC: 8 MG/DL
CHLORIDE SERPL-SCNC: 86 MMOL/L
CHLORIDE SERPL-SCNC: 87 MMOL/L
CHLORIDE SERPL-SCNC: 88 MMOL/L
CO2 SERPL-SCNC: 25 MMOL/L
CO2 SERPL-SCNC: 25 MMOL/L
CO2 SERPL-SCNC: 28 MMOL/L
CREAT SERPL-MCNC: 0.7 MG/DL
DIFFERENTIAL METHOD: ABNORMAL
EOSINOPHIL # BLD AUTO: 0 K/UL
EOSINOPHIL NFR BLD: 0.1 %
ERYTHROCYTE [DISTWIDTH] IN BLOOD BY AUTOMATED COUNT: 23 %
EST. GFR  (AFRICAN AMERICAN): >60 ML/MIN/1.73 M^2
EST. GFR  (NON AFRICAN AMERICAN): >60 ML/MIN/1.73 M^2
GLUCOSE SERPL-MCNC: 101 MG/DL
GLUCOSE SERPL-MCNC: 92 MG/DL
GLUCOSE SERPL-MCNC: 93 MG/DL
HCT VFR BLD AUTO: 27.5 %
HGB BLD-MCNC: 9.2 G/DL
HYPOCHROMIA BLD QL SMEAR: ABNORMAL
INR PPP: 1.4
LYMPHOCYTES # BLD AUTO: 0.8 K/UL
LYMPHOCYTES NFR BLD: 4.8 %
MAGNESIUM SERPL-MCNC: 1.7 MG/DL
MAGNESIUM SERPL-MCNC: 1.7 MG/DL
MAGNESIUM SERPL-MCNC: 1.8 MG/DL
MCH RBC QN AUTO: 30.8 PG
MCHC RBC AUTO-ENTMCNC: 33.5 G/DL
MCV RBC AUTO: 92 FL
MONOCYTES # BLD AUTO: 1.7 K/UL
MONOCYTES NFR BLD: 10 %
NEUTROPHILS # BLD AUTO: 14.2 K/UL
NEUTROPHILS NFR BLD: 85 %
OSMOLALITY SERPL: 254 MOSM/KG
PHOSPHATE SERPL-MCNC: 3.3 MG/DL
PHOSPHATE SERPL-MCNC: 3.4 MG/DL
PHOSPHATE SERPL-MCNC: 3.7 MG/DL
PLATELET # BLD AUTO: 135 K/UL
PLATELET BLD QL SMEAR: ABNORMAL
PMV BLD AUTO: 10.5 FL
POIKILOCYTOSIS BLD QL SMEAR: SLIGHT
POLYCHROMASIA BLD QL SMEAR: ABNORMAL
POTASSIUM SERPL-SCNC: 3 MMOL/L
POTASSIUM SERPL-SCNC: 3.3 MMOL/L
POTASSIUM SERPL-SCNC: 3.4 MMOL/L
PROT SERPL-MCNC: 5.6 G/DL
PROTHROMBIN TIME: 15.2 SEC
RBC # BLD AUTO: 2.99 M/UL
SODIUM SERPL-SCNC: 122 MMOL/L
SODIUM SERPL-SCNC: 123 MMOL/L
SODIUM SERPL-SCNC: 124 MMOL/L
T4 FREE SERPL-MCNC: 1.05 NG/DL
TARGETS BLD QL SMEAR: ABNORMAL
TSH SERPL DL<=0.005 MIU/L-ACNC: 4.98 UIU/ML
WBC # BLD AUTO: 16.93 K/UL

## 2018-07-19 PROCEDURE — 63600175 PHARM REV CODE 636 W HCPCS: Performed by: INTERNAL MEDICINE

## 2018-07-19 PROCEDURE — 84443 ASSAY THYROID STIM HORMONE: CPT

## 2018-07-19 PROCEDURE — 97530 THERAPEUTIC ACTIVITIES: CPT

## 2018-07-19 PROCEDURE — 83930 ASSAY OF BLOOD OSMOLALITY: CPT

## 2018-07-19 PROCEDURE — 97116 GAIT TRAINING THERAPY: CPT

## 2018-07-19 PROCEDURE — 94761 N-INVAS EAR/PLS OXIMETRY MLT: CPT

## 2018-07-19 PROCEDURE — 85610 PROTHROMBIN TIME: CPT

## 2018-07-19 PROCEDURE — 85730 THROMBOPLASTIN TIME PARTIAL: CPT

## 2018-07-19 PROCEDURE — 80076 HEPATIC FUNCTION PANEL: CPT

## 2018-07-19 PROCEDURE — 25000003 PHARM REV CODE 250: Performed by: INTERNAL MEDICINE

## 2018-07-19 PROCEDURE — 36415 COLL VENOUS BLD VENIPUNCTURE: CPT

## 2018-07-19 PROCEDURE — G8987 SELF CARE CURRENT STATUS: HCPCS | Mod: CH

## 2018-07-19 PROCEDURE — 27000221 HC OXYGEN, UP TO 24 HOURS

## 2018-07-19 PROCEDURE — G8989 SELF CARE D/C STATUS: HCPCS | Mod: CH

## 2018-07-19 PROCEDURE — 85025 COMPLETE CBC W/AUTO DIFF WBC: CPT

## 2018-07-19 PROCEDURE — 80069 RENAL FUNCTION PANEL: CPT | Mod: 91

## 2018-07-19 PROCEDURE — 83735 ASSAY OF MAGNESIUM: CPT | Mod: 91

## 2018-07-19 PROCEDURE — 84439 ASSAY OF FREE THYROXINE: CPT

## 2018-07-19 RX ORDER — SPIRONOLACTONE 100 MG/1
100 TABLET, FILM COATED ORAL DAILY
Qty: 30 TABLET | Refills: 11 | Status: SHIPPED | OUTPATIENT
Start: 2018-07-20 | End: 2018-07-19

## 2018-07-19 RX ORDER — NAPROXEN SODIUM 220 MG/1
81 TABLET, FILM COATED ORAL DAILY
Status: CANCELLED | OUTPATIENT
Start: 2018-07-19

## 2018-07-19 RX ORDER — CIPROFLOXACIN 500 MG/1
500 TABLET ORAL EVERY 12 HOURS
Qty: 12 TABLET | Refills: 0 | Status: SHIPPED | OUTPATIENT
Start: 2018-07-19 | End: 2018-07-19

## 2018-07-19 RX ORDER — SPIRONOLACTONE 25 MG/1
100 TABLET ORAL DAILY
Status: DISCONTINUED | OUTPATIENT
Start: 2018-07-20 | End: 2018-07-19 | Stop reason: HOSPADM

## 2018-07-19 RX ORDER — CIPROFLOXACIN 500 MG/1
500 TABLET ORAL EVERY 12 HOURS
Qty: 28 TABLET | Refills: 0 | Status: ON HOLD | OUTPATIENT
Start: 2018-07-19 | End: 2018-07-29

## 2018-07-19 RX ORDER — LANOLIN ALCOHOL/MO/W.PET/CERES
1000 CREAM (GRAM) TOPICAL DAILY
COMMUNITY
Start: 2018-07-20 | End: 2018-01-01 | Stop reason: SDUPTHER

## 2018-07-19 RX ORDER — LANOLIN ALCOHOL/MO/W.PET/CERES
100 CREAM (GRAM) TOPICAL DAILY
Status: ON HOLD | COMMUNITY
Start: 2018-07-20 | End: 2018-01-01

## 2018-07-19 RX ORDER — FOLIC ACID 1 MG/1
1 TABLET ORAL DAILY
Qty: 30 TABLET | Refills: 11 | Status: SHIPPED | OUTPATIENT
Start: 2018-07-20 | End: 2019-01-01

## 2018-07-19 RX ORDER — SPIRONOLACTONE 100 MG/1
100 TABLET, FILM COATED ORAL DAILY
Qty: 30 TABLET | Refills: 0 | Status: SHIPPED | OUTPATIENT
Start: 2018-07-20 | End: 2018-01-01 | Stop reason: SDUPTHER

## 2018-07-19 RX ORDER — FERROUS SULFATE 325(65) MG
325 TABLET, DELAYED RELEASE (ENTERIC COATED) ORAL 2 TIMES DAILY
Qty: 60 TABLET | Refills: 0 | Status: SHIPPED | OUTPATIENT
Start: 2018-07-19 | End: 2018-08-01

## 2018-07-19 RX ORDER — FUROSEMIDE 40 MG/1
40 TABLET ORAL DAILY
Status: DISCONTINUED | OUTPATIENT
Start: 2018-07-20 | End: 2018-07-19 | Stop reason: HOSPADM

## 2018-07-19 RX ADMIN — FOLIC ACID 1 MG: 1 TABLET ORAL at 08:07

## 2018-07-19 RX ADMIN — LACTULOSE 20 G: 20 SOLUTION ORAL at 08:07

## 2018-07-19 RX ADMIN — TRAMADOL HYDROCHLORIDE 100 MG: 50 TABLET, COATED ORAL at 01:07

## 2018-07-19 RX ADMIN — SPIRONOLACTONE 50 MG: 25 TABLET, FILM COATED ORAL at 08:07

## 2018-07-19 RX ADMIN — FERROUS SULFATE TAB EC 325 MG (65 MG FE EQUIVALENT) 325 MG: 325 (65 FE) TABLET DELAYED RESPONSE at 08:07

## 2018-07-19 RX ADMIN — CIPROFLOXACIN 500 MG: 500 TABLET, FILM COATED ORAL at 08:07

## 2018-07-19 RX ADMIN — HEPARIN SODIUM 5000 UNITS: 5000 INJECTION, SOLUTION INTRAVENOUS; SUBCUTANEOUS at 05:07

## 2018-07-19 RX ADMIN — TRAMADOL HYDROCHLORIDE 100 MG: 50 TABLET, COATED ORAL at 04:07

## 2018-07-19 RX ADMIN — MIDODRINE HYDROCHLORIDE 10 MG: 5 TABLET ORAL at 08:07

## 2018-07-19 RX ADMIN — HEPARIN SODIUM 5000 UNITS: 5000 INJECTION, SOLUTION INTRAVENOUS; SUBCUTANEOUS at 01:07

## 2018-07-19 RX ADMIN — CYANOCOBALAMIN TAB 1000 MCG 1000 MCG: 1000 TAB at 08:07

## 2018-07-19 RX ADMIN — THERA TABS 1 TABLET: TAB at 08:07

## 2018-07-19 RX ADMIN — FUROSEMIDE 20 MG: 20 TABLET ORAL at 08:07

## 2018-07-19 RX ADMIN — Medication 100 MG: at 08:07

## 2018-07-19 NOTE — PT/OT/SLP PROGRESS
Occupational Therapy   Treatment & D/C    Name: Annette Esparza  MRN: [id number]  Admitting Diagnosis:  Hyponatremia       Recommendations:     Discharge Recommendations: outpatient PT  Discharge Equipment Recommendations:  walker, rolling  Barriers to discharge:  Inaccessible home environment    Subjective     Communicated with: nsg prior to session.  Pain/Comfort:  · Pain Rating 1: 0/10  · Pain Rating Post-Intervention 1: 0/10    Patients cultural, spiritual, Muslim conflicts given the current situation:  (none reported)    Objective:     Patient found with: oxygen, central line    General Precautions: Standard, fall, respiratory   Orthopedic Precautions:N/A   Braces: N/A     Occupational Performance:    Bed Mobility:    ·      Functional Mobility/Transfers:  · Patient completed Toilet Transfer Step Transfer technique with supervision with  rolling walker  · Functional Mobility: via RW around room for ADLs w/ Sup    Activities of Daily Living:  · Lower Body Dressing: supervision .  · Toileting: modified independence and supervision on toilet    Patient left up in chair with all lines intact and call button in reach    [facility] 6 Click:  [facility] Total Score: 24    Treatment & Education:    Education:  Pt agreeable to OT visit this date. Pt reports/demo'ed complete return to PLOF: (I) w/ all BADLs & stated that she no longer needs OT. Edu/tx re: HEP w/ deep breathing. Pt verbalized/demo'ed understanding.      Assessment:   Pt has achieved/exceeded all established goals for return to PLOF w/ BADLs & appropriate for d/c home w/ out pt PT & rec RW upon medical stability.    Annette Esparza is a 48 y.o. female with a medical diagnosis of Hyponatremia.  She presents with ..  Performance deficits affecting function are impaired cardiopulmonary response to activity.      Rehab Prognosis:  good; patient would benefit from acute skilled OT services to address these deficits and reach maximum level of function.        Plan:     Patient to be seen 5 x/week to address the above listed problems via self-care/home management, therapeutic activities, therapeutic exercises  · Plan of Care Expires: 08/16/18  · Plan of Care Reviewed with: patient    This Plan of care has been discussed with the patient who was involved in its development and understands and is in agreement with the identified goals and treatment plan    GOALS:    Occupational Therapy Goals     Not on file          Multidisciplinary Problems (Resolved)        Problem: Occupational Therapy Goal    Goal Priority Disciplines Outcome Interventions   Occupational Therapy Goal   (Resolved)     OT, PT/OT Outcome(s) achieved    Description:  Goals to be met by: 8/15     Patient will increase functional independence with ADLs by performing:    UE Dressing with Stand-by Assistance.--met 07/19/18  LE Dressing with Minimal Assistance.--exceeded 07/19/18  Grooming while standing with Stand-by Assistance.--met 07/19/18  Toileting from bedside commode with Minimal Assistance for hygiene and clothing management.--exceeded 07/19/18  Toilet transfer to bedside commode with Stand-by Assistance.--exceeded 07/19/18  Upper extremity exercise program x10 reps per handout, with supervision.--met 07/19/18                       Time Tracking:     OT Date of Treatment: 07/19/18  OT Start Time: 1058  OT Stop Time: 1106  OT Total Time (min): 8 min    Billable Minutes:Therapeutic Activity 8    MYA Boyd  7/19/2018

## 2018-07-19 NOTE — PLAN OF CARE
Problem: Occupational Therapy Goal  Goal: Occupational Therapy Goal  Goals to be met by: 8/15     Patient will increase functional independence with ADLs by performing:    UE Dressing with Stand-by Assistance.--met 07/19/18  LE Dressing with Minimal Assistance.--exceeded 07/19/18  Grooming while standing with Stand-by Assistance.--met 07/19/18  Toileting from bedside commode with Minimal Assistance for hygiene and clothing management.--exceeded 07/19/18  Toilet transfer to bedside commode with Stand-by Assistance.--exceeded 07/19/18  Upper extremity exercise program x10 reps per handout, with supervision.--met 07/19/18     Outcome: Outcome(s) achieved Date Met: 07/19/18  Pt agreeable to OT visit this date. Pt reports/demo'ed complete return to PLOF: (I) w/ all BADLs & stated that she no longer needs OT. Edu/tx re: HEP w/ deep breathing. Pt verbalized/demo'ed understanding.    Pt has achieved/exceeded all established goals for return to PLOF w/ BADLs & appropriate for d/c home w/ out pt PT & rec RW upon medical stability.

## 2018-07-19 NOTE — TELEPHONE ENCOUNTER
----- Message from Juan Diego Serra MD sent at 7/19/2018 11:19 AM CDT -----  Fredi toro,    I was hoping to get this patient into [facility] [place], [place]-hepatology for transplant evaluation.    Thanks so much.

## 2018-07-19 NOTE — PROGRESS NOTES
[facility]    CC: Ascites     HPI 48 y.o. female with a previous medical history of cirrhosis who was admitted for acute encephalopathy. She reports worsening, generalized abdominal pain for the past 2 weeks with associated abdominal distention. She reports her last bowel movement to be one week ago. She denies any hematemesis, coffee ground emesis, hematochezia, or melena. She denies any fevers but reports chills.      Patient's cirrhosis was diagnosed with liver biopsy in 2014. Her cirrhosis has been attributed to alcohol intake. Her last drink was 2 years ago. She has had multiple hospitalizations for large volume ascites requiring paracentesis. Last EGD in 6/2016 without esophageal varices but she found to have portal hypertensive gastropathy. She has been on midodrine for hypotension.    Interval History    Reports less abdominal distention. Elevated WBC. Denies any fever or chills.     Past Medical History:   Diagnosis Date    Acute hypoxemic respiratory failure 12/1/2016    ANDREW (acute kidney injury) 11/28/2016    Alcohol dependence in remission     Alcoholic cirrhosis of liver with ascites 10/22/2015    Anxiety     Ascites due to alcoholic cirrhosis 6/13/2016    Centrilobular emphysema 12/12/2016    Cigarette nicotine dependence without complication 6/2/2016    Folate deficiency 10/22/2015    Gallstones     Hepatorenal syndrome 11/29/2016    Hyperbilirubinemia 6/13/2016    Hyponatremia 11/29/2016    Iron deficiency anemia due to chronic blood loss 10/22/2015    Portal hypertensive gastropathy 10/22/2015    Subclinical hypothyroidism 10/22/2015         Review of Systems  General ROS: negative for chills, fever or weight loss  Cardiovascular ROS: no chest pain or dyspnea on exertion  Gastrointestinal ROS: no abdominal pain, no change in bowel habits, or black/ bloody stools    Physical Examination  /63 (BP Location: Left arm, Patient Position: Lying)   Pulse 96   Temp 99 °F  "(37.2 °C) (Oral)   Resp 14   Ht 5' 1" (1.549 m)   Wt 55.8 kg (123 lb 0.3 oz)   LMP  (LMP Unknown)   SpO2 (!) 83% Comment: rn bhakti and MD notified   Breastfeeding? No   BMI 23.24 kg/m²   General appearance: alert, cooperative, no distress  HENT: Normocephalic, atraumatic, neck symmetrical, scleral icterus present   Lungs: clear to auscultation bilaterally, no dullness to percussion bilaterally  Heart: regular rate and rhythm without rub; no displacement of the PMI   Abdomen: distended, generalized tenderness to palpation; bowel sounds normoactive  Extremities: extremities symmetric; no clubbing, cyanosis, or edema  Neurologic: Alert and oriented X 3, normal strength    Labs:  H/H 9.2/27.5  WBC 16.93  Platelets 135  Sodium 123  T bili 10.8  INR 1.4  Albumin 1.9    BCx e.coli, repeat BCx NGTD  UCx e.coli     Imaging:  RUQ US: Sludge and tiny stones noted within the contracted gallbladder.    I personally performed comprehensive medical chart review.    Assessment:   48 y.o. female with a previous medical history of cirrhosis who presents with worsening ascites and gram negative bacteremia. S/P paracentesis with 1.5L removal. Fluid analysis not suggestive of SBP.     Plan:  -Source of bacteremia possibly urine as urine culture positive for E. Coli.   -Elevated WBC today. Follow up on blood cultures  -Continue PO cipro for coverage of UTI  -Monitor H/H   -Transfuse for Hb <7    Michelle Jean  PGY-1 Categorical Medicine  Gastroenterology Service           "

## 2018-07-19 NOTE — TELEPHONE ENCOUNTER
----- Message from Ruma Harris sent at 7/19/2018  3:39 PM CDT -----  Contact: Emelyn Slater  GI[facility] [place] [phone] [facility].

## 2018-07-19 NOTE — PLAN OF CARE
Problem: Patient Care Overview  Goal: Plan of Care Review  Outcome: Ongoing (interventions implemented as appropriate)  Patient on oxygen with documented flow.  Patient sats were 83% on room air.  MD and RN bhakti notified and plt placed on 2L   Will attempt to wean per O2 order protocol.  Will continue to monitor.

## 2018-07-19 NOTE — PT/OT/SLP PROGRESS
Physical Therapy Treatment    Patient Name:  Annette Esparza   MRN:  [id number]    Recommendations:     Discharge Recommendations:  outpatient PT   Discharge Equipment Recommendations: walker, rolling   Barriers to discharge: decreased balance and endurance    Assessment:     Annette Esparza is a 48 y.o. female admitted with a medical diagnosis of Hyponatremia.  She presents with the following impairments/functional limitations:  weakness, impaired endurance, impaired functional mobilty, gait instability, impaired balance, impaired coordination, impaired cardiopulmonary response to activity attempted gait with SC but pt not comfortable with it at all requiring increased assistance and v/c's,pt will benefit from continuing PT services upon discharge to increase independence.    Rehab Prognosis:  Good; patient would benefit from acute skilled PT services to address these deficits and reach maximum level of function.      Recent Surgery: * No surgery found *      Plan:     During this hospitalization, patient to be seen 6 x/week to address the above listed problems via gait training, therapeutic activities, therapeutic exercises, neuromuscular re-education  · Plan of Care Expires:  08/16/18   Plan of Care Reviewed with: patient    Subjective     Communicated with nsg prior to session.  Patient found up in chair upon PT entry to room, agreeable to treatment.      Chief Complaint: n/a  Patient comments/goals: pt hopes to go home today  Pain/Comfort:  · Pain Rating 1:  (no c/o's)    Patients cultural, spiritual, Restoration conflicts given the current situation: no    Objective:     Patient found with: central line, oxygen (O2 not donned)     General Precautions: Standard, fall   Orthopedic Precautions:N/A   Braces: N/A     Functional Mobility:  · Transfers:     · Sit to Stand:  supervision with no AD  · Gait: amb ~15' X 1 with SC and Min/Mod A,amb ~100' X 2 with RW and S,O2/2L  · Balance: good sitting  balance      AM-PAC 6 CLICK MOBILITY  Turning over in bed (including adjusting bedclothes, sheets and blankets)?: 4  Sitting down on and standing up from a chair with arms (e.g., wheelchair, bedside commode, etc.): 3  Moving from lying on back to sitting on the side of the bed?: 4  Moving to and from a bed to a chair (including a wheelchair)?: 3  Need to walk in hospital room?: 3  Climbing 3-5 steps with a railing?: 3  Basic Mobility Total Score: 20       Therapeutic Activities and Exercises: O2 sats on rm air at rest 76%,on 2L/O2 94%,nsg notified,       Patient left up in chair with all lines intact, call button in reach, chair alarm on and nsg notified..    GOALS: see general POC   Physical Therapy Goals        Problem: Physical Therapy Goal    Goal Priority Disciplines Outcome Goal Variances Interventions   Physical Therapy Goal     PT/OT, PT Ongoing (interventions implemented as appropriate)     Description:  Goals to be met by: 2018     Patient will increase functional independence with mobility by performin. Supine to sit with Modified [place] MET 18  2. Sit to supine with Modified [place]  3. Sit to stand transfer with Stand-by Assistance MET 18  4. Gait  x 150 feet with Stand-by Assistance with or without AD MET 18  5. Ascend/descend 15 stair with bilateral Handrails Stand-by Assistance  MET 18  6. Lower extremity exercise program x10 reps with independence                        Time Tracking:     PT Received On: 18  PT Start Time: 1022     PT Stop Time: 1048  PT Total Time (min): 26 min     Billable Minutes: Gait Training 15 and Therapeutic Activity 11    Treatment Type: Treatment  PT/PTA: PTA     PTA Visit Number: 2     Surjit Zimmerman, PTA  2018

## 2018-07-19 NOTE — PROGRESS NOTES
Patient received on room air with SpO2 86%. Patient placed on 1 lpm NC with SpO2 increasing to 94%. A bottle humidifier has been added for comfort. Will continue to monitor.

## 2018-07-19 NOTE — MEDICAL/APP STUDENT
[facility] Internal Medicine Progress Note    Subjective:      Annette Esparza is a 49 yo female that presented to the ED with generalized weakness, confusion, and hallucinations for 2 weeks    Isaias is feeling much better today. Her back pain has decreased because she was given tramadol 100mg. She slept the whole night and was only woken up for blood draws. She denies shortness of breath, vomiting, diarrhea, fever, chills.     Objective:   Last 24 Hour Vital Signs:  BP  Min: 97/65  Max: 123/56  Temp  Av.3 °F (36.8 °C)  Min: 98 °F (36.7 °C)  Max: 99 °F (37.2 °C)  Pulse  Av.3  Min: 73  Max: 126  Resp  Av.4  Min: 14  Max: 18  SpO2  Av.4 %  Min: 90 %  Max: 94 %  Weight  Av.8 kg (123 lb 0.3 oz)  Min: 55.8 kg (123 lb 0.3 oz)  Max: 55.8 kg (123 lb 0.3 oz)  I/O last 3 completed shifts:  In: 2275 [P.O.:975; I.V.:780; Blood:470; IV Piggyback:50]  Out: 1100 [Urine:1100]    Physical Examination:  General: Alert, Awake, Oriented x 3, No Acute Distress  Head: normocephalic, atraumatic  Eyes: scleral icterus  Respiratory: lungs clear to ascultation bilaterally, no accessory use of muscles   Cardiovascular: regular rate with regular rhythm, no murmurs, rubs, or S3, S4, normal apical impulse.  Gastrointestinal: soft, nontender, distended    Laboratory:  Laboratory Data Reviewed: yes  Trended Lab Data:  WBC count lin from 11.66 to 16.93  H/H=6.9/21 on  to 9.2/27.5 on   Cs=327  K=3.4  Cl=87  HCO3=25    Recent Labs  Lab 18  0609  18  0406  18  1418 18  2000 18  0126 18  0457 18  0624   WBC 11.66  --  9.16  --   --   --   --  16.93*  --    HGB 6.9*  --  8.9*  --   --   --   --  9.2*  --    HCT 21.0*  --  26.7*  --   --   --   --  27.5*  --    *  --  106*  --   --   --   --  135*  --    MCV 95  --  92  --   --   --   --  92  --    RDW 25.2*  --  23.3*  --   --   --   --  23.0*  --    *  < > 127*  < > 123* 123* 124*  --   --    K 3.0*  < > 3.1*  < > 3.7  3.4* 3.3*  --   --    CL 87*  < > 89*  < > 86* 85* 88*  --   --    CO2 27  < > 32*  < > 28 29 28  --   --    BUN 5*  < > 3*  < > 4* 4* 5*  --   --    CREATININE 0.6  < > 0.6  < > 0.6 0.7 0.7  --   --      < > 92  < > 125* 102 93  --   --    PROT 5.2*  --  5.5*  --   --   --   --   --  5.6*   ALBUMIN 1.7*  1.7*  < > 1.9*  < > 1.9* 1.9* 1.9*  --  1.8*   BILITOT 9.4*  --  11.4*  --   --   --   --   --  10.8*   AST 28  --  38  --   --   --   --   --  46*   ALKPHOS 310*  --  331*  --   --   --   --   --  343*   ALT 16  --  21  --   --   --   --   --  25   < > = values in this interval not displayed.    Trended Cardiac Data:  No results for input(s): TROPONINI, CKTOTAL, CKMB, BNP in the last 168 hours.    Microbiology Data   Microbiology Results (last 7 days)     Procedure Component Value Units Date/Time    Blood culture x two cultures. Draw prior to antibiotics. [[id number]] Collected:  07/15/18 1619    Order Status:  Completed Specimen:  Blood from Peripheral, Hand, Right Updated:  07/18/18 2312     Blood Culture, Routine No Growth to date     Blood Culture, Routine No Growth to date     Blood Culture, Routine No Growth to date     Blood Culture, Routine No Growth to date    Narrative:       Aerobic and anaerobic    Blood culture [[id number]] Collected:  07/17/18 1645    Order Status:  Completed Specimen:  Blood from Peripheral, Forearm, Left Updated:  07/18/18 2212     Blood Culture, Routine No Growth to date     Blood Culture, Routine No Growth to date    Blood culture x two cultures. Draw prior to antibiotics. [[id number]]  (Susceptibility) Collected:  07/15/18 1635    Order Status:  Completed Specimen:  Blood from Peripheral, Hand, Left Updated:  07/18/18 1000     Blood Culture, Routine Gram stain daron bottle: Gram negative rods      Blood Culture, Routine Results called to and read back by:Corinna Fischer RN 07/16/2018  11:10     Blood Culture, Routine ESCHERICHIA COLI    Narrative:       Aerobic and anaerobic     Culture, Anaerobic [[id number]] Collected:  07/16/18 1538    Order Status:  Completed Specimen:  Body Fluid from Ascites Updated:  07/18/18 0840     Anaerobic Culture Culture in progress    Urine culture - High Risk [[id number]]  (Susceptibility) Collected:  07/15/18 1814    Order Status:  Completed Specimen:  Urine from Urine, Catheterized Updated:  07/17/18 2203     Urine Culture, Routine --     ESCHERICHIA COLI  >100,000 cfu/ml      Blood culture [[id number]] Collected:  07/17/18 1606    Order Status:  Sent Specimen:  Blood from Line, Subclavian, Right Updated:  07/17/18 1607    Aerobic culture [[id number]] Collected:  07/16/18 1538    Order Status:  Completed Specimen:  Body Fluid from Ascites Updated:  07/17/18 0724     Aerobic Bacterial Culture No growth    Gram stain [[id number]] Collected:  07/16/18 1538    Order Status:  Completed Specimen:  Body Fluid from Ascites Updated:  07/16/18 2151     Gram Stain Result Rare WBC's      No organisms seen    Fungus culture [[id number]] Collected:  07/16/18 1538    Order Status:  Sent Specimen:  Body Fluid from Ascites Updated:  07/16/18 1851          Radiology:  Imaging Results          X-Ray Chest 1 View (Final result)  Result time 07/15/18 20:20:31    Final result by Ger Mays MD (07/15/18 20:20:31)                 Impression:      Interval placement of a right IJ central venous catheter, with the tip overlying the right brachiocephalic/SVC junction.      Electronically signed by: Ger Mays MD  Date:    07/15/2018  Time:    20:20             Narrative:    EXAMINATION:  XR CHEST 1 VIEW    CLINICAL HISTORY:  Encounter for adjustment and management of vascular access device    TECHNIQUE:  One view of the chest.    COMPARISON:  07/15/2018 at 5:06 p.m.    FINDINGS:  Cardiac wires overlie the chest.  Interval placement of a right IJ central venous catheter with the distal tip overlying the right brachiocephalic/SVC junction.  Cardiac silhouette is not enlarged.   Mild bibasilar subsegmental atelectasis, left side greater than right.  Suspect trace left pleural fluid.  Stable micronodular pattern throughout the lung fields and biapical emphysema.  No pneumothorax.                               X-Ray Chest AP Portable (Final result)  Result time 07/15/18 17:12:40    Final result by Urbano Rain MD (07/15/18 17:12:40)                 Impression:      1. Grossly stable multifocal pulmonary parenchymal nodular foci, no large focal consolidation.      Electronically signed by: Urbano Rain MD  Date:    07/15/2018  Time:    17:12             Narrative:    EXAMINATION:  XR CHEST AP PORTABLE    CLINICAL HISTORY:  Sepsis;    TECHNIQUE:  Single frontal view of the chest was performed.    COMPARISON:  11/30/2017    FINDINGS:  The cardiomediastinal silhouette is not enlarged, similar to the previous exam noting possible calcification at the aortic arch..  There is no pleural effusion.  The trachea is midline.  The lungs are symmetrically expanded bilaterally with bilateral reticulonodular density, and scattered miliary type density, unchanged.  There is a prominent focus of calcification projected over the left upper lung zone, stable..  No large focal consolidation seen.  There is no pneumothorax.  The osseous structures are remarkable for degenerative change..                                  Current Medications:     Infusions:       Scheduled:   ciprofloxacin HCl  500 mg Oral Q12H    cyanocobalamin  1,000 mcg Oral Daily    ergocalciferol  50,000 Units Oral Q7 Days    ferrous sulfate  325 mg Oral BID    folic acid  1 mg Oral Daily    furosemide  20 mg Oral Daily    heparin (porcine)  5,000 Units Subcutaneous Q8H    lactulose  20 g Oral BID    midodrine  10 mg Oral BID WM    multivitamin  1 tablet Oral Daily    sodium chloride  1 g Oral BID    spironolactone  50 mg Oral Daily    thiamine  100 mg Oral Daily        PRN:  sodium chloride, traMADol    Antibiotics and  Day Number of Therapy:  Cipro 500mg PO, Day 2    Lines and Day Number of Therapy:  Central Line, right internal jugular, Day 3    Assessment and Plan     Hyponatremia:  -Patient presented to ED with a Na of 123. Current Na is 123  -She had confusion, weakness, and hallucinations on admit but these symptoms have improved  -Pt has a history of alcoholic cirrhosis  -Her has abdominal distended and firm  -US shows ascitic fluid in abdomen  -Pt is on 1g NaCl tablet BID     Normocytic Anemia:  H/H = 9.5/28.7 on admit. H/H = 9.2/27.5 on 7/19  Iron = 63, TIBC = 207, transferrin = 140 on admit  Pt given 235mL of packed RBCs on 7/17 at 2045     Alcoholic Cirrhosis:  -Patient has history of alcoholic cirrhosis (Dx 2014 via liver biopsy)  -PT=17.3, INR=1.6, total bilirubin=13.7 on admit  -PT=15.2, INR=1.4, PTT=42.9 total bilirubin=10.8 on 7/19  -urine bilirubin=3+  -continues lactulose 20g/30mL PO BID for increased ammonia  -1.5L fluid removed via paracentesis, not suggestive for SBP     Hypokalemia, hypophosphatemia, hypomagnesia:  -K<2, PO4=1.7, Mg=1.3 on admit  -K=3.4, PO4=3.3, Mg=1.7 on 7/19     UTI:  -UA growing E. Coli  -urine positive for nitrites  -Pt was on 2g of ceftriaxone, Day 3 of medication  -Pt switched to 500mg of cipro PO on 7/18, today is Day 2 of medication     Vit D deficiency  -Vit D=8  -continue vit D supplementation      Perry Sorenson L3  [facility] Internal Medicine  [facility] Hospitalitis Service Team B    [facility] Hospitalist Pager numbers:   [facility] Hospitalist Medicine Team A ([place]): [phone]  [facility] Hospitalist Medicine Team B ([place]):  [phone]

## 2018-07-19 NOTE — CONSULTS
[facility] Consult    Consulting Physician:Eros Gallardo MD  Date of Admission:7/15/2018  Date of Consult: 7/19/18    Reason for Consult:  Hyponatremia    Subjective:  History of Present Illness:  Annette Esparza is a 48 y.o.  female who  has a past medical history of Acute hypoxemic respiratory failure (12/1/2016); ANDREW (acute kidney injury) (11/28/2016); Alcohol dependence in remission; Alcoholic cirrhosis of liver with ascites (10/22/2015); Anxiety; Ascites due to alcoholic cirrhosis (6/13/2016); Centrilobular emphysema (12/12/2016); Cigarette nicotine dependence without complication (6/2/2016); Folate deficiency (10/22/2015); Gallstones; Hepatorenal syndrome (11/29/2016); Hyperbilirubinemia (6/13/2016); Hyponatremia (11/29/2016); Iron deficiency anemia due to chronic blood loss (10/22/2015); Portal hypertensive gastropathy (10/22/2015); and Subclinical hypothyroidism (10/22/2015).. The patient presented to [facility] on 7/15/2018 with a primary complaint of Altered Mental Status (c/o confusion, hallucinations, and weakness x2 weeks. Pt also c/o jaundice for the past few days)     Pt presented to the hospital for AMS with confusion and poor coordination. She mentioned that she had been bumping into walls while walking and having hallucinations. Pt found to be bacteremic with e. Coli likely from UTI. On appropriate antibiotics.    Past Medical History:  Past Medical History:   Diagnosis Date    Acute hypoxemic respiratory failure 12/1/2016    ANDREW (acute kidney injury) 11/28/2016    Alcohol dependence in remission     Alcoholic cirrhosis of liver with ascites 10/22/2015    Anxiety     Ascites due to alcoholic cirrhosis 6/13/2016    Centrilobular emphysema 12/12/2016    Cigarette nicotine dependence without complication 6/2/2016    Folate deficiency 10/22/2015    Gallstones     Hepatorenal syndrome 11/29/2016    Hyperbilirubinemia 6/13/2016    Hyponatremia 11/29/2016    Iron deficiency anemia  due to chronic blood loss 10/22/2015    Portal hypertensive gastropathy 10/22/2015    Subclinical hypothyroidism 10/22/2015       Past Surgical History:  Past Surgical History:   Procedure Laterality Date    ADENOIDECTOMY      APPENDECTOMY       SECTION      COLONOSCOPY      HYSTERECTOMY      25 yrs old    LIVER BIOPSY      OOPHORECTOMY Left     25 yrs old    OOPHORECTOMY Right     29 yrs old    TONSILLECTOMY      TUBAL LIGATION      UPPER GASTROINTESTINAL ENDOSCOPY         Allergies:  Review of patient's allergies indicates:   Allergen Reactions    Morphine Nausea And Vomiting       Medications:  In-Hospital Scheduled Medications:   ciprofloxacin HCl  500 mg Oral Q12H    cyanocobalamin  1,000 mcg Oral Daily    ergocalciferol  50,000 Units Oral Q7 Days    ferrous sulfate  325 mg Oral BID    folic acid  1 mg Oral Daily    heparin (porcine)  5,000 Units Subcutaneous Q8H    lactulose  20 g Oral BID    midodrine  10 mg Oral BID WM    multivitamin  1 tablet Oral Daily    spironolactone  50 mg Oral Daily    thiamine  100 mg Oral Daily     In-Hospital PRN Medications:  sodium chloride, traMADol  In-Hospital IV Infusion Medications:    Home Medications:  Prior to Admission medications    Medication Sig Start Date End Date Taking? Authorizing Provider   furosemide (LASIX) 40 MG tablet TAKE 1 TABLET (40 MG TOTAL) BY MOUTH ONCE DAILY. 3/13/18  Yes Endy Pfeiffer MD   HYDROcodone-acetaminophen (NORCO)  mg per tablet Take 1 tablet by mouth every 8 (eight) hours as needed for Pain. 18  Yes Endy Pfeiffer MD   midodrine (PROAMATINE) 10 MG tablet Take 1 tablet (10 mg total) by mouth 3 (three) times daily with meals. 17 Yes Nicolas Shelton MD   multivitamin (THERAGRAN) tablet Take 1 tablet by mouth once daily.   Yes Historical Provider, MD       Family History:  Family History   Problem Relation Age of Onset    Rheum arthritis Father     Cancer Mother     No Known  Problems Sister     No Known Problems Brother     Colon cancer Neg Hx        Social History:  Social History   Substance Use Topics    Smoking status: Current Every Day Smoker     Packs/day: 1.00     Years: 35.00     Types: Cigarettes    Smokeless tobacco: Never Used    Alcohol use No      Comment: hx etoh-quit 2015       Review of Systems:  General - Denies fevers and chills.  Head - Denies dizziness, lightheadedness, headache  Eyes- Denies loss of vision, blurry vision  Ears - Denies decreased hearing and ringing in the ears  Nose - Denies runny nose and bloody nose  Throat - Denies dysphagia and sore throat  Pulmonary - Denies shortness of breath and cough  Cardiovascular- Denies chest pain and palpitations  Gastrointestonal - Denies nausea, vomiting, constipation. Diarrhea with lactulose  Genitourinary- Denies changes in urination, dysuria, and hematuria  Skin - Denies rashes, skin changes, and itching  Muskuloskeletal - Denies myalgias and arthralgias  Hematologic/Lymphatic - Denies bleeding and bruising.  Neurological - Denies gait disturbance and focal weakness  Psychiatric - Denies depression and anxiety  Endocrine - No heat or cold intolerance.    Health Maintaince :  Immunizations:  Currently on File with [facility]:   Most Recent Immunizations   Administered Date(s) Administered    Hepatitis A / Hepatitis B 2015    Influenza - Quadrivalent - PF 10/17/2015    Influenza - Trivalent - PF (PED) 2015    Pneumococcal Conjugate - 13 Valent 2015    Pneumococcal Polysaccharide - 23 Valent 2016       Objective:  Last 24 Hour Vital Signs:  BP  Min: 99/55  Max: 123/69  Temp  Av.4 °F (36.9 °C)  Min: 98 °F (36.7 °C)  Max: 99 °F (37.2 °C)  Pulse  Av.9  Min: 80  Max: 126  Resp  Av.2  Min: 14  Max: 18  SpO2  Av %  Min: 83 %  Max: 94 %  Weight  Av.8 kg (123 lb 0.3 oz)  Min: 55.8 kg (123 lb 0.3 oz)  Max: 55.8 kg (123 lb 0.3 oz)  I/O last 3 completed shifts:  In:  2275 [P.O.:975; I.V.:780; Blood:470; IV Piggyback:50]  Out: 1100 [Urine:1100]    Physical Examination:  General - alert and cooperative. Bitemporal wasting  Head - Normocephalic and atraumatic  Eyes - Normal lids and lashes. Scleral icterics   Neck - supple  Lungs - Bibasilar rales with decreased breath sounds in the left lung base  Heart - Regular rhythm slightly tachycardic  Abd - Normoactive bowel sounds. Distended.  Ext - No clubbing, cyanosis. 2+ pitting edema BLE  Pulse - 2+ and symmetric  Skin - Warm and dry. Jaundice, telangectasia upper chest  Psych - normal mood and affect    Laboratory Results:  Most Recent Data:  CBC: Lab Results   Component Value Date    WBC 16.93 (H) 07/19/2018    HGB 9.2 (L) 07/19/2018    HCT 27.5 (L) 07/19/2018     (L) 07/19/2018    MCV 92 07/19/2018    RDW 23.0 (H) 07/19/2018     BMP: Lab Results   Component Value Date     (L) 07/19/2018    K 3.4 (L) 07/19/2018    CL 87 (L) 07/19/2018    CO2 25 07/19/2018    BUN 5 (L) 07/19/2018    GLU 92 07/19/2018    CALCIUM 8.0 (L) 07/19/2018    MG 1.7 07/19/2018    PHOS 3.3 07/19/2018     LFTs: Lab Results   Component Value Date    PROT 5.6 (L) 07/19/2018    ALBUMIN 1.9 (L) 07/19/2018    BILITOT 10.8 (H) 07/19/2018    AST 46 (H) 07/19/2018    ALKPHOS 343 (H) 07/19/2018    ALT 25 07/19/2018     (H) 09/30/2014     Coags:   Lab Results   Component Value Date    INR 1.4 (H) 07/19/2018     FLP: Lab Results   Component Value Date    CHOL 150 12/19/2017    HDL 54 12/19/2017    LDLCALC 76.2 12/19/2017    TRIG 99 12/19/2017    CHOLHDL 36.0 12/19/2017     DM: Lab Results   Component Value Date    HGBA1C Invalid (A) 10/16/2015    HGBA1C 4.2 (L) 10/06/2015    LDLCALC 76.2 12/19/2017    CREATININE 0.7 07/19/2018     Thyroid: Lab Results   Component Value Date    TSH 5.119 (H) 04/16/2018    FREET4 1.11 04/16/2018     Anemia: Lab Results   Component Value Date    IRON 63 07/15/2018    TIBC 207 (L) 07/15/2018    FERRITIN 66 07/15/2018     BIOMPHLO51 1805 (H) 07/15/2018    FOLATE 6.0 07/15/2018     Cardiac: Lab Results   Component Value Date    TROPONINI 0.013 10/05/2015     (H) 12/04/2016     Urinalysis: Lab Results   Component Value Date    LABURIN ESCHERICHIA COLI  >100,000 cfu/ml   07/15/2018    COLORU Orange (A) 07/15/2018    SPECGRAV 1.015 07/15/2018    NITRITE Positive (A) 07/15/2018    KETONESU Trace (A) 07/15/2018    UROBILINOGEN >=8.0 (A) 07/15/2018     Trended Lab Data:    Recent Labs  Lab 07/17/18  0609  07/18/18  0406  07/18/18 2000 07/19/18  0126 07/19/18  0457 07/19/18  0624 07/19/18  0755   WBC 11.66  --  9.16  --   --   --  16.93*  --   --    HGB 6.9*  --  8.9*  --   --   --  9.2*  --   --    HCT 21.0*  --  26.7*  --   --   --  27.5*  --   --    *  --  106*  --   --   --  135*  --   --    MCV 95  --  92  --   --   --  92  --   --    RDW 25.2*  --  23.3*  --   --   --  23.0*  --   --    *  < > 127*  < > 123* 124*  --   --  123*   K 3.0*  < > 3.1*  < > 3.4* 3.3*  --   --  3.4*   CL 87*  < > 89*  < > 85* 88*  --   --  87*   CO2 27  < > 32*  < > 29 28  --   --  25   BUN 5*  < > 3*  < > 4* 5*  --   --  5*     < > 92  < > 102 93  --   --  92   PROT 5.2*  --  5.5*  --   --   --   --  5.6*  --    ALBUMIN 1.7*  1.7*  < > 1.9*  < > 1.9* 1.9*  --  1.8* 1.9*   BILITOT 9.4*  --  11.4*  --   --   --   --  10.8*  --    AST 28  --  38  --   --   --   --  46*  --    ALKPHOS 310*  --  331*  --   --   --   --  343*  --    ALT 16  --  21  --   --   --   --  25  --    < > = values in this interval not displayed.  Trended Cardiac Data:  No results for input(s): TROPONINI, CKTOTAL, CKMB, BNP in the last 168 hours.      Other Results:  Radiology:  X-ray Chest 1 View    Result Date: 7/15/2018  EXAMINATION: XR CHEST 1 VIEW CLINICAL HISTORY: Encounter for adjustment and management of vascular access device TECHNIQUE: One view of the chest. COMPARISON: 07/15/2018 at 5:06 p.m. FINDINGS: Cardiac wires overlie the chest.  Interval  placement of a right IJ central venous catheter with the distal tip overlying the right brachiocephalic/SVC junction.  Cardiac silhouette is not enlarged.  Mild bibasilar subsegmental atelectasis, left side greater than right.  Suspect trace left pleural fluid.  Stable micronodular pattern throughout the lung fields and biapical emphysema.  No pneumothorax.     Interval placement of a right IJ central venous catheter, with the tip overlying the right brachiocephalic/SVC junction. Electronically signed by: Ger Mays MD Date:    07/15/2018 Time:    20:20    X-ray Chest Ap Portable    Result Date: 7/15/2018  EXAMINATION: XR CHEST AP PORTABLE CLINICAL HISTORY: Sepsis; TECHNIQUE: Single frontal view of the chest was performed. COMPARISON: 11/30/2017 FINDINGS: The cardiomediastinal silhouette is not enlarged, similar to the previous exam noting possible calcification at the aortic arch..  There is no pleural effusion.  The trachea is midline.  The lungs are symmetrically expanded bilaterally with bilateral reticulonodular density, and scattered miliary type density, unchanged.  There is a prominent focus of calcification projected over the left upper lung zone, stable..  No large focal consolidation seen.  There is no pneumothorax.  The osseous structures are remarkable for degenerative change..     1. Grossly stable multifocal pulmonary parenchymal nodular foci, no large focal consolidation. Electronically signed by: Urbano Rain MD Date:    07/15/2018 Time:    17:12    Us Abdomen Limited    Result Date: 7/17/2018  EXAMINATION: US ABDOMEN LIMITED CLINICAL HISTORY: RUQ US to rule out cholecystitis or choledocolithiasis given gram negative bacteremia and abdominal pain; TECHNIQUE: Limited ultrasound of the right upper quadrant of the abdomen (including pancreas, liver, gallbladder, common bile duct, and right kidney) was performed. COMPARISON: 07/16/2018 FINDINGS: Liver: Normal in size, measuring 11.6 cm. Mildly  increased in echotexture, attenuating the sound suggestive of fatty liver infiltration or chronic liver disease.  No focal hepatic lesions.  Reverse flow noted in the main portal vein. Gallbladder: Sludge and stones noted in the gallbladder, the gallbladder is nondistended.  The sonographic Wiley sign is negative. Biliary system: The common duct is not dilated, measuring 3 mm.  No intrahepatic ductal dilatation. Right kidney: Normal in size with no hydronephrosis, measuring 11.9 cm. Miscellaneous: The spleen measures 12.0 cm.  The splenic vein is patent.  There is a large amount of ascites.     Sludge and tiny stones noted within the contracted gallbladder. Ascites. Examination suggestive of chronic liver disease with reversed main portal vein suggestive of portal hypertension. Electronically signed by: Annie Peralta MD Date:    07/17/2018 Time:    15:56    Us Abdomen Limited    Result Date: 7/16/2018  EXAMINATION: US ABDOMEN LIMITED CLINICAL HISTORY: worsening abd distention in pt with alchoholic hepatitis  evaluate ascites with possible para to r/o SBP; TECHNIQUE: Four-quadrant ultrasound of the abdomen is performed. COMPARISON: None. FINDINGS: There is large amount of ascitic fluid in the right lower quadrant, the left lower quadrant and moderate-sized ascites in the right upper quadrant in the small amount of ascites in the left upper quadrant.  Ascitic fluid greater when compared to the study of 12/01/2017.     Significant peritoneal ascites as above. Electronically signed by: Rajendra Harris MD Date:    07/16/2018 Time:    14:02    Ir Paracentesis With Imaging    Result Date: 7/16/2018  EXAMINATION: Ultrasound-guided paracentesis Procedural Personnel Attending physician(s): Felix Fellow physician(s): None Resident physician(s): None Advanced practice provider(s): None Pre-procedure diagnosis: Ascites Post-procedure diagnosis: Same Indication: Ascites Complications: No immediate complications. TECHNIQUE:  - Ultrasound-guided paracentesis FINDINGS: Pre-procedure Consent: Informed consent for the procedure was obtained and time-out was performed prior to the procedure. Preparation: The site was prepared and draped using maximal sterile barrier technique including cutaneous antisepsis. Anesthesia/sedation Level of anesthesia/sedation: No sedation Anesthesia/sedation administered by: Not applicable Total intra-service sedation time (minutes): 0 Limited abdominal ultrasound Limited abdominal ultrasound was performed. Small ascites. A safe window for paracentesis was identified. Paracentesis Local anesthesia was administered. The peritoneal cavity was accessed and fluid return confirmed position. Ascites was drained. Paracentesis access technique: Real-time ultrasound guidance Catheter placed: 5F Yueh Closure The catheter was removed. A sterile bandage was applied. Post-drainage ultrasound: Not performed Additional Details Additional description of procedure: None Equipment details: None Specimens removed: Abdominal fluid Estimated blood loss (mL): Less than 10 Standardized report: [email or web address] Attestation Signer name: Felix I attest that I was present for the entire procedure. I reviewed the stored images and agree with the report as written.     Ultrasound-guided paracentesis with drainage of 1525 mL of serous fluid. Plan: Per referring team _______________________________________________________________ Electronically signed by: Regino Washington MD Date:    07/16/2018 Time:    16:31    Assessment:  Annette Esparza is a 48 y.o. female with:  Patient Active Problem List    Diagnosis Date Noted    Normocytic anemia 07/17/2018    Hypophosphatemia 07/17/2018    Altered mental status     General weakness     Hepatic encephalopathy     Hypomagnesemia     Hyponatremia 07/15/2018    Oral thrush 11/30/2017    Hypotension 11/30/2017    Centrilobular emphysema 12/12/2016    Mild single current episode of  major depressive disorder 12/12/2016    Hyperbilirubinemia 06/13/2016    Anxiety 06/02/2016    Cigarette nicotine dependence without complication 06/02/2016    Vitamin D deficiency 10/22/2015    Folate deficiency 10/22/2015    Iron deficiency anemia due to chronic blood loss 10/22/2015    Portal hypertensive gastropathy 10/22/2015    Alcoholic cirrhosis of liver with ascites 10/22/2015    Subclinical hypothyroidism 10/22/2015    Gallstones 10/06/2015    Alcohol dependence in remission        Plan:    Hyponatremia - ordered serum and urine osm, FENa, FEUrea, urinalysis, urine protein creatinine ratio, and TSH. Suspect that this is due to her liver failure. Recommend lasix 40 mg and spironolactone 100 mg. Can increase lasix to BID if needed, or if pt does not respond to lasix 40 would increase to 80.    Thank you for allowing us to participate in the care of this patient. Please contact me if you have any questions regarding this consult.    Juan Diego Higgins MD7/19/2018  [facility] PGY V  Pager number: [phone]  Cell: [phone]

## 2018-07-19 NOTE — PROGRESS NOTES
[facility] Resident HOAllisonI Progress Note    Subjective:      Continues to feel better. Today patient states that her abdominal pain is controlled and was able to sleep. Tolerating PO diet. Voiding without difficulty.  Denies nausea, vomiting, fever, chills. Pt ready to go home.     Objective:   Last 24 Hour Vital Signs:  BP  Min: 97/65  Max: 123/56  Temp  Av.3 °F (36.8 °C)  Min: 98 °F (36.7 °C)  Max: 99 °F (37.2 °C)  Pulse  Av.6  Min: 73  Max: 126  Resp  Av.4  Min: 14  Max: 18  SpO2  Av.2 %  Min: 90 %  Max: 94 %  Weight  Av.8 kg (123 lb 0.3 oz)  Min: 55.8 kg (123 lb 0.3 oz)  Max: 55.8 kg (123 lb 0.3 oz)  I/O last 3 completed shifts:  In: 2275 [P.O.:975; I.V.:780; Blood:470; IV Piggyback:50]  Out: 1100 [Urine:1100]    Physical Examination:  General:          sitting up in bed  nad  Head:               Normocephalic and atraumatic  Eyes:               PERRL; EOMi with icteric sclera and clear conjunctivae  Mouth:             Oropharynx clear and without exudate  Cardio:             Normal rate and regular rhythm with normal S1 and S2; no murmurs or rubs  no LE edema  pulses 2+ and symmetric distally  Resp:               CTAB; respirations unlabored; no wheezes, crackles or rhonchi  Abdom:            Soft  mildly distended with normoactive bowel sounds  nontender to palpation  Extrem:            Symmetric and without atrophy  warm to touch  no cyanosis, trace edema  Skin:                No rashes, lesions  +jaundice  Neuro:             AAOx3  face symmetric  moves all extremities  Psych:  Pleasant and cooperative    Laboratory:  Laboratory Data Reviewed: yes  Pertinent Findings:    Recent Labs  Lab 18  0609  18  0406  18  1418 18  2000 18  0126 18  0457 18  0624   WBC 11.66  --  9.16  --   --   --   --  16.93*  --    HGB 6.9*  --  8.9*  --   --   --   --  9.2*  --    HCT 21.0*  --  26.7*  --   --   --   --  27.5*  --    *  --  106*  --    --   --   --  135*  --    MCV 95  --  92  --   --   --   --  92  --    RDW 25.2*  --  23.3*  --   --   --   --  23.0*  --    *  < > 127*  < > 123* 123* 124*  --   --    K 3.0*  < > 3.1*  < > 3.7 3.4* 3.3*  --   --    CL 87*  < > 89*  < > 86* 85* 88*  --   --    CO2 27  < > 32*  < > 28 29 28  --   --    BUN 5*  < > 3*  < > 4* 4* 5*  --   --    CREATININE 0.6  < > 0.6  < > 0.6 0.7 0.7  --   --      < > 92  < > 125* 102 93  --   --    PROT 5.2*  --  5.5*  --   --   --   --   --  5.6*   ALBUMIN 1.7*  1.7*  < > 1.9*  < > 1.9* 1.9* 1.9*  --  1.8*   BILITOT 9.4*  --  11.4*  --   --   --   --   --  10.8*   AST 28  --  38  --   --   --   --   --  46*   ALKPHOS 310*  --  331*  --   --   --   --   --  343*   ALT 16  --  21  --   --   --   --   --  25   < > = values in this interval not displayed.      Microbiology Data Reviewed: yes  Pertinent Findings:  Microbiology Results (last 7 days)     Procedure Component Value Units Date/Time    Blood culture x two cultures. Draw prior to antibiotics. [[id number]] Collected:  07/15/18 1619    Order Status:  Completed Specimen:  Blood from Peripheral, Hand, Right Updated:  07/18/18 2312     Blood Culture, Routine No Growth to date     Blood Culture, Routine No Growth to date     Blood Culture, Routine No Growth to date     Blood Culture, Routine No Growth to date    Narrative:       Aerobic and anaerobic    Blood culture [[id number]] Collected:  07/17/18 1645    Order Status:  Completed Specimen:  Blood from Peripheral, Forearm, Left Updated:  07/18/18 2212     Blood Culture, Routine No Growth to date     Blood Culture, Routine No Growth to date    Blood culture x two cultures. Draw prior to antibiotics. [[id number]]  (Susceptibility) Collected:  07/15/18 1635    Order Status:  Completed Specimen:  Blood from Peripheral, Hand, Left Updated:  07/18/18 1000     Blood Culture, Routine Gram stain daron bottle: Gram negative rods      Blood Culture, Routine Results called to and  read back by:Corinna Fischer RN 07/16/2018  11:10     Blood Culture, Routine ESCHERICHIA COLI    Narrative:       Aerobic and anaerobic    Culture, Anaerobic [[id number]] Collected:  07/16/18 1538    Order Status:  Completed Specimen:  Body Fluid from Ascites Updated:  07/18/18 0840     Anaerobic Culture Culture in progress    Urine culture - High Risk [[id number]]  (Susceptibility) Collected:  07/15/18 1814    Order Status:  Completed Specimen:  Urine from Urine, Catheterized Updated:  07/17/18 2203     Urine Culture, Routine --     ESCHERICHIA COLI  >100,000 cfu/ml      Blood culture [[id number]] Collected:  07/17/18 1606    Order Status:  Sent Specimen:  Blood from Line, Subclavian, Right Updated:  07/17/18 1607    Aerobic culture [[id number]] Collected:  07/16/18 1538    Order Status:  Completed Specimen:  Body Fluid from Ascites Updated:  07/17/18 0724     Aerobic Bacterial Culture No growth    Gram stain [[id number]] Collected:  07/16/18 1538    Order Status:  Completed Specimen:  Body Fluid from Ascites Updated:  07/16/18 2151     Gram Stain Result Rare WBC's      No organisms seen    Fungus culture [[id number]] Collected:  07/16/18 1538    Order Status:  Sent Specimen:  Body Fluid from Ascites Updated:  07/16/18 1851          Other Results:  EKG (my interpretation): old reviewed, no new    Radiology Data Reviewed: yes  Pertinent Findings:  Abd US: hepatomegaly with reversed main portal vein  CBD not dilated    Current Medications:     Infusions:       Scheduled:   ciprofloxacin HCl  500 mg Oral Q12H    cyanocobalamin  1,000 mcg Oral Daily    ergocalciferol  50,000 Units Oral Q7 Days    ferrous sulfate  325 mg Oral BID    folic acid  1 mg Oral Daily    furosemide  20 mg Oral Daily    heparin (porcine)  5,000 Units Subcutaneous Q8H    lactulose  20 g Oral BID    midodrine  10 mg Oral BID WM    multivitamin  1 tablet Oral Daily    sodium chloride  1 g Oral BID    spironolactone  50 mg Oral Daily     thiamine  100 mg Oral Daily        PRN:      Antibiotics and Day Number of Therapy:  ceftriaxone    Lines and Day Number of Therapy:  RIJ central line  angeles    Assessment:     Annette Esparza is a 48 y.o.female with  Patient Active Problem List    Diagnosis Date Noted    Normocytic anemia 07/17/2018    Hypophosphatemia 07/17/2018    Altered mental status     General weakness     Hepatic encephalopathy     Hypomagnesemia     Hyponatremia 07/15/2018    Oral thrush 11/30/2017    Hypotension 11/30/2017    Centrilobular emphysema 12/12/2016    Mild single current episode of major depressive disorder 12/12/2016    Hyperbilirubinemia 06/13/2016    Anxiety 06/02/2016    Cigarette nicotine dependence without complication 06/02/2016    Vitamin D deficiency 10/22/2015    Folate deficiency 10/22/2015    Iron deficiency anemia due to chronic blood loss 10/22/2015    Portal hypertensive gastropathy 10/22/2015    Alcoholic cirrhosis of liver with ascites 10/22/2015    Subclinical hypothyroidism 10/22/2015    Gallstones 10/06/2015    Alcohol dependence in remission         Plan:     Ecoli UTI and bacteremia  -afebrile, WBC 20 on admit  -UA with nitrites, WBC, bacteria  UCx with Ecoli   -1/4 bottles BCx with Ecoli  +procal  -repeat blood cultures pending  -afebrile  resolved leukocytosis  -cont ceftriaxone, added cipro  f/u sensitivities    Hypovolemic hyponatremia  -Na 123 on admit  baseline 135  -Na 124 this AM  cont NS  monitor Na q6 and adjust accordingly    Alcoholic cirrhosis of liver with ascites  -history of EtOH abuse  quit 2 years prior  -PT/INR 15.2/1.4  Tbili 10.8  -Discriminate function > 32  given infection, will hold on steroids  -shifting dullness on PE   IR performed para to evaluate for SBP  studies negative for SBP  -GI consulted, appreciate recs  -worsening anemia  f/u FOBT studies  may require scope     Hyokalemia, hypomagnesmia, hypophosphatemia  -cont to replete      Hypotension  -placed on norepi gtt in ED  discontinued and resumed home midodrine     Normocytic anemia  -Hgb 9.5 on admit  trending down to 6.9  transfused 1u pRBC with appropriate response  Hgb today 9.2  -check FOBT  no s/s of active bleeding  possibly dilutional  -past history of JUNE and FA and B12 deficiency  will provide supplementation     VitD deficiency  -cont supplementation    Acute encephalopathy, resolved  -likely mutlifactorial with hepatic encephalopathy and hyponatremia and possible infection contributing to currrent state  -cont to correct electrolyte derangements (above)  -cont to provide lactulose for 2-3 BM goal  -cont treating bacteremia and UTI with ceftriaxone     Code: full  Diet: Regular  DVT: heparin  Dispo: pending culture sensitivities and continued electrolyte correction       Lissy Caputo  [facility] HO-1  U Hospitalists Service Team B    \[facility]\"" Medicine Hospitalist Pager numbers:   U Hospitalist Medicine Team A (Diana/Patria): [phone]  [facility] Hospitalist Medicine Team B (Dennis/Sami):  [phone]

## 2018-07-19 NOTE — PLAN OF CARE
Problem: Physical Therapy Goal  Goal: Physical Therapy Goal  Goals to be met by: 2018     Patient will increase functional independence with mobility by performin. Supine to sit with Modified [place] MET 18  2. Sit to supine with Modified [place]  3. Sit to stand transfer with Stand-by Assistance MET 18  4. Gait  x 150 feet with Stand-by Assistance with or without AD MET 18  5. Ascend/descend 15 stair with bilateral Handrails Stand-by Assistance  MET 18  6. Lower extremity exercise program x10 reps with independence      Outcome: Ongoing (interventions implemented as appropriate)  Goals 1 and 5 met

## 2018-07-19 NOTE — PLAN OF CARE
Problem: Patient Care Overview  Goal: Plan of Care Review  Outcome: Revised  Patient received upon rounds, comfortably sitting on the chair. Conscious , coherent. GCS 15, with ascites. Patient can ambulate to the toilet independently, with  right  Triple lumen IJ catheter dressing intact, clean. Patient is on 2lpm oxygen via NC. Advised patient to call the nurse for any assistance, bed alarm on, side rails kept secure at all times. Call bell within reach. Bed brake on. Safety fall precaution measures noted. Intentional rounding rendered. Will continue to monitor patient. On telemetry NSR. No subjective complaint of chest pain.

## 2018-07-19 NOTE — PROGRESS NOTES
.Pharmacy New Medication Education    Patient accepted medication education.    Pharmacy educated patient on name and purpose of medications and possible side effects, using the teach-back method.     Current Inpatient Medication Orders   0.9% NaCl infusion (for blood administration)   ciprofloxacin HCl tablet 500 mg   cyanocobalamin tablet 1,000 mcg   ergocalciferol capsule 50,000 Units   ferrous sulfate EC tablet 325 mg   folic acid tablet 1 mg   furosemide tablet 20 mg   heparin (porcine) injection 5,000 Units   lactulose 20 gram/30 mL solution Soln 20 g   midodrine tablet 10 mg   multivitamin tablet 1 tablet   sodium chloride tablet 1 g   spironolactone tablet 50 mg   thiamine tablet 100 mg   traMADol tablet 100 mg       Learners of pharmacy medication education included:  Patient    Patient +/- learner response:  Verbalized Understanding, Teachback

## 2018-07-20 ENCOUNTER — TELEPHONE (OUTPATIENT)
Dept: TRANSPLANT | Facility: CLINIC | Age: 48
End: 2018-07-20

## 2018-07-20 LAB — BACTERIA BLD CULT: NORMAL

## 2018-07-20 NOTE — PLAN OF CARE
07/20/18 0826   Final Note   Assessment Type Final Discharge Note   Discharge Disposition Left Against

## 2018-07-20 NOTE — PT/OT/SLP DISCHARGE
Physical Therapy Discharge Summary    Name: Annette Esparza  MRN: [id number]   Principal Problem: Hyponatremia     Patient Discharged from acute Physical Therapy on 18.  Please refer to prior PT noted date on 1918 for functional status.     Assessment:     Pt left AMA    Objective:     GOALS:    Physical Therapy Goals        Problem: Physical Therapy Goal    Goal Priority Disciplines Outcome Goal Variances Interventions   Physical Therapy Goal     PT/OT, PT Ongoing (interventions implemented as appropriate)     Description:  Goals to be met by: 2018     Patient will increase functional independence with mobility by performin. Supine to sit with Modified [place] MET 18  2. Sit to supine with Modified [place]  3. Sit to stand transfer with Stand-by Assistance MET 18  4. Gait  x 150 feet with Stand-by Assistance with or without AD MET 18  5. Ascend/descend 15 stair with bilateral Handrails Stand-by Assistance  MET 18  6. Lower extremity exercise program x10 reps with independence                        Reasons for Discontinuation of Therapy Services  Pt left AMA      Plan:     Patient Discharged to: recommended OP PT-- pt left AMA.    Misty Buitrago, PT  2018

## 2018-07-20 NOTE — PLAN OF CARE
Patient is requesting to leave AMA. Dr Willett at bedside. AMA papers signed. Removed RIJ TLC . No bleeding or swelling noted at site. Patient left via ambulance by nurse David RUIZ

## 2018-07-20 NOTE — TELEPHONE ENCOUNTER
----- Message from Denia Johnson MD sent at 7/19/2018  6:56 PM CDT -----  She can see me next week during [facility] clinic.  Whatever day works for her.      Thanks,  CB    ----- Message -----  From: Majo Calabrese  Sent: 7/19/2018   4:55 PM  To: Denia Johnson MD    This was a pt of yours, you didn't move through due to etoh. Last seen 1/2017. Reports sobriety 2 years per [facility] note. They are sending her back.  MELD  27. Currently in hosp at [facility] being discharged and Dr Serra is asking for appt next week.   Do you want to see her? Your first avail in txp is 8/10.  No openings in Hep unless we do the DEEPT[facility] clinic next week.   Or, when you are in DEEPTI clinic?   ----- Message -----  From: Juan Diego Serra MD  Sent: 7/19/2018  11:19 AM  To: Majo toro,    I was hoping to get this patient into [facility]hepatology for transplant evaluation.    Thanks so much.

## 2018-07-21 LAB
BLD PROD TYP BPU: NORMAL
BLD PROD TYP BPU: NORMAL
BLOOD UNIT EXPIRATION DATE: NORMAL
BLOOD UNIT EXPIRATION DATE: NORMAL
BLOOD UNIT TYPE CODE: 600
BLOOD UNIT TYPE CODE: 600
BLOOD UNIT TYPE: NORMAL
BLOOD UNIT TYPE: NORMAL
CODING SYSTEM: NORMAL
CODING SYSTEM: NORMAL
DISPENSE STATUS: NORMAL
DISPENSE STATUS: NORMAL
TRANS ERYTHROCYTES VOL PATIENT: NORMAL ML
TRANS ERYTHROCYTES VOL PATIENT: NORMAL ML

## 2018-07-22 LAB — BACTERIA BLD CULT: NORMAL

## 2018-07-23 ENCOUNTER — HOSPITAL ENCOUNTER (INPATIENT)
Facility: HOSPITAL | Age: 48
LOS: 6 days | Discharge: HOME | DRG: 189 | End: 2018-07-29
Attending: EMERGENCY MEDICINE | Admitting: EMERGENCY MEDICINE
Payer: MEDICAID

## 2018-07-23 DIAGNOSIS — E43 SEVERE MALNUTRITION: ICD-10-CM

## 2018-07-23 DIAGNOSIS — J96.21 ACUTE ON CHRONIC RESPIRATORY FAILURE WITH HYPOXIA: ICD-10-CM

## 2018-07-23 DIAGNOSIS — D63.8 ANEMIA OF CHRONIC DISEASE: ICD-10-CM

## 2018-07-23 DIAGNOSIS — I95.89 OTHER SPECIFIED HYPOTENSION: ICD-10-CM

## 2018-07-23 DIAGNOSIS — R18.8 ASCITES: ICD-10-CM

## 2018-07-23 DIAGNOSIS — E87.1 HYPONATREMIA: ICD-10-CM

## 2018-07-23 DIAGNOSIS — K74.60 DECOMPENSATED HEPATIC CIRRHOSIS: Primary | ICD-10-CM

## 2018-07-23 DIAGNOSIS — D69.6 THROMBOCYTOPENIA: ICD-10-CM

## 2018-07-23 DIAGNOSIS — K76.82 HEPATIC ENCEPHALOPATHY: ICD-10-CM

## 2018-07-23 DIAGNOSIS — B96.20 E COLI BACTEREMIA: ICD-10-CM

## 2018-07-23 DIAGNOSIS — Z01.818 PRE-TRANSPLANT EVALUATION FOR LIVER TRANSPLANT: ICD-10-CM

## 2018-07-23 DIAGNOSIS — F10.21 ALCOHOL DEPENDENCE IN REMISSION: ICD-10-CM

## 2018-07-23 DIAGNOSIS — R78.81 E COLI BACTEREMIA: ICD-10-CM

## 2018-07-23 DIAGNOSIS — J43.2 CENTRILOBULAR EMPHYSEMA: Chronic | ICD-10-CM

## 2018-07-23 DIAGNOSIS — K72.90 DECOMPENSATED HEPATIC CIRRHOSIS: Primary | ICD-10-CM

## 2018-07-23 DIAGNOSIS — K76.81 HEPATOPULMONARY SHUNTING: ICD-10-CM

## 2018-07-23 DIAGNOSIS — C22.0 HCC (HEPATOCELLULAR CARCINOMA): ICD-10-CM

## 2018-07-23 DIAGNOSIS — J96.20 ACUTE ON CHRONIC RESPIRATORY FAILURE: ICD-10-CM

## 2018-07-23 DIAGNOSIS — J13 PNEUMONIA OF LEFT LOWER LOBE DUE TO STREPTOCOCCUS PNEUMONIAE: ICD-10-CM

## 2018-07-23 DIAGNOSIS — K70.31 ALCOHOLIC CIRRHOSIS OF LIVER WITH ASCITES: Chronic | ICD-10-CM

## 2018-07-23 LAB
ALBUMIN SERPL BCP-MCNC: 1.7 G/DL
ALLENS TEST: ABNORMAL
ALLENS TEST: ABNORMAL
ALP SERPL-CCNC: 391 U/L
ALT SERPL W/O P-5'-P-CCNC: 20 U/L
AMMONIA PLAS-SCNC: 61 UMOL/L
ANION GAP SERPL CALC-SCNC: 9 MMOL/L
ANISOCYTOSIS BLD QL SMEAR: SLIGHT
AST SERPL-CCNC: 34 U/L
BACTERIA SPEC ANAEROBE CULT: NORMAL
BASOPHILS # BLD AUTO: 0.02 K/UL
BASOPHILS NFR BLD: 0.2 %
BILIRUB SERPL-MCNC: 11 MG/DL
BILIRUB UR QL STRIP: NEGATIVE
BUN SERPL-MCNC: 10 MG/DL
BURR CELLS BLD QL SMEAR: ABNORMAL
CALCIUM SERPL-MCNC: 8.2 MG/DL
CHLORIDE SERPL-SCNC: 92 MMOL/L
CLARITY UR REFRACT.AUTO: ABNORMAL
CO2 SERPL-SCNC: 26 MMOL/L
COLOR UR AUTO: ABNORMAL
CREAT SERPL-MCNC: 0.9 MG/DL
DELSYS: ABNORMAL
DELSYS: ABNORMAL
DIFFERENTIAL METHOD: ABNORMAL
EOSINOPHIL # BLD AUTO: 0 K/UL
EOSINOPHIL NFR BLD: 0.1 %
ERYTHROCYTE [DISTWIDTH] IN BLOOD BY AUTOMATED COUNT: 25.4 %
EST. GFR  (AFRICAN AMERICAN): >60 ML/MIN/1.73 M^2
EST. GFR  (NON AFRICAN AMERICAN): >60 ML/MIN/1.73 M^2
FLOW: 2.5
GLUCOSE SERPL-MCNC: 106 MG/DL
GLUCOSE UR QL STRIP: NEGATIVE
HCO3 UR-SCNC: 28.6 MMOL/L (ref 24–28)
HCO3 UR-SCNC: 29.4 MMOL/L (ref 24–28)
HCT VFR BLD AUTO: 24.5 %
HGB BLD-MCNC: 8 G/DL
HGB UR QL STRIP: NEGATIVE
HYPOCHROMIA BLD QL SMEAR: ABNORMAL
IMM GRANULOCYTES # BLD AUTO: 0.2 K/UL
IMM GRANULOCYTES NFR BLD AUTO: 1.7 %
INR PPP: 1.4
KETONES UR QL STRIP: NEGATIVE
LACTATE SERPL-SCNC: 0.9 MMOL/L
LEUKOCYTE ESTERASE UR QL STRIP: NEGATIVE
LYMPHOCYTES # BLD AUTO: 1.1 K/UL
LYMPHOCYTES NFR BLD: 9.7 %
MCH RBC QN AUTO: 32.5 PG
MCHC RBC AUTO-ENTMCNC: 32.7 G/DL
MCV RBC AUTO: 100 FL
MODE: ABNORMAL
MODE: ABNORMAL
MONOCYTES # BLD AUTO: 1.7 K/UL
MONOCYTES NFR BLD: 14.3 %
NEUTROPHILS # BLD AUTO: 8.7 K/UL
NEUTROPHILS NFR BLD: 74 %
NITRITE UR QL STRIP: NEGATIVE
NRBC BLD-RTO: 0 /100 WBC
OVALOCYTES BLD QL SMEAR: ABNORMAL
PCO2 BLDA: 31.8 MMHG (ref 35–45)
PCO2 BLDA: 33.8 MMHG (ref 35–45)
PH SMN: 7.54 [PH] (ref 7.35–7.45)
PH SMN: 7.58 [PH] (ref 7.35–7.45)
PH UR STRIP: 6 [PH] (ref 5–8)
PLATELET # BLD AUTO: 149 K/UL
PLATELET BLD QL SMEAR: ABNORMAL
PMV BLD AUTO: 10 FL
PO2 BLDA: 44 MMHG (ref 80–100)
PO2 BLDA: 68 MMHG (ref 80–100)
POC BE: 6 MMOL/L
POC BE: 7 MMOL/L
POC SATURATED O2: 87 % (ref 95–100)
POC SATURATED O2: 95 % (ref 95–100)
POC TCO2: 30 MMOL/L (ref 23–27)
POC TCO2: 30 MMOL/L (ref 23–27)
POIKILOCYTOSIS BLD QL SMEAR: SLIGHT
POLYCHROMASIA BLD QL SMEAR: ABNORMAL
POTASSIUM SERPL-SCNC: 3.5 MMOL/L
PROCALCITONIN SERPL IA-MCNC: 0.35 NG/ML
PROT SERPL-MCNC: 5.8 G/DL
PROT UR QL STRIP: NEGATIVE
PROTHROMBIN TIME: 14.4 SEC
RBC # BLD AUTO: 2.46 M/UL
SAMPLE: ABNORMAL
SAMPLE: ABNORMAL
SITE: ABNORMAL
SITE: ABNORMAL
SODIUM SERPL-SCNC: 127 MMOL/L
SP GR UR STRIP: 1 (ref 1–1.03)
SP02: 82
SP02: 95
SPHEROCYTES BLD QL SMEAR: ABNORMAL
TARGETS BLD QL SMEAR: ABNORMAL
URN SPEC COLLECT METH UR: ABNORMAL
UROBILINOGEN UR STRIP-ACNC: NEGATIVE EU/DL
WBC # BLD AUTO: 11.76 K/UL

## 2018-07-23 PROCEDURE — 20600001 HC STEP DOWN PRIVATE ROOM

## 2018-07-23 PROCEDURE — 93010 ELECTROCARDIOGRAM REPORT: CPT | Mod: ,,, | Performed by: INTERNAL MEDICINE

## 2018-07-23 PROCEDURE — 94761 N-INVAS EAR/PLS OXIMETRY MLT: CPT

## 2018-07-23 PROCEDURE — 82140 ASSAY OF AMMONIA: CPT

## 2018-07-23 PROCEDURE — 36415 COLL VENOUS BLD VENIPUNCTURE: CPT

## 2018-07-23 PROCEDURE — 87040 BLOOD CULTURE FOR BACTERIA: CPT

## 2018-07-23 PROCEDURE — 80053 COMPREHEN METABOLIC PANEL: CPT

## 2018-07-23 PROCEDURE — 25000003 PHARM REV CODE 250: Performed by: STUDENT IN AN ORGANIZED HEALTH CARE EDUCATION/TRAINING PROGRAM

## 2018-07-23 PROCEDURE — 81003 URINALYSIS AUTO W/O SCOPE: CPT

## 2018-07-23 PROCEDURE — 99285 EMERGENCY DEPT VISIT HI MDM: CPT | Mod: ,,, | Performed by: EMERGENCY MEDICINE

## 2018-07-23 PROCEDURE — 63600175 PHARM REV CODE 636 W HCPCS: Performed by: HOSPITALIST

## 2018-07-23 PROCEDURE — 36600 WITHDRAWAL OF ARTERIAL BLOOD: CPT

## 2018-07-23 PROCEDURE — 85610 PROTHROMBIN TIME: CPT

## 2018-07-23 PROCEDURE — 63600175 PHARM REV CODE 636 W HCPCS: Performed by: STUDENT IN AN ORGANIZED HEALTH CARE EDUCATION/TRAINING PROGRAM

## 2018-07-23 PROCEDURE — 99285 EMERGENCY DEPT VISIT HI MDM: CPT | Mod: 25,NTX

## 2018-07-23 PROCEDURE — 85025 COMPLETE CBC W/AUTO DIFF WBC: CPT

## 2018-07-23 PROCEDURE — 99223 1ST HOSP IP/OBS HIGH 75: CPT | Mod: ,,, | Performed by: HOSPITALIST

## 2018-07-23 PROCEDURE — 84145 PROCALCITONIN (PCT): CPT

## 2018-07-23 PROCEDURE — 96374 THER/PROPH/DIAG INJ IV PUSH: CPT | Mod: NTX

## 2018-07-23 PROCEDURE — 93005 ELECTROCARDIOGRAM TRACING: CPT | Mod: NTX

## 2018-07-23 PROCEDURE — 83605 ASSAY OF LACTIC ACID: CPT

## 2018-07-23 PROCEDURE — 82803 BLOOD GASES ANY COMBINATION: CPT

## 2018-07-23 PROCEDURE — 25000003 PHARM REV CODE 250: Performed by: HOSPITALIST

## 2018-07-23 PROCEDURE — 99900035 HC TECH TIME PER 15 MIN (STAT)

## 2018-07-23 PROCEDURE — 94640 AIRWAY INHALATION TREATMENT: CPT | Mod: NTX

## 2018-07-23 RX ORDER — ONDANSETRON 8 MG/1
8 TABLET, ORALLY DISINTEGRATING ORAL EVERY 8 HOURS PRN
Status: DISCONTINUED | OUTPATIENT
Start: 2018-07-23 | End: 2018-07-29 | Stop reason: HOSPADM

## 2018-07-23 RX ORDER — GLUCAGON 1 MG
1 KIT INJECTION
Status: DISCONTINUED | OUTPATIENT
Start: 2018-07-23 | End: 2018-07-29 | Stop reason: HOSPADM

## 2018-07-23 RX ORDER — IBUPROFEN 200 MG
16 TABLET ORAL
Status: DISCONTINUED | OUTPATIENT
Start: 2018-07-23 | End: 2018-07-29 | Stop reason: HOSPADM

## 2018-07-23 RX ORDER — MIDODRINE HYDROCHLORIDE 5 MG/1
15 TABLET ORAL
Status: DISCONTINUED | OUTPATIENT
Start: 2018-07-24 | End: 2018-07-29 | Stop reason: HOSPADM

## 2018-07-23 RX ORDER — ONDANSETRON 2 MG/ML
4 INJECTION INTRAMUSCULAR; INTRAVENOUS
Status: COMPLETED | OUTPATIENT
Start: 2018-07-23 | End: 2018-07-23

## 2018-07-23 RX ORDER — SPIRONOLACTONE 100 MG/1
100 TABLET, FILM COATED ORAL DAILY
Status: DISCONTINUED | OUTPATIENT
Start: 2018-07-23 | End: 2018-07-25

## 2018-07-23 RX ORDER — ACETAMINOPHEN 325 MG/1
650 TABLET ORAL EVERY 4 HOURS PRN
Status: DISCONTINUED | OUTPATIENT
Start: 2018-07-23 | End: 2018-07-29 | Stop reason: HOSPADM

## 2018-07-23 RX ORDER — LACTULOSE 10 G/15ML
30 SOLUTION ORAL 3 TIMES DAILY
Status: DISCONTINUED | OUTPATIENT
Start: 2018-07-23 | End: 2018-07-29 | Stop reason: HOSPADM

## 2018-07-23 RX ORDER — TRAMADOL HYDROCHLORIDE 50 MG/1
50 TABLET ORAL EVERY 6 HOURS PRN
Status: DISCONTINUED | OUTPATIENT
Start: 2018-07-23 | End: 2018-07-29 | Stop reason: HOSPADM

## 2018-07-23 RX ORDER — ASCORBIC ACID 500 MG
500 TABLET ORAL DAILY
COMMUNITY

## 2018-07-23 RX ORDER — FOLIC ACID 1 MG/1
1 TABLET ORAL DAILY
Status: DISCONTINUED | OUTPATIENT
Start: 2018-07-23 | End: 2018-07-29 | Stop reason: HOSPADM

## 2018-07-23 RX ORDER — FUROSEMIDE 40 MG/1
40 TABLET ORAL DAILY
Status: DISCONTINUED | OUTPATIENT
Start: 2018-07-23 | End: 2018-07-23

## 2018-07-23 RX ORDER — LANOLIN ALCOHOL/MO/W.PET/CERES
1000 CREAM (GRAM) TOPICAL DAILY
Status: DISCONTINUED | OUTPATIENT
Start: 2018-07-23 | End: 2018-07-29 | Stop reason: HOSPADM

## 2018-07-23 RX ORDER — AZITHROMYCIN 250 MG/1
500 TABLET, FILM COATED ORAL DAILY
Status: COMPLETED | OUTPATIENT
Start: 2018-07-23 | End: 2018-07-27

## 2018-07-23 RX ORDER — FUROSEMIDE 10 MG/ML
40 INJECTION INTRAMUSCULAR; INTRAVENOUS 3 TIMES DAILY
Status: DISCONTINUED | OUTPATIENT
Start: 2018-07-23 | End: 2018-07-26

## 2018-07-23 RX ORDER — FERROUS SULFATE 325(65) MG
325 TABLET, DELAYED RELEASE (ENTERIC COATED) ORAL 2 TIMES DAILY
Status: DISCONTINUED | OUTPATIENT
Start: 2018-07-23 | End: 2018-07-29 | Stop reason: HOSPADM

## 2018-07-23 RX ORDER — MIDODRINE HYDROCHLORIDE 5 MG/1
10 TABLET ORAL
Status: DISCONTINUED | OUTPATIENT
Start: 2018-07-23 | End: 2018-07-23

## 2018-07-23 RX ORDER — THIAMINE HCL 100 MG
100 TABLET ORAL DAILY
Status: DISCONTINUED | OUTPATIENT
Start: 2018-07-23 | End: 2018-07-29 | Stop reason: HOSPADM

## 2018-07-23 RX ORDER — IBUPROFEN 200 MG
24 TABLET ORAL
Status: DISCONTINUED | OUTPATIENT
Start: 2018-07-23 | End: 2018-07-29 | Stop reason: HOSPADM

## 2018-07-23 RX ORDER — SODIUM CHLORIDE 0.9 % (FLUSH) 0.9 %
5 SYRINGE (ML) INJECTION
Status: DISCONTINUED | OUTPATIENT
Start: 2018-07-23 | End: 2018-07-29 | Stop reason: HOSPADM

## 2018-07-23 RX ADMIN — ONDANSETRON 8 MG: 8 TABLET, ORALLY DISINTEGRATING ORAL at 07:07

## 2018-07-23 RX ADMIN — CEFTRIAXONE SODIUM 2 G: 2 INJECTION, POWDER, FOR SOLUTION INTRAMUSCULAR; INTRAVENOUS at 06:07

## 2018-07-23 RX ADMIN — CYANOCOBALAMIN TAB 1000 MCG 1000 MCG: 1000 TAB at 04:07

## 2018-07-23 RX ADMIN — AZITHROMYCIN MONOHYDRATE 500 MG: 250 TABLET ORAL at 07:07

## 2018-07-23 RX ADMIN — FOLIC ACID 1 MG: 1 TABLET ORAL at 05:07

## 2018-07-23 RX ADMIN — THIAMINE HCL TAB 100 MG 100 MG: 100 TAB at 04:07

## 2018-07-23 RX ADMIN — MIDODRINE HYDROCHLORIDE 10 MG: 5 TABLET ORAL at 05:07

## 2018-07-23 RX ADMIN — FERROUS SULFATE TAB EC 325 MG (65 MG FE EQUIVALENT) 325 MG: 325 (65 FE) TABLET DELAYED RESPONSE at 09:07

## 2018-07-23 RX ADMIN — FUROSEMIDE 40 MG: 10 INJECTION, SOLUTION INTRAMUSCULAR; INTRAVENOUS at 09:07

## 2018-07-23 RX ADMIN — LACTULOSE 30 G: 20 SOLUTION ORAL at 07:07

## 2018-07-23 RX ADMIN — SPIRONOLACTONE 100 MG: 25 TABLET, FILM COATED ORAL at 05:07

## 2018-07-23 RX ADMIN — FUROSEMIDE 40 MG: 40 TABLET ORAL at 05:07

## 2018-07-23 RX ADMIN — ONDANSETRON 4 MG: 2 INJECTION, SOLUTION INTRAMUSCULAR; INTRAVENOUS at 01:07

## 2018-07-23 RX ADMIN — TRAMADOL HYDROCHLORIDE 50 MG: 50 TABLET, COATED ORAL at 07:07

## 2018-07-23 NOTE — ED TRIAGE NOTES
Pt brought in via  EMS complaining of sob that has worsened today. Pt reports she is feeling dizzy and weak. Pt reports she had a paracentesis last week and had 1.5L taken off. Pt reports that was not enough and was still swollen.

## 2018-07-23 NOTE — ED NOTES
LOC: The patient is awake, alert and aware of environment with an appropriate affect, the patient is oriented x 3 and speaking appropriately.  APPEARANCE: Patient resting comfortably and in no acute distress, patient is clean and well groomed  SKIN: The skin is warm and dry, jaundice, patient has normal skin turgor and moist mucus membranes, skin intact, no breakdown or bruising noted.  MUSCULOSKELETAL: Patient moving all extremities well, no obvious swelling or deformities noted.   RESPIRATORY: Airway is open and patent, breath sounds clear throughout all lung fields; respirations are spontaneous, patient has a normal effort and rate, no accessory muscle use noted. Pt presents on oxygen NC 3 L, oxygen saturation 96%  CARDIAC: Patient has trace peripheral edema noted to bilateral lower extremities. No complaints of chest pain    ABDOMEN: Soft and tender to palpation along bilateral flanks, distention noted, ascites noted. Bowel sounds present x 4  NEUROLOGIC: PERRL, 3 mm bilaterally, eyes open spontaneously, behavior appropriate to situation, follows commands

## 2018-07-23 NOTE — ED PROVIDER NOTES
Encounter Date: 7/23/2018       History     Chief Complaint   Patient presents with    Shortness of Breath     SOB and low o2 sat at home used another family members oxygen and SOB improved, abd distented, hx of liver disease, pt reports having paracentesis last week in which they did not remove enough fluid.      48 yrs old with decompensated alcoholic liver cirrhosis, hypoxemic respiratory failure 2/2 emphysema, hypothyroidism, hyponatremia 2/2 cirrhosis, JUNE with folate deficieny presented with SOB since today along with worsening abdominal distention. Was brought into the ER on 2L nasal cannula, borrowed family member. Has never been on home O2 before.   Was previously admitted at [facility] for acute encephalopathy and UTI sepsis. Urine cultures grew E.coli, was give IV Rocephin and diuretics. Abdominal paracentesis was done and 1.5L of fluid was removed, blood and peritoneal fluid cultures were negative. Patient left AMA on 7/19.  Says the abdominal distention has been worsening since she left AMA.   EGD in 2016 showed no evidence of varices. Was diagnosed with portal hypertensive gastropathy.  Reports nausea and 2 episodes of vomiting, no blood.  Denies any fever/chills/diarrhea/constipation.     Is on Midodrine for chronic hypotension. Also on Lasix and spironolactone.            Review of patient's allergies indicates:   Allergen Reactions    Morphine Nausea And Vomiting     Past Medical History:   Diagnosis Date    Acute hypoxemic respiratory failure 12/1/2016    ANDREW (acute kidney injury) 11/28/2016    Alcohol dependence in remission     Alcoholic cirrhosis of liver with ascites 10/22/2015    Anxiety     Ascites due to alcoholic cirrhosis 6/13/2016    Centrilobular emphysema 12/12/2016    Cigarette nicotine dependence without complication 6/2/2016    Folate deficiency 10/22/2015    Gallstones     Hepatorenal syndrome 11/29/2016    Hyperbilirubinemia 6/13/2016    Hyponatremia 11/29/2016    Iron  deficiency anemia due to chronic blood loss 10/22/2015    Portal hypertensive gastropathy 10/22/2015    Subclinical hypothyroidism 10/22/2015     Past Surgical History:   Procedure Laterality Date    ADENOIDECTOMY      APPENDECTOMY       SECTION      COLONOSCOPY      HYSTERECTOMY      25 yrs old    LIVER BIOPSY      OOPHORECTOMY Left     25 yrs old    OOPHORECTOMY Right     29 yrs old    TONSILLECTOMY      TUBAL LIGATION      UPPER GASTROINTESTINAL ENDOSCOPY       Family History   Problem Relation Age of Onset    Rheum arthritis Father     Cancer Mother     No Known Problems Sister     No Known Problems Brother     Colon cancer Neg Hx      Social History   Substance Use Topics    Smoking status: Current Every Day Smoker     Packs/day: 1.00     Years: 35.00     Types: Cigarettes    Smokeless tobacco: Never Used    Alcohol use No      Comment: hx etoh-quit 2015     Review of Systems   Constitutional: Positive for appetite change and fatigue. Negative for chills and fever.   HENT: Negative for trouble swallowing.    Eyes: Negative for visual disturbance.   Respiratory: Positive for shortness of breath. Negative for cough.    Cardiovascular: Positive for chest pain. Negative for palpitations.        Pain over the right side of the chest   Gastrointestinal: Positive for abdominal distention, abdominal pain, nausea and vomiting. Negative for constipation and diarrhea.   Genitourinary: Negative for difficulty urinating.   Musculoskeletal: Positive for back pain. Negative for arthralgias.   Skin: Positive for color change.        Jaundice   Neurological: Negative for dizziness and light-headedness.   Psychiatric/Behavioral: Negative for agitation and confusion.       Physical Exam     Initial Vitals [18 1308]   BP Pulse Resp Temp SpO2   (!) 93/53 83 18 98.3 °F (36.8 °C) 96 %      MAP       --         Physical Exam    Nursing note and vitals reviewed.  Constitutional: She appears  distressed.   HENT:   Head: Normocephalic.   Eyes: EOM are normal. Pupils are equal, round, and reactive to light. Scleral icterus is present.   Neck: Normal range of motion.   Cardiovascular: Normal rate and regular rhythm.   Pulmonary/Chest: Breath sounds normal. No respiratory distress.   Abdominal: Soft. Bowel sounds are normal. She exhibits distension. There is tenderness.   Musculoskeletal: She exhibits edema.   Neurological: She is alert and oriented to person, place, and time.   Skin: Skin is dry.         ED Course   Procedures  Labs Reviewed   CBC W/ AUTO DIFFERENTIAL - Abnormal; Notable for the following:        Result Value    RBC 2.46 (*)     Hemoglobin 8.0 (*)     Hematocrit 24.5 (*)      (*)     MCH 32.5 (*)     RDW 25.4 (*)     Platelets 149 (*)     Immature Granulocytes 1.7 (*)     Gran # (ANC) 8.7 (*)     Immature Grans (Abs) 0.20 (*)     Mono # 1.7 (*)     Gran% 74.0 (*)     Lymph% 9.7 (*)     All other components within normal limits   COMPREHENSIVE METABOLIC PANEL - Abnormal; Notable for the following:     Sodium 127 (*)     Chloride 92 (*)     Calcium 8.2 (*)     Total Protein 5.8 (*)     Albumin 1.7 (*)     Total Bilirubin 11.0 (*)     Alkaline Phosphatase 391 (*)     All other components within normal limits   PROTIME-INR - Abnormal; Notable for the following:     Prothrombin Time 14.4 (*)     INR 1.4 (*)     All other components within normal limits   URINALYSIS, REFLEX TO URINE CULTURE - Abnormal; Notable for the following:     Appearance, UA Hazy (*)     All other components within normal limits    Narrative:     Preferred Collection Type->Urine, Clean Catch   AMMONIA - Abnormal; Notable for the following:     Ammonia 61 (*)     All other components within normal limits   ISTAT PROCEDURE - Abnormal; Notable for the following:     POC PH 7.535 (*)     POC PCO2 33.8 (*)     POC PO2 68 (*)     POC HCO3 28.6 (*)     POC TCO2 30 (*)     All other components within normal limits   CULTURE,  BLOOD   CULTURE, BLOOD     EKG Readings: (Independently Interpreted)   EKG:  Sinus rhythm at 72, low voltage QRS, no ischemic changes       Imaging Results          X-Ray Chest AP Portable (Final result)  Result time 07/23/18 14:38:30    Final result by Juan Diego Yañez Jr., MD (07/23/18 14:38:30)                 Impression:      Increasing patchy parenchymal opacification left greater than right.  Pneumonitis not excluded.      Electronically signed by: Juan Diego Yañez MD  Date:    07/23/2018  Time:    14:38             Narrative:    EXAMINATION:  XR CHEST AP PORTABLE    CLINICAL HISTORY:  Other ascites    TECHNIQUE:  Single frontal view of the chest was performed.    COMPARISON:  July 15, 2018.    FINDINGS:  Heart size is similar.  There is patchy parenchymal opacification particular about the left mid and lower lung field.  Some patchy increased markings at the right base as well.  Developing infiltrates not excluded.  No pneumothorax.                                 Medical Decision Making:   History:   Old Medical Records: I decided to obtain old medical records.  Initial Assessment:   48 yr old with decompensated liver cirrhosis with worsening ascites. Was last tapped a week ago and prior to that a year ago.  No fever/chills/hematemesis/   Hypotensive, afebrile  Icteric. Abdominal distention and tenderness present.   Will get CBC, CMP, ammonia, PT/INR, CXR, EKG, ABG (To calculate A-a gradient), UA and BC x2  Will keep an eye on BP and bolus with cautious use of fluids if required.  Will likely need admission for abdominal paracentesis (therapeutic>diagnostic)   Differential Diagnosis:   1) decompensated hepatic cirrhosis 2/2 worsening ascites vs hepatopulmonary syndrome  2) SBP (unlikely)  Clinical Tests:   Lab Tests: Reviewed and Ordered  Radiological Study: Ordered and Reviewed       APC / Resident Notes:   2:19 PM  A-a gradient elevated  H/H 8/24.5  Plt 149    3:01 PM  CXR shows pulmonary infiltrates.  Labs  consistent with hyponatremia and chronic liver failure.  Will be admitted to hospital medicine.         Attending Attestation:   Physician Attestation Statement for Resident:  As the supervising MD   Physician Attestation Statement: I have personally seen and examined this patient.   I agree with the above history. -: 48-year-old female with history of alcoholic cirrhosis with ascites presenting abdominal distention, shortness of breath, hypoxia   As the supervising MD I agree with the above PE.   -: General: NAD, positive jaundice  Cards:  Normal S1-S2  Lungs:CTA  Abd:  Distended, positive fluid thrill,   Neuro: (-) asterix     As the supervising MD I agree with the above treatment, course, plan, and disposition.   -: Patient will be admitted to Medicine for acute decompensated liver cirrhosis  I have reviewed and agree with the residents interpretation of the following: lab data, EKG and x-rays.                       Clinical Impression:   The primary encounter diagnosis was Decompensated hepatic cirrhosis. A diagnosis of Ascites was also pertinent to this visit.      Disposition:   Disposition: Admitted  Condition: Irma Quesada MD  07/23/18 3149

## 2018-07-24 PROBLEM — E43 SEVERE MALNUTRITION: Status: ACTIVE | Noted: 2018-07-24

## 2018-07-24 PROBLEM — D69.6 THROMBOCYTOPENIA: Status: ACTIVE | Noted: 2018-07-24

## 2018-07-24 PROBLEM — J96.20 ACUTE ON CHRONIC RESPIRATORY FAILURE: Status: ACTIVE | Noted: 2018-07-24

## 2018-07-24 PROBLEM — J18.9 PNEUMONIA: Status: ACTIVE | Noted: 2018-07-24

## 2018-07-24 LAB
ALBUMIN SERPL BCP-MCNC: 1.7 G/DL
ALP SERPL-CCNC: 373 U/L
ALT SERPL W/O P-5'-P-CCNC: 20 U/L
ANION GAP SERPL CALC-SCNC: 11 MMOL/L
ANISOCYTOSIS BLD QL SMEAR: ABNORMAL
APPEARANCE FLD: CLEAR
AST SERPL-CCNC: 34 U/L
BASO STIPL BLD QL SMEAR: ABNORMAL
BASOPHILS # BLD AUTO: 0.03 K/UL
BASOPHILS NFR BLD: 0.3 %
BILIRUB SERPL-MCNC: 9.2 MG/DL
BODY FLD TYPE: NORMAL
BUN SERPL-MCNC: 10 MG/DL
CALCIUM SERPL-MCNC: 8.1 MG/DL
CHLORIDE SERPL-SCNC: 91 MMOL/L
CO2 SERPL-SCNC: 27 MMOL/L
COLOR FLD: YELLOW
CREAT SERPL-MCNC: 1 MG/DL
DIFFERENTIAL METHOD: ABNORMAL
EOSINOPHIL # BLD AUTO: 0.1 K/UL
EOSINOPHIL NFR BLD: 0.5 %
ERYTHROCYTE [DISTWIDTH] IN BLOOD BY AUTOMATED COUNT: 25.7 %
EST. GFR  (AFRICAN AMERICAN): >60 ML/MIN/1.73 M^2
EST. GFR  (NON AFRICAN AMERICAN): >60 ML/MIN/1.73 M^2
GLUCOSE SERPL-MCNC: 79 MG/DL
HCT VFR BLD AUTO: 23.2 %
HGB BLD-MCNC: 7.6 G/DL
HYPOCHROMIA BLD QL SMEAR: ABNORMAL
IMM GRANULOCYTES # BLD AUTO: 0.14 K/UL
IMM GRANULOCYTES NFR BLD AUTO: 1.3 %
INR PPP: 1.4
LYMPHOCYTES # BLD AUTO: 1.1 K/UL
LYMPHOCYTES NFR BLD: 10.2 %
LYMPHOCYTES NFR FLD MANUAL: 48 %
MAGNESIUM SERPL-MCNC: 1.2 MG/DL
MCH RBC QN AUTO: 32.8 PG
MCHC RBC AUTO-ENTMCNC: 32.8 G/DL
MCV RBC AUTO: 100 FL
MESOTHL CELL NFR FLD MANUAL: 9 %
MONOCYTES # BLD AUTO: 1.2 K/UL
MONOCYTES NFR BLD: 10.8 %
MONOS+MACROS NFR FLD MANUAL: 27 %
NEUTROPHILS # BLD AUTO: 8.2 K/UL
NEUTROPHILS NFR BLD: 76.9 %
NEUTROPHILS NFR FLD MANUAL: 16 %
NRBC BLD-RTO: 0 /100 WBC
PHOSPHATE SERPL-MCNC: 4.2 MG/DL
PLATELET # BLD AUTO: 146 K/UL
PLATELET BLD QL SMEAR: ABNORMAL
PMV BLD AUTO: 10.9 FL
POIKILOCYTOSIS BLD QL SMEAR: SLIGHT
POLYCHROMASIA BLD QL SMEAR: ABNORMAL
POTASSIUM SERPL-SCNC: 3 MMOL/L
PREALB SERPL-MCNC: 4 MG/DL
PROT SERPL-MCNC: 5.6 G/DL
PROTHROMBIN TIME: 13.9 SEC
RBC # BLD AUTO: 2.32 M/UL
SODIUM SERPL-SCNC: 129 MMOL/L
SPHEROCYTES BLD QL SMEAR: ABNORMAL
WBC # BLD AUTO: 10.64 K/UL
WBC # FLD: 18 /CU MM

## 2018-07-24 PROCEDURE — 85025 COMPLETE CBC W/AUTO DIFF WBC: CPT

## 2018-07-24 PROCEDURE — 25000003 PHARM REV CODE 250: Performed by: PHYSICIAN ASSISTANT

## 2018-07-24 PROCEDURE — 85610 PROTHROMBIN TIME: CPT

## 2018-07-24 PROCEDURE — 27000221 HC OXYGEN, UP TO 24 HOURS

## 2018-07-24 PROCEDURE — 94640 AIRWAY INHALATION TREATMENT: CPT | Mod: NTX

## 2018-07-24 PROCEDURE — 80321 ALCOHOLS BIOMARKERS 1OR 2: CPT

## 2018-07-24 PROCEDURE — P9047 ALBUMIN (HUMAN), 25%, 50ML: HCPCS | Mod: JG | Performed by: PHYSICIAN ASSISTANT

## 2018-07-24 PROCEDURE — 84100 ASSAY OF PHOSPHORUS: CPT

## 2018-07-24 PROCEDURE — 80053 COMPREHEN METABOLIC PANEL: CPT

## 2018-07-24 PROCEDURE — 63600175 PHARM REV CODE 636 W HCPCS: Performed by: HOSPITALIST

## 2018-07-24 PROCEDURE — 87070 CULTURE OTHR SPECIMN AEROBIC: CPT

## 2018-07-24 PROCEDURE — 0W9G3ZZ DRAINAGE OF PERITONEAL CAVITY, PERCUTANEOUS APPROACH: ICD-10-PCS | Performed by: RADIOLOGY

## 2018-07-24 PROCEDURE — 94761 N-INVAS EAR/PLS OXIMETRY MLT: CPT

## 2018-07-24 PROCEDURE — 25000003 PHARM REV CODE 250: Performed by: HOSPITALIST

## 2018-07-24 PROCEDURE — 89051 BODY FLUID CELL COUNT: CPT

## 2018-07-24 PROCEDURE — 99232 SBSQ HOSP IP/OBS MODERATE 35: CPT | Mod: ,,, | Performed by: INTERNAL MEDICINE

## 2018-07-24 PROCEDURE — 99233 SBSQ HOSP IP/OBS HIGH 50: CPT | Mod: ,,, | Performed by: HOSPITALIST

## 2018-07-24 PROCEDURE — 20600001 HC STEP DOWN PRIVATE ROOM

## 2018-07-24 PROCEDURE — S4991 NICOTINE PATCH NONLEGEND: HCPCS | Performed by: PHYSICIAN ASSISTANT

## 2018-07-24 PROCEDURE — 25500020 PHARM REV CODE 255: Performed by: HOSPITALIST

## 2018-07-24 PROCEDURE — 84134 ASSAY OF PREALBUMIN: CPT

## 2018-07-24 PROCEDURE — 25000003 PHARM REV CODE 250: Performed by: STUDENT IN AN ORGANIZED HEALTH CARE EDUCATION/TRAINING PROGRAM

## 2018-07-24 PROCEDURE — 94640 AIRWAY INHALATION TREATMENT: CPT

## 2018-07-24 PROCEDURE — 83735 ASSAY OF MAGNESIUM: CPT

## 2018-07-24 PROCEDURE — 63600175 PHARM REV CODE 636 W HCPCS: Mod: JG | Performed by: PHYSICIAN ASSISTANT

## 2018-07-24 PROCEDURE — 87075 CULTR BACTERIA EXCEPT BLOOD: CPT

## 2018-07-24 PROCEDURE — 25000242 PHARM REV CODE 250 ALT 637 W/ HCPCS: Performed by: HOSPITALIST

## 2018-07-24 RX ORDER — IBUPROFEN 200 MG
1 TABLET ORAL DAILY
Status: DISCONTINUED | OUTPATIENT
Start: 2018-07-24 | End: 2018-07-25

## 2018-07-24 RX ORDER — SPIRONOLACTONE 100 MG/1
200 TABLET, FILM COATED ORAL DAILY
Status: DISCONTINUED | OUTPATIENT
Start: 2018-07-24 | End: 2018-07-24

## 2018-07-24 RX ORDER — MAGNESIUM SULFATE HEPTAHYDRATE 40 MG/ML
2 INJECTION, SOLUTION INTRAVENOUS ONCE
Status: COMPLETED | OUTPATIENT
Start: 2018-07-24 | End: 2018-07-24

## 2018-07-24 RX ORDER — IPRATROPIUM BROMIDE AND ALBUTEROL SULFATE 2.5; .5 MG/3ML; MG/3ML
3 SOLUTION RESPIRATORY (INHALATION)
Status: DISCONTINUED | OUTPATIENT
Start: 2018-07-24 | End: 2018-07-29 | Stop reason: HOSPADM

## 2018-07-24 RX ORDER — ALBUMIN HUMAN 250 G/1000ML
12.5 SOLUTION INTRAVENOUS ONCE
Status: COMPLETED | OUTPATIENT
Start: 2018-07-24 | End: 2018-07-24

## 2018-07-24 RX ORDER — IPRATROPIUM BROMIDE AND ALBUTEROL SULFATE 2.5; .5 MG/3ML; MG/3ML
3 SOLUTION RESPIRATORY (INHALATION) ONCE
Status: COMPLETED | OUTPATIENT
Start: 2018-07-24 | End: 2018-07-24

## 2018-07-24 RX ORDER — POTASSIUM CHLORIDE 20 MEQ/1
40 TABLET, EXTENDED RELEASE ORAL
Status: COMPLETED | OUTPATIENT
Start: 2018-07-24 | End: 2018-07-24

## 2018-07-24 RX ADMIN — TRAMADOL HYDROCHLORIDE 50 MG: 50 TABLET, COATED ORAL at 08:07

## 2018-07-24 RX ADMIN — IPRATROPIUM BROMIDE AND ALBUTEROL SULFATE 3 ML: .5; 3 SOLUTION RESPIRATORY (INHALATION) at 11:07

## 2018-07-24 RX ADMIN — ONDANSETRON 8 MG: 8 TABLET, ORALLY DISINTEGRATING ORAL at 04:07

## 2018-07-24 RX ADMIN — IPRATROPIUM BROMIDE AND ALBUTEROL SULFATE 3 ML: .5; 3 SOLUTION RESPIRATORY (INHALATION) at 07:07

## 2018-07-24 RX ADMIN — FOLIC ACID 1 MG: 1 TABLET ORAL at 09:07

## 2018-07-24 RX ADMIN — TRAMADOL HYDROCHLORIDE 50 MG: 50 TABLET, COATED ORAL at 06:07

## 2018-07-24 RX ADMIN — LACTULOSE 30 G: 20 SOLUTION ORAL at 04:07

## 2018-07-24 RX ADMIN — MIDODRINE HYDROCHLORIDE 15 MG: 5 TABLET ORAL at 05:07

## 2018-07-24 RX ADMIN — MIDODRINE HYDROCHLORIDE 15 MG: 5 TABLET ORAL at 08:07

## 2018-07-24 RX ADMIN — THIAMINE HCL TAB 100 MG 100 MG: 100 TAB at 09:07

## 2018-07-24 RX ADMIN — FERROUS SULFATE TAB EC 325 MG (65 MG FE EQUIVALENT) 325 MG: 325 (65 FE) TABLET DELAYED RESPONSE at 09:07

## 2018-07-24 RX ADMIN — NICOTINE 1 PATCH: 14 PATCH, EXTENDED RELEASE TRANSDERMAL at 09:07

## 2018-07-24 RX ADMIN — ONDANSETRON 8 MG: 8 TABLET, ORALLY DISINTEGRATING ORAL at 07:07

## 2018-07-24 RX ADMIN — CYANOCOBALAMIN TAB 1000 MCG 1000 MCG: 1000 TAB at 09:07

## 2018-07-24 RX ADMIN — LACTULOSE 30 G: 20 SOLUTION ORAL at 09:07

## 2018-07-24 RX ADMIN — CEFTRIAXONE SODIUM 2 G: 2 INJECTION, POWDER, FOR SOLUTION INTRAMUSCULAR; INTRAVENOUS at 05:07

## 2018-07-24 RX ADMIN — POTASSIUM CHLORIDE 40 MEQ: 1500 TABLET, EXTENDED RELEASE ORAL at 04:07

## 2018-07-24 RX ADMIN — TRAMADOL HYDROCHLORIDE 50 MG: 50 TABLET, COATED ORAL at 01:07

## 2018-07-24 RX ADMIN — IPRATROPIUM BROMIDE AND ALBUTEROL SULFATE 3 ML: .5; 3 SOLUTION RESPIRATORY (INHALATION) at 04:07

## 2018-07-24 RX ADMIN — MAGNESIUM SULFATE IN WATER 2 G: 40 INJECTION, SOLUTION INTRAVENOUS at 09:07

## 2018-07-24 RX ADMIN — POTASSIUM CHLORIDE 40 MEQ: 1500 TABLET, EXTENDED RELEASE ORAL at 11:07

## 2018-07-24 RX ADMIN — IOHEXOL 75 ML: 350 INJECTION, SOLUTION INTRAVENOUS at 01:07

## 2018-07-24 RX ADMIN — AZITHROMYCIN MONOHYDRATE 500 MG: 250 TABLET ORAL at 09:07

## 2018-07-24 RX ADMIN — MIDODRINE HYDROCHLORIDE 15 MG: 5 TABLET ORAL at 11:07

## 2018-07-24 RX ADMIN — ALBUMIN HUMAN 12.5 G: 0.25 SOLUTION INTRAVENOUS at 09:07

## 2018-07-24 RX ADMIN — FUROSEMIDE 40 MG: 10 INJECTION, SOLUTION INTRAMUSCULAR; INTRAVENOUS at 04:07

## 2018-07-24 NOTE — HPI
Patient is a 46 y.o. female with past history of alcohol use disorder, severe, alcoholic cirrhosis, hypothyroidism and unspecified anxiety disorder who was admitted on 07/23/2018 with hypoxemic respiratory failure 2/2 emphysema. Psychiatry was consulted for liver transplant evaluation. Per chart review patient was advised to stop drinking 7-21-14 (last note in [facility]) and given information on rehab and AA meetings. Patient denies any previous substance abuse treatment for alcohol. Patient is ambivalent towards AA, believing she can maintain sobriety on her own. Patient was taking Buspar 5mg PO TID for the last 6 months for anxiety, but is no longer taking any psychotropic medications. Pt reported partial response. Denies any other significant psychiatric history, no hospitalization, suicide attempts, or h/o arron/psychosis. Pt lives with her boyfriend who consumes alcohol daily.  Pt reports daily consumption of at least  6 pack of beer beginning 2006 s/p the death of her mother. Divorce followed shortly.  Pt smokes 1 ppd since age 15. Has unsuccessfully attempted to quit. Denies any other illegal drug use. This time patient was admitted for acute encephalopathy at [facility] and completed a paracentesis however, left AMA on 7/19. Then she presented yesterday due to shortness of breath with distended abdomen. Patient claims her last drink was a year ago and PETH test is pending.         Collateral  Boyfriend Orestes - patient has been living with him for 10 years and claims that he is patient's only support.  # [phone]     Sister- Summer   #[phone]

## 2018-07-24 NOTE — HPI
Ms Esparza is a 48 year old woman with  History of ETOH ESLD decompensations including ascites, HE, HRS, Hypothyroidism, COPD who is being admitted due to abdomianl distension.    She was recently admitted to [facility] ([facility] on Jimmie 7/15 due to hepatic encephlaopathy from UTI. She reports that she left AMA on 7/19 as she felt they were not doing anything but watching. They had completed a 1.5L para during the hospitalization but still feels bloated. Her last tap prior to this was ~1 yr ago.    She presented to the hospital due to dyspnea in setting of increaseing abdominal distension. She required 2L NC which she borrowed from family member. She reports currently that she feels the 'same' with abdominal distension as primary complaint. She notes that she will have intermittent pain up to 8/10 including abdomen, under the ribs anteriorly and back pain. She has been taking norco with minimal relief. She denies any tylenol or NSAIDS.    On review she endorses a cough x several days which is non-productive. Denies any sick contact or travel history. She reports that her last ETOH was 1 year ago.    She had previously seen Dr Johnson in clinic (1/2017). Per note she was first diagnosed 9/2014 and underwent a biopsy in 10/2014. She was noted at that time to be drinking until 11/2016. MELD was 16. Was not planned for transplant evaluation at that time due to low MELD and desiring formal ETOH rehab and sobriety.

## 2018-07-24 NOTE — ASSESSMENT & PLAN NOTE
-Was previously diagnosed with unspecified anxiety disorder  -Not currently taking any psychotropic medications.  Denies symptoms of anxiety  -Continue to monitor
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
## 2018-07-24 NOTE — CONSULTS
[facility]  Hepatology  Consult Note    Patient Name: Annette Esparza  MRN: [id number]  Admission Date: 7/23/2018  Hospital Length of Stay: 1 days  Attending Provider: Justino Edward MD   Primary Care Physician: Endy Pfeiffer MD  Principal Problem:Acute on chronic respiratory failure    Inpatient consult to Hepatology  Consult performed by: CHARLENE NASCIMENTO  Consult ordered by: JUSTINO EDWARD        Subjective:     Transplant status: No    HPI:  Ms Esparza is a 48 year old woman with  History of ETOH ESLD decompensations including ascites, HE, HRS, Hypothyroidism, COPD who is being admitted due to abdomianl distension.    She was recently admitted to [facility] ([facility] on Jimmie 7/15 due to hepatic encephlaopathy from UTI. She reports that she left AMA on 7/19 as she felt they were not doing anything but watching. They had completed a 1.5L para during the hospitalization but still feels bloated. Her last tap prior to this was ~1 yr ago.    She presented to the hospital due to dyspnea in setting of increaseing abdominal distension. She required 2L NC which she borrowed from family member. She reports currently that she feels the 'same' with abdominal distension as primary complaint. She notes that she will have intermittent pain up to 8/10 including abdomen, under the ribs anteriorly and back pain. She has been taking norco with minimal relief. She denies any tylenol or NSAIDS.    On review she endorses a cough x several days which is non-productive. Denies any sick contact or travel history. She reports that her last ETOH was 1 year ago.    She had previously seen Dr Johnson in clinic (1/2017). Per note she was first diagnosed 9/2014 and underwent a biopsy in 10/2014. She was noted at that time to be drinking until 11/2016. MELD was 16. Was not planned for transplant evaluation at that time due to low MELD and desiring formal ETOH rehab and sobriety.            Review of Systems    Constitutional: Positive for activity change and appetite change (early satiety). Negative for diaphoresis, fatigue and fever.   HENT: Negative for trouble swallowing.    Eyes: Negative for visual disturbance.   Respiratory: Positive for cough and shortness of breath.    Cardiovascular: Negative for chest pain.   Gastrointestinal: Positive for abdominal distention and abdominal pain. Negative for blood in stool, constipation, nausea and vomiting.   Genitourinary: Negative for hematuria.   Musculoskeletal: Positive for back pain. Negative for myalgias.   Skin: Negative for rash.   Neurological: Negative for dizziness and light-headedness.   Psychiatric/Behavioral: Negative for confusion.       Past Medical History:   Diagnosis Date    Acute hypoxemic respiratory failure 2016    ANDREW (acute kidney injury) 2016    Alcohol dependence in remission     Alcoholic cirrhosis of liver with ascites 10/22/2015    Anxiety     Ascites due to alcoholic cirrhosis 2016    Centrilobular emphysema 2016    Cigarette nicotine dependence without complication 2016    Folate deficiency 10/22/2015    Gallstones     Hepatorenal syndrome 2016    Hyperbilirubinemia 2016    Hyponatremia 2016    Iron deficiency anemia due to chronic blood loss 10/22/2015    Portal hypertensive gastropathy 10/22/2015    Subclinical hypothyroidism 10/22/2015       Past Surgical History:   Procedure Laterality Date    ADENOIDECTOMY      APPENDECTOMY       SECTION      COLONOSCOPY      HYSTERECTOMY      25 yrs old    LIVER BIOPSY      OOPHORECTOMY Left     25 yrs old    OOPHORECTOMY Right     29 yrs old    TONSILLECTOMY      TUBAL LIGATION      UPPER GASTROINTESTINAL ENDOSCOPY         Family history of liver disease: No    Review of patient's allergies indicates:   Allergen Reactions    Morphine Nausea And Vomiting       Social History Main Topics    Smoking status: Current Every Day  Smoker     Packs/day: 1.00     Years: 35.00     Types: Cigarettes    Smokeless tobacco: Never Used    Alcohol use No      Comment: hx etoh-quit 8/2015    Drug use: No    Sexual activity: Yes     Partners: Male       Prescriptions Prior to Admission   Medication Sig Dispense Refill Last Dose    ascorbic acid, vitamin C, (VITAMIN C) 500 MG tablet Take 500 mg by mouth once daily.   7/23/2018    ciprofloxacin HCl (CIPRO) 500 MG tablet Take 1 tablet (500 mg total) by mouth every 12 (twelve) hours. for 14 days 28 tablet 0 7/23/2018    cyanocobalamin (VITAMIN B-12) 1000 MCG tablet Take 1 tablet (1,000 mcg total) by mouth once daily.   7/23/2018    ferrous sulfate 325 (65 FE) MG EC tablet Take 1 tablet (325 mg total) by mouth 2 (two) times daily. 60 tablet 0 7/23/2018    furosemide (LASIX) 40 MG tablet TAKE 1 TABLET (40 MG TOTAL) BY MOUTH ONCE DAILY. 90 tablet 3 7/23/2018    midodrine (PROAMATINE) 10 MG tablet Take 1 tablet (10 mg total) by mouth 3 (three) times daily with meals. 90 tablet 3 7/23/2018    potassium 99 mg Tab Take by mouth once.   7/23/2018    spironolactone (ALDACTONE) 100 MG tablet Take 1 tablet (100 mg total) by mouth once daily. 30 tablet 0 7/23/2018    folic acid (FOLVITE) 1 MG tablet Take 1 tablet (1 mg total) by mouth once daily. 30 tablet 11     HYDROcodone-acetaminophen (NORCO)  mg per tablet Take 1 tablet by mouth every 8 (eight) hours as needed for Pain. 90 tablet 0     multivitamin (THERAGRAN) tablet Take 1 tablet by mouth once daily.   Taking    thiamine 100 MG tablet Take 1 tablet (100 mg total) by mouth once daily.          Objective:     Vital Signs (Most Recent):  Temp: 98.2 °F (36.8 °C) (07/24/18 1154)  Pulse: 86 (07/24/18 1154)  Resp: 16 (07/24/18 1154)  BP: 102/60 (07/24/18 1154)  SpO2: (!) 92 % (07/24/18 1154) Vital Signs (24h Range):  Temp:  [97.9 °F (36.6 °C)-98.2 °F (36.8 °C)] 98.2 °F (36.8 °C)  Pulse:  [64-86] 86  Resp:  [15-20] 16  SpO2:  [82 %-99 %] 92 %  BP:  ()/(53-60) 102/60     Weight: 55.5 kg (122 lb 5.7 oz) (07/24/18 0147)  Body mass index is 23.12 kg/m².    Physical Exam   Constitutional: She is oriented to person, place, and time.   Appears comfortable, in no distress. Speaking full sentences.   HENT:   Head: Normocephalic and atraumatic.   Eyes: Scleral icterus is present.   Cardiovascular: Normal rate and regular rhythm.    Pulmonary/Chest: Effort normal and breath sounds normal. No respiratory distress.   Abdominal: Soft. Bowel sounds are normal. She exhibits distension. There is no tenderness. There is no rebound and no guarding.   Musculoskeletal: She exhibits edema. She exhibits no tenderness.   Lymphadenopathy:     She has no cervical adenopathy.   Neurological: She is alert and oriented to person, place, and time.   No asterixis   Skin: Skin is warm and dry.   Psychiatric: She has a normal mood and affect. Her behavior is normal. Judgment and thought content normal.   Nursing note and vitals reviewed.      MELD-Na score: 25 at 7/24/2018  4:57 AM  MELD score: 19 at 7/24/2018  4:57 AM  Calculated from:  Serum Creatinine: 1 mg/dL at 7/24/2018  4:57 AM  Serum Sodium: 129 mmol/L at 7/24/2018  4:57 AM  Total Bilirubin: 9.2 mg/dL at 7/24/2018  4:57 AM  INR(ratio): 1.4 at 7/24/2018  4:57 AM  Age: 48 years    Significant Labs:  CBC:   Recent Labs  Lab 07/24/18 0457   WBC 10.64   RBC 2.32*   HGB 7.6*   HCT 23.2*   *     CMP:   Recent Labs  Lab 07/24/18 0457   GLU 79   CALCIUM 8.1*   ALBUMIN 1.7*   PROT 5.6*   *   K 3.0*   CO2 27   CL 91*   BUN 10   CREATININE 1.0   ALKPHOS 373*   ALT 20   AST 34   BILITOT 9.2*     Coagulation:   Recent Labs  Lab 07/19/18 0457 07/24/18 0457   INR 1.4*  < > 1.4*   APTT 42.9*  --   --    < > = values in this interval not displayed.    Significant Imaging:  Labs: Reviewed       Assessment/Plan:     Alcoholic cirrhosis of liver with ascites    48F with history of ETOH ESLD, with decompensations including ascites,  HE, HRS, recently admitted to OSH with HE secondary to HRS, now presenting with abdominal distension. Last seen in clinic 1/2017 with Dr. Johnson with Na-MELD 16. Currently with elevated LFT with Tbili elevated to 11 from prior 1.4 (4/2018) with concern for PNA.    Recent labs:  4/16 Tb 1.4, AST 62, ALT 27, Crt 0.5  7/15 Tb 13.7, AST 32, ALT 20, 1.6  7/19 Tb 10.8, AST 46, ALT 25  7/23 Tb 11.0, AST 34, ALT 20, INR 1.4, 0.9    - EGD (6/2016): LA Grade B esophagitis, portal hypertensive gastropathy. No varices.  - RUQ (7/24) hepatic cirrhosis and portal hypertension patent vasculature.    Plan  - addiction psych consult pending, PETH  - IR guided paracentesis with fluid studies  - pending CT AP 3 phase  - TTE pending given hypoxia  - Ceftriaxone, Azithromycin for treatment of ?pneumonia. Infectious workup pending.  - continue diuretics: lasix 40mg TID, spironolactone 100mg TID  - mental status currently appropriate, continue lactulose 30mg TID  - Midodrine 15mg TID              Thank you for your consult. I will follow-up with patient. Please contact us if you have any additional questions.    Juan Diego Calle MD  Hepatology  [facility]

## 2018-07-24 NOTE — SUBJECTIVE & OBJECTIVE
Review of Systems   Constitutional: Positive for activity change and appetite change (early satiety). Negative for diaphoresis, fatigue and fever.   HENT: Negative for trouble swallowing.    Eyes: Negative for visual disturbance.   Respiratory: Positive for cough and shortness of breath.    Cardiovascular: Negative for chest pain.   Gastrointestinal: Positive for abdominal distention and abdominal pain. Negative for blood in stool, constipation, nausea and vomiting.   Genitourinary: Negative for hematuria.   Musculoskeletal: Positive for back pain. Negative for myalgias.   Skin: Negative for rash.   Neurological: Negative for dizziness and light-headedness.   Psychiatric/Behavioral: Negative for confusion.       Past Medical History:   Diagnosis Date    Acute hypoxemic respiratory failure 2016    ANDREW (acute kidney injury) 2016    Alcohol dependence in remission     Alcoholic cirrhosis of liver with ascites 10/22/2015    Anxiety     Ascites due to alcoholic cirrhosis 2016    Centrilobular emphysema 2016    Cigarette nicotine dependence without complication 2016    Folate deficiency 10/22/2015    Gallstones     Hepatorenal syndrome 2016    Hyperbilirubinemia 2016    Hyponatremia 2016    Iron deficiency anemia due to chronic blood loss 10/22/2015    Portal hypertensive gastropathy 10/22/2015    Subclinical hypothyroidism 10/22/2015       Past Surgical History:   Procedure Laterality Date    ADENOIDECTOMY      APPENDECTOMY       SECTION      COLONOSCOPY      HYSTERECTOMY      25 yrs old    LIVER BIOPSY      OOPHORECTOMY Left     25 yrs old    OOPHORECTOMY Right     29 yrs old    TONSILLECTOMY      TUBAL LIGATION      UPPER GASTROINTESTINAL ENDOSCOPY         Family history of liver disease: No    Review of patient's allergies indicates:   Allergen Reactions    Morphine Nausea And Vomiting       Social History Main Topics    Smoking status:  Current Every Day Smoker     Packs/day: 1.00     Years: 35.00     Types: Cigarettes    Smokeless tobacco: Never Used    Alcohol use No      Comment: hx etoh-quit 8/2015    Drug use: No    Sexual activity: Yes     Partners: Male       Prescriptions Prior to Admission   Medication Sig Dispense Refill Last Dose    ascorbic acid, vitamin C, (VITAMIN C) 500 MG tablet Take 500 mg by mouth once daily.   7/23/2018    ciprofloxacin HCl (CIPRO) 500 MG tablet Take 1 tablet (500 mg total) by mouth every 12 (twelve) hours. for 14 days 28 tablet 0 7/23/2018    cyanocobalamin (VITAMIN B-12) 1000 MCG tablet Take 1 tablet (1,000 mcg total) by mouth once daily.   7/23/2018    ferrous sulfate 325 (65 FE) MG EC tablet Take 1 tablet (325 mg total) by mouth 2 (two) times daily. 60 tablet 0 7/23/2018    furosemide (LASIX) 40 MG tablet TAKE 1 TABLET (40 MG TOTAL) BY MOUTH ONCE DAILY. 90 tablet 3 7/23/2018    midodrine (PROAMATINE) 10 MG tablet Take 1 tablet (10 mg total) by mouth 3 (three) times daily with meals. 90 tablet 3 7/23/2018    potassium 99 mg Tab Take by mouth once.   7/23/2018    spironolactone (ALDACTONE) 100 MG tablet Take 1 tablet (100 mg total) by mouth once daily. 30 tablet 0 7/23/2018    folic acid (FOLVITE) 1 MG tablet Take 1 tablet (1 mg total) by mouth once daily. 30 tablet 11     HYDROcodone-acetaminophen (NORCO)  mg per tablet Take 1 tablet by mouth every 8 (eight) hours as needed for Pain. 90 tablet 0     multivitamin (THERAGRAN) tablet Take 1 tablet by mouth once daily.   Taking    thiamine 100 MG tablet Take 1 tablet (100 mg total) by mouth once daily.          Objective:     Vital Signs (Most Recent):  Temp: 98.2 °F (36.8 °C) (07/24/18 1154)  Pulse: 86 (07/24/18 1154)  Resp: 16 (07/24/18 1154)  BP: 102/60 (07/24/18 1154)  SpO2: (!) 92 % (07/24/18 1154) Vital Signs (24h Range):  Temp:  [97.9 °F (36.6 °C)-98.2 °F (36.8 °C)] 98.2 °F (36.8 °C)  Pulse:  [64-86] 86  Resp:  [15-20] 16  SpO2:  [82  %-99 %] 92 %  BP: ()/(53-60) 102/60     Weight: 55.5 kg (122 lb 5.7 oz) (07/24/18 0147)  Body mass index is 23.12 kg/m².    Physical Exam   Constitutional: She is oriented to person, place, and time.   Appears comfortable, in no distress. Speaking full sentences.   HENT:   Head: Normocephalic and atraumatic.   Eyes: Scleral icterus is present.   Cardiovascular: Normal rate and regular rhythm.    Pulmonary/Chest: Effort normal and breath sounds normal. No respiratory distress.   Abdominal: Soft. Bowel sounds are normal. She exhibits distension. There is no tenderness. There is no rebound and no guarding.   Musculoskeletal: She exhibits edema. She exhibits no tenderness.   Lymphadenopathy:     She has no cervical adenopathy.   Neurological: She is alert and oriented to person, place, and time.   No asterixis   Skin: Skin is warm and dry.   Psychiatric: She has a normal mood and affect. Her behavior is normal. Judgment and thought content normal.   Nursing note and vitals reviewed.      MELD-Na score: 25 at 7/24/2018  4:57 AM  MELD score: 19 at 7/24/2018  4:57 AM  Calculated from:  Serum Creatinine: 1 mg/dL at 7/24/2018  4:57 AM  Serum Sodium: 129 mmol/L at 7/24/2018  4:57 AM  Total Bilirubin: 9.2 mg/dL at 7/24/2018  4:57 AM  INR(ratio): 1.4 at 7/24/2018  4:57 AM  Age: 48 years    Significant Labs:  CBC:   Recent Labs  Lab 07/24/18 0457   WBC 10.64   RBC 2.32*   HGB 7.6*   HCT 23.2*   *     CMP:   Recent Labs  Lab 07/24/18 0457   GLU 79   CALCIUM 8.1*   ALBUMIN 1.7*   PROT 5.6*   *   K 3.0*   CO2 27   CL 91*   BUN 10   CREATININE 1.0   ALKPHOS 373*   ALT 20   AST 34   BILITOT 9.2*     Coagulation:   Recent Labs  Lab 07/19/18 0457 07/24/18 0457   INR 1.4*  < > 1.4*   APTT 42.9*  --   --    < > = values in this interval not displayed.    Significant Imaging:  Labs: Reviewed

## 2018-07-24 NOTE — PROCEDURES
Radiology Post-Procedure Note    Pre Op Diagnosis: Ascites  Post Op Diagnosis: Same    Procedure: Paracentesis    Procedure performed by: Catalina    Written Informed Consent Obtained: Yes  Specimen Removed: YES   Estimated Blood Loss: Minimal    Findings:   Successful paracentesis.  Albumin administered PRN per protocol.    Patient tolerated procedure well.  Please see dictated note for further details and amount of fluid removed.         Shani Sierra MD  Department of Radiology  Resident

## 2018-07-24 NOTE — PLAN OF CARE
Problem: Patient Care Overview  Goal: Plan of Care Review  Outcome: Ongoing (interventions implemented as appropriate)  Pt AAOx4, afebrile, free of falls. Pt unable to tolerate being titrated to 2L NC, requiring 3L at this time. Pt tolerated all medications. Ambulates with +1 assistance to restroom. I/O in flowsheets. Pt c/o pain and nausea managed with PRN's. CT of chest and paracentesis this morning. WCTM.

## 2018-07-24 NOTE — PROGRESS NOTES
Patient stable, tolerated paracentesis well, 3.9 L removed, labs sent, Dressing to R abdomen CDI.  Report called to DREW Valentine.  Pt denies needs, awaiting transport back to [facility]

## 2018-07-24 NOTE — ASSESSMENT & PLAN NOTE
-1 PPD since age of 15.  Endorses recent hx of 1/2 PPD  -Not highly motivated for tobacco cessation.  Multiple failed cessation attempts  -Recommend nicotine patch 21 mg/24 hr prn q daily

## 2018-07-24 NOTE — PLAN OF CARE
Problem: Patient Care Overview  Goal: Plan of Care Review  Outcome: Ongoing (interventions implemented as appropriate)  No acute events occurred during the day.  Pt complained of SOB and dyspnea on assessment.  O2 sats were 88-89% on 3L; pt sating >92% on 4L.  STAT breathing treatment ordered and given.  Pt reports relief.  Pts morning lasix and spirolactone held due to low bp 89/53 per Dr. Edward.  Will reassess afternoon dose.  CTA completed.  Para completed in IR- 3.9L removed.  Pts K+ 3.0, replaced orally.  Pt up with one assist.  No falls or injuries occurred.  Family is at bedside.  WCTM.

## 2018-07-24 NOTE — PLAN OF CARE
Endy Pfeiffer MD  [address] / [place] [address]    Future Appointments  Date Time Provider Department Center   8/2/2018 3:00 PM Endy Pfeiffer MD Merged with [facility]   10/10/2018 9:30 AM [id number] - [place], [place] - [address]  [address]  [place] [address]  Phone: [phone] Fax: [phone]    Payor: MEDICAID / [facility]: [facility] [employer] [facility] / Product Type: Managed Medicaid /        07/24/18 1201   Discharge Assessment   Assessment Type Discharge Planning Assessment   Confirmed/corrected address and phone number on facesheet? Yes   Assessment information obtained from? Patient   Expected Length of Stay (days) 3   Communicated expected length of stay with patient/caregiver yes   Prior to hospitilization cognitive status: Alert/Oriented   Prior to hospitalization functional status: Independent   Current cognitive status: Alert/Oriented   Current Functional Status: Independent   Lives With significant other   Able to Return to Prior Arrangements yes   Is patient able to care for self after discharge? Yes   Who are your caregiver(s) and their phone number(s)? (Orestes  significant other [phone])   Patient's perception of discharge disposition home or selfcare   Patient currently receives any other outside agency services? No   Equipment Currently Used at Home none   Do you have any problems affording any of your prescribed medications? TBD   Does the patient have transportation home? Yes   Transportation Available family or friend will provide   Discharge Plan A Home with family   Discharge Plan B Home with family  (outpatient therapy)   Patient/Family In Agreement With Plan yes
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
## 2018-07-24 NOTE — H&P
History and Physical  Hospital Medicine       Patient Name: Annette Esparza  MRN:  [id number]  [place] [facility] [place] [facility] [place] Justino Edward MD  Date of Admission:  7/23/2018     Principal Problem:  Acute on chronic respiratory failure   Primary Care Physician: Endy Pfeiffer MD      History of Present Illness:     48 yrs old with decompensated alcoholic liver cirrhosis, hypoxemic respiratory failure 2/2; hypothyroidism, hyponatremia 2/2 cirrhosis, JUNE with folate deficieny presented with SOB since today along with worsening abdominal distention. Was brought into the ER on 2L nasal cannula, borrowed family member. Has never been on home O2 before.   Was previously admitted at [facility] for acute encephalopathy and UTI sepsis. Urine cultures grew E.coli, was give IV Rocephin and diuretics. Abdominal paracentesis was done and 1.5L of fluid was removed, blood and peritoneal fluid cultures were negative. Patient left AMA on 7/19.  Says the abdominal distention has been worsening since she left AMA.   EGD in 2016 showed no evidence of varices. Was diagnosed with portal hypertensive gastropathy.  Reports nausea and 2 episodes of vomiting, no blood.    Review of Systems   Constitutional: Negative for chills, fatigue, fever.   HENT: Negative for sore throat, trouble swallowing.    Eyes: Negative for photophobia, visual disturbance.   Respiratory: Negative for cough, positive shortness of breath.    Cardiovascular: Negative for chest pain, palpitations, leg swelling.   Gastrointestinal: Negative for abdominal pain, constipation, diarrhea, nausea, vomiting.   Endocrine: Negative for cold intolerance, heat intolerance.   Genitourinary: Negative for dysuria, frequency.   Musculoskeletal: Negative for arthralgias, myalgias.   Skin: Negative for rash, wound, erythema   Neurological: Negative for dizziness, syncope, weakness, light-headedness.   Psychiatric/Behavioral: Negative for confusion, hallucinations,  anxiety  All other systems reviewed and are negative.      Past Medical History: Patient has a past medical history of Acute hypoxemic respiratory failure (2016); ANDREW (acute kidney injury) (2016); Alcohol dependence in remission; Alcoholic cirrhosis of liver with ascites (10/22/2015); Anxiety; Ascites due to alcoholic cirrhosis (2016); Centrilobular emphysema (2016); Cigarette nicotine dependence without complication (2016); Folate deficiency (10/22/2015); Gallstones; Hepatorenal syndrome (2016); Hyperbilirubinemia (2016); Hyponatremia (2016); Iron deficiency anemia due to chronic blood loss (10/22/2015); Portal hypertensive gastropathy (10/22/2015); and Subclinical hypothyroidism (10/22/2015).    Past Surgical History: Patient has a past surgical history that includes  section; Tonsillectomy; Adenoidectomy; Appendectomy; Upper gastrointestinal endoscopy; Liver biopsy; Tubal ligation; Colonoscopy; Hysterectomy; Oophorectomy (Left); and Oophorectomy (Right).    Social History: Patient reports that she has been smoking Cigarettes.  She has a 35.00 pack-year smoking history. She has never used smokeless tobacco. She reports that she does not drink alcohol or use drugs.    Family History: family history includes Cancer in her mother; No Known Problems in her brother and sister; Rheum arthritis in her father.    Medications: Scheduled Meds:   azithromycin  500 mg Oral Daily    cefTRIAXone (ROCEPHIN) IVPB  2 g Intravenous Q24H    cyanocobalamin  1,000 mcg Oral Daily    ferrous sulfate  325 mg Oral BID    folic acid  1 mg Oral Daily    furosemide  40 mg Intravenous TID    lactulose  30 g Oral TID    midodrine  15 mg Oral TID WM    spironolactone  100 mg Oral Daily    thiamine  100 mg Oral Daily     Continuous Infusions:  PRN Meds:.acetaminophen, dextrose 50%, dextrose 50%, glucagon (human recombinant), glucose, glucose, omnipaque, ondansetron, sodium chloride  0.9%, traMADol    Allergies: Patient is allergic to morphine.    Physical Exam:     Vital Signs (Most Recent):  Temp: 98 °F (36.7 °C) (07/24/18 0028)  Pulse: 73 (07/24/18 0028)  Resp: 18 (07/24/18 0028)  BP: (!) 94/55 (07/24/18 0028)  SpO2: (!) 93 % (07/24/18 0028) Vital Signs Range (Last 24H):  Temp:  [98 °F (36.7 °C)-98.3 °F (36.8 °C)]   Pulse:  [64-84]   Resp:  [18-20]   BP: (90-96)/(52-60)   SpO2:  [82 %-98 %]    Body mass index is 19.84 kg/m².     Physical Exam:  Constitutional: appears weak and ill  Head: Normocephalic and atraumatic.   Mouth/Throat: Oropharynx is clear and moist.   Eyes: EOM are normal. Pupils are equal, round, and reactive to light. positive scleral icterus.   Neck: Normal range of motion. Neck supple.   Cardiovascular: Normal rate and regular rhythm.  No murmur heard.  Pulmonary/Chest: Effort normal and breath sounds normal. No respiratory distress. No wheezes, rales, or rhonchi  Abdominal: Soft. Bowel sounds are normal.  positive distension and tenderness  Musculoskeletal: Normal range of motion. No edema.   Neurological: Alert and oriented to person, place, and time.   Skin: Skin is warm and dry.   Psychiatric: Normal mood and affect. Behavior is normal.   Vitals reviewed.      Recent Labs  Lab 07/18/18  0406 07/19/18  0457 07/23/18  1348   WBC 9.16 16.93* 11.76   HGB 8.9* 9.2* 8.0*   HCT 26.7* 27.5* 24.5*   * 135* 149*         Recent Labs  Lab 07/19/18  0126 07/19/18  0755 07/19/18  1430 07/23/18  1348   * 123* 122* 127*   K 3.3* 3.4* 3.0* 3.5   CL 88* 87* 86* 92*   CO2 28 25 25 26   BUN 5* 5* 6 10   CREATININE 0.7 0.7 0.7 0.9   GLU 93 92 101 106   CALCIUM 8.0* 8.0* 7.9* 8.2*   MG 1.8 1.7 1.7  --    PHOS 3.7 3.3 3.4  --        Recent Labs  Lab 07/18/18  0406  07/19/18  0457 07/19/18  0624 07/19/18  0755 07/19/18  1430 07/23/18  1348   ALKPHOS 331*  --   --  343*  --   --  391*   ALT 21  --   --  25  --   --  20   AST 38  --   --  46*  --   --  34   ALBUMIN 1.9*  < >  --   1.8* 1.9* 1.8* 1.7*   PROT 5.5*  --   --  5.6*  --   --  5.8*   BILITOT 11.4*  --   --  10.8*  --   --  11.0*   INR 1.3*  --  1.4*  --   --   --  1.4*   < > = values in this interval not displayed.   No results for input(s): POCTGLUCOSE in the last 168 hours.      Assessment and Plan:     Ms. Annette Esparza is a 48 y.o. female who presented to [facility] on 7/23/2018 with     Active Hospital Problems    Diagnosis  POA    *Acute on chronic respiratory failure [J96.20]  Yes    Thrombocytopenia [D69.6]  Yes    Pneumonia [J18.9]  Yes    Severe malnutrition [E43]  Yes    Decompensated hepatic cirrhosis [K72.90]  Yes    Hepatic encephalopathy [K72.90]  Yes    Hyponatremia [E87.1]  Yes    Hypotension [I95.9]  Yes    Centrilobular emphysema [J43.2]  Yes     Chronic    Ascites [R18.8]  Yes    Alcoholic cirrhosis of liver with ascites [K70.31]  Yes     Chronic    Subclinical hypothyroidism [E03.9]  Yes     Chronic    Anemia of chronic disease [D63.8]  Yes    Alcohol dependence in remission [F10.21]  Yes     Chronic      Resolved Hospital Problems    Diagnosis Date Resolved POA   No resolved problems to display.     # Acute on chronic respiratory failure  # Pneumonia due to suspected strep PNA  # COPD  - patient lives on 2L NC at home, today required more oxygen, upto 5L  - ABG on RA shows a PAO2 of 43  - CXR shows some opacities  - will empirically start on rocephin and azithromax  - also with that low of PAO2, concern for possible HPS, will get 2d echo with bubble study  - CT chest also ordered for further evaluation  - duonebs    # Decompensated alcoholic cirrhosis with ascites  # Severe alcohol dependence in remission  MELD-Na score: 26 at 7/23/2018  1:48 PM  MELD score: 19 at 7/23/2018  1:48 PM  Calculated from:  Serum Creatinine: 0.9 mg/dL (Rounded to 1) at 7/23/2018  1:48 PM  Serum Sodium: 127 mmol/L at 7/23/2018  1:48 PM  Total Bilirubin: 11 mg/dL at 7/23/2018  1:48 PM  INR(ratio): 1.4 at 7/23/2018   1:48 PM  Age: 48 years  - patient states last drink was over a year ago  - abdomen U/S with liver doppler  - hepatology consult  - addiction psych consult with PET  - CT ab/pelv with triple phase  - started on lasix 40 mg IV TID  - IR paracentesis planned for there AM    # Hyponatremia  - fluid restriction     # Anemia of chronic disease  # Thrombocytopenia  - monitor     # Hepatic Encephalopathy  - continue lactulose to have 3 to 4 BMs daily    # Severe malnutrition  - PAB and boost    # Hypotension  - increase midodrine to 15 mg TID         Diet:  Low sodium with fluid restriction   GI PPx:    DVT PPx:    Goals of Care:  full    High Risk Conditions:  -respiratory failure     Disposition:  Later this week    Justino Edward MD  Medical Director [facility]  Spectra:  [phone]  Pager: [phone]

## 2018-07-24 NOTE — H&P
Inpatient Radiology Pre-procedure Note    History of Present Illness:  Annette Esparza is a 48 y.o. female with alcoholic cirrhosis and recurrent ascites who is here for paracentesis.    Admission H&P reviewed.    Past Medical History:   Diagnosis Date    Acute hypoxemic respiratory failure 2016    ANDREW (acute kidney injury) 2016    Alcohol dependence in remission     Alcoholic cirrhosis of liver with ascites 10/22/2015    Anxiety     Ascites due to alcoholic cirrhosis 2016    Centrilobular emphysema 2016    Cigarette nicotine dependence without complication 2016    Folate deficiency 10/22/2015    Gallstones     Hepatorenal syndrome 2016    Hyperbilirubinemia 2016    Hyponatremia 2016    Iron deficiency anemia due to chronic blood loss 10/22/2015    Portal hypertensive gastropathy 10/22/2015    Subclinical hypothyroidism 10/22/2015     Past Surgical History:   Procedure Laterality Date    ADENOIDECTOMY      APPENDECTOMY       SECTION      COLONOSCOPY      HYSTERECTOMY      25 yrs old    LIVER BIOPSY      OOPHORECTOMY Left     25 yrs old    OOPHORECTOMY Right     29 yrs old    TONSILLECTOMY      TUBAL LIGATION      UPPER GASTROINTESTINAL ENDOSCOPY         Review of Systems:   As documented in primary team H&P    Home Meds:   Prior to Admission medications    Medication Sig Start Date End Date Taking? Authorizing Provider   ascorbic acid, vitamin C, (VITAMIN C) 500 MG tablet Take 500 mg by mouth once daily.   Yes Historical Provider, MD   ciprofloxacin HCl (CIPRO) 500 MG tablet Take 1 tablet (500 mg total) by mouth every 12 (twelve) hours. for 14 days 18 Yes Lissy Caputo, DO   cyanocobalamin (VITAMIN B-12) 1000 MCG tablet Take 1 tablet (1,000 mcg total) by mouth once daily. 18  Yes Lissy Caputo, DO   ferrous sulfate 325 (65 FE) MG EC tablet Take 1 tablet (325 mg total) by mouth 2 (two) times daily. 18   Yes Lissy Caputo DO   furosemide (LASIX) 40 MG tablet TAKE 1 TABLET (40 MG TOTAL) BY MOUTH ONCE DAILY. 3/13/18  Yes Endy Pfeiffer MD   midodrine (PROAMATINE) 10 MG tablet Take 1 tablet (10 mg total) by mouth 3 (three) times daily with meals. 12/2/17 12/2/18 Yes Nicolas Shelton MD   potassium 99 mg Tab Take by mouth once.   Yes Historical Provider, MD   spironolactone (ALDACTONE) 100 MG tablet Take 1 tablet (100 mg total) by mouth once daily. 7/20/18 7/20/19 Yes Lissy Caputo DO   folic acid (FOLVITE) 1 MG tablet Take 1 tablet (1 mg total) by mouth once daily. 7/20/18 7/20/19  Lissy Caputo DO   HYDROcodone-acetaminophen (NORCO)  mg per tablet Take 1 tablet by mouth every 8 (eight) hours as needed for Pain. 7/9/18   Endy Pfeiffer MD   multivitamin (THERAGRAN) tablet Take 1 tablet by mouth once daily.    Historical Provider, MD   thiamine 100 MG tablet Take 1 tablet (100 mg total) by mouth once daily. 7/20/18   Lissy Caputo DO     Scheduled Meds:    albuterol-ipratropium  3 mL Nebulization Q4H WAKE    azithromycin  500 mg Oral Daily    cefTRIAXone (ROCEPHIN) IVPB  2 g Intravenous Q24H    cyanocobalamin  1,000 mcg Oral Daily    ferrous sulfate  325 mg Oral BID    folic acid  1 mg Oral Daily    furosemide  40 mg Intravenous TID    lactulose  30 g Oral TID    midodrine  15 mg Oral TID WM    potassium chloride SA  40 mEq Oral Q2H    spironolactone  100 mg Oral Daily    thiamine  100 mg Oral Daily     Continuous Infusions:   PRN Meds:acetaminophen, dextrose 50%, dextrose 50%, glucagon (human recombinant), glucose, glucose, omnipaque, ondansetron, sodium chloride 0.9%, traMADol  Anticoagulants/Antiplatelets: no anticoagulation    Allergies:   Review of patient's allergies indicates:   Allergen Reactions    Morphine Nausea And Vomiting     Sedation Hx: have not been any systemic reactions    Labs:    Recent Labs  Lab 07/24/18  0457   INR 1.4*       Recent Labs  Lab  07/24/18  0457   WBC 10.64   HGB 7.6*   HCT 23.2*   *   *      Recent Labs  Lab 07/19/18  0624  07/24/18 0457   GLU  --   < > 79   NA  --   < > 129*   K  --   < > 3.0*   CL  --   < > 91*   CO2  --   < > 27   BUN  --   < > 10   CREATININE  --   < > 1.0   CALCIUM  --   < > 8.1*   MG  --   < > 1.2*   ALT 25  < > 20   AST 46*  < > 34   ALBUMIN 1.8*  < > 1.7*   BILITOT 10.8*  < > 9.2*   BILIDIR 7.4*  --   --    < > = values in this interval not displayed.      Vitals:  Temp: 98.2 °F (36.8 °C) (07/24/18 1154)  Pulse: 78 (07/24/18 1429)  Resp: 16 (07/24/18 1429)  BP: 96/63 (07/24/18 1429)  SpO2: 98 % (07/24/18 1429)     Physical Exam:    General: no acute distress  Mental Status: alert and oriented to person, place and time  HEENT: normocephalic, atraumatic  Chest: unlabored breathing  Heart: regular heart rate  Abdomen: nondistended  Extremity: moves all extremities      Plan:  Sedation Plan: local  Patient will undergo paracentesis.      Shani Sierra MD  Department of Radiology  Resident

## 2018-07-24 NOTE — ASSESSMENT & PLAN NOTE
48F with history of ETOH ESLD, with decompensations including ascites, HE, HRS, recently admitted to OSH with HE secondary to HRS, now presenting with abdominal distension. Last seen in clinic 1/2017 with Dr. Johnson with Na-MELD 16. Currently with elevated LFT with Tbili elevated to 11 from prior 1.4 (4/2018) with concern for PNA.    Recent labs:  4/16 Tb 1.4, AST 62, ALT 27, Crt 0.5  7/15 Tb 13.7, AST 32, ALT 20, 1.6  7/19 Tb 10.8, AST 46, ALT 25  7/23 Tb 11.0, AST 34, ALT 20, INR 1.4, 0.9    - EGD (6/2016): LA Grade B esophagitis, portal hypertensive gastropathy. No varices.  - RUQ (7/24) hepatic cirrhosis and portal hypertension patent vasculature.    Plan  - addiction psych consult pending, PETH  - IR guided paracentesis with fluid studies  - pending CT AP 3 phase  - TTE pending given hypoxia  - Ceftriaxone, Azithromycin for treatment of ?pneumonia. Infectious workup pending.  - continue diuretics: lasix 40mg TID, spironolactone 100mg TID  - mental status currently appropriate, continue lactulose 30mg TID  - Midodrine 15mg TID

## 2018-07-24 NOTE — PT/OT/SLP PROGRESS
Physical Therapy      Patient Name:  Annette Esparza   MRN:  [id number]    PT orders acknowledged and attempted in PM. Pt returned to floor after paracentesis, RN giving meds and pt eating lunch. PT unable to re-attempt later in PM. Will re-attempt PT evaluation when next scheduled.     ZAID VITALE, PT
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
## 2018-07-24 NOTE — ASSESSMENT & PLAN NOTE
-long history of alcohol use.  Daily alcohol consumption since 2006  -Pending PETH test.   -Contact collateral to obtain more succinct alcohol use timeline  -Not currently demonstrating any symptoms of alcohol withdrawal, no need to initiate benzodiazepine taper  -Supplement Thiamine 100 mg daily, Folate 1 mg daily, MVI daily.  -High risk for relapse, poor insight, poor medical health, poor coping skills, poor judgement. Recommend IOP. Will reassess s/p PETH results

## 2018-07-25 PROBLEM — C22.0 HCC (HEPATOCELLULAR CARCINOMA): Status: ACTIVE | Noted: 2018-07-25

## 2018-07-25 LAB
ABO + RH BLD: NORMAL
ALBUMIN SERPL BCP-MCNC: 2 G/DL
ALP SERPL-CCNC: 329 U/L
ALT SERPL W/O P-5'-P-CCNC: 17 U/L
ANION GAP SERPL CALC-SCNC: 9 MMOL/L
ANISOCYTOSIS BLD QL SMEAR: SLIGHT
AST SERPL-CCNC: 28 U/L
BASOPHILS # BLD AUTO: 0.03 K/UL
BASOPHILS NFR BLD: 0.2 %
BILIRUB SERPL-MCNC: 6.9 MG/DL
BLD GP AB SCN CELLS X3 SERPL QL: NORMAL
BLD PROD TYP BPU: NORMAL
BLOOD UNIT EXPIRATION DATE: NORMAL
BLOOD UNIT TYPE CODE: 600
BLOOD UNIT TYPE: NORMAL
BUN SERPL-MCNC: 9 MG/DL
CALCIUM SERPL-MCNC: 8.4 MG/DL
CHLORIDE SERPL-SCNC: 94 MMOL/L
CO2 SERPL-SCNC: 26 MMOL/L
CODING SYSTEM: NORMAL
CREAT SERPL-MCNC: 1.1 MG/DL
DIFFERENTIAL METHOD: ABNORMAL
DISPENSE STATUS: NORMAL
EOSINOPHIL # BLD AUTO: 0 K/UL
EOSINOPHIL NFR BLD: 0.2 %
ERYTHROCYTE [DISTWIDTH] IN BLOOD BY AUTOMATED COUNT: 25.8 %
EST. GFR  (AFRICAN AMERICAN): >60 ML/MIN/1.73 M^2
EST. GFR  (NON AFRICAN AMERICAN): 59.1 ML/MIN/1.73 M^2
GLUCOSE SERPL-MCNC: 94 MG/DL
HCT VFR BLD AUTO: 20.7 %
HGB BLD-MCNC: 6.8 G/DL
HYPOCHROMIA BLD QL SMEAR: ABNORMAL
IMM GRANULOCYTES # BLD AUTO: 0.11 K/UL
IMM GRANULOCYTES NFR BLD AUTO: 0.9 %
INR PPP: 1.4
LACTATE SERPL-SCNC: 2.9 MMOL/L
LYMPHOCYTES # BLD AUTO: 0.8 K/UL
LYMPHOCYTES NFR BLD: 6.4 %
MAGNESIUM SERPL-MCNC: 1.8 MG/DL
MCH RBC QN AUTO: 33.5 PG
MCHC RBC AUTO-ENTMCNC: 32.9 G/DL
MCV RBC AUTO: 102 FL
MONOCYTES # BLD AUTO: 1.2 K/UL
MONOCYTES NFR BLD: 9.8 %
NEUTROPHILS # BLD AUTO: 10.3 K/UL
NEUTROPHILS NFR BLD: 82.5 %
NRBC BLD-RTO: 0 /100 WBC
NUM UNITS TRANS PACKED RBC: NORMAL
OVALOCYTES BLD QL SMEAR: ABNORMAL
PHOSPHATE SERPL-MCNC: 3.8 MG/DL
PLATELET # BLD AUTO: 138 K/UL
PMV BLD AUTO: 10.1 FL
POIKILOCYTOSIS BLD QL SMEAR: SLIGHT
POLYCHROMASIA BLD QL SMEAR: ABNORMAL
POTASSIUM SERPL-SCNC: 3.6 MMOL/L
PROT SERPL-MCNC: 5.3 G/DL
PROTHROMBIN TIME: 14.1 SEC
RBC # BLD AUTO: 2.03 M/UL
SODIUM SERPL-SCNC: 129 MMOL/L
TARGETS BLD QL SMEAR: ABNORMAL
WBC # BLD AUTO: 12.5 K/UL

## 2018-07-25 PROCEDURE — 99233 SBSQ HOSP IP/OBS HIGH 50: CPT | Mod: ,,, | Performed by: HOSPITALIST

## 2018-07-25 PROCEDURE — 96374 THER/PROPH/DIAG INJ IV PUSH: CPT

## 2018-07-25 PROCEDURE — 25500020 PHARM REV CODE 255: Performed by: HOSPITALIST

## 2018-07-25 PROCEDURE — 94761 N-INVAS EAR/PLS OXIMETRY MLT: CPT

## 2018-07-25 PROCEDURE — 83735 ASSAY OF MAGNESIUM: CPT

## 2018-07-25 PROCEDURE — 36415 COLL VENOUS BLD VENIPUNCTURE: CPT

## 2018-07-25 PROCEDURE — 25000003 PHARM REV CODE 250: Performed by: STUDENT IN AN ORGANIZED HEALTH CARE EDUCATION/TRAINING PROGRAM

## 2018-07-25 PROCEDURE — 27000221 HC OXYGEN, UP TO 24 HOURS

## 2018-07-25 PROCEDURE — 25500020 PHARM REV CODE 255: Performed by: STUDENT IN AN ORGANIZED HEALTH CARE EDUCATION/TRAINING PROGRAM

## 2018-07-25 PROCEDURE — 80053 COMPREHEN METABOLIC PANEL: CPT

## 2018-07-25 PROCEDURE — 97161 PT EVAL LOW COMPLEX 20 MIN: CPT

## 2018-07-25 PROCEDURE — S4991 NICOTINE PATCH NONLEGEND: HCPCS | Performed by: PHYSICIAN ASSISTANT

## 2018-07-25 PROCEDURE — 85025 COMPLETE CBC W/AUTO DIFF WBC: CPT

## 2018-07-25 PROCEDURE — 84100 ASSAY OF PHOSPHORUS: CPT

## 2018-07-25 PROCEDURE — 83605 ASSAY OF LACTIC ACID: CPT

## 2018-07-25 PROCEDURE — P9016 RBC LEUKOCYTES REDUCED: HCPCS

## 2018-07-25 PROCEDURE — 94640 AIRWAY INHALATION TREATMENT: CPT

## 2018-07-25 PROCEDURE — 25000003 PHARM REV CODE 250: Performed by: PHYSICIAN ASSISTANT

## 2018-07-25 PROCEDURE — 25000003 PHARM REV CODE 250: Performed by: HOSPITALIST

## 2018-07-25 PROCEDURE — 36430 TRANSFUSION BLD/BLD COMPNT: CPT

## 2018-07-25 PROCEDURE — 20600001 HC STEP DOWN PRIVATE ROOM

## 2018-07-25 PROCEDURE — 63600175 PHARM REV CODE 636 W HCPCS: Performed by: HOSPITALIST

## 2018-07-25 PROCEDURE — 25000242 PHARM REV CODE 250 ALT 637 W/ HCPCS: Performed by: HOSPITALIST

## 2018-07-25 PROCEDURE — 86901 BLOOD TYPING SEROLOGIC RH(D): CPT

## 2018-07-25 PROCEDURE — 85610 PROTHROMBIN TIME: CPT

## 2018-07-25 PROCEDURE — 86920 COMPATIBILITY TEST SPIN: CPT

## 2018-07-25 PROCEDURE — 93306 TTE W/DOPPLER COMPLETE: CPT | Mod: 26,,, | Performed by: INTERNAL MEDICINE

## 2018-07-25 RX ORDER — IBUPROFEN 200 MG
1 TABLET ORAL DAILY
Status: DISCONTINUED | OUTPATIENT
Start: 2018-07-25 | End: 2018-07-29 | Stop reason: HOSPADM

## 2018-07-25 RX ORDER — MULTIVIT WITH MINERALS/HERBS
1 TABLET ORAL DAILY
COMMUNITY

## 2018-07-25 RX ORDER — SPIRONOLACTONE 100 MG/1
100 TABLET, FILM COATED ORAL DAILY
Status: DISCONTINUED | OUTPATIENT
Start: 2018-07-25 | End: 2018-07-29 | Stop reason: HOSPADM

## 2018-07-25 RX ORDER — SODIUM CHLORIDE 0.9 % (FLUSH) 0.9 %
5 SYRINGE (ML) INJECTION
Status: DISCONTINUED | OUTPATIENT
Start: 2018-07-25 | End: 2018-07-29 | Stop reason: HOSPADM

## 2018-07-25 RX ORDER — GADOBUTROL 604.72 MG/ML
10 INJECTION INTRAVENOUS
Status: COMPLETED | OUTPATIENT
Start: 2018-07-26 | End: 2018-07-26

## 2018-07-25 RX ORDER — HYDROCODONE BITARTRATE AND ACETAMINOPHEN 500; 5 MG/1; MG/1
TABLET ORAL
Status: DISCONTINUED | OUTPATIENT
Start: 2018-07-25 | End: 2018-07-29 | Stop reason: HOSPADM

## 2018-07-25 RX ADMIN — ONDANSETRON 8 MG: 8 TABLET, ORALLY DISINTEGRATING ORAL at 06:07

## 2018-07-25 RX ADMIN — IOHEXOL 75 ML: 350 INJECTION, SOLUTION INTRAVENOUS at 02:07

## 2018-07-25 RX ADMIN — SPIRONOLACTONE 100 MG: 100 TABLET, FILM COATED ORAL at 10:07

## 2018-07-25 RX ADMIN — MIDODRINE HYDROCHLORIDE 15 MG: 5 TABLET ORAL at 04:07

## 2018-07-25 RX ADMIN — MIDODRINE HYDROCHLORIDE 15 MG: 5 TABLET ORAL at 08:07

## 2018-07-25 RX ADMIN — IOHEXOL 15 ML: 350 INJECTION, SOLUTION INTRAVENOUS at 12:07

## 2018-07-25 RX ADMIN — IPRATROPIUM BROMIDE AND ALBUTEROL SULFATE 3 ML: .5; 3 SOLUTION RESPIRATORY (INHALATION) at 01:07

## 2018-07-25 RX ADMIN — IPRATROPIUM BROMIDE AND ALBUTEROL SULFATE 3 ML: .5; 3 SOLUTION RESPIRATORY (INHALATION) at 05:07

## 2018-07-25 RX ADMIN — TRAMADOL HYDROCHLORIDE 50 MG: 50 TABLET, COATED ORAL at 12:07

## 2018-07-25 RX ADMIN — MIDODRINE HYDROCHLORIDE 15 MG: 5 TABLET ORAL at 12:07

## 2018-07-25 RX ADMIN — TRAMADOL HYDROCHLORIDE 50 MG: 50 TABLET, COATED ORAL at 06:07

## 2018-07-25 RX ADMIN — IPRATROPIUM BROMIDE AND ALBUTEROL SULFATE 3 ML: .5; 3 SOLUTION RESPIRATORY (INHALATION) at 08:07

## 2018-07-25 RX ADMIN — THIAMINE HCL TAB 100 MG 100 MG: 100 TAB at 08:07

## 2018-07-25 RX ADMIN — AZITHROMYCIN MONOHYDRATE 500 MG: 250 TABLET ORAL at 08:07

## 2018-07-25 RX ADMIN — FUROSEMIDE 40 MG: 10 INJECTION, SOLUTION INTRAMUSCULAR; INTRAVENOUS at 10:07

## 2018-07-25 RX ADMIN — LACTULOSE 30 G: 20 SOLUTION ORAL at 04:07

## 2018-07-25 RX ADMIN — LACTULOSE 30 G: 20 SOLUTION ORAL at 08:07

## 2018-07-25 RX ADMIN — FOLIC ACID 1 MG: 1 TABLET ORAL at 08:07

## 2018-07-25 RX ADMIN — FUROSEMIDE 40 MG: 10 INJECTION, SOLUTION INTRAMUSCULAR; INTRAVENOUS at 04:07

## 2018-07-25 RX ADMIN — NICOTINE 1 PATCH: 14 PATCH, EXTENDED RELEASE TRANSDERMAL at 08:07

## 2018-07-25 RX ADMIN — CYANOCOBALAMIN TAB 1000 MCG 1000 MCG: 1000 TAB at 08:07

## 2018-07-25 RX ADMIN — IOHEXOL 15 ML: 350 INJECTION, SOLUTION INTRAVENOUS at 02:07

## 2018-07-25 RX ADMIN — FERROUS SULFATE TAB EC 325 MG (65 MG FE EQUIVALENT) 325 MG: 325 (65 FE) TABLET DELAYED RESPONSE at 08:07

## 2018-07-25 NOTE — PLAN OF CARE
Problem: Physical Therapy Goal  Goal: Physical Therapy Goal  Outcome: Outcome(s) achieved Date Met: 07/25/18  Pt evaluation complete; pt safe to d/c home from a mobility standpoint without needs for skilled PT at this time.    ZAID VITALE, PT  7/25/2018

## 2018-07-25 NOTE — PHARMACY MED REC
"Admission Medication Reconciliation - Pharmacy Consult Note    The home medication history was taken by Kathy Alcazar, Pharmacy Tech.     You may go to "Admission" then "Reconcile Home Medications" tabs to review and/or act upon these items.      No issues noted with the medication reconciliation.      Xi Harrison, PharmD  [id number]                .    .            "

## 2018-07-25 NOTE — PLAN OF CARE
Problem: Patient Care Overview  Goal: Plan of Care Review  Outcome: Ongoing (interventions implemented as appropriate)  Pt AAOx4, afebrile, free of falls. Pt ambulates with stand-by assist within room. Pt c/o pain managed with PRNs, relief noted. Pt unable to tolerate titrating oxygen at this time. Cannot maintain >92% on less than 3L NC at this time. Pt denies distress. No acute changes overnight. WCTM.

## 2018-07-25 NOTE — PROGRESS NOTES
Progress Note   Hospital Medicine         Patient Name: Annette Esparza  MRN:  [id number]  [facility] Medicine Team: [facility] HOSP MED L Justino Edward MD  Date of Admission:  7/23/2018     Length of Stay:  LOS: 2 days   Expected Discharge Date: 7/27/2018  Principal Problem:  Acute on chronic respiratory failure       Subjective:     Interval History/Overnight Events:  Patient feeling much better today after getting her paracentesis yesterday; CT ab/pelv triple phase shows new 4.4 cm mass consistent with HCC; plan on getting MRI abdomen for further evaluation     Review of Systems   Constitutional: Negative for chills, fatigue, fever.   HENT: Negative for sore throat, trouble swallowing.    Eyes: Negative for photophobia, visual disturbance.   Respiratory: Negative for cough, shortness of breath.    Cardiovascular: Negative for chest pain, palpitations, leg swelling.   Gastrointestinal: Negative for abdominal pain, constipation, diarrhea, nausea, vomiting.   Endocrine: Negative for cold intolerance, heat intolerance.   Genitourinary: Negative for dysuria, frequency.   Musculoskeletal: Negative for arthralgias, myalgias.   Skin: Negative for rash, wound, erythema   Neurological: Negative for dizziness, syncope, weakness, light-headedness.   Psychiatric/Behavioral: Negative for confusion, hallucinations, anxiety  All other systems reviewed and are negative.    Objective:     Temp:  [97.3 °F (36.3 °C)-98 °F (36.7 °C)]   Pulse:  [60-84]   Resp:  [16-18]   BP: ()/(54-61)   SpO2:  [89 %-100 %]       Physical Exam:  Constitutional: Appears well-developed and well-nourished.   Head: Normocephalic and atraumatic.   Mouth/Throat: Oropharynx is clear and moist.   Eyes: EOM are normal. Pupils are equal, round, and reactive to light. positive scleral icterus.   Neck: Normal range of motion. Neck supple.   Cardiovascular: Normal rate and regular rhythm.  No murmur heard.  Pulmonary/Chest: Effort normal and breath sounds  normal. No respiratory distress. No wheezes, rales, or rhonchi  Abdominal: Soft. Bowel sounds are normal.  Positive distension, no tenderness  Musculoskeletal: Normal range of motion. No edema.   Neurological: Alert and oriented to person, place, and time.   Skin: Skin is warm and dry.   Psychiatric: Normal mood and affect. Behavior is normal.       Recent Labs  Lab 07/19/18  0457 07/23/18  1348 07/24/18  0457 07/25/18  0533   WBC 16.93* 11.76 10.64 12.50   HGB 9.2* 8.0* 7.6* 6.8*   HCT 27.5* 24.5* 23.2* 20.7*   * 149* 146* 138*       Recent Labs  Lab 07/19/18  1430 07/23/18  1348 07/24/18  0457 07/25/18  0533   * 127* 129* 129*   K 3.0* 3.5 3.0* 3.6   CL 86* 92* 91* 94*   CO2 25 26 27 26   BUN 6 10 10 9   CREATININE 0.7 0.9 1.0 1.1    106 79 94   CALCIUM 7.9* 8.2* 8.1* 8.4*   MG 1.7  --  1.2* 1.8   PHOS 3.4  --  4.2 3.8       Recent Labs  Lab 07/23/18  1348 07/24/18  0457 07/25/18  0533   ALKPHOS 391* 373* 329*   ALT 20 20 17   AST 34 34 28   ALBUMIN 1.7* 1.7* 2.0*   PROT 5.8* 5.6* 5.3*   BILITOT 11.0* 9.2* 6.9*   INR 1.4* 1.4* 1.4*     No results for input(s): POCTGLUCOSE in the last 168 hours.     albuterol-ipratropium  3 mL Nebulization Q4H WAKE    azithromycin  500 mg Oral Daily    cefTRIAXone (ROCEPHIN) IVPB  2 g Intravenous Q24H    cyanocobalamin  1,000 mcg Oral Daily    ferrous sulfate  325 mg Oral BID    folic acid  1 mg Oral Daily    furosemide  40 mg Intravenous TID    lactulose  30 g Oral TID    midodrine  15 mg Oral TID WM    spironolactone  100 mg Oral Daily    thiamine  100 mg Oral Daily       Assessment and Plan     Ms. Annette Esparza is a 48 y.o. female who presented to [facility] on 7/23/2018 with     Hospital Course:    Ms. Annette Esparza was admitted to Hospital Medicine for management of     Active Hospital Problems    Diagnosis  POA    *Acute on chronic respiratory failure [J96.20]  Yes    HCC (hepatocellular carcinoma) [C22.0]  Yes     Thrombocytopenia [D69.6]  Yes    Pneumonia [J18.9]  Yes    Severe malnutrition [E43]  Yes    Decompensated hepatic cirrhosis [K72.90]  Yes    Hepatic encephalopathy [K72.90]  Yes    Hyponatremia [E87.1]  Yes    Hypotension [I95.9]  Yes    Centrilobular emphysema [J43.2]  Yes     Chronic    Ascites [R18.8]  Yes    Anxiety [F41.9]  Yes     Chronic    Cigarette nicotine dependence without complication [F17.210]  Yes     Chronic    Alcoholic cirrhosis of liver with ascites [K70.31]  Yes     Chronic    Subclinical hypothyroidism [E03.9]  Yes     Chronic    Anemia of chronic disease [D63.8]  Yes    Alcohol use disorder, severe, in early remission [F10.21]  Yes      Resolved Hospital Problems    Diagnosis Date Resolved POA   No resolved problems to display.          # Acute on chronic respiratory failure  # Pneumonia due to suspected strep PNA  # COPD  - patient lives on 2L NC at home, was at 4L, wean  - ABG on RA shows a PAO2 of 43  - CXR shows some opacities  -  on rocephin and azithromax  - also with that low of PAO2, concern for possible HPS, 2d echo with bubble study done does not show any shunt; will repeat ABG In the AM;   - CTA chest negative for PE, positive for PNA; resp cultures ordered   - duonebs     # Decompensated alcoholic cirrhosis with ascites  # Severe alcohol dependence in remission  # Hepatocellular Carcinoma   MELD-Na score: 24 at 7/25/2018  5:33 AM  MELD score: 18 at 7/25/2018  5:33 AM  Calculated from:  Serum Creatinine: 1.1 mg/dL at 7/25/2018  5:33 AM  Serum Sodium: 129 mmol/L at 7/25/2018  5:33 AM  Total Bilirubin: 6.9 mg/dL at 7/25/2018  5:33 AM  INR(ratio): 1.4 at 7/25/2018  5:33 AM  Age: 48 years    - patient states last drink was over a year ago  - abdomen U/S with liver doppler reviewed   - hepatology consult  - addiction psych consult with [facility], suggest high risk and state needs IOP programs;   - CT ab/pelv with triple phase shows 4.4 cm new mass consistent with HCC; plan on  MRI abdomen   - started on lasix 40 mg IV TID  - IR paracentesis done on 7/24 with 3.9 L removed, negative for SBP     # Hyponatremia  - fluid restriction, improving      # Anemia of chronic disease  # Thrombocytopenia  - hemoglobin is 6.8 today, states yellow stool, will transfuse 1 unit of blood and checking DIC labs     # Hepatic Encephalopathy  - continue lactulose to have 3 to 4 BMs daily     # Severe malnutrition  - PAB and boost     # Hypotension  - increase midodrine to 15 mg TID     # Hypokalemia/Hypomagnesemia  - replace          Diet:  Low sodium with fluid restriction   GI PPx:    DVT PPx:    Goals of Care:  full     High Risk Conditions:  -respiratory failure      Disposition:  Later this week       Justino Edward MD  Medical Director [facility] Medicine  Spectra:  [phone]  Pager: [phone]

## 2018-07-25 NOTE — PLAN OF CARE
Problem: Patient Care Overview  Goal: Plan of Care Review  Outcome: Ongoing (interventions implemented as appropriate)  No acute events occurred during the day.  Pt complained of generalized pain, PRN tramadol given as needed.  Pt on 1L nasal cannula with O2 sats >92%.  Pt had CT of abdomen and pelvis.  Pt also had echo completed.  Pt receiving 1 unit PRBC for H/H of 6.8/20.7.  No falls or injuries occurred.  WCTM.

## 2018-07-25 NOTE — PT/OT/SLP EVAL
Physical Therapy Evaluation and Discharge Note    Patient Name:  Annette Esparza   MRN:  [id number]    Recommendations:     Discharge Recommendations:  home   Discharge Equipment Recommendations: none   Barriers to discharge: None    Assessment:     Annette Esparza is a 48 y.o. female admitted with a medical diagnosis of Acute on chronic respiratory failure. .  At this time, patient is functioning at their prior level of function and does not require further acute PT services.     Recent Surgery: * No surgery found *      Plan:     During this hospitalization, patient does not require further acute PT services.  Please re-consult if situation changes.     Plan of Care Reviewed with: patient    Subjective     Communicated with RN prior to session.  Patient found supine in bed upon PT entry to room, agreeable to evaluation.      Chief Complaint: abd and back pain  Patient comments/goals: return home  Pain/Comfort:  · Pain Rating 1: 8/10  · Location - Side 1: Bilateral  · Location - Orientation 1: generalized  · Location 1:  (abd and low back)  · Pain Addressed 1: Reposition, Distraction  · Pain Rating Post-Intervention 1: 8/10    Living Environment:  Pt lives with significant other in 2nd floor apt with 13 [address]THE[address] and R handrails and tub/shower. Pt reports (I) with ADLs and amb. Pt reports when she needs assist she says with her parents who are able to provide 24 hr assist.   Prior to admission, patients level of function was (I).  Patient has the following equipment: none.  DME owned (not currently used): none.  Upon discharge, patient will have assistance from significant other and parents.    Objective:     Patient found with: telemetry     General Precautions: Standard, fall   Orthopedic Precautions:N/A   Braces: N/A     Exams:  · Cognitive Exam:  Patient is oriented to Person, Place, Time and Situation and follows 100% of multi-step commands   · Gross Motor Coordination:  WFL  · Postural Exam:  Patient  presented with the following abnormalities:    · -       No postural abnormalities identified  · Sensation:    · -       Intact  light/touch B LE  · Skin Integrity/Edema:      · -       Edema: Severe abd  · RLE ROM: WFL  · RLE Strength: WFL  · LLE ROM: WFL  · LLE Strength: WFL    Functional Mobility:  Bed Mobility:     · Supine to Sit: modified independence  · Sit to Supine: modified independence    Transfers:     · Sit to Stand:  supervision with no AD    Gait: ~175ft S without AD; no LOB or SOB     *pt refused attempting stairs, pt reported if she felt unsafe ascending/descending stairs at home she would stay with her parents     AM-PAC 6 CLICK MOBILITY  Total Score:19       Therapeutic Activities and Exercises:  Pt sat EOB with S.  Pt educated on:  -role of PT/POC  -importance of OOB activity  -amb in hallway several time daily  Pt safe to amb in hallway with family or RN staff.     Patient left supine with all lines intact, call button in reach and RN notified.    GOALS:    Physical Therapy Goals     Not on file          Multidisciplinary Problems (Resolved)        Problem: Physical Therapy Goal    Goal Priority Disciplines Outcome Goal Variances Interventions   Physical Therapy Goal   (Resolved)     PT/OT, PT Outcome(s) achieved                     History:     Past Medical History:   Diagnosis Date    Acute hypoxemic respiratory failure 12/1/2016    ANDREW (acute kidney injury) 11/28/2016    Alcohol dependence in remission     Alcoholic cirrhosis of liver with ascites 10/22/2015    Anxiety     Ascites due to alcoholic cirrhosis 6/13/2016    Centrilobular emphysema 12/12/2016    Cigarette nicotine dependence without complication 6/2/2016    Folate deficiency 10/22/2015    Gallstones     Hepatorenal syndrome 11/29/2016    Hyperbilirubinemia 6/13/2016    Hyponatremia 11/29/2016    Iron deficiency anemia due to chronic blood loss 10/22/2015    Portal hypertensive gastropathy 10/22/2015    Subclinical  hypothyroidism 10/22/2015       Past Surgical History:   Procedure Laterality Date    ADENOIDECTOMY      APPENDECTOMY       SECTION      COLONOSCOPY      HYSTERECTOMY      25 yrs old    LIVER BIOPSY      OOPHORECTOMY Left     25 yrs old    OOPHORECTOMY Right     29 yrs old    TONSILLECTOMY      TUBAL LIGATION      UPPER GASTROINTESTINAL ENDOSCOPY         Clinical Decision Making:     Decision Making/ Complexity Score   On examination of body system using standardized tests and measures patient presents with 3 or more elements from any of the following: body structures and functions, activity limitations, and/or participation restrictions.  Leading to a clinical presentation that is considered stable and/or uncomplicated                              Clinical Decision Making  (Eval Complexity):  Low- [id number]     Time Tracking:     PT Received On: 18  PT Start Time: 1222     PT Stop Time: 1230  PT Total Time (min): 8 min     Billable Minutes: Evaluation 8      ZAID VITALE, PT  2018

## 2018-07-25 NOTE — PROGRESS NOTES
Progress Note   Hospital Medicine         Patient Name: Annette Esparza  MRN:  [id number]  [facility] [place] [facility] [place] Justino Edward MD  Date of Admission:  7/23/2018     Length of Stay:  LOS: 1 day   Expected Discharge Date: 7/25/2018  Principal Problem:  Acute on chronic respiratory failure       Subjective:     Interval History/Overnight Events:  Patient states feeling SOB and feeling like her ascites is causing it to be worse; states she just wants to get a paracentesis and she will feel better; went for IR paracentesis and 3.9 L removed and cell count pending; CTA shows PNA, continue abx for now; resp cultures ordered; 2d echo with bubble study is pending; still requiring high levels of oxygen    Review of Systems   Constitutional: Negative for chills, fatigue, fever.   HENT: Negative for sore throat, trouble swallowing.    Eyes: Negative for photophobia, visual disturbance.   Respiratory: Negative for cough, shortness of breath.    Cardiovascular: Negative for chest pain, palpitations, leg swelling.   Gastrointestinal: Negative for abdominal pain, constipation, diarrhea, nausea, vomiting.   Endocrine: Negative for cold intolerance, heat intolerance.   Genitourinary: Negative for dysuria, frequency.   Musculoskeletal: Negative for arthralgias, myalgias.   Skin: Negative for rash, wound, erythema   Neurological: Negative for dizziness, syncope, weakness, light-headedness.   Psychiatric/Behavioral: Negative for confusion, hallucinations, anxiety  All other systems reviewed and are negative.    Objective:     Temp:  [97.6 °F (36.4 °C)-98.2 °F (36.8 °C)]   Pulse:  [60-91]   Resp:  [15-20]   BP: ()/(53-63)   SpO2:  [91 %-99 %]       Physical Exam:  Constitutional: Appears well-developed and well-nourished.   Head: Normocephalic and atraumatic.   Mouth/Throat: Oropharynx is clear and moist.   Eyes: EOM are normal. Pupils are equal, round, and reactive to light. positive scleral icterus.   Neck:  Normal range of motion. Neck supple.   Cardiovascular: Normal rate and regular rhythm.  No murmur heard.  Pulmonary/Chest: Effort normal and breath sounds normal. No respiratory distress. No wheezes, rales, or rhonchi  Abdominal: Soft. Bowel sounds are normal.  Positive distension, no tenderness  Musculoskeletal: Normal range of motion. No edema.   Neurological: Alert and oriented to person, place, and time.   Skin: Skin is warm and dry.   Psychiatric: Normal mood and affect. Behavior is normal.       Recent Labs  Lab 07/18/18  0406 07/19/18  0457 07/23/18  1348 07/24/18  0457   WBC 9.16 16.93* 11.76 10.64   HGB 8.9* 9.2* 8.0* 7.6*   HCT 26.7* 27.5* 24.5* 23.2*   * 135* 149* 146*       Recent Labs  Lab 07/19/18  0755 07/19/18  1430 07/23/18  1348 07/24/18  0457   * 122* 127* 129*   K 3.4* 3.0* 3.5 3.0*   CL 87* 86* 92* 91*   CO2 25 25 26 27   BUN 5* 6 10 10   CREATININE 0.7 0.7 0.9 1.0   GLU 92 101 106 79   CALCIUM 8.0* 7.9* 8.2* 8.1*   MG 1.7 1.7  --  1.2*   PHOS 3.3 3.4  --  4.2       Recent Labs  Lab 07/19/18  0457 07/19/18  0624  07/19/18  1430 07/23/18  1348 07/24/18  0457   ALKPHOS  --  343*  --   --  391* 373*   ALT  --  25  --   --  20 20   AST  --  46*  --   --  34 34   ALBUMIN  --  1.8*  < > 1.8* 1.7* 1.7*   PROT  --  5.6*  --   --  5.8* 5.6*   BILITOT  --  10.8*  --   --  11.0* 9.2*   INR 1.4*  --   --   --  1.4* 1.4*   < > = values in this interval not displayed.  No results for input(s): POCTGLUCOSE in the last 168 hours.     albuterol-ipratropium  3 mL Nebulization Q4H WAKE    azithromycin  500 mg Oral Daily    cefTRIAXone (ROCEPHIN) IVPB  2 g Intravenous Q24H    cyanocobalamin  1,000 mcg Oral Daily    ferrous sulfate  325 mg Oral BID    folic acid  1 mg Oral Daily    furosemide  40 mg Intravenous TID    lactulose  30 g Oral TID    magnesium sulfate IVPB  2 g Intravenous Once    midodrine  15 mg Oral TID WM    nicotine  1 patch Transdermal Daily    spironolactone  100 mg Oral  Daily    thiamine  100 mg Oral Daily       Assessment and Plan     Ms. Annette Esparza is a 48 y.o. female who presented to [facility] on 7/23/2018 with     Hospital Course:    Ms. Annette Esparza was admitted to Hospital Medicine for management of     Active Hospital Problems    Diagnosis  POA    *Acute on chronic respiratory failure [J96.20]  Yes    Thrombocytopenia [D69.6]  Yes    Pneumonia [J18.9]  Yes    Severe malnutrition [E43]  Yes    Decompensated hepatic cirrhosis [K72.90]  Yes    Hepatic encephalopathy [K72.90]  Yes    Hyponatremia [E87.1]  Yes    Hypotension [I95.9]  Yes    Centrilobular emphysema [J43.2]  Yes     Chronic    Ascites [R18.8]  Yes    Anxiety [F41.9]  Yes     Chronic    Cigarette nicotine dependence without complication [F17.210]  Yes     Chronic    Alcoholic cirrhosis of liver with ascites [K70.31]  Yes     Chronic    Subclinical hypothyroidism [E03.9]  Yes     Chronic    Anemia of chronic disease [D63.8]  Yes    Alcohol use disorder, severe, in early remission [F10.21]  Yes      Resolved Hospital Problems    Diagnosis Date Resolved POA   No resolved problems to display.          # Acute on chronic respiratory failure  # Pneumonia due to suspected strep PNA  # COPD  - patient lives on 2L NC at home, was at 4L, wean  - ABG on RA shows a PAO2 of 43  - CXR shows some opacities  - will empirically start on rocephin and azithromax  - also with that low of PAO2, concern for possible HPS, will get 2d echo with bubble study  - CTA chest negative for PE, positive for PNA; resp cultures ordered   - arvind     # Decompensated alcoholic cirrhosis with ascites  # Severe alcohol dependence in remission  MELD-Na score: 25 at 7/24/2018  4:57 AM  MELD score: 19 at 7/24/2018  4:57 AM  Calculated from:  Serum Creatinine: 1 mg/dL at 7/24/2018  4:57 AM  Serum Sodium: 129 mmol/L at 7/24/2018  4:57 AM  Total Bilirubin: 9.2 mg/dL at 7/24/2018  4:57 AM  INR(ratio): 1.4 at 7/24/2018   4:57 AM  Age: 48 years    - patient states last drink was over a year ago  - abdomen U/S with liver doppler reviewed   - hepatology consult  - addiction psych consult with [facility]  - CT ab/pelv with triple phase pending  - started on lasix 40 mg IV TID  - IR paracentesis done on 7/24 with 3.9 L removed, cell count pending      # Hyponatremia  - fluid restriction      # Anemia of chronic disease  # Thrombocytopenia  - monitor      # Hepatic Encephalopathy  - continue lactulose to have 3 to 4 BMs daily     # Severe malnutrition  - PAB and boost     # Hypotension  - increase midodrine to 15 mg TID     # Hypokalemia/Hypomagnesemia  - replace          Diet:  Low sodium with fluid restriction   GI PPx:    DVT PPx:    Goals of Care:  full     High Risk Conditions:  -respiratory failure      Disposition:  Later this week       Justino Edward MD  Medical Director [facility] Medicine  Spectra:  [phone]  Pager: [phone]

## 2018-07-26 ENCOUNTER — DOCUMENTATION ONLY (OUTPATIENT)
Dept: PHARMACY | Facility: HOSPITAL | Age: 48
End: 2018-07-26

## 2018-07-26 PROBLEM — B96.20 E COLI BACTEREMIA: Status: ACTIVE | Noted: 2018-07-26

## 2018-07-26 PROBLEM — R78.81 E COLI BACTEREMIA: Status: ACTIVE | Noted: 2018-07-26

## 2018-07-26 LAB
ABO + RH BLD: NORMAL
AFP SERPL-MCNC: 3.5 NG/ML
ALBUMIN SERPL BCP-MCNC: 2.2 G/DL
ALLENS TEST: ABNORMAL
ALP SERPL-CCNC: 359 U/L
ALT SERPL W/O P-5'-P-CCNC: 23 U/L
ANION GAP SERPL CALC-SCNC: 11 MMOL/L
AST SERPL-CCNC: 30 U/L
BASOPHILS # BLD AUTO: 0.03 K/UL
BASOPHILS NFR BLD: 0.2 %
BILIRUB SERPL-MCNC: 9.6 MG/DL
BLD GP AB SCN CELLS X3 SERPL QL: NORMAL
BUN SERPL-MCNC: 7 MG/DL
CALCIUM SERPL-MCNC: 8.6 MG/DL
CHLORIDE SERPL-SCNC: 92 MMOL/L
CO2 SERPL-SCNC: 26 MMOL/L
CREAT SERPL-MCNC: 1 MG/DL
DELSYS: ABNORMAL
DIFFERENTIAL METHOD: ABNORMAL
EOSINOPHIL # BLD AUTO: 0 K/UL
EOSINOPHIL NFR BLD: 0.2 %
ERYTHROCYTE [DISTWIDTH] IN BLOOD BY AUTOMATED COUNT: 25 %
EST. GFR  (AFRICAN AMERICAN): >60 ML/MIN/1.73 M^2
EST. GFR  (NON AFRICAN AMERICAN): >60 ML/MIN/1.73 M^2
FIBRINOGEN PPP-MCNC: 244 MG/DL
GLUCOSE SERPL-MCNC: 89 MG/DL
HAPTOGLOB SERPL-MCNC: 45 MG/DL
HCO3 UR-SCNC: 27.2 MMOL/L (ref 24–28)
HCT VFR BLD AUTO: 25.8 %
HGB BLD-MCNC: 8.4 G/DL
IMM GRANULOCYTES # BLD AUTO: 0.13 K/UL
IMM GRANULOCYTES NFR BLD AUTO: 0.9 %
INR PPP: 1.5
LDH SERPL L TO P-CCNC: 207 U/L
LYMPHOCYTES # BLD AUTO: 0.7 K/UL
LYMPHOCYTES NFR BLD: 5.2 %
MAGNESIUM SERPL-MCNC: 1.5 MG/DL
MCH RBC QN AUTO: 32.7 PG
MCHC RBC AUTO-ENTMCNC: 32.6 G/DL
MCV RBC AUTO: 100 FL
MODE: ABNORMAL
MONOCYTES # BLD AUTO: 1 K/UL
MONOCYTES NFR BLD: 6.8 %
NEUTROPHILS # BLD AUTO: 12.3 K/UL
NEUTROPHILS NFR BLD: 86.7 %
NRBC BLD-RTO: 0 /100 WBC
PCO2 BLDA: 34.5 MMHG (ref 35–45)
PH SMN: 7.5 [PH] (ref 7.35–7.45)
PHOSPHATE SERPL-MCNC: 4.2 MG/DL
PLATELET # BLD AUTO: 134 K/UL
PMV BLD AUTO: 9.9 FL
PO2 BLDA: 60 MMHG (ref 80–100)
POC BE: 4 MMOL/L
POC SATURATED O2: 93 % (ref 95–100)
POC TCO2: 28 MMOL/L (ref 23–27)
POTASSIUM SERPL-SCNC: 3.5 MMOL/L
PROT SERPL-MCNC: 5.8 G/DL
PROTHROMBIN TIME: 14.5 SEC
RBC # BLD AUTO: 2.57 M/UL
SAMPLE: ABNORMAL
SITE: ABNORMAL
SODIUM SERPL-SCNC: 129 MMOL/L
WBC # BLD AUTO: 14.2 K/UL

## 2018-07-26 PROCEDURE — 27000221 HC OXYGEN, UP TO 24 HOURS

## 2018-07-26 PROCEDURE — S4991 NICOTINE PATCH NONLEGEND: HCPCS | Performed by: PHYSICIAN ASSISTANT

## 2018-07-26 PROCEDURE — 25000003 PHARM REV CODE 250: Performed by: STUDENT IN AN ORGANIZED HEALTH CARE EDUCATION/TRAINING PROGRAM

## 2018-07-26 PROCEDURE — 99233 SBSQ HOSP IP/OBS HIGH 50: CPT | Mod: ,,, | Performed by: HOSPITALIST

## 2018-07-26 PROCEDURE — 87340 HEPATITIS B SURFACE AG IA: CPT | Mod: NTX

## 2018-07-26 PROCEDURE — 86790 VIRUS ANTIBODY NOS: CPT | Mod: NTX

## 2018-07-26 PROCEDURE — 86787 VARICELLA-ZOSTER ANTIBODY: CPT | Mod: NTX

## 2018-07-26 PROCEDURE — 86704 HEP B CORE ANTIBODY TOTAL: CPT | Mod: NTX

## 2018-07-26 PROCEDURE — 94640 AIRWAY INHALATION TREATMENT: CPT

## 2018-07-26 PROCEDURE — 36600 WITHDRAWAL OF ARTERIAL BLOOD: CPT

## 2018-07-26 PROCEDURE — 20600001 HC STEP DOWN PRIVATE ROOM

## 2018-07-26 PROCEDURE — 25000242 PHARM REV CODE 250 ALT 637 W/ HCPCS: Performed by: HOSPITALIST

## 2018-07-26 PROCEDURE — 25500020 PHARM REV CODE 255: Performed by: HOSPITALIST

## 2018-07-26 PROCEDURE — 86803 HEPATITIS C AB TEST: CPT | Mod: NTX

## 2018-07-26 PROCEDURE — 83735 ASSAY OF MAGNESIUM: CPT

## 2018-07-26 PROCEDURE — 83615 LACTATE (LD) (LDH) ENZYME: CPT

## 2018-07-26 PROCEDURE — 36415 COLL VENOUS BLD VENIPUNCTURE: CPT

## 2018-07-26 PROCEDURE — 85384 FIBRINOGEN ACTIVITY: CPT

## 2018-07-26 PROCEDURE — 63600175 PHARM REV CODE 636 W HCPCS: Performed by: HOSPITALIST

## 2018-07-26 PROCEDURE — 85025 COMPLETE CBC W/AUTO DIFF WBC: CPT

## 2018-07-26 PROCEDURE — 84100 ASSAY OF PHOSPHORUS: CPT

## 2018-07-26 PROCEDURE — 99231 SBSQ HOSP IP/OBS SF/LOW 25: CPT | Mod: ,,, | Performed by: INTERNAL MEDICINE

## 2018-07-26 PROCEDURE — 86644 CMV ANTIBODY: CPT | Mod: NTX

## 2018-07-26 PROCEDURE — 82105 ALPHA-FETOPROTEIN SERUM: CPT

## 2018-07-26 PROCEDURE — 86682 HELMINTH ANTIBODY: CPT

## 2018-07-26 PROCEDURE — 25000003 PHARM REV CODE 250: Performed by: HOSPITALIST

## 2018-07-26 PROCEDURE — 80053 COMPREHEN METABOLIC PANEL: CPT

## 2018-07-26 PROCEDURE — 86665 EPSTEIN-BARR CAPSID VCA: CPT | Mod: NTX

## 2018-07-26 PROCEDURE — 86592 SYPHILIS TEST NON-TREP QUAL: CPT | Mod: NTX

## 2018-07-26 PROCEDURE — 25000003 PHARM REV CODE 250: Performed by: PHYSICIAN ASSISTANT

## 2018-07-26 PROCEDURE — 82803 BLOOD GASES ANY COMBINATION: CPT

## 2018-07-26 PROCEDURE — A9585 GADOBUTROL INJECTION: HCPCS | Performed by: HOSPITALIST

## 2018-07-26 PROCEDURE — 86850 RBC ANTIBODY SCREEN: CPT

## 2018-07-26 PROCEDURE — 86703 HIV-1/HIV-2 1 RESULT ANTBDY: CPT | Mod: NTX

## 2018-07-26 PROCEDURE — 85610 PROTHROMBIN TIME: CPT

## 2018-07-26 PROCEDURE — 83010 ASSAY OF HAPTOGLOBIN QUANT: CPT

## 2018-07-26 PROCEDURE — 86706 HEP B SURFACE ANTIBODY: CPT | Mod: NTX

## 2018-07-26 RX ORDER — ALPRAZOLAM 0.25 MG/1
0.5 TABLET ORAL ONCE
Status: COMPLETED | OUTPATIENT
Start: 2018-07-26 | End: 2018-07-26

## 2018-07-26 RX ORDER — RAMELTEON 8 MG/1
8 TABLET ORAL NIGHTLY PRN
Status: DISCONTINUED | OUTPATIENT
Start: 2018-07-26 | End: 2018-07-29 | Stop reason: HOSPADM

## 2018-07-26 RX ORDER — FUROSEMIDE 80 MG/1
80 TABLET ORAL 2 TIMES DAILY
Status: DISCONTINUED | OUTPATIENT
Start: 2018-07-26 | End: 2018-07-29 | Stop reason: HOSPADM

## 2018-07-26 RX ADMIN — RAMELTEON 8 MG: 8 TABLET, FILM COATED ORAL at 09:07

## 2018-07-26 RX ADMIN — LACTULOSE 30 G: 20 SOLUTION ORAL at 09:07

## 2018-07-26 RX ADMIN — FERROUS SULFATE TAB EC 325 MG (65 MG FE EQUIVALENT) 325 MG: 325 (65 FE) TABLET DELAYED RESPONSE at 08:07

## 2018-07-26 RX ADMIN — MIDODRINE HYDROCHLORIDE 15 MG: 5 TABLET ORAL at 08:07

## 2018-07-26 RX ADMIN — CEFTRIAXONE SODIUM 2 G: 2 INJECTION, POWDER, FOR SOLUTION INTRAMUSCULAR; INTRAVENOUS at 08:07

## 2018-07-26 RX ADMIN — GADOBUTROL 10 ML: 604.72 INJECTION INTRAVENOUS at 12:07

## 2018-07-26 RX ADMIN — TRAMADOL HYDROCHLORIDE 50 MG: 50 TABLET, COATED ORAL at 06:07

## 2018-07-26 RX ADMIN — IPRATROPIUM BROMIDE AND ALBUTEROL SULFATE 3 ML: .5; 3 SOLUTION RESPIRATORY (INHALATION) at 03:07

## 2018-07-26 RX ADMIN — IPRATROPIUM BROMIDE AND ALBUTEROL SULFATE 3 ML: .5; 3 SOLUTION RESPIRATORY (INHALATION) at 12:07

## 2018-07-26 RX ADMIN — AZITHROMYCIN MONOHYDRATE 500 MG: 250 TABLET ORAL at 08:07

## 2018-07-26 RX ADMIN — MIDODRINE HYDROCHLORIDE 15 MG: 5 TABLET ORAL at 12:07

## 2018-07-26 RX ADMIN — MIDODRINE HYDROCHLORIDE 15 MG: 5 TABLET ORAL at 06:07

## 2018-07-26 RX ADMIN — CYANOCOBALAMIN TAB 1000 MCG 1000 MCG: 1000 TAB at 08:07

## 2018-07-26 RX ADMIN — FERROUS SULFATE TAB EC 325 MG (65 MG FE EQUIVALENT) 325 MG: 325 (65 FE) TABLET DELAYED RESPONSE at 09:07

## 2018-07-26 RX ADMIN — THIAMINE HCL TAB 100 MG 100 MG: 100 TAB at 08:07

## 2018-07-26 RX ADMIN — TRAMADOL HYDROCHLORIDE 50 MG: 50 TABLET, COATED ORAL at 12:07

## 2018-07-26 RX ADMIN — LACTULOSE 30 G: 20 SOLUTION ORAL at 04:07

## 2018-07-26 RX ADMIN — FOLIC ACID 1 MG: 1 TABLET ORAL at 08:07

## 2018-07-26 RX ADMIN — FERROUS SULFATE TAB EC 325 MG (65 MG FE EQUIVALENT) 325 MG: 325 (65 FE) TABLET DELAYED RESPONSE at 12:07

## 2018-07-26 RX ADMIN — SPIRONOLACTONE 100 MG: 100 TABLET, FILM COATED ORAL at 08:07

## 2018-07-26 RX ADMIN — ALPRAZOLAM 0.5 MG: 0.25 TABLET ORAL at 12:07

## 2018-07-26 RX ADMIN — ONDANSETRON 8 MG: 8 TABLET, ORALLY DISINTEGRATING ORAL at 11:07

## 2018-07-26 RX ADMIN — IPRATROPIUM BROMIDE AND ALBUTEROL SULFATE 3 ML: .5; 3 SOLUTION RESPIRATORY (INHALATION) at 08:07

## 2018-07-26 RX ADMIN — FUROSEMIDE 80 MG: 80 TABLET ORAL at 06:07

## 2018-07-26 RX ADMIN — LACTULOSE 30 G: 20 SOLUTION ORAL at 08:07

## 2018-07-26 RX ADMIN — FUROSEMIDE 40 MG: 10 INJECTION, SOLUTION INTRAMUSCULAR; INTRAVENOUS at 12:07

## 2018-07-26 RX ADMIN — IPRATROPIUM BROMIDE AND ALBUTEROL SULFATE 3 ML: .5; 3 SOLUTION RESPIRATORY (INHALATION) at 07:07

## 2018-07-26 RX ADMIN — NICOTINE 1 PATCH: 14 PATCH, EXTENDED RELEASE TRANSDERMAL at 12:07

## 2018-07-26 RX ADMIN — FUROSEMIDE 40 MG: 10 INJECTION, SOLUTION INTRAMUSCULAR; INTRAVENOUS at 08:07

## 2018-07-26 NOTE — SUBJECTIVE & OBJECTIVE
Interval History:   - CT AP remarkable for 4cm hepatic lesion c/w HCC; pending MRI for further evaluation    Current Facility-Administered Medications   Medication    0.9%  NaCl infusion (for blood administration)    acetaminophen tablet 650 mg    albuterol-ipratropium 2.5 mg-0.5 mg/3 mL nebulizer solution 3 mL    azithromycin tablet 500 mg    cefTRIAXone (ROCEPHIN) 2 g in dextrose 5 % 50 mL IVPB    cyanocobalamin tablet 1,000 mcg    dextrose 50% injection 12.5 g    dextrose 50% injection 25 g    ferrous sulfate EC tablet 325 mg    folic acid tablet 1 mg    furosemide injection 40 mg    glucagon (human recombinant) injection 1 mg    glucose chewable tablet 16 g    glucose chewable tablet 24 g    lactulose 20 gram/30 mL solution Soln 30 g    midodrine tablet 15 mg    nicotine 14 mg/24 hr 1 patch    ondansetron disintegrating tablet 8 mg    sodium chloride 0.9% flush 5 mL    sodium chloride 0.9% flush 5 mL    spironolactone tablet 100 mg    thiamine tablet 100 mg    traMADol tablet 50 mg       Objective:     Vital Signs (Most Recent):  Temp: 97.9 °F (36.6 °C) (07/26/18 0436)  Pulse: 79 (07/26/18 0708)  Resp: 18 (07/26/18 0436)  BP: 95/60 (07/26/18 0436)  SpO2: (!) 93 % (07/26/18 0436) Vital Signs (24h Range):  Temp:  [97.3 °F (36.3 °C)-98.7 °F (37.1 °C)] 97.9 °F (36.6 °C)  Pulse:  [74-89] 79  Resp:  [16-18] 18  SpO2:  [92 %-100 %] 93 %  BP: ()/(52-62) 95/60     Weight: 54 kg (119 lb 0.8 oz) (07/25/18 0435)  Body mass index is 22.49 kg/m².    Physical Exam   Constitutional: She is oriented to person, place, and time. She appears well-developed.   Appears calm and comfortable, lying in bed with home clothes on.   HENT:   Head: Normocephalic and atraumatic.   Eyes: No scleral icterus.   Pulmonary/Chest: Effort normal and breath sounds normal.   Abdominal: Soft. Bowel sounds are normal. She exhibits distension. There is no tenderness. There is no rebound.   Neurological: She is alert and  oriented to person, place, and time.   Skin: Skin is warm.   Psychiatric: She has a normal mood and affect. Her behavior is normal. Judgment and thought content normal.       MELD-Na score: 25 at 7/26/2018  3:53 AM  MELD score: 20 at 7/26/2018  3:53 AM  Calculated from:  Serum Creatinine: 1 mg/dL at 7/26/2018  3:53 AM  Serum Sodium: 129 mmol/L at 7/26/2018  3:53 AM  Total Bilirubin: 9.6 mg/dL at 7/26/2018  3:53 AM  INR(ratio): 1.5 at 7/26/2018  3:53 AM  Age: 48 years    Significant Labs:  CBC:   Recent Labs  Lab 07/26/18  0353   WBC 14.20*   RBC 2.57*   HGB 8.4*   HCT 25.8*   *     CMP:   Recent Labs  Lab 07/26/18  0353   GLU 89   CALCIUM 8.6*   ALBUMIN 2.2*   PROT 5.8*   *   K 3.5   CO2 26   CL 92*   BUN 7   CREATININE 1.0   ALKPHOS 359*   ALT 23   AST 30   BILITOT 9.6*     Coagulation:   Recent Labs  Lab 07/26/18  0353   INR 1.5*       Significant Imaging:  Labs: Reviewed  CT: Reviewed  MRI: Reviewed

## 2018-07-26 NOTE — PROGRESS NOTES
Progress Note   Hospital Medicine         Patient Name: Annette Esparza  MRN:  [id number]  [facility] Medicine Team: [facility] [place] MED L Justino Edward MD  Date of Admission:  7/23/2018     Length of Stay:  LOS: 3 days   Expected Discharge Date: 7/29/2018  Principal Problem:  Acute on chronic respiratory failure       Subjective:     Interval History/Overnight Events:  Patient is tearful today after finding out she has HCC, however told her that she is still eligible for a liver transplant and that seemed to comfort her a little; may need to start the evaluation while inpatient, will discuss tomorrow; MRI abdomen reviewed     Review of Systems   Constitutional: Negative for chills, fatigue, fever.   HENT: Negative for sore throat, trouble swallowing.    Eyes: Negative for photophobia, visual disturbance.   Respiratory: Negative for cough, shortness of breath.    Cardiovascular: Negative for chest pain, palpitations, leg swelling.   Gastrointestinal: Negative for abdominal pain, constipation, diarrhea, nausea, vomiting.   Endocrine: Negative for cold intolerance, heat intolerance.   Genitourinary: Negative for dysuria, frequency.   Musculoskeletal: Negative for arthralgias, myalgias.   Skin: Negative for rash, wound, erythema   Neurological: Negative for dizziness, syncope, weakness, light-headedness.   Psychiatric/Behavioral: Negative for confusion, hallucinations, anxiety  All other systems reviewed and are negative.    Objective:     Temp:  [97.8 °F (36.6 °C)-98.7 °F (37.1 °C)]   Pulse:  []   Resp:  [16-20]   BP: ()/(52-71)   SpO2:  [84 %-97 %]       Physical Exam:  Constitutional: Appears well-developed and well-nourished.   Head: Normocephalic and atraumatic.   Mouth/Throat: Oropharynx is clear and moist.   Eyes: EOM are normal. Pupils are equal, round, and reactive to light. positive scleral icterus.   Neck: Normal range of motion. Neck supple.   Cardiovascular: Normal rate and regular rhythm.  No  murmur heard.  Pulmonary/Chest: Effort normal and breath sounds normal. No respiratory distress. No wheezes, rales, or rhonchi  Abdominal: Soft. Bowel sounds are normal.  Positive distension, no tenderness  Musculoskeletal: Normal range of motion. No edema.   Neurological: Alert and oriented to person, place, and time.   Skin: Skin is warm and dry.   Psychiatric: Normal mood and affect. Behavior is normal.       Recent Labs  Lab 07/23/18  1348 07/24/18 0457 07/25/18  0533 07/26/18  0353   WBC 11.76 10.64 12.50 14.20*   HGB 8.0* 7.6* 6.8* 8.4*   HCT 24.5* 23.2* 20.7* 25.8*   * 146* 138* 134*       Recent Labs  Lab 07/24/18 0457 07/25/18  0533 07/26/18  0353   * 129* 129*   K 3.0* 3.6 3.5   CL 91* 94* 92*   CO2 27 26 26   BUN 10 9 7   CREATININE 1.0 1.1 1.0   GLU 79 94 89   CALCIUM 8.1* 8.4* 8.6*   MG 1.2* 1.8 1.5*   PHOS 4.2 3.8 4.2       Recent Labs  Lab 07/24/18 0457 07/25/18  0533 07/26/18  0353   ALKPHOS 373* 329* 359*   ALT 20 17 23   AST 34 28 30   ALBUMIN 1.7* 2.0* 2.2*   PROT 5.6* 5.3* 5.8*   BILITOT 9.2* 6.9* 9.6*   INR 1.4* 1.4* 1.5*     No results for input(s): POCTGLUCOSE in the last 168 hours.     albuterol-ipratropium  3 mL Nebulization Q4H WAKE    azithromycin  500 mg Oral Daily    [START ON 7/29/2018] ceFAZolin (ANCEF) IVPB  2 g Intravenous Q8H    cefTRIAXone (ROCEPHIN) IVPB  2 g Intravenous Q24H    cyanocobalamin  1,000 mcg Oral Daily    ferrous sulfate  325 mg Oral BID    folic acid  1 mg Oral Daily    furosemide  80 mg Oral BID    lactulose  30 g Oral TID    midodrine  15 mg Oral TID WM    nicotine  1 patch Transdermal Daily    spironolactone  100 mg Oral Daily    thiamine  100 mg Oral Daily       Assessment and Plan     Ms. Annette Esparza is a 48 y.o. female who presented to [facility] on 7/23/2018 with     Hospital Course:    Ms. Annette Esparza was admitted to Hospital Medicine for management of     Active Hospital Problems    Diagnosis  POA    *Acute on  chronic respiratory failure [J96.20]  Yes    E coli bacteremia [R78.81]  Yes    HCC (hepatocellular carcinoma) [C22.0]  Yes    Thrombocytopenia [D69.6]  Yes    Pneumonia [J18.9]  Yes    Severe malnutrition [E43]  Yes    Decompensated hepatic cirrhosis [K72.90]  Yes    Hepatic encephalopathy [K72.90]  Yes    Hyponatremia [E87.1]  Yes    Hypotension [I95.9]  Yes    Centrilobular emphysema [J43.2]  Yes     Chronic    Ascites [R18.8]  Yes    Anxiety [F41.9]  Yes     Chronic    Cigarette nicotine dependence without complication [F17.210]  Yes     Chronic    Alcoholic cirrhosis of liver with ascites [K70.31]  Yes     Chronic    Subclinical hypothyroidism [E03.9]  Yes     Chronic    Anemia of chronic disease [D63.8]  Yes    Alcohol use disorder, severe, in early remission [F10.21]  Yes      Resolved Hospital Problems    Diagnosis Date Resolved POA   No resolved problems to display.          # Acute on chronic respiratory failure  # Pneumonia due to suspected strep PNA  # COPD  - patient lives on 2L NC at home, was at 4L, wean  - ABG on RA shows a PAO2 of 60, improved from 43 on admit  - CXR shows some opacities  -  on rocephin and azithromax  - also with that low of PAO2, concern for possible HPS, 2d echo with bubble study done does not show any shunt;   - CTA chest negative for PE, positive for PNA; resp cultures ordered   - duonebs     # Decompensated alcoholic cirrhosis with ascites  # Severe alcohol dependence in remission  # Hepatocellular Carcinoma   MELD-Na score: 25 at 7/26/2018  3:53 AM  MELD score: 20 at 7/26/2018  3:53 AM  Calculated from:  Serum Creatinine: 1 mg/dL at 7/26/2018  3:53 AM  Serum Sodium: 129 mmol/L at 7/26/2018  3:53 AM  Total Bilirubin: 9.6 mg/dL at 7/26/2018  3:53 AM  INR(ratio): 1.5 at 7/26/2018  3:53 AM  Age: 48 years    - patient states last drink was over a year ago  - abdomen U/S with liver doppler reviewed   - hepatology consult  - addiction psych consult with [facility],  suggest high risk and state needs IOP programs;   - CT ab/pelv with triple phase shows 4.4 cm new mass consistent with HCC; MRI abdomen reviewed   - started on lasix 40 mg IV TID  - IR paracentesis done on 7/24 with 3.9 L removed, negative for SBP     # Hyponatremia  - fluid restriction, improving      # Anemia of chronic disease  # Thrombocytopenia  - hemoglobin is 6.8 today, states yellow stool, will transfuse 1 unit of blood and checking DIC labs     # Hepatic Encephalopathy  - continue lactulose to have 3 to 4 BMs daily     # Severe malnutrition  - PAB and boost     # Hypotension  - increase midodrine to 15 mg TID     # Hypokalemia/Hypomagnesemia  - replace          Diet:  Low sodium with fluid restriction   GI PPx:    DVT PPx:    Goals of Care:  full     High Risk Conditions:  -respiratory failure      Disposition:  Later this week       Justino Edward MD  Medical Director [facility] Medicine  Spectra:  [phone]  Pager: [phone]

## 2018-07-26 NOTE — PROGRESS NOTES
[facility]  Hepatology  Progress Note    Patient Name: Annette Esparza  MRN: [id number]  Admission Date: 7/23/2018  Hospital Length of Stay: 3 days  Attending Provider: Justino Edward MD   Primary Care Physician: Endy Pfeiffer MD  Principal Problem:Acute on chronic respiratory failure    Subjective:     Transplant status: No    HPI: Ms Esparza is a 48 year old woman with  History of ETOH ESLD decompensations including ascites, HE, HRS, Hypothyroidism, COPD who is being admitted due to abdomianl distension.    She was recently admitted to [facility] on Jimmie 7/15 due to hepatic encephlaopathy from UTI. She reports that she left AMA on 7/19 as she felt they were not doing anything but watching. They had completed a 1.5L para during the hospitalization but still feels bloated. Her last tap prior to this was ~1 yr ago.    She presented to the hospital due to dyspnea in setting of increaseing abdominal distension. She required 2L NC which she borrowed from family member. She reports currently that she feels the 'same' with abdominal distension as primary complaint. She notes that she will have intermittent pain up to 8/10 including abdomen, under the ribs anteriorly and back pain. She has been taking norco with minimal relief. She denies any tylenol or NSAIDS.    On review she endorses a cough x several days which is non-productive. Denies any sick contact or travel history. She reports that her last ETOH was 1 year ago.    She had previously seen Dr Johnson in clinic (1/2017). Per note she was first diagnosed 9/2014 and underwent a biopsy in 10/2014. She was noted at that time to be drinking until 11/2016. MELD was 16. Was not planned for transplant evaluation at that time due to low MELD and desiring formal ETOH rehab and sobriety.            Interval History:   - CT AP remarkable for 4cm hepatic lesion c/w HCC; pending MRI for further evaluation    Current  Facility-Administered Medications   Medication    0.9%  NaCl infusion (for blood administration)    acetaminophen tablet 650 mg    albuterol-ipratropium 2.5 mg-0.5 mg/3 mL nebulizer solution 3 mL    azithromycin tablet 500 mg    cefTRIAXone (ROCEPHIN) 2 g in dextrose 5 % 50 mL IVPB    cyanocobalamin tablet 1,000 mcg    dextrose 50% injection 12.5 g    dextrose 50% injection 25 g    ferrous sulfate EC tablet 325 mg    folic acid tablet 1 mg    furosemide injection 40 mg    glucagon (human recombinant) injection 1 mg    glucose chewable tablet 16 g    glucose chewable tablet 24 g    lactulose 20 gram/30 mL solution Soln 30 g    midodrine tablet 15 mg    nicotine 14 mg/24 hr 1 patch    ondansetron disintegrating tablet 8 mg    sodium chloride 0.9% flush 5 mL    sodium chloride 0.9% flush 5 mL    spironolactone tablet 100 mg    thiamine tablet 100 mg    traMADol tablet 50 mg       Objective:     Vital Signs (Most Recent):  Temp: 97.9 °F (36.6 °C) (07/26/18 0436)  Pulse: 79 (07/26/18 0708)  Resp: 18 (07/26/18 0436)  BP: 95/60 (07/26/18 0436)  SpO2: (!) 93 % (07/26/18 0436) Vital Signs (24h Range):  Temp:  [97.3 °F (36.3 °C)-98.7 °F (37.1 °C)] 97.9 °F (36.6 °C)  Pulse:  [74-89] 79  Resp:  [16-18] 18  SpO2:  [92 %-100 %] 93 %  BP: ()/(52-62) 95/60     Weight: 54 kg (119 lb 0.8 oz) (07/25/18 0435)  Body mass index is 22.49 kg/m².    Physical Exam   Constitutional: She is oriented to person, place, and time. She appears well-developed.   Appears calm and comfortable, lying in bed with home clothes on.   HENT:   Head: Normocephalic and atraumatic.   Eyes: No scleral icterus.   Pulmonary/Chest: Effort normal and breath sounds normal.   Abdominal: Soft. Bowel sounds are normal. She exhibits distension. There is no tenderness. There is no rebound.   Neurological: She is alert and oriented to person, place, and time.   Skin: Skin is warm.   Psychiatric: She has a normal mood and affect. Her behavior  is normal. Judgment and thought content normal.       MELD-Na score: 25 at 7/26/2018  3:53 AM  MELD score: 20 at 7/26/2018  3:53 AM  Calculated from:  Serum Creatinine: 1 mg/dL at 7/26/2018  3:53 AM  Serum Sodium: 129 mmol/L at 7/26/2018  3:53 AM  Total Bilirubin: 9.6 mg/dL at 7/26/2018  3:53 AM  INR(ratio): 1.5 at 7/26/2018  3:53 AM  Age: 48 years    Significant Labs:  CBC:   Recent Labs  Lab 07/26/18  0353   WBC 14.20*   RBC 2.57*   HGB 8.4*   HCT 25.8*   *     CMP:   Recent Labs  Lab 07/26/18  0353   GLU 89   CALCIUM 8.6*   ALBUMIN 2.2*   PROT 5.8*   *   K 3.5   CO2 26   CL 92*   BUN 7   CREATININE 1.0   ALKPHOS 359*   ALT 23   AST 30   BILITOT 9.6*     Coagulation:   Recent Labs  Lab 07/26/18  0353   INR 1.5*       Significant Imaging:  Labs: Reviewed  CT: Reviewed  MRI: Reviewed    Assessment/Plan:     Alcoholic cirrhosis of liver with ascites    48F with history of ETOH ESLD, with decompensations including ascites, HE, HRS, recently admitted to OSH with HE secondary to HRS, now presenting with abdominal distension. Last seen in clinic 1/2017 with Dr. Johnson with Na-MELD 16. Currently with elevated LFT with Tbili elevated to 11 from prior 1.4 (4/2018) with concern for PNA.    Recent labs:  4/16 Tb 1.4, AST 62, ALT 27, Crt 0.5  7/15 Tb 13.7, AST 32, ALT 20, 1.6  7/19 Tb 10.8, AST 46, ALT 25  7/23 Tb 11.0, AST 34, ALT 20, INR 1.4, 0.9    - EGD (6/2016): LA Grade B esophagitis, portal hypertensive gastropathy. No varices.  - RUQ (7/24) hepatic cirrhosis and portal hypertension patent vasculature.    LFTs remain elevated since admission. Infx workup thusfar concerning for PNA (bilateral subsegmental patchy opacification); negative diagnostic paracentesis. CT AP concerning for 4cm hepatic lesion ?HCC. MRI completed overnight for further characterization. AFP 3.5.    Plan  - discussed HCC finding with the patient.  - PETH pending  - addiction psych consulted: high risk for relapse given poor insight, poor  health, poor coping skills, poor judgment  - TTE normal LVEF, dilated LA. Bubble study performed but with no intra-cardiac shunt but no documentation regarding ?pulmonary shunt. Will ask radiology to re-evaluate. Repeat ABG pending today.  - Ceftriaxone, Azithromycin for treatment of ?pneumonia. Infectious workup pending.  - continue diuretics: lasix 40mg TID, spironolactone 100mg TID  - mental status currently appropriate, continue lactulose 30mg TID  - Midodrine 15mg TID              Thank you for your consult. I will follow-up with patient. Please contact us if you have any additional questions.    Juan Diego Calle MD  Hepatology  [facility]

## 2018-07-26 NOTE — PROGRESS NOTES
PHARM.D. PRE-TRANSPLANT NOTE:    This patient's medication therapy was evaluated as part of her pre-transplant evaluation.    The following pharmacologic concerns were noted: none    This patient's medication profile was reviewed for contraindications for DAA Hepatitis C therapy:    [X]  No current inducers of CYP 3A4 or PGP  [X]  No amiodarone on this patient's EMR profile in the last 24 months  [X]  No past or current atrial fibrillation on this patient's EMR profile       No current facility-administered medications for this visit.      No current outpatient prescriptions on file.     Facility-Administered Medications Ordered in Other Visits   Medication Dose Route Frequency Provider Last Rate Last Dose    0.9%  NaCl infusion (for blood administration)   Intravenous Q24H PRN Justino Edward MD        acetaminophen tablet 650 mg  650 mg Oral Q4H PRN Justino Edward MD        albuterol-ipratropium 2.5 mg-0.5 mg/3 mL nebulizer solution 3 mL  3 mL Nebulization Q4H WAKE Justino Edward MD   3 mL at 07/26/18 1204    azithromycin tablet 500 mg  500 mg Oral Daily Justino Edward MD   500 mg at 07/26/18 0839    [START ON 7/29/2018] ceFAZolin (ANCEF) 2 g in dextrose 5 % 50 mL IVPB  2 g Intravenous Q8H Justino Edward MD        cefTRIAXone (ROCEPHIN) 2 g in dextrose 5 % 50 mL IVPB  2 g Intravenous Q24H Justino Edward MD 50 mL/hr at 07/23/18 1852 2 g at 07/26/18 0840    cyanocobalamin tablet 1,000 mcg  1,000 mcg Oral Daily Tima Polo MD   1,000 mcg at 07/26/18 0839    dextrose 50% injection 12.5 g  12.5 g Intravenous PRN Justino Edward MD        dextrose 50% injection 25 g  25 g Intravenous PRN Justino Edward MD        ferrous sulfate EC tablet 325 mg  325 mg Oral BID Tima Polo MD   325 mg at 07/26/18 0838    folic acid tablet 1 mg  1 mg Oral Daily Tima Polo MD   1 mg at 07/26/18 0839    furosemide tablet 80 mg  80 mg Oral BID Justino Edward MD        glucagon (human  recombinant) injection 1 mg  1 mg Intramuscular PRN Justino Edward MD        glucose chewable tablet 16 g  16 g Oral PRN Justino Edward MD        glucose chewable tablet 24 g  24 g Oral PRN Justino Edward MD        lactulose 20 gram/30 mL solution Soln 30 g  30 g Oral TID Justino Edward MD   30 g at 07/26/18 0839    midodrine tablet 15 mg  15 mg Oral TID WM Justino Edward MD   15 mg at 07/26/18 1221    nicotine 14 mg/24 hr 1 patch  1 patch Transdermal Daily Aneesh Andrea PA-C   1 patch at 07/26/18 0022    ondansetron disintegrating tablet 8 mg  8 mg Oral Q8H PRN Justino Edward MD   8 mg at 07/26/18 1118    sodium chloride 0.9% flush 5 mL  5 mL Intravenous PRN Justino Edward MD        sodium chloride 0.9% flush 5 mL  5 mL Intravenous PRN Justino Edward MD        spironolactone tablet 100 mg  100 mg Oral Daily Justino Edward MD   100 mg at 07/26/18 0839    thiamine tablet 100 mg  100 mg Oral Daily Tima Polo MD   100 mg at 07/26/18 0839    traMADol tablet 50 mg  50 mg Oral Q6H PRN Justino Edward MD   50 mg at 07/26/18 0632         Currently she is responsible for preparing / administering this patient's medications on a daily basis.  I am available for consultation and can be contacted, as needed by the other members of the Liver Transplant team.

## 2018-07-26 NOTE — PLAN OF CARE
Problem: Patient Care Overview  Goal: Plan of Care Review  Pt remains AAOx4, vital signs are stable. Pt had MRI done. Pt on 1L O2 sats >92%. No changes overnight. Pt refused night time dose lactulose. Pt complained of back pain, PRN pain medication given as ordered.  Plan of care reviewed with pt, all questions and concerns were addressed. Will continue to monitor.

## 2018-07-26 NOTE — DISCHARGE SUMMARY
hospitals Hospital Medicine Discharge Summary    Primary Team: hospitals Hospitalist Team B  Attending Physician: Sami Cooney MD  Resident: Naseem Willett MD  Intern: Lissy Caputo DO    Date of Admit: 7/15/2018  Date of Discharge: 7/19/2018    Discharge to: patient left AMA  Condition: patient left AMA    Discharge Diagnoses     Patient Active Problem List   Diagnosis    Gallstones    Alcohol use disorder, severe, in early remission    Anemia of chronic disease    Vitamin D deficiency    Folate deficiency    Iron deficiency anemia due to chronic blood loss    Portal hypertensive gastropathy    Alcoholic cirrhosis of liver with ascites    Subclinical hypothyroidism    Anxiety    Cigarette nicotine dependence without complication    Hyperbilirubinemia    Ascites    Centrilobular emphysema    Mild single current episode of major depressive disorder    Oral thrush    Hypotension    Hyponatremia    Altered mental status    General weakness    Hepatic encephalopathy    Hypomagnesemia    Normocytic anemia    Hypophosphatemia    Decompensated hepatic cirrhosis    Acute on chronic respiratory failure    Thrombocytopenia    Pneumonia    Severe malnutrition    HCC (hepatocellular carcinoma)       Consultants and Procedures     Consultants:  Sampsno Chan MD Consulting Physician  Nephrology 07/19/18 1101 -   Juan Diego Serra MD Consulting Physician  Gastroenterology 07/16/18 1203 07/16/18 1209       Procedures:   Paracentesis 7/16    Imaging:  Xray Chest Portable  Impression       1. Grossly stable multifocal pulmonary parenchymal nodular foci, no large focal consolidation.      Electronically signed by: Urbano Rain MD  Date: 07/15/2018  Time: 17:12     Xray Chest 1 View  Impression       Interval placement of a right IJ central venous catheter, with the tip overlying the right brachiocephalic/SVC junction.      Electronically signed by: Ger Mays MD  Date: 07/15/2018  Time: 20:20     US  Abdomen  Impression       Significant peritoneal ascites as above.      Electronically signed by: Rajendra Harris MD  Date: 07/16/2018  Time: 14:02        US Abdomen  Impression       Sludge and tiny stones noted within the contracted gallbladder.    Ascites.    Examination suggestive of chronic liver disease with reversed main portal vein suggestive of portal hypertension.      Electronically signed by: Annie Peralta MD  Date: 07/17/2018  Time: 15:56       Brief History of Present Illness      Annette Esparza is a 48 y.o. female who  has a past medical history of Acute hypoxemic respiratory failure (12/1/2016); ANDREW (acute kidney injury) (11/28/2016); Alcohol dependence in remission; Alcoholic cirrhosis of liver with ascites (10/22/2015); Anxiety; Ascites due to alcoholic cirrhosis (6/13/2016); Centrilobular emphysema (12/12/2016); Cigarette nicotine dependence without complication (6/2/2016); Folate deficiency (10/22/2015); Gallstones; Hepatorenal syndrome (11/29/2016); Hyperbilirubinemia (6/13/2016); Hyponatremia (11/29/2016); Iron deficiency anemia due to chronic blood loss (10/22/2015); Portal hypertensive gastropathy (10/22/2015); and Subclinical hypothyroidism (10/22/2015).. The patient presented to [facility] on 7/15/2018 with a primary complaint of Altered Mental Status (c/o confusion, hallucinations, and weakness x2 weeks. Pt also c/o jaundice for the past few days)     Pt endorses respiratory infection about a month PTA which she states is when she started feeling poorly. Before this incident pt was able to ambulate without a walker. After this infection, pt and family states that she was unable to lift herself from the bed and had to be carried. Pt's PO intake was never great and decreased during this illness and never recovered. Pt endorses subjective fevers during the respiratory illness and cough with yellow sputum production. The respiratory illness resolved after a couple  of weeks, but weakness remained and pt never fully recovered. Over the past 2 weeks, pt has endorsed worsening abd distention with greater confusion and hallucinations along with yellowing of skin.    For the full HPI please refer to the History & Physical from this admission.    Hospital Course By Problem with Pertinent Findings     Ecoli UTI and bacteremia  -afebrile, WBC 20 on admit  -UA with nitrites, WBC, bacteria  UCx with Ecoli   -1/4 bottles BCx with Ecoli  +procal  -repeat blood cultures pending  -afebrile  WBC 16.93  -cont ceftriaxone, added cipro  f/u sensitivities  -unresolved     Hypovolemic hyponatremia  -Na 123 on admit  baseline 135  -Na 124 at time of AMA  -unresolved      Alcoholic cirrhosis of liver with ascites  -history of EtOH abuse  quit 2 years prior  -PT/INR 15.2/1.4  Tbili 10.8  -Discriminate function > 32  given infection, will hold on steroids  -shifting dullness on PE   IR performed para to evaluate for SBP  studies negative for SBP  -GI consulted, recommended placing patient on liver transplant list  -worsening anemia  f/u FOBT studies  may require scope  -unresolved     Hyokalemia, hypomagnesmia, hypophosphatemia  -cont to replete      Hypotension  -placed on norepi gtt in ED  discontinued and resumed home midodrine     Normocytic anemia  -Hgb 9.5 on admit  trending down to 6.9  transfused 1u pRBC with appropriate response  Hgb today 9.2  -check FOBT  no s/s of active bleeding  possibly dilutional  -past history of JUNE and FA and B12 deficiency  will provide supplementation     VitD deficiency  -cont supplementation     Acute encephalopathy, resolved  -likely mutlifactorial with hepatic encephalopathy and hyponatremia and possible infection contributing to currrent state  -cont to correct electrolyte derangements (above)  -cont to provide lactulose for 2-3 BM goal  -cont treating bacteremia and UTI with ceftriaxone     Discharge Medications      Annette Esparza  [place]   [place] Medication Instructions MILA:[id number]    Printed on:07/26/18 8639   Medication Information                      ciprofloxacin HCl (CIPRO) 500 MG tablet  Take 1 tablet (500 mg total) by mouth every 12 (twelve) hours. for 14 days             cyanocobalamin (VITAMIN B-12) 1000 MCG tablet  Take 1 tablet (1,000 mcg total) by mouth once daily.             ferrous sulfate 325 (65 FE) MG EC tablet  Take 1 tablet (325 mg total) by mouth 2 (two) times daily.             folic acid (FOLVITE) 1 MG tablet  Take 1 tablet (1 mg total) by mouth once daily.             furosemide (LASIX) 40 MG tablet  TAKE 1 TABLET (40 MG TOTAL) BY MOUTH ONCE DAILY.             HYDROcodone-acetaminophen (NORCO)  mg per tablet  Take 1 tablet by mouth every 8 (eight) hours as needed for Pain.             midodrine (PROAMATINE) 10 MG tablet  Take 1 tablet (10 mg total) by mouth 3 (three) times daily with meals.             multivitamin (THERAGRAN) tablet  Take 1 tablet by mouth once daily.             spironolactone (ALDACTONE) 100 MG tablet  Take 1 tablet (100 mg total) by mouth once daily.             thiamine 100 MG tablet  Take 1 tablet (100 mg total) by mouth once daily.                 Discharge Information:   Diet:  Patient left AMA    Physical Activity:  Patient left AMA             Instructions:  1. Take all medications as prescribed  2. Keep all follow-up appointments  3. Return to the hospital or call your primary care physicians if any worsening symptoms such as fever, chest pain, shortness of breath, return of symptoms, or any other concerns.    Follow-Up Appointments:  Endy Pfeiffer MD  On 8/2/2018  3pm  [address] [place] [id number]  [phone]         Lissy Caputo DO  [facility], [facility]

## 2018-07-27 ENCOUNTER — DOCUMENTATION ONLY (OUTPATIENT)
Dept: TRANSPLANT | Facility: CLINIC | Age: 48
End: 2018-07-27

## 2018-07-27 ENCOUNTER — TELEPHONE (OUTPATIENT)
Dept: HEPATOLOGY | Facility: HOSPITAL | Age: 48
End: 2018-07-27

## 2018-07-27 LAB
ABO + RH BLD: NORMAL
ALBUMIN SERPL BCP-MCNC: 2.1 G/DL
ALP SERPL-CCNC: 360 U/L
ALT SERPL W/O P-5'-P-CCNC: 20 U/L
ANION GAP SERPL CALC-SCNC: 10 MMOL/L
AST SERPL-CCNC: 29 U/L
BASOPHILS # BLD AUTO: 0.05 K/UL
BASOPHILS NFR BLD: 0.3 %
BILIRUB SERPL-MCNC: 9.1 MG/DL
BLD GP AB SCN CELLS X3 SERPL QL: NORMAL
BUN SERPL-MCNC: 8 MG/DL
CALCIUM SERPL-MCNC: 8.3 MG/DL
CHLORIDE SERPL-SCNC: 94 MMOL/L
CMV IGG SERPL QL IA: REACTIVE
CO2 SERPL-SCNC: 26 MMOL/L
CREAT SERPL-MCNC: 1.2 MG/DL
DIASTOLIC DYSFUNCTION: NO
DIFFERENTIAL METHOD: ABNORMAL
EBV VCA IGG SER QL IA: POSITIVE
EOSINOPHIL # BLD AUTO: 0 K/UL
EOSINOPHIL NFR BLD: 0.1 %
ERYTHROCYTE [DISTWIDTH] IN BLOOD BY AUTOMATED COUNT: 25.2 %
EST. GFR  (AFRICAN AMERICAN): >60 ML/MIN/1.73 M^2
EST. GFR  (NON AFRICAN AMERICAN): 53.2 ML/MIN/1.73 M^2
GLUCOSE SERPL-MCNC: 88 MG/DL
HBV CORE AB SERPL QL IA: NEGATIVE
HBV SURFACE AB SER-ACNC: NEGATIVE M[IU]/ML
HBV SURFACE AG SERPL QL IA: NEGATIVE
HCT VFR BLD AUTO: 26.2 %
HCV AB SERPL QL IA: NEGATIVE
HEPATITIS A ANTIBODY, IGG: POSITIVE
HGB BLD-MCNC: 8.4 G/DL
HIV 1+2 AB+HIV1 P24 AG SERPL QL IA: NEGATIVE
IMM GRANULOCYTES # BLD AUTO: 0.12 K/UL
IMM GRANULOCYTES NFR BLD AUTO: 0.7 %
INR PPP: 1.4
LACTATE SERPL-SCNC: 3.2 MMOL/L
LYMPHOCYTES # BLD AUTO: 1.1 K/UL
LYMPHOCYTES NFR BLD: 6.5 %
MAGNESIUM SERPL-MCNC: 1.3 MG/DL
MCH RBC QN AUTO: 32.9 PG
MCHC RBC AUTO-ENTMCNC: 32.1 G/DL
MCV RBC AUTO: 103 FL
MONOCYTES # BLD AUTO: 0.9 K/UL
MONOCYTES NFR BLD: 5.8 %
NEUTROPHILS # BLD AUTO: 14.1 K/UL
NEUTROPHILS NFR BLD: 86.6 %
NRBC BLD-RTO: 0 /100 WBC
PHOSPHATE SERPL-MCNC: 4.5 MG/DL
PLATELET # BLD AUTO: 145 K/UL
PMV BLD AUTO: 9.5 FL
POTASSIUM SERPL-SCNC: 3.5 MMOL/L
PROCALCITONIN SERPL IA-MCNC: 0.36 NG/ML
PROT SERPL-MCNC: 5.9 G/DL
PROTHROMBIN TIME: 13.9 SEC
RBC # BLD AUTO: 2.55 M/UL
RETIRED EF AND QEF - SEE NOTES: 65 (ref 55–65)
RPR SER QL: NORMAL
SODIUM SERPL-SCNC: 130 MMOL/L
WBC # BLD AUTO: 16.23 K/UL

## 2018-07-27 PROCEDURE — 86901 BLOOD TYPING SEROLOGIC RH(D): CPT

## 2018-07-27 PROCEDURE — 86480 TB TEST CELL IMMUN MEASURE: CPT

## 2018-07-27 PROCEDURE — S4991 NICOTINE PATCH NONLEGEND: HCPCS | Performed by: PHYSICIAN ASSISTANT

## 2018-07-27 PROCEDURE — 93325 DOPPLER ECHO COLOR FLOW MAPG: CPT | Mod: 26,NTX,, | Performed by: INTERNAL MEDICINE

## 2018-07-27 PROCEDURE — 93351 STRESS TTE COMPLETE: CPT | Mod: 26,NTX,, | Performed by: INTERNAL MEDICINE

## 2018-07-27 PROCEDURE — 93320 DOPPLER ECHO COMPLETE: CPT | Mod: 26,NTX,, | Performed by: INTERNAL MEDICINE

## 2018-07-27 PROCEDURE — 80053 COMPREHEN METABOLIC PANEL: CPT | Mod: NTX

## 2018-07-27 PROCEDURE — 25000242 PHARM REV CODE 250 ALT 637 W/ HCPCS: Performed by: HOSPITALIST

## 2018-07-27 PROCEDURE — 25000003 PHARM REV CODE 250: Performed by: STUDENT IN AN ORGANIZED HEALTH CARE EDUCATION/TRAINING PROGRAM

## 2018-07-27 PROCEDURE — 85025 COMPLETE CBC W/AUTO DIFF WBC: CPT | Mod: NTX

## 2018-07-27 PROCEDURE — 25000003 PHARM REV CODE 250: Performed by: HOSPITALIST

## 2018-07-27 PROCEDURE — 84100 ASSAY OF PHOSPHORUS: CPT | Mod: NTX

## 2018-07-27 PROCEDURE — 83605 ASSAY OF LACTIC ACID: CPT | Mod: NTX

## 2018-07-27 PROCEDURE — 36415 COLL VENOUS BLD VENIPUNCTURE: CPT

## 2018-07-27 PROCEDURE — 83735 ASSAY OF MAGNESIUM: CPT | Mod: NTX

## 2018-07-27 PROCEDURE — 20600001 HC STEP DOWN PRIVATE ROOM: Mod: NTX

## 2018-07-27 PROCEDURE — 99233 SBSQ HOSP IP/OBS HIGH 50: CPT | Mod: NTX,,, | Performed by: HOSPITALIST

## 2018-07-27 PROCEDURE — 97802 MEDICAL NUTRITION INDIV IN: CPT

## 2018-07-27 PROCEDURE — 81003 URINALYSIS AUTO W/O SCOPE: CPT | Mod: NTX

## 2018-07-27 PROCEDURE — 25000003 PHARM REV CODE 250: Mod: NTX | Performed by: PHYSICIAN ASSISTANT

## 2018-07-27 PROCEDURE — 94640 AIRWAY INHALATION TREATMENT: CPT

## 2018-07-27 PROCEDURE — 84145 PROCALCITONIN (PCT): CPT | Mod: NTX

## 2018-07-27 PROCEDURE — 85610 PROTHROMBIN TIME: CPT | Mod: NTX

## 2018-07-27 PROCEDURE — 63600175 PHARM REV CODE 636 W HCPCS: Performed by: HOSPITALIST

## 2018-07-27 PROCEDURE — 93321 DOPPLER ECHO F-UP/LMTD STD: CPT | Mod: NTX

## 2018-07-27 PROCEDURE — 87040 BLOOD CULTURE FOR BACTERIA: CPT | Mod: 59,NTX

## 2018-07-27 RX ORDER — LORAZEPAM/0.9% SODIUM CHLORIDE 100MG/0.1L
2 PLASTIC BAG, INJECTION (ML) INTRAVENOUS EVERY 4 HOURS
Status: COMPLETED | OUTPATIENT
Start: 2018-07-27 | End: 2018-07-27

## 2018-07-27 RX ADMIN — TRAMADOL HYDROCHLORIDE 50 MG: 50 TABLET, COATED ORAL at 04:07

## 2018-07-27 RX ADMIN — ONDANSETRON 8 MG: 8 TABLET, ORALLY DISINTEGRATING ORAL at 04:07

## 2018-07-27 RX ADMIN — LACTULOSE 30 G: 20 SOLUTION ORAL at 09:07

## 2018-07-27 RX ADMIN — MIDODRINE HYDROCHLORIDE 15 MG: 5 TABLET ORAL at 12:07

## 2018-07-27 RX ADMIN — IPRATROPIUM BROMIDE AND ALBUTEROL SULFATE 3 ML: .5; 3 SOLUTION RESPIRATORY (INHALATION) at 07:07

## 2018-07-27 RX ADMIN — IPRATROPIUM BROMIDE AND ALBUTEROL SULFATE 3 ML: .5; 3 SOLUTION RESPIRATORY (INHALATION) at 08:07

## 2018-07-27 RX ADMIN — FUROSEMIDE 80 MG: 80 TABLET ORAL at 05:07

## 2018-07-27 RX ADMIN — SPIRONOLACTONE 100 MG: 100 TABLET, FILM COATED ORAL at 09:07

## 2018-07-27 RX ADMIN — TRAMADOL HYDROCHLORIDE 25 MG: 50 TABLET, FILM COATED ORAL at 10:07

## 2018-07-27 RX ADMIN — RAMELTEON 8 MG: 8 TABLET, FILM COATED ORAL at 10:07

## 2018-07-27 RX ADMIN — CEFTRIAXONE SODIUM 2 G: 2 INJECTION, POWDER, FOR SOLUTION INTRAMUSCULAR; INTRAVENOUS at 09:07

## 2018-07-27 RX ADMIN — MIDODRINE HYDROCHLORIDE 15 MG: 5 TABLET ORAL at 09:07

## 2018-07-27 RX ADMIN — CYANOCOBALAMIN TAB 1000 MCG 1000 MCG: 1000 TAB at 09:07

## 2018-07-27 RX ADMIN — MAGNESIUM SULFATE IN WATER 2 G: 40 INJECTION, SOLUTION INTRAVENOUS at 09:07

## 2018-07-27 RX ADMIN — FERROUS SULFATE TAB EC 325 MG (65 MG FE EQUIVALENT) 325 MG: 325 (65 FE) TABLET DELAYED RESPONSE at 09:07

## 2018-07-27 RX ADMIN — NICOTINE 1 PATCH: 14 PATCH, EXTENDED RELEASE TRANSDERMAL at 09:07

## 2018-07-27 RX ADMIN — MAGNESIUM SULFATE IN WATER 2 G: 40 INJECTION, SOLUTION INTRAVENOUS at 04:07

## 2018-07-27 RX ADMIN — MIDODRINE HYDROCHLORIDE 15 MG: 5 TABLET ORAL at 05:07

## 2018-07-27 RX ADMIN — FUROSEMIDE 80 MG: 80 TABLET ORAL at 09:07

## 2018-07-27 RX ADMIN — AZITHROMYCIN MONOHYDRATE 500 MG: 250 TABLET ORAL at 09:07

## 2018-07-27 RX ADMIN — THERA TABS 1 TABLET: TAB at 09:07

## 2018-07-27 RX ADMIN — FERROUS SULFATE TAB EC 325 MG (65 MG FE EQUIVALENT) 325 MG: 325 (65 FE) TABLET DELAYED RESPONSE at 08:07

## 2018-07-27 RX ADMIN — IPRATROPIUM BROMIDE AND ALBUTEROL SULFATE 3 ML: .5; 3 SOLUTION RESPIRATORY (INHALATION) at 04:07

## 2018-07-27 RX ADMIN — FOLIC ACID 1 MG: 1 TABLET ORAL at 09:07

## 2018-07-27 RX ADMIN — TRAMADOL HYDROCHLORIDE 50 MG: 50 TABLET, COATED ORAL at 10:07

## 2018-07-27 RX ADMIN — TRAMADOL HYDROCHLORIDE 50 MG: 50 TABLET, COATED ORAL at 11:07

## 2018-07-27 RX ADMIN — LACTULOSE 30 G: 20 SOLUTION ORAL at 04:07

## 2018-07-27 RX ADMIN — THIAMINE HCL TAB 100 MG 100 MG: 100 TAB at 09:07

## 2018-07-27 RX ADMIN — IPRATROPIUM BROMIDE AND ALBUTEROL SULFATE 3 ML: .5; 3 SOLUTION RESPIRATORY (INHALATION) at 12:07

## 2018-07-27 RX ADMIN — TRAMADOL HYDROCHLORIDE 50 MG: 50 TABLET, COATED ORAL at 05:07

## 2018-07-27 NOTE — CONSULTS
Consult Note  Gynecology    Consult Requested By: Hospital Medicine  Reason for Consult: Cervical/breast cancer screening for liver transplant    SUBJECTIVE:     History of Present Illness:  Patient is a 48 y.o. with hepatocellular carcinoma currently being evaluated for liver transplant. GYN services requested for pap smear and well woman exam.    Patient is postmenopausal, has not seen an OBGYN for annual exams since . She has no gynecologic complaints at this time. Denies vaginal bleeding, abnormal discharge, difficulty with urination, incontinence, breast masses, skin changes, or nipple discharge.     OB History:    c/s x2, ,  (twins)    GYN History:  Hysterectomy in  due to fibroids, LMP , cycles heavy prior to that  Last pap   Denies h/o abnormal paps  Sexually active, uses surgical sterilization (hysterectomy) for contraception  Denies h/o STDs  Last MMG   Denies h/o abnormal MMG    Family History:  - Sister diagnosed with breast cancer at age 38, still living.  - No family history of uterine, colon, or ovarian cancer.    PMHx:   Past Medical History:   Diagnosis Date    Acute hypoxemic respiratory failure 2016    ANDREW (acute kidney injury) 2016    Alcohol dependence in remission     Alcoholic cirrhosis of liver with ascites 10/22/2015    Anxiety     Ascites due to alcoholic cirrhosis 2016    Centrilobular emphysema 2016    Cigarette nicotine dependence without complication 2016    Folate deficiency 10/22/2015    Gallstones     Hepatorenal syndrome 2016    Hyperbilirubinemia 2016    Hyponatremia 2016    Iron deficiency anemia due to chronic blood loss 10/22/2015    Portal hypertensive gastropathy 10/22/2015    Subclinical hypothyroidism 10/22/2015       PSHx:   Past Surgical History:   Procedure Laterality Date    ADENOIDECTOMY      APPENDECTOMY       SECTION      COLONOSCOPY      HYSTERECTOMY      25 yrs  old    LIVER BIOPSY      OOPHORECTOMY Left     25 yrs old    OOPHORECTOMY Right     29 yrs old    TONSILLECTOMY      TUBAL LIGATION      UPPER GASTROINTESTINAL ENDOSCOPY         All:   Review of patient's allergies indicates:   Allergen Reactions    Morphine Nausea And Vomiting       Meds:   Prescriptions Prior to Admission   Medication Sig Dispense Refill Last Dose    ascorbic acid, vitamin C, (VITAMIN C) 500 MG tablet Take 500 mg by mouth once daily.   7/23/2018    b complex vitamins tablet Take 1 tablet by mouth once daily.       ciprofloxacin HCl (CIPRO) 500 MG tablet Take 1 tablet (500 mg total) by mouth every 12 (twelve) hours. for 14 days 28 tablet 0 7/23/2018    cyanocobalamin (VITAMIN B-12) 1000 MCG tablet Take 1 tablet (1,000 mcg total) by mouth once daily.   7/23/2018    ferrous sulfate 325 (65 FE) MG EC tablet Take 1 tablet (325 mg total) by mouth 2 (two) times daily. 60 tablet 0 7/23/2018    folic acid (FOLVITE) 1 MG tablet Take 1 tablet (1 mg total) by mouth once daily. 30 tablet 11     furosemide (LASIX) 40 MG tablet TAKE 1 TABLET (40 MG TOTAL) BY MOUTH ONCE DAILY. 90 tablet 3 7/23/2018    HYDROcodone-acetaminophen (NORCO)  mg per tablet Take 1 tablet by mouth every 8 (eight) hours as needed for Pain. 90 tablet 0     midodrine (PROAMATINE) 10 MG tablet Take 1 tablet (10 mg total) by mouth 3 (three) times daily with meals. 90 tablet 3 7/23/2018    multivitamin (THERAGRAN) tablet Take 1 tablet by mouth once daily.   Taking    potassium 99 mg Tab Take 1 tablet by mouth once daily.    7/23/2018    spironolactone (ALDACTONE) 100 MG tablet Take 1 tablet (100 mg total) by mouth once daily. 30 tablet 0 7/23/2018    thiamine 100 MG tablet Take 1 tablet (100 mg total) by mouth once daily.          SH:   Social History     Social History    Marital status:      Spouse name: N/A    Number of children: N/A    Years of education: N/A     Occupational History    Not on file.      Social History Main Topics    Smoking status: Current Every Day Smoker     Packs/day: 1.00     Years: 35.00     Types: Cigarettes    Smokeless tobacco: Never Used    Alcohol use No      Comment: hx etoh-quit 8/2015    Drug use: No    Sexual activity: Yes     Partners: Male     Other Topics Concern    Not on file     Social History Narrative    No narrative on file       FH:   Family History   Problem Relation Age of Onset    Rheum arthritis Father     Cancer Mother     No Known Problems Sister     No Known Problems Brother     Colon cancer Neg Hx        Review of Systems:  Constitutional: no fever or chills  Respiratory: no cough or shortness of breath  Cardiovascular: no chest pain or palpitations  Gastrointestinal: no nausea or vomiting  Genitourinary: rash on mons that started as small comedone - pt reports shaving and likely folliculitis, no hematuria or dysuria, negative for urinary incontinence  Integument/Breast: no rash or pruritis, negative for breast lump, nipple discharge and skin lesion(s)       OBJECTIVE:     Temp:  [98.2 °F (36.8 °C)-99.2 °F (37.3 °C)] 98.2 °F (36.8 °C)  Pulse:  [] 85  Resp:  [16-20] 18  SpO2:  [84 %-100 %] 92 %  BP: ()/(57-71) 102/58    Physical Exam:  GEN: No apparent distress. Alert and oriented.  CV: Regular rate and rhythm. Normal sounding S1 and S2. No murmur, rub, or gallop noted.  LUNGS: Clear to auscultation bilaterally. No wheezes, rales or rhonchi noted.  BREASTS: No tenderness or palpable masses. No skin changes or nipple discharge. No axillary lymphadenopathy.  ABDOMEN: Ascitic abdomen. No guarding or rebound tenderness.  EXT: Diffusely jaundiced.  EXT. GENITALIA: 4 x 4 cm erythematous rash on mons with 2 small scabs, not hot to the touch, not tender to palpation  VAGINA: Pink, moist, and well-rugated. Mild amount of physiologic discharge, no blood present, no lesions noted. Vaginal cuff visualized and normal appearing.   No cervix, uterus,  fallopian tubes, or ovaries.      ASSESSMENT/PLAN:     Patient Active Problem List   Diagnosis    Gallstones    Alcohol use disorder, severe, in early remission    Anemia of chronic disease    Vitamin D deficiency    Folate deficiency    Iron deficiency anemia due to chronic blood loss    Portal hypertensive gastropathy    Alcoholic cirrhosis of liver with ascites    Subclinical hypothyroidism    Anxiety    Cigarette nicotine dependence without complication    Hyperbilirubinemia    Ascites    Centrilobular emphysema    Mild single current episode of major depressive disorder    Oral thrush    Hypotension    Hyponatremia    Altered mental status    General weakness    Hepatic encephalopathy    Hypomagnesemia    Normocytic anemia    Hypophosphatemia    Decompensated hepatic cirrhosis    Acute on chronic respiratory failure    Thrombocytopenia    Pneumonia    Severe malnutrition    HCC (hepatocellular carcinoma)    E coli bacteremia         Gynecologic malignancy screening. Low risk of malignancy at this time.  -Pap collected and sent to pathology lab.    Thank you for the consult. Please call GYN with any questions.  Will sign off.      Riana Landis MD  OBGYN, PGY-1

## 2018-07-27 NOTE — PLAN OF CARE
Problem: Patient Care Overview  Goal: Plan of Care Review  Outcome: Ongoing (interventions implemented as appropriate)  VSS. PRN pain med given with great effect.  A/Ox4.  No acute events over night. Patient stated she slept well, when in the evening she voiced concern if she'd be able to sleep.  Safety maintained.  All questions answered. Patient updated on plan of care.  Will continue to monitor.

## 2018-07-27 NOTE — ASSESSMENT & PLAN NOTE
Malnutrition in the context of Chronic Illness/Injury    Related to (etiology):  Inadequate energy intake in the setting of Liver fx    Signs and Symptoms (as evidenced by):  Poor appetite x 3 week PTA, mild fat depletion of triceps, and severe muscle depletion of temples and clavicle.

## 2018-07-27 NOTE — PROGRESS NOTES
Progress Note   Hospital Medicine         Patient Name: Annette Esparza  MRN:  [id number]  [facility] Medicine Team: [facility] HOSP MED L Justino Edward MD  Date of Admission:  7/23/2018     Length of Stay:  LOS: 4 days   Expected Discharge Date: 8/1/2018  Principal Problem:  Acute on chronic respiratory failure       Subjective:     Interval History/Overnight Events:  Patient in better spirits today, rising WBC, infectious work up including C. Diff; under going liver transplant evaluation; possible d/c tomorrow WBC improves;     Review of Systems   Constitutional: Negative for chills, fatigue, fever.   HENT: Negative for sore throat, trouble swallowing.    Eyes: Negative for photophobia, visual disturbance.   Respiratory: Negative for cough, shortness of breath.    Cardiovascular: Negative for chest pain, palpitations, leg swelling.   Gastrointestinal: Negative for abdominal pain, constipation, diarrhea, nausea, vomiting.   Endocrine: Negative for cold intolerance, heat intolerance.   Genitourinary: Negative for dysuria, frequency.   Musculoskeletal: Negative for arthralgias, myalgias.   Skin: Negative for rash, wound, erythema   Neurological: Negative for dizziness, syncope, weakness, light-headedness.   Psychiatric/Behavioral: Negative for confusion, hallucinations, anxiety  All other systems reviewed and are negative.    Objective:     Temp:  [98.2 °F (36.8 °C)-99.2 °F (37.3 °C)]   Pulse:  [72-92]   Resp:  [16-20]   BP: ()/(57-58)   SpO2:  [84 %-100 %]       Physical Exam:  Constitutional: Appears well-developed and well-nourished.   Head: Normocephalic and atraumatic.   Mouth/Throat: Oropharynx is clear and moist.   Eyes: EOM are normal. Pupils are equal, round, and reactive to light. positive scleral icterus.   Neck: Normal range of motion. Neck supple.   Cardiovascular: Normal rate and regular rhythm.  No murmur heard.  Pulmonary/Chest: Effort normal and breath sounds normal. No respiratory distress. No  wheezes, rales, or rhonchi  Abdominal: Soft. Bowel sounds are normal.  Positive distension, no tenderness  Musculoskeletal: Normal range of motion. No edema.   Neurological: Alert and oriented to person, place, and time.   Skin: Skin is warm and dry.   Psychiatric: Normal mood and affect. Behavior is normal.       Recent Labs  Lab 07/23/18  1348 07/24/18  0457 07/25/18  0533 07/26/18  0353 07/27/18 0447   WBC 11.76 10.64 12.50 14.20* 16.23*   HGB 8.0* 7.6* 6.8* 8.4* 8.4*   HCT 24.5* 23.2* 20.7* 25.8* 26.2*   * 146* 138* 134* 145*       Recent Labs  Lab 07/25/18  0533 07/26/18 0353 07/27/18 0447   * 129* 130*   K 3.6 3.5 3.5   CL 94* 92* 94*   CO2 26 26 26   BUN 9 7 8   CREATININE 1.1 1.0 1.2   GLU 94 89 88   CALCIUM 8.4* 8.6* 8.3*   MG 1.8 1.5* 1.3*   PHOS 3.8 4.2 4.5       Recent Labs  Lab 07/25/18  0533 07/26/18  0353 07/27/18  0447 07/27/18  0448   ALKPHOS 329* 359* 360*  --    ALT 17 23 20  --    AST 28 30 29  --    ALBUMIN 2.0* 2.2* 2.1*  --    PROT 5.3* 5.8* 5.9*  --    BILITOT 6.9* 9.6* 9.1*  --    INR 1.4* 1.5*  --  1.4*     No results for input(s): POCTGLUCOSE in the last 168 hours.     albuterol-ipratropium  3 mL Nebulization Q4H WAKE    [START ON 7/29/2018] ceFAZolin (ANCEF) IVPB  2 g Intravenous Q8H    cefTRIAXone (ROCEPHIN) IVPB  2 g Intravenous Q24H    cyanocobalamin  1,000 mcg Oral Daily    ferrous sulfate  325 mg Oral BID    folic acid  1 mg Oral Daily    furosemide  80 mg Oral BID    lactulose  30 g Oral TID    magnesium sulfate  2 g Intravenous Q4H    midodrine  15 mg Oral TID WM    multivitamin  1 tablet Oral Daily    nicotine  1 patch Transdermal Daily    spironolactone  100 mg Oral Daily    thiamine  100 mg Oral Daily       Assessment and Plan     Ms. Annette Esparza is a 48 y.o. female who presented to [facility] on 7/23/2018 with     Hospital Course:    Ms. Annette Esparza was admitted to Hospital Medicine for management of     Active Hospital Problems     Diagnosis  POA    *Acute on chronic respiratory failure [J96.20]  Yes    E coli bacteremia [R78.81]  Yes    HCC (hepatocellular carcinoma) [C22.0]  Yes    Thrombocytopenia [D69.6]  Yes    Pneumonia [J18.9]  Yes    Severe malnutrition [E43]  Yes    Decompensated hepatic cirrhosis [K72.90]  Yes    Hepatic encephalopathy [K72.90]  Yes    Hyponatremia [E87.1]  Yes    Hypotension [I95.9]  Yes    Centrilobular emphysema [J43.2]  Yes     Chronic    Ascites [R18.8]  Yes    Anxiety [F41.9]  Yes     Chronic    Cigarette nicotine dependence without complication [F17.210]  Yes     Chronic    Alcoholic cirrhosis of liver with ascites [K70.31]  Yes     Chronic    Subclinical hypothyroidism [E03.9]  Yes     Chronic    Anemia of chronic disease [D63.8]  Yes    Alcohol use disorder, severe, in early remission [F10.21]  Yes      Resolved Hospital Problems    Diagnosis Date Resolved POA   No resolved problems to display.          # Acute on chronic respiratory failure  # Pneumonia due to suspected strep PNA  # COPD  - patient lives on 2L NC at home, was at 4L, wean  - ABG on RA shows a PAO2 of 60, improved from 43 on admit  - CXR shows some opacities  -  on rocephin and azithromax  - also with that low of PAO2, concern for possible HPS, 2d echo with bubble study done does not show any shunt;   - CTA chest negative for PE, positive for PNA; resp cultures ordered   - duonebs     # Decompensated alcoholic cirrhosis with ascites  # Severe alcohol dependence in remission  # Hepatocellular Carcinoma   MELD-Na score: 25 at 7/27/2018  4:48 AM  MELD score: 20 at 7/27/2018  4:48 AM  Calculated from:  Serum Creatinine: 1.2 mg/dL at 7/27/2018  4:47 AM  Serum Sodium: 130 mmol/L at 7/27/2018  4:47 AM  Total Bilirubin: 9.1 mg/dL at 7/27/2018  4:47 AM  INR(ratio): 1.4 at 7/27/2018  4:48 AM  Age: 48 years    - patient states last drink was over a year ago  - abdomen U/S with liver doppler reviewed   - hepatology consult  -  addiction psych consult with [facility], suggest high risk and state needs IOP programs;   - CT ab/pelv with triple phase shows 4.4 cm new mass consistent with HCC; MRI abdomen reviewed   - started on lasix 40 mg IV TID  - IR paracentesis done on 7/24 with 3.9 L removed, negative for SBP  - approved for liver transplant evaluation, several      # Hyponatremia  - fluid restriction, improving      # Anemia of chronic disease  # Thrombocytopenia  - s/p 1 unit PRBC with good response     # Hepatic Encephalopathy  - continue lactulose to have 3 to 4 BMs daily     # Severe malnutrition  - PAB and boost     # Hypotension  - increase midodrine to 15 mg TID     # Hypokalemia/Hypomagnesemia  - replace          Diet:  Low sodium with fluid restriction   GI PPx:    DVT PPx:    Goals of Care:  full     High Risk Conditions:  -respiratory failure      Disposition:  possiby tomorrow        Justino Edward MD  Medical Director [facility]  Spectra:  [id number]  Pager: [phone]

## 2018-07-27 NOTE — NURSING
PRE EDUCATION TEACHING NOTE    Annette Esparza was seen in the hospital today.  Handbook on pre-liver transplant information (see outline below) was given to the patient and time was allowed for questions.  Patient's father, step mother and two sisters were at  her bedside.  Informed consent signed and written information given on selection criteria.      LIVER TRANSPLANT WORK-UP EDUCATION  I. NATIONAL REGISTRY LISTING  A. Information for listing  B. Regions  C. Per UNOS, can be listed at more than one center  II. SURGERY  A. Length  B. Complications: bleeding, infection  C. Central lines, drains, Brizuela catheter, incision, endotracheal tube, NG tube  D. Transfusions, cell saver  III. SHORT TERM RECOVERY  A. ICU, PICU, Hospital stay  IV. LONG TERM RECOVERY  A. Labs at home  B. Clinic visits  C. Complications: infection, rejection, readmissions  D. Normal immunity and immunosuppression  E. Incidence of re-admit in 1st year  V. REJECTION  A. Incidence  B. Treatment: Solumedrol, Prograf, Thymoglobulin (actions and side effects)  VI. IMMUNOSUPPRESSIVES  A. Prednisone  B. Imuran/Cellcept  C. Cyclosporin/Prograf  D. Rapamune  E. Need for lifetime compliance  F. Actions and side effects  G. Costs  VII. RECURRENCE OF VIRAL HEPATITIS

## 2018-07-27 NOTE — PLAN OF CARE
Problem: Patient Care Overview  Goal: Plan of Care Review  Outcome: Ongoing (interventions implemented as appropriate)  Pt AOx4 and verbally responsive to care. VSS. Pain well controlled with PRN pain medication. x4 BM this shift. C-diff precautions initiated until stool sample collected and comes back. Pt resting in bed at this time with son at bedside, cont to monitor.

## 2018-07-27 NOTE — HPI
"MELD-Na score: 25 at 7/27/2018  4:48 AM  MELD score: 20 at 7/27/2018  4:48 AM  Calculated from:  Serum Creatinine: 1.2 mg/dL at 7/27/2018  4:47 AM  Serum Sodium: 130 mmol/L at 7/27/2018  4:47 AM  Total Bilirubin: 9.1 mg/dL at 7/27/2018  4:47 AM  INR(ratio): 1.4 at 7/27/2018  4:48 AM  Age: 48 years     Estimated body mass index is 22.49 kg/m² as calculated from the following:    Height as of this encounter: 5' 1" (1.549 m).    Weight as of this encounter: 54 kg (119 lb 0.8 oz).     Ms Esparza is a 48 year old woman with  History of ETOH ESLD decompensations including ascites, HE, HRS, Hypothyroidism, COPD who is being admitted due to abdomianl distension.     She was recently admitted to [facility] ([facility] on Jimmie 7/15 due to hepatic encephlaopathy from UTI. She reports that she left AMA on 7/19 as she felt they were not doing anything but watching. They had completed a 1.5L para during the hospitalization but still feels bloated. Her last tap prior to this was ~1 yr ago.     She presented to the hospital due to dyspnea in setting of increaseing abdominal distension. She required 2L NC which she borrowed from family member. She reported  that she feels the 'same' with abdominal distension as primary complaint. She noted that she will have intermittent pain up to 8/10 including abdomen, under the ribs anteriorly and back pain. She has been taking norco with minimal relief. She denies any tylenol or NSAIDS.     On review she endorses a cough x several days which is non-productive. Denies any sick contact or travel history. She reports that her last ETOH was 1 year ago.     She had previously seen Dr Johnson in clinic (1/2017). Per note she was first diagnosed 9/2014 and underwent a biopsy in 10/2014. She was noted at that time to be drinking until 11/2016. MELD was 16. Was not planned for transplant evaluation at that time due to low MELD and desiring formal ETOH rehab and sobriety.     "
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
## 2018-07-27 NOTE — TELEPHONE ENCOUNTER
Patient: Annette Esparza       MRN: [id number]      : 1970     Age: 48 y.o.  [address]  [place] [address]    Provider: Hepatologist - Lauren    Urgency of review: urgent    Patient Transplant Status: In Evaluation    Reason for presentation: Initial staging for transplant    Clinical Summary: 48 year old decompensated alcohol liver disease with new ill-defined liver lesion.  AFP normal  Claims no alcohol for 1 year    Imaging to be reviewed: CT + MRI 2018    HCC Treatment History: None    ABO: A NEG    Platelets:   Lab Results   Component Value Date/Time     (L) 2018 04:47 AM     Creatinine:   Lab Results   Component Value Date/Time    CREATININE 1.2 2018 04:47 AM     Bilirubin:   Lab Results   Component Value Date/Time    BILITOT 9.1 (H) 2018 04:47 AM     AFP Last 3 each if available:   Lab Results   Component Value Date/Time    AFP 3.5 2018 03:54 AM    AFP 3.9 2016 10:07 PM    AFP 8.1 2016 08:52 AM       MELD: MELD-Na score: 25 at 2018  4:48 AM  MELD score: 20 at 2018  4:48 AM  Calculated from:  Serum Creatinine: 1.2 mg/dL at 2018  4:47 AM  Serum Sodium: 130 mmol/L at 2018  4:47 AM  Total Bilirubin: 9.1 mg/dL at 2018  4:47 AM  INR(ratio): 1.4 at 2018  4:48 AM  Age: 48 years    Plan:     Follow-up Provider:

## 2018-07-27 NOTE — PLAN OF CARE
Problem: Patient Care Overview  Goal: Plan of Care Review  Outcome: Ongoing (interventions implemented as appropriate)  Recommendations     1. Continue current 2 gram sodium diet + Boost Plus ONS (fluid restriction per MD).   2. RD to monitor & follow-up.

## 2018-07-27 NOTE — CONSULTS
"  [facility]  Adult Nutrition  Consult Note    SUMMARY     Recommendations    1. Continue current 2 gram sodium diet + Boost Plus ONS (fluid restriction per MD).   2. RD to monitor & follow-up.    Goals: PO intake >50%  Nutrition Goal Status: new  Communication of RD Recs: reviewed with RN    Reason for Assessment    Reason for Assessment: consult  Diagnosis: other (see comments) (Resp. fx)  Relevant Medical History: Etoh abuse  Interdisciplinary Rounds: did not attend    General Information Comments: Pt reports improving appetite, consumed 75% of breakfast this AM. Drinking 1-2 ONS/day. Pt reports poor appetite x 3 weeks PTA 2/2 abdominal pain & distention. States UBW is 105. Low sodium diet education complete.   Nutrition Discharge Planning: Adequate PO intake    Nutrition/Diet History    Patient Reported Diet/Restrictions/Preferences: general, low salt  Do you have any cultural, spiritual, Tenriism conflicts, given your current situation?: No  Factors Affecting Nutritional Intake: altered gastrointestinal function, abdominal distention, decreased appetite    Anthropometrics    Temp: 98.3 °F (36.8 °C)  Height Method: Stated  Height: 5' 1" (154.9 cm)  Height (inches): 61 in  Weight Method: Bed Scale  Weight: 54 kg (119 lb 0.8 oz)  Weight (lb): 119.05 lb  Ideal Body Weight (IBW), Female: 105 lb  % Ideal Body Weight, Female (lb): 113.38 lb  BMI (Calculated): 22.5  BMI Grade: 18.5-24.9 - normal  Usual Body Weight (UBW), k.7 kg  % Usual Body Weight: 113.44  % Weight Change From Usual Weight: 13.21 %    Lab/Procedures/Meds    Pertinent Labs Reviewed: reviewed  Pertinent Labs Comments: Na 130, GFR 53.2  Pertinent Medications Reviewed: reviewed  Pertinent Medications Comments: MVI    Physical Findings/Assessment    Overall Physical Appearance: underweight, edematous  Oral/Mouth Cavity: WDL  Skin: intact, jaundice    Estimated/Assessed Needs    Weight Used For Calorie Calculations: 54 kg (119 lb " 0.8 oz)     Energy Calorie Requirements (kcal): 1378 kcal/d  Energy Need Method: Montague-St Jeor (1.25 PAL)     Protein Requirements: 65 g/d (1.2 g/kg)  Weight Used For Protein Calculations: 54 kg (119 lb 0.8 oz)     Fluid Need Method: other (see comments) (Per MD or 1 mL/kcal)    Nutrition Prescription Ordered    Current Diet Order: 2 gram Na  Nutrition Order Comments: 1200 mL FR  Oral Nutrition Supplement: Boost Plus ONS    Evaluation of Received Nutrient/Fluid Intake    Comments: LBM: 7/26    Tolerance: tolerating    Nutrition Risk    Level of Risk/Frequency of Follow-up:  (1x/week)     Assessment and Plan    Severe malnutrition      Malnutrition in the context of Chronic Illness/Injury    Related to (etiology):  Inadequate energy intake in the setting of Liver fx    Signs and Symptoms (as evidenced by):  Poor appetite x 3 week PTA, mild fat depletion of triceps, and severe muscle depletion of temples and clavicle.           Monitor and Evaluation    Food and Nutrient Intake: energy intake, food and beverage intake  Food and Nutrient Adminstration: diet order  Physical Activity and Function: nutrition-related ADLs and IADLs  Anthropometric Measurements: weight, weight change  Biochemical Data, Medical Tests and Procedures: lipid profile, inflammatory profile, glucose/endocrine profile, gastrointestinal profile, electrolyte and renal panel  Nutrition-Focused Physical Findings: overall appearance     Nutrition Follow-Up    RD Follow-up?: Yes

## 2018-07-27 NOTE — TELEPHONE ENCOUNTER
Patient: Annette Esparza       MRN: [id number]      : 1970     Age: 48 y.o.  [address]  [place] [address]    Provider: Hepatologist Allison Aguilar    Urgency of review: urgent    Patient Transplant Status: In Evaluation    Reason for presentation: Initial staging for transplant    Clinical Summary: 48 year old decompensated alcohol liver disease with new ill-defined liver lesion.  AFP normal  Claims no alcohol for 1 year    Imaging to be reviewed: CT + MRI 2018    HCC Treatment History: None    ABO: A NEG    Platelets:   Lab Results   Component Value Date/Time     (L) 2018 04:47 AM     Creatinine:   Lab Results   Component Value Date/Time    CREATININE 1.2 2018 04:47 AM     Bilirubin:   Lab Results   Component Value Date/Time    BILITOT 9.1 (H) 2018 04:47 AM     AFP Last 3 each if available:   Lab Results   Component Value Date/Time    AFP 3.5 2018 03:54 AM    AFP 3.9 2016 10:07 PM    AFP 8.1 2016 08:52 AM       MELD: MELD-Na score: 25 at 2018  4:48 AM  MELD score: 20 at 2018  4:48 AM  Calculated from:  Serum Creatinine: 1.2 mg/dL at 2018  4:47 AM  Serum Sodium: 130 mmol/L at 2018  4:47 AM  Total Bilirubin: 9.1 mg/dL at 2018  4:47 AM  INR(ratio): 1.4 at 2018  4:48 AM  Age: 48 years    Plan: MRI with Diffusely heterogenous right hepatic lobe of the liver with patchy areas of washout - highly suspicious for mass lesion but hard to really delineate. CT more concerning for definite enhancement and washout for a 4 x 4 cm mass in segment 8  --Ascites.  Washout on venous; arterially enhancing (CT).  MR to r/o infiltrative process--whole rt lobe looks abnormal.  No definite vascular invasion at this time; but concern that whole rt lobe may be involved.  Plan: TJLBX--rt lobe (dx hcc)     Patient notified of IR review and recommendations.  Understanding and agreement expressed.  Order entered and procedure date for TJLBX  requested.        Follow-up Provider: eGr Aguilar MD

## 2018-07-28 LAB
ALBUMIN SERPL BCP-MCNC: 2 G/DL
ALP SERPL-CCNC: 344 U/L
ALT SERPL W/O P-5'-P-CCNC: 18 U/L
ANION GAP SERPL CALC-SCNC: 10 MMOL/L
AST SERPL-CCNC: 26 U/L
BACTERIA BLD CULT: NORMAL
BACTERIA BLD CULT: NORMAL
BACTERIA SPEC AEROBE CULT: NO GROWTH
BASOPHILS # BLD AUTO: 0.05 K/UL
BASOPHILS NFR BLD: 0.4 %
BILIRUB SERPL-MCNC: 7.8 MG/DL
BILIRUB UR QL STRIP: NEGATIVE
BUN SERPL-MCNC: 11 MG/DL
C DIFF GDH STL QL: NEGATIVE
C DIFF TOX A+B STL QL IA: NEGATIVE
CALCIUM SERPL-MCNC: 8.3 MG/DL
CHLORIDE SERPL-SCNC: 92 MMOL/L
CLARITY UR REFRACT.AUTO: ABNORMAL
CO2 SERPL-SCNC: 28 MMOL/L
COLOR UR AUTO: ABNORMAL
CREAT SERPL-MCNC: 1.2 MG/DL
DIFFERENTIAL METHOD: ABNORMAL
EOSINOPHIL # BLD AUTO: 0 K/UL
EOSINOPHIL NFR BLD: 0 %
ERYTHROCYTE [DISTWIDTH] IN BLOOD BY AUTOMATED COUNT: 24.2 %
EST. GFR  (AFRICAN AMERICAN): >60 ML/MIN/1.73 M^2
EST. GFR  (NON AFRICAN AMERICAN): 53.2 ML/MIN/1.73 M^2
FIBRINOGEN PPP-MCNC: 284 MG/DL
GLUCOSE SERPL-MCNC: 92 MG/DL
GLUCOSE UR QL STRIP: NEGATIVE
HCT VFR BLD AUTO: 22.5 %
HGB BLD-MCNC: 7.3 G/DL
HGB UR QL STRIP: NEGATIVE
IMM GRANULOCYTES # BLD AUTO: 0.07 K/UL
IMM GRANULOCYTES NFR BLD AUTO: 0.6 %
INR PPP: 1.3
KETONES UR QL STRIP: NEGATIVE
LACTATE SERPL-SCNC: 1.9 MMOL/L
LEUKOCYTE ESTERASE UR QL STRIP: NEGATIVE
LYMPHOCYTES # BLD AUTO: 0.8 K/UL
LYMPHOCYTES NFR BLD: 6.4 %
MAGNESIUM SERPL-MCNC: 2.1 MG/DL
MCH RBC QN AUTO: 33.2 PG
MCHC RBC AUTO-ENTMCNC: 32.4 G/DL
MCV RBC AUTO: 102 FL
MONOCYTES # BLD AUTO: 0.7 K/UL
MONOCYTES NFR BLD: 5.5 %
NEUTROPHILS # BLD AUTO: 10.7 K/UL
NEUTROPHILS NFR BLD: 87.1 %
NITRITE UR QL STRIP: NEGATIVE
NRBC BLD-RTO: 0 /100 WBC
PH UR STRIP: 5 [PH] (ref 5–8)
PHOSPHATE SERPL-MCNC: 4.3 MG/DL
PLATELET # BLD AUTO: 134 K/UL
PMV BLD AUTO: 9.6 FL
POTASSIUM SERPL-SCNC: 3.4 MMOL/L
PROT SERPL-MCNC: 5.6 G/DL
PROT UR QL STRIP: NEGATIVE
PROTHROMBIN TIME: 13.5 SEC
RBC # BLD AUTO: 2.2 M/UL
SODIUM SERPL-SCNC: 130 MMOL/L
SP GR UR STRIP: 1.02 (ref 1–1.03)
URN SPEC COLLECT METH UR: ABNORMAL
UROBILINOGEN UR STRIP-ACNC: NEGATIVE EU/DL
WBC # BLD AUTO: 12.25 K/UL

## 2018-07-28 PROCEDURE — 25000242 PHARM REV CODE 250 ALT 637 W/ HCPCS: Mod: NTX | Performed by: HOSPITALIST

## 2018-07-28 PROCEDURE — 63600175 PHARM REV CODE 636 W HCPCS: Mod: NTX | Performed by: HOSPITALIST

## 2018-07-28 PROCEDURE — 94640 AIRWAY INHALATION TREATMENT: CPT | Mod: NTX

## 2018-07-28 PROCEDURE — 80053 COMPREHEN METABOLIC PANEL: CPT | Mod: NTX

## 2018-07-28 PROCEDURE — 84100 ASSAY OF PHOSPHORUS: CPT | Mod: NTX

## 2018-07-28 PROCEDURE — 87324 CLOSTRIDIUM AG IA: CPT | Mod: NTX

## 2018-07-28 PROCEDURE — 85025 COMPLETE CBC W/AUTO DIFF WBC: CPT | Mod: NTX

## 2018-07-28 PROCEDURE — 94761 N-INVAS EAR/PLS OXIMETRY MLT: CPT | Mod: NTX

## 2018-07-28 PROCEDURE — 25000003 PHARM REV CODE 250: Mod: NTX | Performed by: HOSPITALIST

## 2018-07-28 PROCEDURE — 25000003 PHARM REV CODE 250: Mod: NTX | Performed by: STUDENT IN AN ORGANIZED HEALTH CARE EDUCATION/TRAINING PROGRAM

## 2018-07-28 PROCEDURE — 83605 ASSAY OF LACTIC ACID: CPT | Mod: NTX

## 2018-07-28 PROCEDURE — 85610 PROTHROMBIN TIME: CPT | Mod: NTX

## 2018-07-28 PROCEDURE — 36415 COLL VENOUS BLD VENIPUNCTURE: CPT | Mod: NTX

## 2018-07-28 PROCEDURE — S4991 NICOTINE PATCH NONLEGEND: HCPCS | Mod: NTX | Performed by: PHYSICIAN ASSISTANT

## 2018-07-28 PROCEDURE — 27000221 HC OXYGEN, UP TO 24 HOURS: Mod: NTX

## 2018-07-28 PROCEDURE — 20600001 HC STEP DOWN PRIVATE ROOM: Mod: NTX

## 2018-07-28 PROCEDURE — 25000003 PHARM REV CODE 250: Mod: NTX | Performed by: PHYSICIAN ASSISTANT

## 2018-07-28 PROCEDURE — 85384 FIBRINOGEN ACTIVITY: CPT | Mod: NTX

## 2018-07-28 PROCEDURE — 99233 SBSQ HOSP IP/OBS HIGH 50: CPT | Mod: NTX,,, | Performed by: HOSPITALIST

## 2018-07-28 PROCEDURE — 83735 ASSAY OF MAGNESIUM: CPT | Mod: NTX

## 2018-07-28 RX ADMIN — NICOTINE 1 PATCH: 14 PATCH, EXTENDED RELEASE TRANSDERMAL at 09:07

## 2018-07-28 RX ADMIN — THIAMINE HCL TAB 100 MG 100 MG: 100 TAB at 09:07

## 2018-07-28 RX ADMIN — TRAMADOL HYDROCHLORIDE 50 MG: 50 TABLET, COATED ORAL at 06:07

## 2018-07-28 RX ADMIN — TRAMADOL HYDROCHLORIDE 50 MG: 50 TABLET, COATED ORAL at 12:07

## 2018-07-28 RX ADMIN — ONDANSETRON 8 MG: 8 TABLET, ORALLY DISINTEGRATING ORAL at 06:07

## 2018-07-28 RX ADMIN — FUROSEMIDE 80 MG: 80 TABLET ORAL at 09:07

## 2018-07-28 RX ADMIN — FOLIC ACID 1 MG: 1 TABLET ORAL at 09:07

## 2018-07-28 RX ADMIN — FERROUS SULFATE TAB EC 325 MG (65 MG FE EQUIVALENT) 325 MG: 325 (65 FE) TABLET DELAYED RESPONSE at 09:07

## 2018-07-28 RX ADMIN — SPIRONOLACTONE 100 MG: 100 TABLET, FILM COATED ORAL at 09:07

## 2018-07-28 RX ADMIN — IPRATROPIUM BROMIDE AND ALBUTEROL SULFATE 3 ML: .5; 3 SOLUTION RESPIRATORY (INHALATION) at 08:07

## 2018-07-28 RX ADMIN — IPRATROPIUM BROMIDE AND ALBUTEROL SULFATE 3 ML: .5; 3 SOLUTION RESPIRATORY (INHALATION) at 04:07

## 2018-07-28 RX ADMIN — MIDODRINE HYDROCHLORIDE 15 MG: 5 TABLET ORAL at 09:07

## 2018-07-28 RX ADMIN — FUROSEMIDE 80 MG: 80 TABLET ORAL at 05:07

## 2018-07-28 RX ADMIN — IPRATROPIUM BROMIDE AND ALBUTEROL SULFATE 3 ML: .5; 3 SOLUTION RESPIRATORY (INHALATION) at 07:07

## 2018-07-28 RX ADMIN — MIDODRINE HYDROCHLORIDE 15 MG: 5 TABLET ORAL at 05:07

## 2018-07-28 RX ADMIN — LACTULOSE 30 G: 20 SOLUTION ORAL at 09:07

## 2018-07-28 RX ADMIN — POTASSIUM BICARBONATE 50 MEQ: 25 TABLET, EFFERVESCENT ORAL at 12:07

## 2018-07-28 RX ADMIN — RAMELTEON 8 MG: 8 TABLET, FILM COATED ORAL at 09:07

## 2018-07-28 RX ADMIN — THERA TABS 1 TABLET: TAB at 09:07

## 2018-07-28 RX ADMIN — IPRATROPIUM BROMIDE AND ALBUTEROL SULFATE 3 ML: .5; 3 SOLUTION RESPIRATORY (INHALATION) at 12:07

## 2018-07-28 RX ADMIN — ONDANSETRON 8 MG: 8 TABLET, ORALLY DISINTEGRATING ORAL at 12:07

## 2018-07-28 RX ADMIN — CEFTRIAXONE SODIUM 2 G: 2 INJECTION, POWDER, FOR SOLUTION INTRAMUSCULAR; INTRAVENOUS at 09:07

## 2018-07-28 RX ADMIN — CYANOCOBALAMIN TAB 1000 MCG 1000 MCG: 1000 TAB at 09:07

## 2018-07-28 RX ADMIN — MIDODRINE HYDROCHLORIDE 15 MG: 5 TABLET ORAL at 12:07

## 2018-07-28 NOTE — PROGRESS NOTES
Progress Note   Hospital Medicine         Patient Name: Annette Esparza  MRN:  [id number]  [facility] Medicine Team: [facility]OS[facility] MED L Justino Edward MD  Date of Admission:  7/23/2018     Length of Stay:  LOS: 5 days   Expected Discharge Date: 8/1/2018  Principal Problem:  Acute on chronic respiratory failure       Subjective:     Interval History/Overnight Events:  Patient doing ok today; blood counts dropped, still awaiting stool sample to rule out C.diff; under going liver tx eval; told patient when its safe, we will send patient home, possibly tomorrow or Monday;     Review of Systems   Constitutional: Negative for chills, fatigue, fever.   HENT: Negative for sore throat, trouble swallowing.    Eyes: Negative for photophobia, visual disturbance.   Respiratory: Negative for cough, shortness of breath.    Cardiovascular: Negative for chest pain, palpitations, leg swelling.   Gastrointestinal: Negative for abdominal pain, constipation, diarrhea, nausea, vomiting.   Endocrine: Negative for cold intolerance, heat intolerance.   Genitourinary: Negative for dysuria, frequency.   Musculoskeletal: Negative for arthralgias, myalgias.   Skin: Negative for rash, wound, erythema   Neurological: Negative for dizziness, syncope, weakness, light-headedness.   Psychiatric/Behavioral: Negative for confusion, hallucinations, anxiety  All other systems reviewed and are negative.    Objective:     Temp:  [97.9 °F (36.6 °C)-98.8 °F (37.1 °C)]   Pulse:  [74-88]   Resp:  [12-16]   BP: ()/(53-62)   SpO2:  [92 %-98 %]       Physical Exam:  Constitutional: Appears well-developed and well-nourished.   Head: Normocephalic and atraumatic.   Mouth/Throat: Oropharynx is clear and moist.   Eyes: EOM are normal. Pupils are equal, round, and reactive to light. positive scleral icterus.   Neck: Normal range of motion. Neck supple.   Cardiovascular: Normal rate and regular rhythm.  No murmur heard.  Pulmonary/Chest: Effort normal and breath  sounds normal. No respiratory distress. No wheezes, rales, or rhonchi  Abdominal: Soft. Bowel sounds are normal.  Positive distension, no tenderness  Musculoskeletal: Normal range of motion. No edema.   Neurological: Alert and oriented to person, place, and time.   Skin: Skin is warm and dry.   Psychiatric: Normal mood and affect. Behavior is normal.       Recent Labs  Lab 07/23/18  1348 07/24/18  0457 07/25/18  0533 07/26/18  0353 07/27/18  0447 07/28/18  0433   WBC 11.76 10.64 12.50 14.20* 16.23* 12.25   HGB 8.0* 7.6* 6.8* 8.4* 8.4* 7.3*   HCT 24.5* 23.2* 20.7* 25.8* 26.2* 22.5*   * 146* 138* 134* 145* 134*       Recent Labs  Lab 07/26/18  0353 07/27/18  0447 07/28/18  0433   * 130* 130*   K 3.5 3.5 3.4*   CL 92* 94* 92*   CO2 26 26 28   BUN 7 8 11   CREATININE 1.0 1.2 1.2   GLU 89 88 92   CALCIUM 8.6* 8.3* 8.3*   MG 1.5* 1.3* 2.1   PHOS 4.2 4.5 4.3       Recent Labs  Lab 07/26/18  0353 07/27/18  0447 07/27/18  0448 07/28/18  0433   ALKPHOS 359* 360*  --  344*   ALT 23 20  --  18   AST 30 29  --  26   ALBUMIN 2.2* 2.1*  --  2.0*   PROT 5.8* 5.9*  --  5.6*   BILITOT 9.6* 9.1*  --  7.8*   INR 1.5*  --  1.4* 1.3*     No results for input(s): POCTGLUCOSE in the last 168 hours.     albuterol-ipratropium  3 mL Nebulization Q4H WAKE    [START ON 7/29/2018] ceFAZolin (ANCEF) IVPB  2 g Intravenous Q8H    cefTRIAXone (ROCEPHIN) IVPB  2 g Intravenous Q24H    cyanocobalamin  1,000 mcg Oral Daily    ferrous sulfate  325 mg Oral BID    folic acid  1 mg Oral Daily    furosemide  80 mg Oral BID    lactulose  30 g Oral TID    midodrine  15 mg Oral TID WM    multivitamin  1 tablet Oral Daily    nicotine  1 patch Transdermal Daily    spironolactone  100 mg Oral Daily    thiamine  100 mg Oral Daily       Assessment and Plan     Ms. Annette Esparza is a 48 y.o. female who presented to [facility] on 7/23/2018 with     Hospital Course:    Ms. Annette Esparza was admitted to Hospital Medicine for  management of     Active Hospital Problems    Diagnosis  POA    *Acute on chronic respiratory failure [J96.20]  Yes    E coli bacteremia [R78.81]  Yes    HCC (hepatocellular carcinoma) [C22.0]  Yes    Thrombocytopenia [D69.6]  Yes    Pneumonia [J18.9]  Yes    Severe malnutrition [E43]  Yes    Decompensated hepatic cirrhosis [K72.90]  Yes    Hepatic encephalopathy [K72.90]  Yes    Hyponatremia [E87.1]  Yes    Hypotension [I95.9]  Yes    Centrilobular emphysema [J43.2]  Yes     Chronic    Ascites [R18.8]  Yes    Anxiety [F41.9]  Yes     Chronic    Cigarette nicotine dependence without complication [F17.210]  Yes     Chronic    Alcoholic cirrhosis of liver with ascites [K70.31]  Yes     Chronic    Subclinical hypothyroidism [E03.9]  Yes     Chronic    Anemia of chronic disease [D63.8]  Yes    Alcohol use disorder, severe, in early remission [F10.21]  Yes      Resolved Hospital Problems    Diagnosis Date Resolved POA   No resolved problems to display.          # Acute on chronic respiratory failure  # Pneumonia due to suspected strep PNA  # COPD  - patient lives on 2L NC at home, at 2L currently  - ABG on RA shows a PAO2 of 60, improved from 43 on admit  - CXR shows some opacities  -  on rocephin and azithromax  - also with that low of PAO2, concern for possible HPS, 2d echo with bubble study done does not show any shunt;   - CTA chest negative for PE, positive for PNA; resp cultures ordered   - duonebs     # Decompensated alcoholic cirrhosis with ascites  # Severe alcohol dependence in remission  # Hepatocellular Carcinoma   MELD-Na score: 24 at 7/28/2018  4:33 AM  MELD score: 19 at 7/28/2018  4:33 AM  Calculated from:  Serum Creatinine: 1.2 mg/dL at 7/28/2018  4:33 AM  Serum Sodium: 130 mmol/L at 7/28/2018  4:33 AM  Total Bilirubin: 7.8 mg/dL at 7/28/2018  4:33 AM  INR(ratio): 1.3 at 7/28/2018  4:33 AM  Age: 48 years    - patient states last drink was over a year ago  - abdomen U/S with liver doppler  reviewed   - hepatology consult  - addiction psych consult with [facility], suggest high risk and state needs IOP programs;   - CT ab/pelv with triple phase shows 4.4 cm new mass consistent with HCC; MRI abdomen reviewed   - started on lasix 40 mg IV TID  - IR paracentesis done on 7/24 with 3.9 L removed, negative for SBP  - approved for liver transplant evaluation, several      # Hyponatremia  - fluid restriction, improving      # Anemia of chronic disease  # Thrombocytopenia  - s/p 1 unit PRBC with good response     # Hepatic Encephalopathy  - continue lactulose to have 3 to 4 BMs daily     # Severe malnutrition  - PAB and boost     # Hypotension  - increase midodrine to 15 mg TID     # Hypokalemia/Hypomagnesemia  - replace          Diet:  Low sodium with fluid restriction   GI PPx:    DVT PPx:    Goals of Care:  full     High Risk Conditions:  -respiratory failure      Disposition:  possiby monday       Justino Edward MD  Medical Director [facility] Medicine  Spectra:  [phone]  Pager: [phone]

## 2018-07-28 NOTE — PLAN OF CARE
Problem: Patient Care Overview  Goal: Plan of Care Review  Outcome: Ongoing (interventions implemented as appropriate)  Pt AOx4 and verbally responsive to care. VSS. Pain well controlled with PRN Tramadol. WBC trending down, cont to monitor Hgb. Stool sample sent this shift for c-diff culture. Pt resting in bed at this time, cont to monitor.

## 2018-07-28 NOTE — PLAN OF CARE
Problem: Patient Care Overview  Goal: Plan of Care Review  Outcome: Ongoing (interventions implemented as appropriate)  VSS.  PRN pain meds were moderately effective had to get an extra one time dose of tramadol.  A/Ox4.  Still pending stool sample.  No acute events over night. Safety maintained.  All questions answered. Patient updated on plan of care.  Will continue to monitor.

## 2018-07-29 VITALS
WEIGHT: 119.06 LBS | OXYGEN SATURATION: 97 % | TEMPERATURE: 99 F | BODY MASS INDEX: 22.48 KG/M2 | DIASTOLIC BLOOD PRESSURE: 54 MMHG | HEART RATE: 86 BPM | RESPIRATION RATE: 16 BRPM | SYSTOLIC BLOOD PRESSURE: 96 MMHG | HEIGHT: 61 IN

## 2018-07-29 PROBLEM — F10.21 ALCOHOL DEPENDENCE IN REMISSION: Status: ACTIVE | Noted: 2018-07-29

## 2018-07-29 LAB
ALBUMIN SERPL BCP-MCNC: 1.9 G/DL
ALP SERPL-CCNC: 347 U/L
ALT SERPL W/O P-5'-P-CCNC: 16 U/L
ANION GAP SERPL CALC-SCNC: 8 MMOL/L
ANISOCYTOSIS BLD QL SMEAR: SLIGHT
AST SERPL-CCNC: 28 U/L
BASOPHILS # BLD AUTO: 0.05 K/UL
BASOPHILS NFR BLD: 0.4 %
BILIRUB SERPL-MCNC: 7 MG/DL
BUN SERPL-MCNC: 11 MG/DL
CALCIUM SERPL-MCNC: 8.4 MG/DL
CHLORIDE SERPL-SCNC: 92 MMOL/L
CO2 SERPL-SCNC: 29 MMOL/L
CREAT SERPL-MCNC: 1.1 MG/DL
DIFFERENTIAL METHOD: ABNORMAL
EOSINOPHIL # BLD AUTO: 0 K/UL
EOSINOPHIL NFR BLD: 0.2 %
ERYTHROCYTE [DISTWIDTH] IN BLOOD BY AUTOMATED COUNT: 23.9 %
EST. GFR  (AFRICAN AMERICAN): >60 ML/MIN/1.73 M^2
EST. GFR  (NON AFRICAN AMERICAN): 59.1 ML/MIN/1.73 M^2
GLUCOSE SERPL-MCNC: 98 MG/DL
HCT VFR BLD AUTO: 21.9 %
HGB BLD-MCNC: 7.4 G/DL
HYPOCHROMIA BLD QL SMEAR: ABNORMAL
IMM GRANULOCYTES # BLD AUTO: 0.11 K/UL
IMM GRANULOCYTES NFR BLD AUTO: 0.9 %
INR PPP: 1.4
LYMPHOCYTES # BLD AUTO: 0.9 K/UL
LYMPHOCYTES NFR BLD: 7.7 %
MAGNESIUM SERPL-MCNC: 1.3 MG/DL
MCH RBC QN AUTO: 33.9 PG
MCHC RBC AUTO-ENTMCNC: 33.8 G/DL
MCV RBC AUTO: 101 FL
MONOCYTES # BLD AUTO: 0.7 K/UL
MONOCYTES NFR BLD: 5.8 %
NEUTROPHILS # BLD AUTO: 10.4 K/UL
NEUTROPHILS NFR BLD: 85 %
NRBC BLD-RTO: 0 /100 WBC
OVALOCYTES BLD QL SMEAR: ABNORMAL
PHOSPHATE SERPL-MCNC: 3.5 MG/DL
PLATELET # BLD AUTO: 134 K/UL
PMV BLD AUTO: 9.6 FL
POIKILOCYTOSIS BLD QL SMEAR: SLIGHT
POLYCHROMASIA BLD QL SMEAR: ABNORMAL
POTASSIUM SERPL-SCNC: 3.5 MMOL/L
PROT SERPL-MCNC: 5.5 G/DL
PROTHROMBIN TIME: 14 SEC
RBC # BLD AUTO: 2.18 M/UL
SODIUM SERPL-SCNC: 129 MMOL/L
SPHEROCYTES BLD QL SMEAR: ABNORMAL
WBC # BLD AUTO: 12.26 K/UL

## 2018-07-29 PROCEDURE — 25000003 PHARM REV CODE 250: Mod: NTX | Performed by: STUDENT IN AN ORGANIZED HEALTH CARE EDUCATION/TRAINING PROGRAM

## 2018-07-29 PROCEDURE — 63600175 PHARM REV CODE 636 W HCPCS: Mod: NTX | Performed by: HOSPITALIST

## 2018-07-29 PROCEDURE — 99233 SBSQ HOSP IP/OBS HIGH 50: CPT | Mod: NTX,,, | Performed by: HOSPITALIST

## 2018-07-29 PROCEDURE — 25000242 PHARM REV CODE 250 ALT 637 W/ HCPCS: Mod: NTX | Performed by: HOSPITALIST

## 2018-07-29 PROCEDURE — 85025 COMPLETE CBC W/AUTO DIFF WBC: CPT | Mod: NTX

## 2018-07-29 PROCEDURE — S4991 NICOTINE PATCH NONLEGEND: HCPCS | Mod: NTX | Performed by: PHYSICIAN ASSISTANT

## 2018-07-29 PROCEDURE — 83735 ASSAY OF MAGNESIUM: CPT | Mod: NTX

## 2018-07-29 PROCEDURE — 36415 COLL VENOUS BLD VENIPUNCTURE: CPT | Mod: NTX

## 2018-07-29 PROCEDURE — 80053 COMPREHEN METABOLIC PANEL: CPT | Mod: NTX

## 2018-07-29 PROCEDURE — 84100 ASSAY OF PHOSPHORUS: CPT | Mod: NTX

## 2018-07-29 PROCEDURE — 25000003 PHARM REV CODE 250: Mod: NTX | Performed by: PHYSICIAN ASSISTANT

## 2018-07-29 PROCEDURE — 25000003 PHARM REV CODE 250: Mod: NTX | Performed by: HOSPITALIST

## 2018-07-29 PROCEDURE — 85610 PROTHROMBIN TIME: CPT | Mod: NTX

## 2018-07-29 RX ORDER — MAGNESIUM SULFATE HEPTAHYDRATE 40 MG/ML
2 INJECTION, SOLUTION INTRAVENOUS ONCE
Status: COMPLETED | OUTPATIENT
Start: 2018-07-29 | End: 2018-07-29

## 2018-07-29 RX ORDER — CIPROFLOXACIN 500 MG/1
500 TABLET ORAL EVERY 12 HOURS
Qty: 28 TABLET | Refills: 0
Start: 2018-07-29 | End: 2018-07-30

## 2018-07-29 RX ORDER — MIDODRINE HYDROCHLORIDE 10 MG/1
15 TABLET ORAL
Qty: 90 TABLET | Refills: 3 | Status: ON HOLD | OUTPATIENT
Start: 2018-07-29 | End: 2018-01-01

## 2018-07-29 RX ORDER — LACTULOSE 10 G/15ML
30 SOLUTION ORAL 3 TIMES DAILY
Qty: 1350 ML | Refills: 6 | Status: SHIPPED | OUTPATIENT
Start: 2018-07-29 | End: 2018-01-01

## 2018-07-29 RX ORDER — FUROSEMIDE 80 MG/1
80 TABLET ORAL 2 TIMES DAILY
Qty: 60 TABLET | Refills: 11 | Status: SHIPPED | OUTPATIENT
Start: 2018-07-29 | End: 2018-01-01

## 2018-07-29 RX ORDER — MIDODRINE HYDROCHLORIDE 10 MG/1
15 TABLET ORAL
Qty: 90 TABLET | Refills: 3 | Status: SHIPPED | OUTPATIENT
Start: 2018-07-29 | End: 2018-07-29

## 2018-07-29 RX ADMIN — TRAMADOL HYDROCHLORIDE 50 MG: 50 TABLET, COATED ORAL at 01:07

## 2018-07-29 RX ADMIN — CEFAZOLIN SODIUM 2 G: 2 SOLUTION INTRAVENOUS at 08:07

## 2018-07-29 RX ADMIN — FERROUS SULFATE TAB EC 325 MG (65 MG FE EQUIVALENT) 325 MG: 325 (65 FE) TABLET DELAYED RESPONSE at 08:07

## 2018-07-29 RX ADMIN — ONDANSETRON 8 MG: 8 TABLET, ORALLY DISINTEGRATING ORAL at 03:07

## 2018-07-29 RX ADMIN — MIDODRINE HYDROCHLORIDE 15 MG: 5 TABLET ORAL at 08:07

## 2018-07-29 RX ADMIN — LACTULOSE 30 G: 20 SOLUTION ORAL at 08:07

## 2018-07-29 RX ADMIN — THIAMINE HCL TAB 100 MG 100 MG: 100 TAB at 08:07

## 2018-07-29 RX ADMIN — TRAMADOL HYDROCHLORIDE 50 MG: 50 TABLET, COATED ORAL at 07:07

## 2018-07-29 RX ADMIN — THERA TABS 1 TABLET: TAB at 08:07

## 2018-07-29 RX ADMIN — MAGNESIUM SULFATE IN WATER 2 G: 40 INJECTION, SOLUTION INTRAVENOUS at 08:07

## 2018-07-29 RX ADMIN — NICOTINE 1 PATCH: 14 PATCH, EXTENDED RELEASE TRANSDERMAL at 08:07

## 2018-07-29 RX ADMIN — FUROSEMIDE 80 MG: 80 TABLET ORAL at 08:07

## 2018-07-29 RX ADMIN — FOLIC ACID 1 MG: 1 TABLET ORAL at 08:07

## 2018-07-29 RX ADMIN — IPRATROPIUM BROMIDE AND ALBUTEROL SULFATE 3 ML: .5; 3 SOLUTION RESPIRATORY (INHALATION) at 08:07

## 2018-07-29 RX ADMIN — CYANOCOBALAMIN TAB 1000 MCG 1000 MCG: 1000 TAB at 08:07

## 2018-07-29 RX ADMIN — IPRATROPIUM BROMIDE AND ALBUTEROL SULFATE 3 ML: .5; 3 SOLUTION RESPIRATORY (INHALATION) at 11:07

## 2018-07-29 RX ADMIN — MIDODRINE HYDROCHLORIDE 15 MG: 5 TABLET ORAL at 11:07

## 2018-07-29 RX ADMIN — SPIRONOLACTONE 100 MG: 100 TABLET, FILM COATED ORAL at 08:07

## 2018-07-29 NOTE — NURSING
Pt's stool sample was negative for c-diff. Special contact Iso cancelled.     Results for NGUYEN RAINEY (MRN [id number]) as of 7/29/2018 01:59   Ref. Range 7/28/2018 14:57   C. diff Antigen Latest Ref Range: Negative  Negative   C difficile Toxins A+B, EIA Latest Ref Range: Negative  Negative

## 2018-07-29 NOTE — PROGRESS NOTES
Progress Note   Hospital Medicine         Patient Name: Annette Esparza  MRN:  [id number]  [facility] Medicine Team: [facility] HOSP MED L Justino Edward MD  Date of Admission:  7/23/2018     Length of Stay:  LOS: 6 days   Expected Discharge Date: 7/29/2018  Principal Problem:  Acute on chronic respiratory failure       Subjective:     Interval History/Overnight Events:  Patient doing ok today; WBC improved; C. Diff negative; H/H stable; plan for d/c today and follow up as an outpatient; qualifies for home O2, will get that set up and delivered to her; will complete outpatient liver transplant evaluation     Review of Systems   Constitutional: Negative for chills, fatigue, fever.   HENT: Negative for sore throat, trouble swallowing.    Eyes: Negative for photophobia, visual disturbance.   Respiratory: Negative for cough, shortness of breath.    Cardiovascular: Negative for chest pain, palpitations, leg swelling.   Gastrointestinal: Negative for abdominal pain, constipation, diarrhea, nausea, vomiting.   Endocrine: Negative for cold intolerance, heat intolerance.   Genitourinary: Negative for dysuria, frequency.   Musculoskeletal: Negative for arthralgias, myalgias.   Skin: Negative for rash, wound, erythema   Neurological: Negative for dizziness, syncope, weakness, light-headedness.   Psychiatric/Behavioral: Negative for confusion, hallucinations, anxiety  All other systems reviewed and are negative.    Objective:     Temp:  [96.8 °F (36 °C)-98.6 °F (37 °C)]   Pulse:  [70-95]   Resp:  [12-18]   BP: (90-96)/(54-58)   SpO2:  [91 %-97 %]       Physical Exam:  Constitutional: Appears well-developed and well-nourished.   Head: Normocephalic and atraumatic.   Mouth/Throat: Oropharynx is clear and moist.   Eyes: EOM are normal. Pupils are equal, round, and reactive to light. positive scleral icterus.   Neck: Normal range of motion. Neck supple.   Cardiovascular: Normal rate and regular rhythm.  No murmur  heard.  Pulmonary/Chest: Effort normal and breath sounds normal. No respiratory distress. No wheezes, rales, or rhonchi  Abdominal: Soft. Bowel sounds are normal.  Positive distension, no tenderness  Musculoskeletal: Normal range of motion. No edema.   Neurological: Alert and oriented to person, place, and time.   Skin: Skin is warm and dry.   Psychiatric: Normal mood and affect. Behavior is normal.       Recent Labs  Lab 07/24/18  0457 07/25/18  0533 07/26/18  0353 07/27/18 0447 07/28/18  0433 07/29/18  0322   WBC 10.64 12.50 14.20* 16.23* 12.25 12.26   HGB 7.6* 6.8* 8.4* 8.4* 7.3* 7.4*   HCT 23.2* 20.7* 25.8* 26.2* 22.5* 21.9*   * 138* 134* 145* 134* 134*       Recent Labs  Lab 07/27/18 0447 07/28/18  0433 07/29/18  0322   * 130* 129*   K 3.5 3.4* 3.5   CL 94* 92* 92*   CO2 26 28 29   BUN 8 11 11   CREATININE 1.2 1.2 1.1   GLU 88 92 98   CALCIUM 8.3* 8.3* 8.4*   MG 1.3* 2.1 1.3*   PHOS 4.5 4.3 3.5       Recent Labs  Lab 07/27/18  0447 07/27/18  0448 07/28/18 0433 07/29/18  0322   ALKPHOS 360*  --  344* 347*   ALT 20  --  18 16   AST 29  --  26 28   ALBUMIN 2.1*  --  2.0* 1.9*   PROT 5.9*  --  5.6* 5.5*   BILITOT 9.1*  --  7.8* 7.0*   INR  --  1.4* 1.3* 1.4*     No results for input(s): POCTGLUCOSE in the last 168 hours.     albuterol-ipratropium  3 mL Nebulization Q4H WAKE    ceFAZolin (ANCEF) IVPB  2 g Intravenous Q8H    cyanocobalamin  1,000 mcg Oral Daily    ferrous sulfate  325 mg Oral BID    folic acid  1 mg Oral Daily    furosemide  80 mg Oral BID    lactulose  30 g Oral TID    midodrine  15 mg Oral TID WM    multivitamin  1 tablet Oral Daily    nicotine  1 patch Transdermal Daily    spironolactone  100 mg Oral Daily    thiamine  100 mg Oral Daily       Assessment and Plan     Ms. Annette Esparza is a 48 y.o. female who presented to [facility] on 7/23/2018 with     Hospital Course:    Ms. Annette Esparza was admitted to Hospital Medicine for management of     Active  Hospital Problems    Diagnosis  POA    *Acute on chronic respiratory failure [J96.20]  Yes    E coli bacteremia [R78.81]  Yes    HCC (hepatocellular carcinoma) [C22.0]  Yes    Thrombocytopenia [D69.6]  Yes    Pneumonia [J18.9]  Yes    Severe malnutrition [E43]  Yes    Decompensated hepatic cirrhosis [K72.90]  Yes    Hepatic encephalopathy [K72.90]  Yes    Hyponatremia [E87.1]  Yes    Hypotension [I95.9]  Yes    Centrilobular emphysema [J43.2]  Yes     Chronic    Ascites [R18.8]  Yes    Anxiety [F41.9]  Yes     Chronic    Cigarette nicotine dependence without complication [F17.210]  Yes     Chronic    Alcoholic cirrhosis of liver with ascites [K70.31]  Yes     Chronic    Subclinical hypothyroidism [E03.9]  Yes     Chronic    Anemia of chronic disease [D63.8]  Yes    Alcohol use disorder, severe, in early remission [F10.21]  Yes      Resolved Hospital Problems    Diagnosis Date Resolved POA   No resolved problems to display.          # Acute on chronic respiratory failure  # Pneumonia due to suspected strep PNA  # COPD  - patient lives on 2L NC at home, on RA; qualified ambulatory sats 88% RA;   - ABG on RA shows a PAO2 of 60, improved from 43 on admit  - CXR shows some opacities  -  on rocephin and azithromax, completed therapy   - also with that low of PAO2, concern for possible HPS, 2d echo with bubble study done does not show any shunt;   - CTA chest negative for PE, positive for PNA; resp cultures ordered   - duonebs     # Decompensated alcoholic cirrhosis with ascites  # Severe alcohol dependence in remission  # Hepatocellular Carcinoma   MELD-Na score: 24 at 7/29/2018  3:22 AM  MELD score: 18 at 7/29/2018  3:22 AM  Calculated from:  Serum Creatinine: 1.1 mg/dL at 7/29/2018  3:22 AM  Serum Sodium: 129 mmol/L at 7/29/2018  3:22 AM  Total Bilirubin: 7 mg/dL at 7/29/2018  3:22 AM  INR(ratio): 1.4 at 7/29/2018  3:22 AM  Age: 48 years    - patient states last drink was over a year ago  - abdomen U/S  with liver doppler reviewed   - hepatology consult  - addiction psych consult with [facility], suggest high risk and state needs IOP programs;   - CT ab/pelv with triple phase shows 4.4 cm new mass consistent with HCC; MRI abdomen reviewed   - started on lasix 40 mg IV TID; sending home on lasix 80 mg PO BID and aldactone  - IR paracentesis done on 7/24 with 3.9 L removed, negative for SBP  - approved for liver transplant evaluation, stress test negative for ischemia; will complete the rest of evaluation as an outpatient; needs outpatient paracentesis set up;      # Hyponatremia  - fluid restriction, improving      # Anemia of chronic disease  # Thrombocytopenia  - s/p 1 unit PRBC with good response     # Hepatic Encephalopathy  - continue lactulose to have 3 to 4 BMs daily, sent home on this     # Severe malnutrition  - PAB and boost     # Hypotension  - increase midodrine to 15 mg TID, sent home on this dose      # Hypokalemia/Hypomagnesemia  - replace    # E.coli bacteremia  - POA  - repeat blood cultures negative; on cefazolin here; will go home on cipro with last dose being tomorrow           Diet:  Low sodium with fluid restriction   GI PPx:    DVT PPx:    Goals of Care:  full     High Risk Conditions:  -respiratory failure      Disposition:  d/c today with outpatient completion of liver transplant evaluation and enrol into IOP       Justino Edward MD  Medical Director [facility] Medicine  Spectra:  [phone]  Pager: [phone]

## 2018-07-29 NOTE — NURSING
Pt AOx4 and verbally responsive to are. VSS. No c/o pain or discomfort. Went over discharge paperwork with pt and did 6 min walk test.  will be here to get pt.

## 2018-07-29 NOTE — NURSING
Home Oxygen Evaluation    Date Performed: 2018    1) Patient's Home O2 Sat on room air, while at rest: 90%         If O2 sats on room air at rest are 88% or below, patient qualifies. No additional testing needed. Document N/A in steps 2 and 3. If 89% or above, complete steps 2.      2) Patient's O2 Sat on room air while exercisin%        If O2 sats on room air while exercising remain 89% or above patient does not qualify, no further testing needed Document N/A in step 3. If O2 sats on room air while exercising are 88% or below, continue to step 3.      3) Patient's O2 Sat while exercising on O2: 95% at 2 LPM         (Must show improvement from #2 for patients to qualify)    If O2 sats improve on oxygen, patient qualifies for portable oxygen. If not, the patient does not qualify.

## 2018-07-29 NOTE — DISCHARGE SUMMARY
Discharge Summary  Hospital Medicine    Patient Name: Annette Esparza  MRN:  [id number]  Hospital Medicine Team: [facility] Justino Edward MD  Date of Admission:  7/23/2018     Date of Discharge:  07/29/2018  Length of Stay:  LOS: 6 days   Principal Problem:  Acute on chronic respiratory failure     Active Hospital Problems    Diagnosis  POA    *Acute on chronic respiratory failure [J96.20]  Yes    Alcohol dependence in remission [F10.21]  Unknown    E coli bacteremia [R78.81]  Yes    HCC (hepatocellular carcinoma) [C22.0]  Yes    Thrombocytopenia [D69.6]  Yes    Pneumonia [J18.9]  Yes    Severe malnutrition [E43]  Yes    Decompensated hepatic cirrhosis [K72.90]  Yes    Hepatic encephalopathy [K72.90]  Yes    Hyponatremia [E87.1]  Yes    Hypotension [I95.9]  Yes    Centrilobular emphysema [J43.2]  Yes     Chronic    Ascites [R18.8]  Yes    Anxiety [F41.9]  Yes     Chronic    Cigarette nicotine dependence without complication [F17.210]  Yes     Chronic    Alcoholic cirrhosis of liver with ascites [K70.31]  Yes     Chronic    Subclinical hypothyroidism [E03.9]  Yes     Chronic    Anemia of chronic disease [D63.8]  Yes    Alcohol use disorder, severe, in early remission [F10.21]  Yes      Resolved Hospital Problems    Diagnosis Date Resolved POA   No resolved problems to display.       History of Present Illness:      48 yrs old with decompensated alcoholic liver cirrhosis, hypoxemic respiratory failure 2/2; hypothyroidism, hyponatremia 2/2 cirrhosis, JUNE with folate deficieny presented with SOB since today along with worsening abdominal distention. Was brought into the ER on 2L nasal cannula, borrowed family member. Has never been on home O2 before.   Was previously admitted at [facility] for acute encephalopathy and UTI sepsis. Urine cultures grew E.coli, was give IV Rocephin and diuretics. Abdominal paracentesis was done and 1.5L of fluid was removed, blood and peritoneal fluid cultures were  negative. Patient left AMA on 7/19.  Says the abdominal distention has been worsening since she left AMA.   EGD in 2016 showed no evidence of varices. Was diagnosed with portal hypertensive gastropathy.  Reports nausea and 2 episodes of vomiting, no blood.    Hospital Course of Principle Problem Addressed:       # Acute on chronic respiratory failure  # Pneumonia due to suspected strep PNA  # COPD  - patient lives on 2L NC at home, on RA; qualified ambulatory sats 88% RA;   - ABG on RA shows a PAO2 of 60, improved from 43 on admit  - CXR shows some opacities  -  on rocephin and azithromax, completed therapy   - also with that low of PAO2, concern for possible HPS, 2d echo with bubble study done does not show any shunt;   - CTA chest negative for PE, positive for PNA; resp cultures ordered   - duonebs     # Decompensated alcoholic cirrhosis with ascites  # Severe alcohol dependence in remission  # Hepatocellular Carcinoma   MELD-Na score: 24 at 7/29/2018  3:22 AM  MELD score: 18 at 7/29/2018  3:22 AM  Calculated from:  Serum Creatinine: 1.1 mg/dL at 7/29/2018  3:22 AM  Serum Sodium: 129 mmol/L at 7/29/2018  3:22 AM  Total Bilirubin: 7 mg/dL at 7/29/2018  3:22 AM  INR(ratio): 1.4 at 7/29/2018  3:22 AM  Age: 48 years     - patient states last drink was over a year ago  - abdomen U/S with liver doppler reviewed   - hepatology consult  - addiction psych consult with [facility], suggest high risk and state needs IOP programs;   - CT ab/pelv with triple phase shows 4.4 cm new mass consistent with HCC; MRI abdomen reviewed   - started on lasix 40 mg IV TID; sending home on lasix 80 mg PO BID and aldactone  - IR paracentesis done on 7/24 with 3.9 L removed, negative for SBP  - approved for liver transplant evaluation, stress test negative for ischemia; will complete the rest of evaluation as an outpatient; needs outpatient paracentesis set up;      # Hyponatremia  - fluid restriction, improving      # Anemia of chronic disease  #  Thrombocytopenia  - s/p 1 unit PRBC with good response     # Hepatic Encephalopathy  - continue lactulose to have 3 to 4 BMs daily, sent home on this     # Severe malnutrition  - PAB and boost     # Hypotension  - increase midodrine to 15 mg TID, sent home on this dose      # Hypokalemia/Hypomagnesemia  - replace     # E.coli bacteremia  - POA  - repeat blood cultures negative; on cefazolin here; will go home on cipro with last dose being tomorrow            Diet:  Low sodium with fluid restriction   GI PPx:    DVT PPx:    Goals of Care:  full     High Risk Conditions:  -respiratory failure      Disposition:  d/c today with outpatient completion of liver transplant evaluation and enrol into IOP         Other Medical Problems Addressed in the Hospital:         Significant Diagnostic Tests/Imaging:       Recent Labs  Lab 07/25/18  0533 07/26/18  0353 07/27/18  0447 07/28/18  0433 07/29/18  0322   WBC 12.50 14.20* 16.23* 12.25 12.26   HGB 6.8* 8.4* 8.4* 7.3* 7.4*   HCT 20.7* 25.8* 26.2* 22.5* 21.9*   * 134* 145* 134* 134*       Recent Labs  Lab 07/25/18  0533 07/26/18  0353 07/27/18 0447 07/28/18  0433 07/29/18  0322   * 129* 130* 130* 129*   K 3.6 3.5 3.5 3.4* 3.5   CL 94* 92* 94* 92* 92*   CO2 26 26 26 28 29   BUN 9 7 8 11 11   CREATININE 1.1 1.0 1.2 1.2 1.1   CALCIUM 8.4* 8.6* 8.3* 8.3* 8.4*   MG 1.8 1.5* 1.3* 2.1 1.3*   PHOS 3.8 4.2 4.5 4.3 3.5   PROT 5.3* 5.8* 5.9* 5.6* 5.5*   BILITOT 6.9* 9.6* 9.1* 7.8* 7.0*   ALKPHOS 329* 359* 360* 344* 347*   ALT 17 23 20 18 16   AST 28 30 29 26 28         Special Treatments/Procedures:         Discharge Medications:      Current Discharge Medication List      START taking these medications    Details   lactulose (CHRONULAC) 20 gram/30 mL Soln Take 45 mLs (30 g total) by mouth 3 (three) times daily. for 10 days  Qty: 1350 mL, Refills: 6         CONTINUE these medications which have CHANGED    Details   ciprofloxacin HCl (CIPRO) 500 MG tablet Take 1 tablet (500 mg  total) by mouth every 12 (twelve) hours. Last day of therapy 7/30/18 for 1 day  Qty: 28 tablet, Refills: 0      furosemide (LASIX) 80 MG tablet Take 1 tablet (80 mg total) by mouth 2 (two) times daily.  Qty: 60 tablet, Refills: 11      midodrine (PROAMATINE) 10 MG tablet Take 1.5 tablets (15 mg total) by mouth 3 (three) times daily with meals.  Qty: 90 tablet, Refills: 3         CONTINUE these medications which have NOT CHANGED    Details   ascorbic acid, vitamin C, (VITAMIN C) 500 MG tablet Take 500 mg by mouth once daily.      b complex vitamins tablet Take 1 tablet by mouth once daily.      cyanocobalamin (VITAMIN B-12) 1000 MCG tablet Take 1 tablet (1,000 mcg total) by mouth once daily.      ferrous sulfate 325 (65 FE) MG EC tablet Take 1 tablet (325 mg total) by mouth 2 (two) times daily.  Qty: 60 tablet, Refills: 0      folic acid (FOLVITE) 1 MG tablet Take 1 tablet (1 mg total) by mouth once daily.  Qty: 30 tablet, Refills: 11      HYDROcodone-acetaminophen (NORCO)  mg per tablet Take 1 tablet by mouth every 8 (eight) hours as needed for Pain.  Qty: 90 tablet, Refills: 0    Associated Diagnoses: Pain      multivitamin (THERAGRAN) tablet Take 1 tablet by mouth once daily.      potassium 99 mg Tab Take 1 tablet by mouth once daily.       spironolactone (ALDACTONE) 100 MG tablet Take 1 tablet (100 mg total) by mouth once daily.  Qty: 30 tablet, Refills: 0      thiamine 100 MG tablet Take 1 tablet (100 mg total) by mouth once daily.             Discharge Diet:2 gram sodium diet    Activity: activity as tolerated    Discharge Condition: Good    Disposition: Home or Self Care    Time spent on the discharge of the patient including review of hospital course with the patient. reviewing discharge medications and arranging follow-up care 35 minutes.  Patient was seen and examined on the date of discharge and determined to be suitable for discharge.    Future Appointments  Date Time Provider Department Center    8/2/2018 3:00 PM Endy Pfeiffer MD [facility]   10/10/2018 9:30 AM [facility]       Justino Edward MD  Medical Director [facility] Medicine  Spectra:  [phone]  Pager: [phone]

## 2018-07-30 DIAGNOSIS — C22.0 HCC (HEPATOCELLULAR CARCINOMA): Primary | ICD-10-CM

## 2018-07-30 DIAGNOSIS — K70.31 ASCITES DUE TO ALCOHOLIC CIRRHOSIS: Primary | ICD-10-CM

## 2018-07-30 LAB
STRONGYLOIDES ANTIBODY IGG: NEGATIVE
VARICELLA INTERPRETATION: POSITIVE
VARICELLA ZOSTER IGG: 2.99 ISR

## 2018-07-31 ENCOUNTER — CONFERENCE (OUTPATIENT)
Dept: TRANSPLANT | Facility: CLINIC | Age: 48
End: 2018-07-31

## 2018-07-31 ENCOUNTER — TELEPHONE (OUTPATIENT)
Dept: TRANSPLANT | Facility: CLINIC | Age: 48
End: 2018-07-31

## 2018-07-31 ENCOUNTER — PATIENT OUTREACH (OUTPATIENT)
Dept: ADMINISTRATIVE | Facility: CLINIC | Age: 48
End: 2018-07-31

## 2018-07-31 DIAGNOSIS — Z01.818 PRE-TRANSPLANT EVALUATION FOR LIVER TRANSPLANT: ICD-10-CM

## 2018-07-31 DIAGNOSIS — C22.0 HCC (HEPATOCELLULAR CARCINOMA): Primary | ICD-10-CM

## 2018-07-31 LAB
MITOGEN IGNF BCKGRD COR BLD-ACNC: 0.02 IU/ML
MITOGEN NIL: 1.75 IU/ML
PHOSPHATIDYLETHANOL (PETH): 58 NG/ML
TB GOLD PLUS: NEGATIVE
TB1 - NIL: 0 IU/ML
TB2 - NIL: 0 IU/ML

## 2018-07-31 RX ORDER — CHOLECALCIFEROL (VITAMIN D3) 25 MCG
1000 TABLET ORAL DAILY
COMMUNITY

## 2018-07-31 NOTE — PATIENT INSTRUCTIONS
Discharge Instructions for Cirrhosis of the Liver  You have been diagnosed with cirrhosis of the liver. This is a long-term (chronic) problem. It occurs when liver tissue is destroyed and replaced by scar tissue. Causes of cirrhosis include:  · Infection such as viral hepatitis  · Chronic alcoholism  · The bodys immune system attacks healthy cells (autoimmune disorders)  · Obesity  · Medicine side effects  · Genetic diseases  Sometimes the exact cause is unknown. You may not have any symptoms at first. Or your symptoms may be mild. But they usually get worse. Cirrhosis is likely to occur if you have a history of long-term alcohol abuse. Cirrhosis cant be cured. But it can be treated.   Home care  Alcohol  · People with liver disease should not drink alcohol. If you stop drinking, you may feel better and live longer.  · If you are a chronic alcohol user, you will have withdrawal symptoms. Talk with your healthcare provider for more information.    · If alcohol is a problem, ask your provider about medicine that can help you quit drinking.    · Find a local Alcoholics Anonymous support group online at www.aa.org.  Diet  · Ask your provider what kind of diet you should follow. You may be asked to limit or not eat certain foods. Do not limit your protein intake.  · Weigh yourself daily and keep a weight log. If you have a sudden change in weight, call your provider.  · Cut back on salt:  ¨ Limit canned, dried, packaged, and fast foods.  ¨ Dont add salt to your food at the table.  ¨ Season foods with herbs instead of salt when you cook.  Medicines, supplements, and vaccines  · Take your medicines exactly as directed.  · Talk to your provider before taking vitamins, over-the-counter medicines, or herbal supplements. Many herbal supplements may be poisonous (toxic) to the liver.  · Avoid aspirin and other blood-thinning medicines.  · Discuss vitamin supplements and deficiencies with your provider.  · Ask your provider  about getting vaccinations for viruses that can cause liver diseases.  Follow-up care  Follow up with your healthcare provider, or as advised. You will likely have the following tests:  · Lab tests  · Blood tests for liver cancer  · Ultrasound of your liver every 6 months  · Endoscopy to check for swollen veins (varices) in your digestive tract  When to call your provider  Call your healthcare provider right away if you have any of the following:  · Fever of 100.4°F (38.0°C) or higher  · Extreme tiredness (fatigue), weakness, or lack of appetite  · Vomiting (with or without blood)  · Yellowing of your skin or eyes (jaundice)  · Itching  · Swelling in your belly or legs  · Black or tarry stools  · Skin that bruises easily  · Confusion or trouble thinking clearly   Date Last Reviewed: 8/1/2016  © 4268-1666 [employer]. [address] [place] [address], [place] [address]. All rights reserved. This information is not intended as a substitute for professional medical care. Always follow your healthcare professional's instructions.

## 2018-07-31 NOTE — TELEPHONE ENCOUNTER
Call returned with no answer.  Voice message left requesting a return call related to scheduling appointments.  Contact number provided.     ----- Message from Jasmina Ledbetter sent at 7/31/2018  9:15 AM CDT -----  Contact: patient  Patient Returning Call from [facility]    Who Left Message for Patient: pt not sure who called  Communication Preference: [phone]  Additional Information: n/a

## 2018-08-01 ENCOUNTER — TELEPHONE (OUTPATIENT)
Dept: TRANSPLANT | Facility: CLINIC | Age: 48
End: 2018-08-01

## 2018-08-01 LAB
BACTERIA BLD CULT: NORMAL
BACTERIA BLD CULT: NORMAL
BACTERIA SPEC ANAEROBE CULT: NORMAL
DIASTOLIC DYSFUNCTION: NO
ESTIMATED PA SYSTOLIC PRESSURE: 26.62
MITRAL VALVE REGURGITATION: NORMAL
RETIRED EF AND QEF - SEE NOTES: 60 (ref 55–65)
TRICUSPID VALVE REGURGITATION: NORMAL

## 2018-08-01 NOTE — SUBJECTIVE & OBJECTIVE
Past Medical History:   Diagnosis Date    Acute hypoxemic respiratory failure 2016    ANDREW (acute kidney injury) 2016    Alcohol dependence in remission     Alcoholic cirrhosis of liver with ascites 10/22/2015    Anxiety     Ascites due to alcoholic cirrhosis 2016    Centrilobular emphysema 2016    Cigarette nicotine dependence without complication 2016    Folate deficiency 10/22/2015    Gallstones     Hepatorenal syndrome 2016    Hyperbilirubinemia 2016    Hyponatremia 2016    Iron deficiency anemia due to chronic blood loss 10/22/2015    Portal hypertensive gastropathy 10/22/2015    Subclinical hypothyroidism 10/22/2015     Past Surgical History:   Procedure Laterality Date    ADENOIDECTOMY      APPENDECTOMY       SECTION      COLONOSCOPY      HYSTERECTOMY      25 yrs old    LIVER BIOPSY      OOPHORECTOMY Left     25 yrs old    OOPHORECTOMY Right     29 yrs old    TONSILLECTOMY      TUBAL LIGATION      UPPER GASTROINTESTINAL ENDOSCOPY         Review of patient's allergies indicates:   Allergen Reactions    Morphine Nausea And Vomiting       Family History     Problem Relation (Age of Onset)    Cancer Mother    No Known Problems Sister, Brother    Rheum arthritis Father        Social History Main Topics    Smoking status: Current Every Day Smoker     Packs/day: 1.00     Years: 35.00     Types: Cigarettes    Smokeless tobacco: Never Used    Alcohol use No      Comment: hx etoh-quit 2015    Drug use: No    Sexual activity: Yes     Partners: Male       Scheduled Meds:  Continuous Infusions:  PRN Meds:    Review of Systems   Constitutional: Positive for activity change, appetite change and fatigue. Negative for chills and fever.   Respiratory: Negative for cough and shortness of breath.    Cardiovascular: Negative for chest pain and leg swelling.   Gastrointestinal: Positive for abdominal distention and abdominal pain. Negative for  constipation, diarrhea and rectal pain.   Genitourinary: Negative for difficulty urinating and dysuria.   Musculoskeletal: Negative.    Skin: Negative for color change and rash.   Neurological: Negative for dizziness and light-headedness.   Psychiatric/Behavioral: Positive for confusion.   All other systems reviewed and are negative.     Objective:     Vital Signs (Most Recent):  Temp: 98.5 °F (36.9 °C) (07/29/18 1202)  Pulse: 86 (07/29/18 1202)  Resp: 16 (07/29/18 1202)  BP: (!) 96/54 (07/29/18 1202)  SpO2: 97 % (07/29/18 1202) Vital Signs (24h Range):        Weight: 54 kg (119 lb 0.8 oz)  Body mass index is 22.49 kg/m².    No intake or output data in the 24 hours ending 07/31/18 2349    Physical Exam   Constitutional: She is oriented to person, place, and time. No distress.   HENT:   Mouth/Throat: No oropharyngeal exudate.   Eyes: Pupils are equal, round, and reactive to light. No scleral icterus.   Neck: Neck supple. No thyromegaly present.   Cardiovascular: Normal heart sounds and intact distal pulses.    No murmur heard.  Pulmonary/Chest: No respiratory distress. She has no wheezes. She has no rales.   Abdominal: Soft. Bowel sounds are normal. She exhibits distension. She exhibits no mass. There is tenderness. There is no rebound and no guarding. No hernia.       Musculoskeletal: She exhibits no edema or tenderness.   Lymphadenopathy:     She has no cervical adenopathy.   Neurological: She is alert and oriented to person, place, and time.   Skin: Skin is dry. She is not diaphoretic. No pallor.   Psychiatric: She has a normal mood and affect.   Nursing note and vitals reviewed.      Laboratory:  Labs within the past month have been reviewed.    Diagnostic Results:  I have personally reviewed all pertinent imaging studies.

## 2018-08-01 NOTE — TELEPHONE ENCOUNTER
Call returned.  Patient states she's feeling very distended and is requesting to have a paracentesis today.  Denies shortness of breath or fever.  Reviewed with JOE Mullins MA; informed first available para is 8/2 @1100.  Patient notified and accepted the appointment.      Advised of appointments needed to complete evaluation.  Patient agreeable to scheduling at first available.     ----- Message from Michelle Lin sent at 8/1/2018  8:25 AM CDT -----  Contact: patient  Patient Requesting Sooner Appointment.     Reason for sooner appt.: patient is scheduled for PARA on 08/02/18 and would like to come in today if possible.   When is the first available appointment? 08/02/18  Communication Preference: [phone]  Additional Information:

## 2018-08-01 NOTE — CONSULTS
"[facility]  Liver Transplant  Consult Note    Patient Name: Annette Esparza  MRN: [id number]  Admission Date: 7/23/2018  Hospital Length of Stay: 6 days  Code Status: Prior  Attending Provider: No att. providers found  Primary Care Provider: Endy Pfeiffer MD    Consults  Subjective:     History of Present Illness:  MELD-Na score: 25 at 7/27/2018  4:48 AM  MELD score: 20 at 7/27/2018  4:48 AM  Calculated from:  Serum Creatinine: 1.2 mg/dL at 7/27/2018  4:47 AM  Serum Sodium: 130 mmol/L at 7/27/2018  4:47 AM  Total Bilirubin: 9.1 mg/dL at 7/27/2018  4:47 AM  INR(ratio): 1.4 at 7/27/2018  4:48 AM  Age: 48 years     Estimated body mass index is 22.49 kg/m² as calculated from the following:    Height as of this encounter: 5' 1" (1.549 m).    Weight as of this encounter: 54 kg (119 lb 0.8 oz).     Ms Esparza is a 48 year old woman with  History of ETOH ESLD decompensations including ascites, HE, HRS, Hypothyroidism, COPD who is being admitted due to abdomianl distension.     She was recently admitted to [facility] on Jimmie 7/15 due to hepatic encephlaopathy from UTI. She reports that she left AMA on 7/19 as she felt they were not doing anything but watching. They had completed a 1.5L para during the hospitalization but still feels bloated. Her last tap prior to this was ~1 yr ago.     She presented to the hospital due to dyspnea in setting of increaseing abdominal distension. She required 2L NC which she borrowed from family member. She reported  that she feels the 'same' with abdominal distension as primary complaint. She noted that she will have intermittent pain up to 8/10 including abdomen, under the ribs anteriorly and back pain. She has been taking norco with minimal relief. She denies any tylenol or NSAIDS.     On review she endorses a cough x several days which is non-productive. Denies any sick contact or travel history. She reports that her last ETOH was 1 year " ago.     She had previously seen Dr Johnson in clinic (2017). Per note she was first diagnosed 2014 and underwent a biopsy in 10/2014. She was noted at that time to be drinking until 2016. MELD was 16. Was not planned for transplant evaluation at that time due to low MELD and desiring formal ETOH rehab and sobriety.       Past Medical History:   Diagnosis Date    Acute hypoxemic respiratory failure 2016    ANDREW (acute kidney injury) 2016    Alcohol dependence in remission     Alcoholic cirrhosis of liver with ascites 10/22/2015    Anxiety     Ascites due to alcoholic cirrhosis 2016    Centrilobular emphysema 2016    Cigarette nicotine dependence without complication 2016    Folate deficiency 10/22/2015    Gallstones     Hepatorenal syndrome 2016    Hyperbilirubinemia 2016    Hyponatremia 2016    Iron deficiency anemia due to chronic blood loss 10/22/2015    Portal hypertensive gastropathy 10/22/2015    Subclinical hypothyroidism 10/22/2015     Past Surgical History:   Procedure Laterality Date    ADENOIDECTOMY      APPENDECTOMY       SECTION      COLONOSCOPY      HYSTERECTOMY      25 yrs old    LIVER BIOPSY      OOPHORECTOMY Left     25 yrs old    OOPHORECTOMY Right     29 yrs old    TONSILLECTOMY      TUBAL LIGATION      UPPER GASTROINTESTINAL ENDOSCOPY         Review of patient's allergies indicates:   Allergen Reactions    Morphine Nausea And Vomiting       Family History     Problem Relation (Age of Onset)    Cancer Mother    No Known Problems Sister, Brother    Rheum arthritis Father        Social History Main Topics    Smoking status: Current Every Day Smoker     Packs/day: 1.00     Years: 35.00     Types: Cigarettes    Smokeless tobacco: Never Used    Alcohol use No      Comment: hx etoh-quit 2015    Drug use: No    Sexual activity: Yes     Partners: Male       Scheduled Meds:  Continuous Infusions:  PRN Meds:    Review  of Systems   Constitutional: Positive for activity change, appetite change and fatigue. Negative for chills and fever.   Respiratory: Negative for cough and shortness of breath.    Cardiovascular: Negative for chest pain and leg swelling.   Gastrointestinal: Positive for abdominal distention and abdominal pain. Negative for constipation, diarrhea and rectal pain.   Genitourinary: Negative for difficulty urinating and dysuria.   Musculoskeletal: Negative.    Skin: Negative for color change and rash.   Neurological: Negative for dizziness and light-headedness.   Psychiatric/Behavioral: Positive for confusion.   All other systems reviewed and are negative.     Objective:     Vital Signs (Most Recent):  Temp: 98.5 °F (36.9 °C) (07/29/18 1202)  Pulse: 86 (07/29/18 1202)  Resp: 16 (07/29/18 1202)  BP: (!) 96/54 (07/29/18 1202)  SpO2: 97 % (07/29/18 1202) Vital Signs (24h Range):        Weight: 54 kg (119 lb 0.8 oz)  Body mass index is 22.49 kg/m².    No intake or output data in the 24 hours ending 07/31/18 7012    Physical Exam   Constitutional: She is oriented to person, place, and time. No distress.   HENT:   Mouth/Throat: No oropharyngeal exudate.   Eyes: Pupils are equal, round, and reactive to light. No scleral icterus.   Neck: Neck supple. No thyromegaly present.   Cardiovascular: Normal heart sounds and intact distal pulses.    No murmur heard.  Pulmonary/Chest: No respiratory distress. She has no wheezes. She has no rales.   Abdominal: Soft. Bowel sounds are normal. She exhibits distension. She exhibits no mass. There is tenderness. There is no rebound and no guarding. No hernia.       Musculoskeletal: She exhibits no edema or tenderness.   Lymphadenopathy:     She has no cervical adenopathy.   Neurological: She is alert and oriented to person, place, and time.   Skin: Skin is dry. She is not diaphoretic. No pallor.   Psychiatric: She has a normal mood and affect.   Nursing note and vitals  reviewed.      Laboratory:  Labs within the past month have been reviewed.    Diagnostic Results:  I have personally reviewed all pertinent imaging studies.    Assessment/Plan:     * Pre-transplant evaluation for liver transplant    48 yrs old with decompensated alcoholic liver cirrhosis, hypoxemic respiratory failure 2/2; hypothyroidism, hyponatremia 2/2 cirrhosis. She has MRI/CT scan suggesting HCC right lobe. She shall need work including bone scan, chest CT to out metastatic disease.     She is a high risk liver transplant candidate due to medical complexity - COPD requiring intermittent O2 support and frailty.              Transplant Candidacy: Patient is a 48 y.o. year old female with alcoholic liver disease being evaluated for possible OLT.  In my opinion, she is a high-risk liver transplant candidate.  She will be presented to selection committee after all tests and evaluations are complete.        UNOS Patient Status  Functional Status: 70% - Cares for self: unable to carry on normal activity or active work  Physical Capacity: Wheelchair bound or more limited    Counseling: I discussed with the patient the benefits of liver transplantation.  We discussed the evaluation and listing procedures.  We discussed the MELD system and the associated waiting times.  We discussed national and center specific survival results.  We discussed the option of being multiply listed in different OPOs.   We discussed the risks, benefits and potential complications related to surgery including the risks related to anesthesia, bleeding, infection, primary non-function of the allograft, the risk of reoperation as well as the risk of death.  We discussed the typical post-operative course, length of hospitalization, the long-term use of immunosuppressive therapy as well as the need for long-term routine follow-up.    PHS: I discussed the use of organs from donors with PHS increased-risk behavior, including the testing protocols  utilized, as well as data from the literature regarding the likelihood of transmission of hepatitis or HIV.  The patient that she is willing to consider such grafts.  DCD: I discussed the use of organs recovered by donation after cardiac death (DCD), including slightly decreased graft survival and greater risk of arterial and biliary complications. The potential advantage to the recipient is possibly receiving a transplant sooner by accepting such an organ. The patient that she is willing to consider such grafts.  HBcAb: I discussed the use of organs from donors with HBcAb in conjunction with long term use of HBV antiviral drugs, such as lamivudine. The small but measurable risk of hepatitis B seroconversion was discussed as well as the potentially life long need to continue antiviral drugs. The patient that she is willing to consider such grafts.  HCV Non-viremic recipient: I discussed the use of HCV-positive organs in naive recipients, including the risk of viral transmission to the patients or others, potential insurance barriers for antiviral medication coverage, risk for fibrosing cholestatic hepatitis, death or graft loss. The potential advantage to the recipient is the possibility of receiving a transplant sooner with decreased mortality risk by accepting such an organ. The patient that she is willing to consider such grafts.    Russell Edward MD  Liver Transplant  [facility]

## 2018-08-01 NOTE — HOSPITAL COURSE
CT AP remarkable for 4cm hepatic lesion c/w HCC. On MRI there is diffuse heterogeneous enhancement throughout the liver with ill-defined patchy arterial hyperenhancement and areas of washout seen throughout the right hepatic lobe and caudate. Right portal has diminutive flow.

## 2018-08-01 NOTE — ASSESSMENT & PLAN NOTE
48 yrs old with decompensated alcoholic liver cirrhosis, hypoxemic respiratory failure 2/2; hypothyroidism, hyponatremia 2/2 cirrhosis. She has MRI/CT scan suggesting HCC right lobe. She shall need work including bone scan, chest CT to out metastatic disease.     She is a high risk liver transplant candidate due to medical complexity - COPD requiring intermittent O2 support and frailty.

## 2018-08-02 ENCOUNTER — HOSPITAL ENCOUNTER (OUTPATIENT)
Dept: INTERVENTIONAL RADIOLOGY/VASCULAR | Facility: HOSPITAL | Age: 48
Discharge: HOME | End: 2018-08-02
Attending: INTERNAL MEDICINE
Payer: MEDICAID

## 2018-08-02 ENCOUNTER — TELEPHONE (OUTPATIENT)
Dept: TRANSPLANT | Facility: CLINIC | Age: 48
End: 2018-08-02

## 2018-08-02 VITALS
DIASTOLIC BLOOD PRESSURE: 62 MMHG | HEART RATE: 64 BPM | OXYGEN SATURATION: 98 % | SYSTOLIC BLOOD PRESSURE: 94 MMHG | RESPIRATION RATE: 16 BRPM

## 2018-08-02 DIAGNOSIS — K70.31 ASCITES DUE TO ALCOHOLIC CIRRHOSIS: ICD-10-CM

## 2018-08-02 PROBLEM — R78.81 E COLI BACTEREMIA: Status: RESOLVED | Noted: 2018-07-26 | Resolved: 2018-08-02

## 2018-08-02 PROBLEM — J96.20 ACUTE ON CHRONIC RESPIRATORY FAILURE: Status: RESOLVED | Noted: 2018-07-24 | Resolved: 2018-08-02

## 2018-08-02 PROBLEM — J18.9 PNEUMONIA: Status: RESOLVED | Noted: 2018-07-24 | Resolved: 2018-08-02

## 2018-08-02 PROBLEM — J43.1 PANLOBULAR EMPHYSEMA: Status: ACTIVE | Noted: 2018-08-02

## 2018-08-02 PROBLEM — I95.9 HYPOTENSION: Status: RESOLVED | Noted: 2017-11-30 | Resolved: 2018-08-02

## 2018-08-02 PROBLEM — B96.20 E COLI BACTEREMIA: Status: RESOLVED | Noted: 2018-07-26 | Resolved: 2018-08-02

## 2018-08-02 LAB
APPEARANCE FLD: CLEAR
BODY FLD TYPE: NORMAL
COLOR FLD: YELLOW
LYMPHOCYTES NFR FLD MANUAL: 44 %
MESOTHL CELL NFR FLD MANUAL: 6 %
MONOS+MACROS NFR FLD MANUAL: 46 %
NEUTROPHILS NFR FLD MANUAL: 4 %
WBC # FLD: 100 /CU MM

## 2018-08-02 PROCEDURE — 87075 CULTR BACTERIA EXCEPT BLOOD: CPT | Mod: NTX

## 2018-08-02 PROCEDURE — 27200940 IR PARACENTESIS WITH IMAGING: Mod: TXP

## 2018-08-02 PROCEDURE — 89051 BODY FLUID CELL COUNT: CPT | Mod: TXP

## 2018-08-02 PROCEDURE — 87070 CULTURE OTHR SPECIMN AEROBIC: CPT | Mod: TXP

## 2018-08-02 PROCEDURE — 49083 ABD PARACENTESIS W/IMAGING: CPT | Mod: NTX,,, | Performed by: FAMILY MEDICINE

## 2018-08-02 NOTE — TELEPHONE ENCOUNTER
Noted    ----- Message from Nilam Helm RN sent at 7/30/2018 11:26 AM CDT -----  Regarding: New patient in evaluation/initiated inpatient/will complete outpatient  Ms Esparza has been seen in the past for Alcoholic cirrhosis. During this recent hospitalization she was noted to have a new lesion, 4.4 cm and an evaluation was opened up. Some of her eval was completed. She still needs ID consult... All serologies were drawn.  Surgeon visit and Dietary consult. All testing was done. Her imaging is on for review at IR conference. She will likely need to be taced. PETH is still pending. She does have a problem with ascites and I have orders in for Caleb.  I have a call into [facility] to set up her first outpatient para.    Jennifer:  I have orders in for labs. Her Meld has been running in Mid 20's.  Can we get labs this week and a follow up with an NP may be Thursday or Friday. If not that soon, then Next week early, please.    Thanks  Vale

## 2018-08-02 NOTE — DISCHARGE INSTRUCTIONS
Please call with any questions or concerns.      Monday thru Friday 8:00 am - 4:30 pm    Interventional Radiology   [phone]    After Hours    Ask for the Radiology Intern on call  [phone]

## 2018-08-02 NOTE — PROGRESS NOTES
Patient to [facility]. Orders, labs, and allergies reviewed. Awaiting consent.

## 2018-08-02 NOTE — H&P
Radiology History & Physical      SUBJECTIVE:     Chief Complaint: abdominal distention    History of Present Illness:  Annette Esparza is a 48 y.o. female who presents for ultrasound guided paracentesis  Past Medical History:   Diagnosis Date    Acute hypoxemic respiratory failure 2016    ANDREW (acute kidney injury) 2016    Alcohol dependence in remission     Alcoholic cirrhosis of liver with ascites 10/22/2015    Anxiety     Ascites due to alcoholic cirrhosis 2016    Centrilobular emphysema 2016    Cigarette nicotine dependence without complication 2016    Folate deficiency 10/22/2015    Gallstones     Hepatorenal syndrome 2016    Hyperbilirubinemia 2016    Hyponatremia 2016    Iron deficiency anemia due to chronic blood loss 10/22/2015    Portal hypertensive gastropathy 10/22/2015    Subclinical hypothyroidism 10/22/2015     Past Surgical History:   Procedure Laterality Date    ADENOIDECTOMY      APPENDECTOMY       SECTION      COLONOSCOPY      HYSTERECTOMY      25 yrs old    LIVER BIOPSY      OOPHORECTOMY Left     25 yrs old    OOPHORECTOMY Right     29 yrs old    TONSILLECTOMY      TUBAL LIGATION      UPPER GASTROINTESTINAL ENDOSCOPY         Home Meds:   Prior to Admission medications    Medication Sig Start Date End Date Taking? Authorizing Provider   ascorbic acid, vitamin C, (VITAMIN C) 500 MG tablet Take 500 mg by mouth once daily.    Historical Provider, MD   b complex vitamins tablet Take 1 tablet by mouth once daily.    Historical Provider, MD   cyanocobalamin (VITAMIN B-12) 1000 MCG tablet Take 1 tablet (1,000 mcg total) by mouth once daily. 18   Lissy Caputo, DO   folic acid (FOLVITE) 1 MG tablet Take 1 tablet (1 mg total) by mouth once daily. 18  Lissy Caputo, DO   furosemide (LASIX) 80 MG tablet Take 1 tablet (80 mg total) by mouth 2 (two) times daily. 18  Justino Edward MD    HYDROcodone-acetaminophen (NORCO)  mg per tablet Take 1 tablet by mouth every 8 (eight) hours as needed for Pain. 8/1/18   Endy Pfeiffer MD   lactulose (CHRONULAC) 20 gram/30 mL Soln Take 45 mLs (30 g total) by mouth 3 (three) times daily. for 10 days 7/29/18 8/8/18  Justino Edward MD   midodrine (PROAMATINE) 10 MG tablet Take 1.5 tablets (15 mg total) by mouth 3 (three) times daily with meals. 7/29/18 7/29/19  Justino Edward MD   multivitamin (THERAGRAN) tablet Take 1 tablet by mouth once daily.    Historical Provider, MD   potassium 99 mg Tab Take 1 tablet by mouth once daily.     Historical Provider, MD   spironolactone (ALDACTONE) 100 MG tablet Take 1 tablet (100 mg total) by mouth once daily. 7/20/18 7/20/19  Lissy Caputo DO   thiamine 100 MG tablet Take 1 tablet (100 mg total) by mouth once daily. 7/20/18   Lissy Caputo DO   vitamin D 1000 units Tab Take 1,000 Units by mouth once daily.    Historical Provider, MD     Anticoagulants/Antiplatelets: no anticoagulation    Allergies:   Review of patient's allergies indicates:   Allergen Reactions    Morphine Nausea And Vomiting     Sedation History:  no adverse reactions    Review of Systems:   Hematological: no known coagulopathies  Respiratory: no shortness of breath  Cardiovascular: no chest pain  Gastrointestinal: no abdominal pain  Genito-Urinary: no dysuria  Musculoskeletal: negative  Neurological: no TIA or stroke symptoms         OBJECTIVE:     Vital Signs (Most Recent)  Pulse: 64 (08/02/18 1126)  Resp: 16 (08/02/18 1126)  BP: (!) 90/54 (08/02/18 1126)  SpO2: 98 % (08/02/18 1126)    Physical Exam:  ASA: 2  Mallampati: n/a    General: no acute distress  Mental Status: alert and oriented to person, place and time  HEENT: normocephalic, atraumatic  Chest: unlabored breathing  Heart: regular heart rate  Abdomen: distended  Extremity: moves all extremities    Laboratory  Lab Results   Component Value Date    INR 1.4 (H) 07/29/2018        Lab Results   Component Value Date    WBC 12.26 07/29/2018    HGB 7.4 (L) 07/29/2018    HCT 21.9 (L) 07/29/2018     (H) 07/29/2018     (L) 07/29/2018      Lab Results   Component Value Date    GLU 98 07/29/2018     (L) 07/29/2018    K 3.5 07/29/2018    CL 92 (L) 07/29/2018    CO2 29 07/29/2018    BUN 11 07/29/2018    CREATININE 1.1 07/29/2018    CALCIUM 8.4 (L) 07/29/2018    MG 1.3 (L) 07/29/2018    ALT 16 07/29/2018    AST 28 07/29/2018    ALBUMIN 1.9 (L) 07/29/2018    BILITOT 7.0 (H) 07/29/2018    BILIDIR 7.4 (H) 07/19/2018       ASSESSMENT/PLAN:     Sedation Plan: local  Patient will undergo ultrasound guided paracentesis.    SHANDRA Jalloh, FNP  Interventional Radiology  [phone] [employer]

## 2018-08-02 NOTE — PROCEDURES
Radiology Post-Procedure Note    Pre Op Diagnosis: Ascites  Post Op Diagnosis: Same    Procedure: Ultrasound Guided Paracentesis    Procedure performed by: Willie VILLASEÑOR, Jennifer     Written Informed Consent Obtained: Yes  Specimen Removed: YES clear yellow  Estimated Blood Loss: Minimal    Findings:   Successful paracentesis.  Albumin administered PRN per protocol.    Patient tolerated procedure well.    Jennifer Tapia, APRN, FNP  Interventional Radiology  [phone] [employer]

## 2018-08-02 NOTE — PROGRESS NOTES
Paracentesis complete,4000 MLs removed. Specimen sent to lab.Dressing applied to RLQ puncture site, dressing clean dry and intact. Pt given discharge instructions and handouts, pt verbalizes understanding. Questions answered. Pt denies pain and discomfort. Pt refusing transport. Pt ambulated to [facility] for transport home with family. Gait steady.

## 2018-08-03 ENCOUNTER — LAB VISIT (OUTPATIENT)
Dept: LAB | Facility: HOSPITAL | Age: 48
End: 2018-08-03
Attending: INTERNAL MEDICINE
Payer: MEDICAID

## 2018-08-03 ENCOUNTER — OFFICE VISIT (OUTPATIENT)
Dept: TRANSPLANT | Facility: CLINIC | Age: 48
End: 2018-08-03
Payer: MEDICAID

## 2018-08-03 VITALS
HEART RATE: 63 BPM | DIASTOLIC BLOOD PRESSURE: 61 MMHG | WEIGHT: 104.06 LBS | SYSTOLIC BLOOD PRESSURE: 96 MMHG | HEIGHT: 61 IN | BODY MASS INDEX: 19.65 KG/M2 | TEMPERATURE: 98 F | RESPIRATION RATE: 16 BRPM | OXYGEN SATURATION: 97 %

## 2018-08-03 DIAGNOSIS — K70.31 ASCITES DUE TO ALCOHOLIC CIRRHOSIS: ICD-10-CM

## 2018-08-03 DIAGNOSIS — E43 SEVERE MALNUTRITION: ICD-10-CM

## 2018-08-03 DIAGNOSIS — I95.9 HYPOTENSION, UNSPECIFIED HYPOTENSION TYPE: ICD-10-CM

## 2018-08-03 DIAGNOSIS — K76.82 HEPATIC ENCEPHALOPATHY: ICD-10-CM

## 2018-08-03 DIAGNOSIS — K70.31 ALCOHOLIC CIRRHOSIS OF LIVER WITH ASCITES: Primary | Chronic | ICD-10-CM

## 2018-08-03 DIAGNOSIS — E87.1 HYPONATREMIA: ICD-10-CM

## 2018-08-03 DIAGNOSIS — C22.0 HCC (HEPATOCELLULAR CARCINOMA): ICD-10-CM

## 2018-08-03 DIAGNOSIS — D63.8 ANEMIA OF CHRONIC DISEASE: ICD-10-CM

## 2018-08-03 DIAGNOSIS — F10.10 ALCOHOL ABUSE: ICD-10-CM

## 2018-08-03 LAB
ALBUMIN SERPL BCP-MCNC: 2.4 G/DL
ALP SERPL-CCNC: 351 U/L
ALT SERPL W/O P-5'-P-CCNC: 21 U/L
ANION GAP SERPL CALC-SCNC: 12 MMOL/L
ANISOCYTOSIS BLD QL SMEAR: ABNORMAL
AST SERPL-CCNC: 39 U/L
BASOPHILS # BLD AUTO: 0.04 K/UL
BASOPHILS NFR BLD: 0.5 %
BILIRUB SERPL-MCNC: 6.2 MG/DL
BUN SERPL-MCNC: 11 MG/DL
CALCIUM SERPL-MCNC: 8.3 MG/DL
CHLORIDE SERPL-SCNC: 95 MMOL/L
CO2 SERPL-SCNC: 24 MMOL/L
CREAT SERPL-MCNC: 1.1 MG/DL
DIFFERENTIAL METHOD: ABNORMAL
EOSINOPHIL # BLD AUTO: 0 K/UL
EOSINOPHIL NFR BLD: 0.4 %
ERYTHROCYTE [DISTWIDTH] IN BLOOD BY AUTOMATED COUNT: 22.7 %
EST. GFR  (AFRICAN AMERICAN): >60 ML/MIN/1.73 M^2
EST. GFR  (NON AFRICAN AMERICAN): 59.1 ML/MIN/1.73 M^2
GLUCOSE SERPL-MCNC: 86 MG/DL
HCT VFR BLD AUTO: 28.1 %
HGB BLD-MCNC: 9.1 G/DL
IMM GRANULOCYTES # BLD AUTO: 0.03 K/UL
IMM GRANULOCYTES NFR BLD AUTO: 0.4 %
INR PPP: 1.4
LYMPHOCYTES # BLD AUTO: 0.8 K/UL
LYMPHOCYTES NFR BLD: 9.4 %
MCH RBC QN AUTO: 35.3 PG
MCHC RBC AUTO-ENTMCNC: 32.4 G/DL
MCV RBC AUTO: 109 FL
MONOCYTES # BLD AUTO: 0.9 K/UL
MONOCYTES NFR BLD: 10 %
NEUTROPHILS # BLD AUTO: 6.8 K/UL
NEUTROPHILS NFR BLD: 79.3 %
NRBC BLD-RTO: 0 /100 WBC
PLATELET # BLD AUTO: 152 K/UL
PLATELET BLD QL SMEAR: ABNORMAL
PMV BLD AUTO: 9.5 FL
POLYCHROMASIA BLD QL SMEAR: ABNORMAL
POTASSIUM SERPL-SCNC: 3.4 MMOL/L
PROT SERPL-MCNC: 6.9 G/DL
PROTHROMBIN TIME: 14 SEC
RBC # BLD AUTO: 2.58 M/UL
SODIUM SERPL-SCNC: 131 MMOL/L
WBC # BLD AUTO: 8.52 K/UL

## 2018-08-03 PROCEDURE — 99214 OFFICE O/P EST MOD 30 MIN: CPT | Mod: S$PBB,TXP,, | Performed by: NURSE PRACTITIONER

## 2018-08-03 PROCEDURE — 36415 COLL VENOUS BLD VENIPUNCTURE: CPT | Mod: TXP

## 2018-08-03 PROCEDURE — 99999 PR PBB SHADOW E&M-EST. PATIENT-LVL III: CPT | Mod: PBBFAC,TXP,, | Performed by: NURSE PRACTITIONER

## 2018-08-03 PROCEDURE — 85025 COMPLETE CBC W/AUTO DIFF WBC: CPT | Mod: TXP

## 2018-08-03 PROCEDURE — 85610 PROTHROMBIN TIME: CPT | Mod: TXP

## 2018-08-03 PROCEDURE — 99213 OFFICE O/P EST LOW 20 MIN: CPT | Mod: PBBFAC,TXP | Performed by: NURSE PRACTITIONER

## 2018-08-03 PROCEDURE — 80053 COMPREHEN METABOLIC PANEL: CPT | Mod: TXP

## 2018-08-03 NOTE — PROGRESS NOTES
"[facility] Established Patient Visit    Reason for Visit: Hospital follow-up     PCP: Endy Pfeiffer    HPI:  This is a 48 y.o. female here for hospital follow-up. Ms. Esparza has cirrhosis secondary to alcohol. She has been followed by Dr. Johnson in hepatology, last seen in clinic 1/2017. Her liver disease has been complicated by fluid retention (mainly ascites) hepatic encephalopathy, anemia, hyponatremia, and hypotension. No history of variceal bleeding.     She was recently admitted to [facility] (7/23-7/29) with shortness of breath/acute respiratory failure and worsening abdominal distention.   LVP completed 7/24 with 3.9L removed, negative for SBP. Of note, had recent admission to [facility] prior to this with acute encephalopathy and UTI sepsis (IV antibiotics given, paracentesis completed, and patient left AMA).    While inpatient, TPCT (7/25) showed new 4.4 cm mass, concerning for HCC. Follow-up MRI abd w/wo contrast again noted "right hepatic lobe and caudate mass with diminutive caliber of the right portal vein and branches most concerning for infiltrative HCC." Liver transplant initiated inpatient though not complete.     Imaging reviewed at IR conference (7/31):  --Ascites.  Washout on venous; arterially enhancing (CT).  MR to r/o infiltrative process--whole rt lobe looks abnormal.  No definite vascular invasion at this time; but concern that whole rt lobe may be involved.  Plan: TJLBX--rt lobe (dx hcc)       Interval history:  She is here today with her daughter. Had paracentesis yesterday with 4L removed (negative for SBP), requiring weekly right now. No lower extremity swelling. Current diuretics: lasix 80 mg BID and spironolactone 100 mg daily. Trying to follow strict low Na diet and 1.5 L fluid restriction. Taking lactulose TID though not having multiple BMs daily. Denies any s/s HE. No upper or lower GI bleeding.         She reports abstinence for > 1 year though PETH positive (58) " from 18. She has not completed any formal rehab. She has stopped smoking. Overall, very anxious about the diagnosis of liver cancer and eager to move forward with required testing/recommended treatment plan.        Cirrhosis Health Maintenance:  -- HCC screening: as above  -- Variceal screening: EGD 2016 with no varices; PHG present  -- Hepatitis A & B vaccination: vaccines complete        ROS:   GENERAL: Denies fever, chills. (+) weight fluctuations with fluid retention/removal. (+) fatigue  HEENT: Denies headaches, dizziness, vision/hearing changes  CARDIOVASCULAR: Denies chest pain, palpitations, or edema  RESPIRATORY: Denies dyspnea, cough  GI: Denies abdominal pain, rectal bleeding, nausea, vomiting. No change in bowel pattern or color  : Denies dysuria, hematuria   SKIN: Denies rash, itching   NEURO: Denies confusion, memory loss, or mood changes  PSYCH: (+) depression or anxiety  HEME/LYMPH: (+) easy bruising or bleeding  MSK: Denies joint pain or myopathy.       PMHX:  has a past medical history of Acute hypoxemic respiratory failure (2016); ANDREW (acute kidney injury) (2016); Alcohol dependence in remission; Alcoholic cirrhosis of liver with ascites (10/22/2015); Anxiety; Ascites due to alcoholic cirrhosis (2016); Centrilobular emphysema (2016); Cigarette nicotine dependence without complication (2016); Folate deficiency (10/22/2015); Gallstones; Hepatorenal syndrome (2016); Hyperbilirubinemia (2016); Hyponatremia (2016); Iron deficiency anemia due to chronic blood loss (10/22/2015); Portal hypertensive gastropathy (10/22/2015); and Subclinical hypothyroidism (10/22/2015).    PSHX:  has a past surgical history that includes  section; Tonsillectomy; Adenoidectomy; Appendectomy; Upper gastrointestinal endoscopy; Liver biopsy; Tubal ligation; Colonoscopy; Hysterectomy; Oophorectomy (Left); and Oophorectomy (Right).    The patient's social and family  "histories were reviewed by me and updated in the appropriate section of the electronic medical record.    Review of patient's allergies indicates:   Allergen Reactions    Morphine Nausea And Vomiting     Current Outpatient Prescriptions on File Prior to Visit   Medication Sig Dispense Refill    ascorbic acid, vitamin C, (VITAMIN C) 500 MG tablet Take 500 mg by mouth once daily.      b complex vitamins tablet Take 1 tablet by mouth once daily.      cyanocobalamin (VITAMIN B-12) 1000 MCG tablet Take 1 tablet (1,000 mcg total) by mouth once daily.      folic acid (FOLVITE) 1 MG tablet Take 1 tablet (1 mg total) by mouth once daily. 30 tablet 11    furosemide (LASIX) 80 MG tablet Take 1 tablet (80 mg total) by mouth 2 (two) times daily. 60 tablet 11    HYDROcodone-acetaminophen (NORCO)  mg per tablet Take 1 tablet by mouth every 8 (eight) hours as needed for Pain. 90 tablet 0    lactulose (CHRONULAC) 20 gram/30 mL Soln Take 45 mLs (30 g total) by mouth 3 (three) times daily. for 10 days 1350 mL 6    midodrine (PROAMATINE) 10 MG tablet Take 1.5 tablets (15 mg total) by mouth 3 (three) times daily with meals. 90 tablet 3    multivitamin (THERAGRAN) tablet Take 1 tablet by mouth once daily.      potassium 99 mg Tab Take 1 tablet by mouth once daily.       spironolactone (ALDACTONE) 100 MG tablet Take 1 tablet (100 mg total) by mouth once daily. 30 tablet 0    vitamin D 1000 units Tab Take 1,000 Units by mouth once daily.      thiamine 100 MG tablet Take 1 tablet (100 mg total) by mouth once daily.       No current facility-administered medications on file prior to visit.        Objective Findings:    PHYSICAL EXAM:   Friendly White female, in no acute distress; alert and oriented to person, place and time. Thin, muscle wasting noted.  VITALS: BP 96/61 (BP Location: Right arm, Patient Position: Sitting, BP Method: Pediatric (Automatic))   Pulse 63   Temp 98 °F (36.7 °C) (Oral)   Resp 16   Ht 5' 1" " (1.549 m)   Wt 47.2 kg (104 lb 0.9 oz)   LMP  (LMP Unknown)   SpO2 97%   BMI 19.66 kg/m²   HENT: Normocephalic, without obvious abnormality. Oral mucosa pink and moist.   EYES: (+) scleral icterus  NECK: Supple. No masses or cervical adenopathy.  CARDIOVASCULAR: Regular rate and rhythm. No murmurs.  RESPIRATORY: Normal respiratory effort. BBS CTA. No wheezes or crackles.  GI: Soft, non-tender. Minimal distention, no significant ascites. No palpable hepatosplenomegaly. No masses palpable.  EXTREMITIES:  No clubbing, cyanosis or edema.  SKIN: Warm and dry. (+) jaundice. No rashes noted to exposed skin. (+) telangectasias to chest. No palmar erythema.  NEURO:  Normal gait. No asterixis.  PSYCH:  Memory intact. Thought and speech pattern appropriate. Behavior normal. (+) depression or anxiety noted.      Labs:  Lab Results   Component Value Date    WBC 8.52 08/03/2018    HGB 9.1 (L) 08/03/2018    HCT 28.1 (L) 08/03/2018     08/03/2018    CHOL 150 12/19/2017    TRIG 99 12/19/2017    HDL 54 12/19/2017     (L) 08/03/2018    K 3.4 (L) 08/03/2018    CREATININE 1.1 08/03/2018    ALT 21 08/03/2018    AST 39 08/03/2018    ALKPHOS 351 (H) 08/03/2018    BILITOT 6.2 (H) 08/03/2018    ALBUMIN 2.4 (L) 08/03/2018    INR 1.4 (H) 08/03/2018    AFP 3.5 07/26/2018       Diagnostic Studies  EGD - 6/3/16  Findings:       There is no endoscopic evidence of varices in the entire esophagus.       LA Grade B (one or more mucosal breaks greater than 5 mm, not        extending between the tops of two mucosal folds) esophagitis was        found in the lower third of the esophagus.       Severe portal hypertensive gastropathy was found in the gastric        fundus and in the gastric body.       The examined duodenum was normal.  Impression:   - LA Grade B esophagitis.                        - Portal hypertensive gastropathy.                        - Normal examined duodenum.                        - No specimens collected.      CT  abd pelvis w/ contrast - 7/25/18  FINDINGS:  Bilateral subsegmental patchy opacities again noted notably in the posterior basal segment of the left lower lobe with associated volume loss suggestive of subsegmental atelectasis, minimal associated airspace disease not excluded.  No pleural effusion.  Heart is not enlarged.  No pericardial effusion.    Liver is enlarged with nodular contour suggestive of liver cirrhosis.  An ill-defined heterogeneous enhancing lesion in the right hepatic lobe noted roughly measuring 4.4 x 4.1 cm (series 2, image 20) showing some washout on delayed phase imaging concerning for HCC.  Multiphasic MR examination is suggested for further evaluation.  Otherwise diffusely heterogeneous liver parenchyma.  Gallbladder shows gallstones.  No intra or extrahepatic biliary ductal dilatation.  Spleen is mildly enlarged.  Splenorenal shunt noted.    Small hiatal hernia.  Stomach, pancreas, and adrenal glands are within normal limits.    Kidneys are normal in size and location.  No renal mass or hydronephrosis.  Ureters are difficult to trace to the urinary bladder.  Urinary bladder is within normal limits.  Uterus is surgically removed.    Visualized loops of small and large bowel show no evidence of inflammation or obstruction.  Continue mild heterogeneous enhancement can be associated with nephritis, less conspicuous compared to the prior CT with contrast.  Thickening of the right ascending colonic wall likely related to portal colopathy.    Moderate ascites noted in the abdomen and pelvis.  No free intraperitoneal air.  No lymphadenopathy in the abdomen and pelvis.    Abdominal aorta is normal in course and caliber without significant atherosclerotic calcifications.    Osseous structures show no significant abnormalities.  Extraperitoneal soft tissues are within normal limits.      Impression       Ill-defined, heterogeneously enhancing right hepatic lobe lesion measuring up to 4.4 cm with mild  washout on delayed phase imaging, overall more conspicuous than prior exam and is concerning for HCC.  Multiphasic MR evaluation is advised.    Sequela of liver cirrhosis and portal hypertension including; ascites, mild splenomegaly, splenorenal shunt, and portal colopathy.    Bilateral patchy subsegmental opacities within the lower lobes, more in the left and may be seen in the settings of atelectasis, superimposed and airspace disease not excluded, not significantly changed from recent exam.    Cholelithiasis.    Additional findings as detailed above.       MRI abd w/wo contrast - 7/26/18  FINDINGS:  Pulmonary Bases: There is bibasilar atelectasis with small bilateral effusions.    Liver: normal.    The liver is grossly cirrhotic There is diffuse heterogeneous enhancement throughout the liver with ill-defined patchy arterial hyperenhancement and areas of washout seen throughout the right hepatic lobe and caudate (for example series 12 and 16 , image 19, 32 and series 16).  Subtle areas of faint arterial hyperenhancement are noted within the left lobe (for example series 12, image 49) without definite washout, nonspecific.  There is diminutive caliber of the right portal vein and branches.  The main portal vein and intrahepatic veins are patent.    Bile Ducts: normal    Gallbladder: Absent    Pancreas: normal.    Spleen: Enlarged with prominent splenic varices..    Proximal Gastrointestinal tract: Small perigastric varices, otherwise normal.    Mesentery: normal    Adrenal Glands: normal.    Kidneys: normal.    Aorta: Patent    Lymph nodes: no pathologically enlarged lymph nodes are seen.    Body wall: normal    Other: Large volume intra-abdominal ascites.  No marrow enhancing lesion.        Impression       Ill-defined right hepatic lobe and caudate mass with diminutive caliber of the right portal vein and branches most concerning for infiltrative HCC.  Additional mildly enhancing left lobe foci without definitive  washout are nonspecific.  Continued attention at follow-up recommended.    Cirrhosis with sequela of portal hypertension to include large intra-abdominal ascites, splenomegaly, and portosystemic collaterals. Small bilateral pleural effusions.         ASSESSMENT:  48 y.o. White female with:  1. Decompensated alcoholic cirrhosis  MELD-Na score: 22 at 8/3/2018 11:38 AM  MELD score: 18 at 8/3/2018 11:38 AM  Calculated from:  Serum Creatinine: 1.1 mg/dL at 8/3/2018 11:28 AM  Serum Sodium: 131 mmol/L at 8/3/2018 11:28 AM  Total Bilirubin: 6.2 mg/dL at 8/3/2018 11:28 AM  INR(ratio): 1.4 at 8/3/2018 11:38 AM  Age: 48 years  -- HCC screening: see below  -- Variceal screening: EGD 6/2016 with no varices; PHG present  -- Hepatitis A & B vaccination: vaccines complete      2. Ascites  -- Requiring weekly LVP  -- Current diuretics: lasix 80 mg BID and spironolactone 100 mg daily  -- No h/o SBP    3. Hepatocellular carcinoma  -- Concern for extensive R lobe disease based on imaging / IR review    4. Alcohol abuse  -- Reports abstinence though has positive PETH from 7/24/18; has not completed formal rehab    5. Hepatic encephalopathy  -- Appears well controlled on lactulose    6. Hyponatremia  -- On 1.5L fluid restriction  -- Na 131 today    7. Anemia  -- H/H stable at 9.1/28.1 (s/p PRBC transfusion inpatient)    8. Hypotension  -- BP stable on midodrine 15 mg TID      9. Malnutrition  -- Drinking protein shakes, boost/ensure   -- Albumin 2.4      EDUCATION / DISCUSSION:   The disease process and manifestations of cirrhosis were discussed. Signs and symptoms of hepatic decompensation were reviewed, including jaundice, ascites, and slowed mentation due to hepatic encephalopathy. The patient should seek medical attention if any of these things occur.  We discussed the potential for bleeding from esophageal varices with symptoms of hematemesis and melena. The patient should report to the Emergency Department for these  symptoms.    Cirrhosis Counseling  -- Strict abstinence from alcohol  -- Avoid non-steroidal anti-inflammatory drugs (NSAIDs) such as ibuprofen (Motrin, Advil), naproxen (Naprosyn, Aleve)  -- Tylenol/acetaminophen as needed for pain, no more than 2000 mg per day  -- No raw seafood due to the risk of infection  -- Low sodium diet, less than 2000 mg per day   -- Daily protein intake of 1.2-1.5 gram protein / kg body weight per day to prevent muscle mass loss  -- Upper endoscopy (frequency based on findings) to screen for varices (enlarged blood vessels) in the stomach and esophagus which can burst and cause fatal bleeding  -- Compliance with lactulose for hepatic encephalopathy (confusion due to high ammonia level)       PLAN:  *Discussed with transplant coordinator  1. Schedule liver biopsy for HCC (per IR conference reccs)  2. Will need to see SW regarding positive PETH and likely referral to Addiction Psych. Continue serial PETH monitoring. Abstinence reinforced.   3. Continue weekly paracentesis PRN. Has standing orders, provided with number for IR scheduling  4. EGD for varices screening  5. Continue diuretics at current doses  6. Continue lactulose TID. Goal of 3-4 BMs per day. Instructed to increase dosing for any s/s HE.   7. Continue midodrine  8. 1.5 L fluid restriction  9. Return to clinic in 2 weeks with repeat labs.            Thank you for allowing me to participate in the care of Annette Stubbs, FNP-C  Hepatology and Liver Transplant

## 2018-08-03 NOTE — Clinical Note
1. Schedule liver biopsy 2. Needs to be seen by SW regarding positive PETH (7/24) 3. Will need EGD for varices screening 4. RTC in 2 weeks

## 2018-08-04 PROBLEM — F10.21 ALCOHOL DEPENDENCE IN REMISSION: Status: RESOLVED | Noted: 2018-07-29 | Resolved: 2018-01-01

## 2018-08-06 NOTE — TELEPHONE ENCOUNTER
Patient called to give information about completing required appointments for evaluation.  Patient instructed that she needs an appointment for outpatient rehab and EGD? Colonoscopy.  Patient given information to make appointments.

## 2018-08-08 NOTE — PROGRESS NOTES
Liver biopsy scheduled 8/15.  Sobriety addressed by SW and Psychiatry during recent admission with recommendation for IOP  Order entered for EGD; message forwarded to endoscopy  requesting procedure be scheduled as soon as possible.  Orders entered and appointment card completed for clinic follow-up with labs in 2 weeks

## 2018-08-08 NOTE — TELEPHONE ENCOUNTER
Patient called and informed of the increase in medication.  Spironolactone increased to 200 mg.   Patient stated that she understood the information provided.  Patient also reports that she has scheduled her EDG and Colonoscopy on 8/14/18.

## 2018-08-08 NOTE — TELEPHONE ENCOUNTER
Patient called about appointments and medication no answer at this time.  Message left for the patient to call back.  Will try again.

## 2018-08-09 NOTE — H&P
Radiology History & Physical      SUBJECTIVE:     Chief Complaint: recurrent ascites    History of Present Illness:  Annette Esparza is a 48 y.o. female who presents for US guided paracentesis.  Past Medical History:   Diagnosis Date    Acute hypoxemic respiratory failure 2016    ANDREW (acute kidney injury) 2016    Alcohol dependence in remission     Alcoholic cirrhosis of liver with ascites 10/22/2015    Anxiety     Ascites due to alcoholic cirrhosis 2016    Centrilobular emphysema 2016    Cigarette nicotine dependence without complication 2016    Folate deficiency 10/22/2015    Gallstones     Hepatorenal syndrome 2016    Hyperbilirubinemia 2016    Hyponatremia 2016    Iron deficiency anemia due to chronic blood loss 10/22/2015    Portal hypertensive gastropathy 10/22/2015    Subclinical hypothyroidism 10/22/2015     Past Surgical History:   Procedure Laterality Date    ADENOIDECTOMY      APPENDECTOMY       SECTION      COLONOSCOPY      HYSTERECTOMY      25 yrs old    LIVER BIOPSY      OOPHORECTOMY Left     25 yrs old    OOPHORECTOMY Right     29 yrs old    TONSILLECTOMY      TUBAL LIGATION      UPPER GASTROINTESTINAL ENDOSCOPY         Home Meds:   Prior to Admission medications    Medication Sig Start Date End Date Taking? Authorizing Provider   ascorbic acid, vitamin C, (VITAMIN C) 500 MG tablet Take 500 mg by mouth once daily.    Historical Provider, MD   b complex vitamins tablet Take 1 tablet by mouth once daily.    Historical Provider, MD   cyanocobalamin (VITAMIN B-12) 1000 MCG tablet Take 1 tablet (1,000 mcg total) by mouth once daily. 18   Lissy Caputo,    folic acid (FOLVITE) 1 MG tablet Take 1 tablet (1 mg total) by mouth once daily. 18  Lissy Caputo, DO   furosemide (LASIX) 80 MG tablet Take 1 tablet (80 mg total) by mouth 2 (two) times daily. 18  Justino Edward MD    HYDROcodone-acetaminophen (NORCO)  mg per tablet Take 1 tablet by mouth every 8 (eight) hours as needed for Pain. 8/1/18   Endy Pfeiffer MD   lactulose (CHRONULAC) 20 gram/30 mL Soln Take 45 mLs (30 g total) by mouth 3 (three) times daily. for 10 days 7/29/18 8/8/18  Justino Edward MD   midodrine (PROAMATINE) 10 MG tablet Take 1.5 tablets (15 mg total) by mouth 3 (three) times daily with meals. 7/29/18 7/29/19  Justino Edward MD   multivitamin (THERAGRAN) tablet Take 1 tablet by mouth once daily.    Historical Provider, MD   polyethylene glycol (GOLYTELY,NULYTELY) 236-22.74-6.74 -5.86 gram suspension Take 4,000 mLs (4 L total) by mouth once. for 1 dose 8/8/18 8/8/18  Jesus Whalen MD   potassium 99 mg Tab Take 1 tablet by mouth once daily.     Historical Provider, MD   spironolactone (ALDACTONE) 100 MG tablet Take 1 tablet (100 mg total) by mouth once daily.  Patient taking differently: Take 200 mg by mouth once daily.  7/20/18 7/20/19  Lissy Caputo DO   thiamine 100 MG tablet Take 1 tablet (100 mg total) by mouth once daily. 7/20/18   Lissy Caputo, DO   vitamin D 1000 units Tab Take 1,000 Units by mouth once daily.    Historical Provider, MD     Anticoagulants/Antiplatelets: no anticoagulation    Allergies:   Review of patient's allergies indicates:   Allergen Reactions    Dilaudid [hydromorphone] Hallucinations    Morphine Nausea And Vomiting     Sedation History:  no adverse reactions    Review of Systems:   Hematological: no known coagulopathies  Respiratory: no shortness of breath  Cardiovascular: no chest pain  Gastrointestinal: no abdominal pain  Genito-Urinary: no dysuria  Musculoskeletal: negative  Neurological: no TIA or stroke symptoms         OBJECTIVE:     Vital Signs (Most Recent)  Pulse: 71 (08/09/18 0850)  Resp: 16 (08/09/18 0850)  BP: 105/66 (08/09/18 0850)  SpO2: 100 % (08/09/18 0850)    Physical Exam:  ASA: 2  Mallampati: 2    General: no acute distress  Mental  Status: alert and oriented to person, place and time  HEENT: normocephalic, atraumatic  Chest: unlabored breathing  Heart: regular heart rate  Abdomen: mildly distended  Extremity: moves all extremities    Laboratory  Lab Results   Component Value Date    INR 1.4 (H) 08/03/2018       Lab Results   Component Value Date    WBC 8.52 08/03/2018    HGB 9.1 (L) 08/03/2018    HCT 28.1 (L) 08/03/2018     (H) 08/03/2018     08/03/2018      Lab Results   Component Value Date    GLU 86 08/03/2018     (L) 08/03/2018    K 3.4 (L) 08/03/2018    CL 95 08/03/2018    CO2 24 08/03/2018    BUN 11 08/03/2018    CREATININE 1.1 08/03/2018    CALCIUM 8.3 (L) 08/03/2018    MG 1.3 (L) 07/29/2018    ALT 21 08/03/2018    AST 39 08/03/2018    ALBUMIN 2.4 (L) 08/03/2018    BILITOT 6.2 (H) 08/03/2018    BILIDIR 7.4 (H) 07/19/2018       ASSESSMENT/PLAN:     Preprocedural US demonstrated insufficient fluid for safe paracentesis. Procedure not performed.    Sawyer Frederick MD  Resident  Department of Radiology  Pager: [phone]

## 2018-08-13 NOTE — TELEPHONE ENCOUNTER
----- Message from Kierra Rodrigez sent at 8/13/2018  8:20 AM CDT -----  Pt having colonoscopy tomorrow and needs Ryanne to email her the instructions/she misplaced them    Email: [email or web address]    Pt contact [phone]

## 2018-08-13 NOTE — TELEPHONE ENCOUNTER
Call placed to Endo to request a copy of the instructions to give to the patient but they requested that the patient call in order to see how far the patient had gotten with the prep.  Patient called and given the number to call so the prep can be reviewed.

## 2018-08-14 PROBLEM — K74.60 CIRRHOSIS: Status: ACTIVE | Noted: 2018-01-01

## 2018-08-14 NOTE — ANESTHESIA POSTPROCEDURE EVALUATION
"Anesthesia Post Evaluation    Patient: Annette Esparza    Procedure(s) Performed: Procedure(s) (LRB):  ESOPHAGOGASTRODUODENOSCOPY (EGD) (N/A)  COLONOSCOPY (N/A)    Final Anesthesia Type: general  Patient location during evaluation: PACU  Patient participation: Yes- Able to Participate  Level of consciousness: awake and alert and oriented  Post-procedure vital signs: reviewed and stable  Pain management: adequate  Airway patency: patent  PONV status at discharge: No PONV  Anesthetic complications: no      Cardiovascular status: stable  Respiratory status: unassisted  Hydration status: euvolemic  Follow-up not needed.        Visit Vitals  BP 97/64 (BP Location: Left arm, Patient Position: Sitting)   Pulse 71   Temp 36.2 °C (97.2 °F) (Oral)   Resp 18   Ht 5' 1" (1.549 m)   Wt 47.6 kg (105 lb)   LMP  (LMP Unknown)   SpO2 100%   Breastfeeding? No   BMI 19.84 kg/m²       Pain/Trent Score: Pain Assessment Performed: Yes (8/14/2018  3:21 PM)  Presence of Pain: denies (8/14/2018  3:21 PM)  Pain Rating Prior to Med Admin: 6 (8/14/2018  3:21 PM)  Trent Score: 10 (8/14/2018  3:21 PM)        "

## 2018-08-14 NOTE — TRANSFER OF CARE
"Anesthesia Transfer of Care Note    Patient: Annette Esparza    Procedure(s) Performed: Procedure(s) (LRB):  ESOPHAGOGASTRODUODENOSCOPY (EGD) (N/A)  COLONOSCOPY (N/A)    Patient location: [facility]    Anesthesia Type: general    Transport from OR: Transported from OR on room air with adequate spontaneous ventilation    Post pain: adequate analgesia    Post assessment: no apparent anesthetic complications and tolerated procedure well    Post vital signs: stable    Level of consciousness: awake    Nausea/Vomiting: no nausea/vomiting    Complications: none    Transfer of care protocol was followed      Last vitals:   Visit Vitals  BP (!) 90/59 (BP Location: Left arm, Patient Position: Lying)   Pulse 84   Temp 36.4 °C (97.5 °F) (Temporal)   Resp 18   Ht 5' 1" (1.549 m)   Wt 47.6 kg (105 lb)   LMP  (LMP Unknown)   SpO2 100%   Breastfeeding? No   BMI 19.84 kg/m²     "

## 2018-08-14 NOTE — ANESTHESIA PREPROCEDURE EVALUATION
08/14/2018  Annette Esparza is a 48 y.o., female.    Anesthesia Evaluation         Review of Systems  Anesthesia Hx:  No problems with previous Anesthesia   Social:  Smoker, Alcohol Use    Cardiovascular:   Exercise tolerance: good Denies Hypertension.  Denies CAD.     Denies Angina.  Functional Capacity Can you climb two flights of stairs? ==> Yes    Pulmonary:   COPD Denies Asthma.  Denies Recent URI.  Denies Sleep Apnea.    Renal/:   Chronic Renal Disease    Hepatic/GI:   Denies PUD. Denies Hiatal Hernia.  Denies GERD. Liver Disease,  Denies Hepatitis.    Neurological:   Denies CVA. Denies Seizures.    Endocrine:   Denies Diabetes. Denies Hypothyroidism.        Physical Exam  General:  Well nourished    Airway/Jaw/Neck:  Airway Findings: Mouth Opening: Normal Tongue: Normal  General Airway Assessment: Adult  Mallampati: I  TM Distance: Normal, at least 6 cm  Jaw/Neck Findings:  Neck ROM: Normal ROM  Neck Findings:     Eyes/Ears/Nose:  EYES/EARS/NOSE FINDINGS: Normal   Dental:  Dental Findings: In tact   Chest/Lungs:  Chest/Lungs Findings: Clear to auscultation     Heart/Vascular:  Heart Findings: Rate: Normal  Rhythm: Regular Rhythm  Sounds: Normal        Mental Status:  Mental Status Findings:  Alert and Oriented         Anesthesia Plan  Type of Anesthesia, risks & benefits discussed:  Anesthesia Type:  general  Patient's Preference: Proceed with anesthesia understanding that the risks are very small but could be serious or life threatening.  Intra-op Monitoring Plan: standard ASA monitors  Intra-op Monitoring Plan Comments:   Post Op Pain Control Plan:   Post Op Pain Control Plan Comments:   Induction:   IV  Beta Blocker:  Patient is not currently on a Beta-Blocker (No further documentation required).       Informed Consent: Patient understands risks and agrees with Anesthesia plan.  Questions  answered. Anesthesia consent signed with patient.  ASA Score: 3     Day of Surgery Review of History & Physical: I have interviewed and examined the patient. I have reviewed the patient's H&P dated:            Ready For Surgery From Anesthesia Perspective.

## 2018-08-15 NOTE — PROGRESS NOTES
Pt tolerated transjugular liver biopsy well. Dressing to right side of neck clean dry and intact. Will transfer to [facility] for further observation.

## 2018-08-15 NOTE — H&P
Radiology History & Physical      SUBJECTIVE:     Chief Complaint: concern for infiltrative HCC    History of Present Illness:  Annette Esparza is a 48 y.o. female who presents for transjugular liver biopsy.  Past Medical History:   Diagnosis Date    Acute hypoxemic respiratory failure 2016    ANDREW (acute kidney injury) 2016    Alcohol dependence in remission     Alcoholic cirrhosis of liver with ascites 10/22/2015    Anxiety     Ascites due to alcoholic cirrhosis 2016    Centrilobular emphysema 2016    Cigarette nicotine dependence without complication 2016    Folate deficiency 10/22/2015    Gallstones     Hepatorenal syndrome 2016    Hyperbilirubinemia 2016    Hyponatremia 2016    Iron deficiency anemia due to chronic blood loss 10/22/2015    Portal hypertensive gastropathy 10/22/2015    Subclinical hypothyroidism 10/22/2015     Past Surgical History:   Procedure Laterality Date    ADENOIDECTOMY      APPENDECTOMY       SECTION      COLONOSCOPY      HYSTERECTOMY      25 yrs old    LIVER BIOPSY      OOPHORECTOMY Left     25 yrs old    OOPHORECTOMY Right     29 yrs old    TONSILLECTOMY      TUBAL LIGATION      UPPER GASTROINTESTINAL ENDOSCOPY         Home Meds:   Prior to Admission medications    Medication Sig Start Date End Date Taking? Authorizing Provider   ascorbic acid, vitamin C, (VITAMIN C) 500 MG tablet Take 500 mg by mouth once daily.   Yes Historical Provider, MD   b complex vitamins tablet Take 1 tablet by mouth once daily.   Yes Historical Provider, MD   cyanocobalamin (VITAMIN B-12) 1000 MCG tablet Take 1 tablet (1,000 mcg total) by mouth once daily. 18  Yes Lissy Caputo, DO   folic acid (FOLVITE) 1 MG tablet Take 1 tablet (1 mg total) by mouth once daily. 18 Yes Lissy Caputo, DO   furosemide (LASIX) 80 MG tablet Take 1 tablet (80 mg total) by mouth 2 (two) times daily. 18 Yes  Justino Edward MD   HYDROcodone-acetaminophen (NORCO)  mg per tablet Take 1 tablet by mouth every 8 (eight) hours as needed for Pain. 8/1/18  Yes Endy Pfeiffer MD   midodrine (PROAMATINE) 10 MG tablet Take 1.5 tablets (15 mg total) by mouth 3 (three) times daily with meals. 7/29/18 7/29/19 Yes Justino Edward MD   multivitamin (THERAGRAN) tablet Take 1 tablet by mouth once daily.   Yes Historical Provider, MD   potassium 99 mg Tab Take 1 tablet by mouth once daily.    Yes Historical Provider, MD   spironolactone (ALDACTONE) 100 MG tablet Take 1 tablet (100 mg total) by mouth once daily.  Patient taking differently: Take 200 mg by mouth once daily.  7/20/18 7/20/19 Yes Lissy Caputo,    vitamin D 1000 units Tab Take 1,000 Units by mouth once daily.   Yes Historical Provider, MD     Anticoagulants/Antiplatelets: no anticoagulation    Allergies:   Review of patient's allergies indicates:   Allergen Reactions    Dilaudid [hydromorphone] Hallucinations    Morphine Nausea And Vomiting     Sedation History:  no adverse reactions    Review of Systems:   Hematological: no known coagulopathies  Respiratory: no shortness of breath  Cardiovascular: no chest pain  Gastrointestinal: no abdominal pain  Genito-Urinary: no dysuria  Musculoskeletal: negative  Neurological: no TIA or stroke symptoms         OBJECTIVE:     Vital Signs (Most Recent)  Temp: 98.2 °F (36.8 °C) (08/15/18 1225)  Pulse: (!) 54 (08/15/18 1225)  Resp: 16 (08/15/18 1225)  BP: (!) 100/58 (08/15/18 1225)  SpO2: 97 % (08/15/18 1225)    Physical Exam:  ASA: 2  Mallampati: 3    General: no acute distress  Mental Status: alert and oriented to person, place and time  HEENT: normocephalic, atraumatic  Chest: unlabored breathing  Heart: regular heart rate  Abdomen: mildly distended  Extremity: moves all extremities    Laboratory  Lab Results   Component Value Date    INR 1.3 (H) 08/14/2018       Lab Results   Component Value Date    WBC 5.06 08/14/2018     HGB 9.6 (L) 08/14/2018    HCT 29.0 (L) 08/14/2018     (H) 08/14/2018     08/14/2018      Lab Results   Component Value Date     08/14/2018     (L) 08/14/2018    K 3.5 08/14/2018    CL 98 08/14/2018    CO2 23 08/14/2018    BUN 9 08/14/2018    CREATININE 0.9 08/14/2018    CALCIUM 8.9 08/14/2018    MG 1.3 (L) 07/29/2018    ALT 26 08/14/2018    AST 31 08/14/2018    ALBUMIN 2.7 (L) 08/14/2018    BILITOT 3.9 (H) 08/14/2018    BILIDIR 7.4 (H) 07/19/2018       ASSESSMENT/PLAN:     Sedation Plan: moderate  Patient will undergo transjugular biopsy.    Sawyer Frederick MD  Resident  Department of Radiology  Pager: [phone]

## 2018-08-16 NOTE — PROCEDURES
Radiology Post Procedure Note:     Procedure: Transjugular liver biopsy     (s): Magaly    Blood Loss: Minimal    Specimen: 4 x 18 G core specimens    Findings:   Patient came to IR and under imaging guidance had the right hepatic vein accessed and multiple core liver biopsy samples obtained.     Pressures obtained and in imaging report.     ILevi M.D. personally reviewed and agree with the above dictated note.

## 2018-08-16 NOTE — TELEPHONE ENCOUNTER
"    Reason for Disposition   Health Information question, no triage required and triager able to answer question    Answer Assessment - Initial Assessment Questions  1. REASON FOR CALL or QUESTION: "What is your reason for calling today?" or "How can I best help you?" or "What question do you have that I can help answer?"      Pt reported she had a procedure today where they went in through her neck to check her liver. She was instructed to keep bandage on x 24 hrs but if she had bleeding to return to ER. She just woke-has some dry blood on her clothing but has continued blood seeping through bandage. Wants advice.    Protocols used: ST INFORMATION ONLY CALL-A-AH    Advised if she was instructed to return to ER for bleeding and she is currently having bleeding then would recommend going to ER  "

## 2018-08-17 PROBLEM — K74.60 CIRRHOSIS: Status: RESOLVED | Noted: 2018-01-01 | Resolved: 2018-01-01

## 2018-08-17 NOTE — PROGRESS NOTES
"[facility] Established Patient Visit    Reason for Visit: Follow-up     PCP: Endy Pfeiffer    HPI:  This is a 48 y.o. female here for follow-up. Ms. Esparza has cirrhosis secondary to alcohol. She has been followed by Dr. Johnson in hepatology, last seen in clinic 1/2017. Her liver disease has been complicated by fluid retention (mainly ascites) hepatic encephalopathy, anemia, hyponatremia, and hypotension. No history of variceal bleeding.     She had a recent hospital admission (7/23-7/29) with acute respiratory failure and worsening ascites. While inpatient, TPCT (7/25) showed new 4.4 cm mass, concerning for HCC. Follow-up MRI abd w/wo contrast again noted "right hepatic lobe and caudate mass with diminutive caliber of the right portal vein and branches most concerning for infiltrative HCC." Liver transplant initiated inpatient though not complete.     Imaging reviewed at IR conference (7/31):  --Ascites.  Washout on venous; arterially enhancing (CT).  MR to r/o infiltrative process--whole rt lobe looks abnormal.  No definite vascular invasion at this time; but concern that whole rt lobe may be involved.  Plan: TJLBX--rt lobe (dx hcc). TJ liver biopsy completed 8/15/18.        Interval history:  She has not required paracentesis since 8/2 (4L removed, negative for SBP). Fluid retention has completely resolved since increasing diuretics. Currently on lasix 80 mg BID and spironolactone 200 mg daily. No lower extremity edema. On low Na diet and 1.5L fluid restriction. No longer taking lactulose though denies s/s HE. No upper or lower GI bleeding. Overall feels much better; eating well.     She previously reported abstinence for >1 year though PETH positive (58) from 7/24/18. PETH from today pending. Was seen by psych and SW inpatient with recommendations for IOP. She has not yet enrolled in any form of rehab. She has been trying to establish care with a psychiatrist for anxiety though can't " find one that accepts her insurance.           Cirrhosis Health Maintenance:  -- HCC screening: as above  -- Variceal screening: EGD 18 with PHG, no varices    -- Hepatitis A & B vaccination: vaccines complete      ROS:   GENERAL: Denies fever, chills, weight loss/gain, (+) fatigue  HEENT: Denies headaches, dizziness, vision/hearing changes  CARDIOVASCULAR: Denies chest pain, palpitations, or edema  RESPIRATORY: Denies dyspnea, cough  GI: Denies abdominal pain, rectal bleeding, nausea, vomiting. No change in bowel pattern or color  : Denies dysuria, hematuria   SKIN: Denies rash, itching   NEURO: Denies confusion, memory loss, or mood changes  PSYCH: (+) depression and anxiety  HEME/LYMPH: (+) easy bruising or bleeding  MSK: Denies joint pain or myalgia.       PMHX:  has a past medical history of Acute hypoxemic respiratory failure (2016), ANDREW (acute kidney injury) (2016), Alcohol dependence in remission, Alcoholic cirrhosis of liver with ascites (10/22/2015), Anxiety, Ascites due to alcoholic cirrhosis (2016), Centrilobular emphysema (2016), Cigarette nicotine dependence without complication (2016), Folate deficiency (10/22/2015), Gallstones, Hepatorenal syndrome (2016), Hyperbilirubinemia (2016), Hyponatremia (2016), Iron deficiency anemia due to chronic blood loss (10/22/2015), Portal hypertensive gastropathy (10/22/2015), and Subclinical hypothyroidism (10/22/2015).    PSHX:  has a past surgical history that includes  section; Tonsillectomy; Adenoidectomy; Appendectomy; Upper gastrointestinal endoscopy; Liver biopsy; Tubal ligation; Colonoscopy; Hysterectomy; Oophorectomy (Left); Oophorectomy (Right); BIOPSY, LIVER, TRANSJUGULAR APPROACH (N/A, 8/15/2018); ESOPHAGOGASTRODUODENOSCOPY (EGD) (N/A, 2018); COLONOSCOPY (N/A, 2018); ESOPHAGOGASTRODUODENOSCOPY (EGD) (N/A, 6/3/2016); ESOPHAGOGASTRODUODENOSCOPY (EGD) (N/A, 2015); COLONOSCOPY (N/A,  "11/19/2014); and BIOPSY (N/A, 10/30/2014).    The patient's social and family histories were reviewed by me and updated in the appropriate section of the electronic medical record.    Review of patient's allergies indicates:   Allergen Reactions    Morphine Nausea And Vomiting     Current Outpatient Medications on File Prior to Visit   Medication Sig Dispense Refill    ascorbic acid, vitamin C, (VITAMIN C) 500 MG tablet Take 500 mg by mouth once daily.      b complex vitamins tablet Take 1 tablet by mouth once daily.      cyanocobalamin (VITAMIN B-12) 1000 MCG tablet Take 1 tablet (1,000 mcg total) by mouth once daily.      folic acid (FOLVITE) 1 MG tablet Take 1 tablet (1 mg total) by mouth once daily. 30 tablet 11    furosemide (LASIX) 80 MG tablet Take 1 tablet (80 mg total) by mouth 2 (two) times daily. 60 tablet 11    HYDROcodone-acetaminophen (NORCO)  mg per tablet Take 1 tablet by mouth every 8 (eight) hours as needed for Pain. 90 tablet 0    midodrine (PROAMATINE) 10 MG tablet Take 1.5 tablets (15 mg total) by mouth 3 (three) times daily with meals. 90 tablet 3    multivitamin (THERAGRAN) tablet Take 1 tablet by mouth once daily.      potassium 99 mg Tab Take 1 tablet by mouth once daily.       spironolactone (ALDACTONE) 100 MG tablet Take 1 tablet (100 mg total) by mouth once daily. (Patient taking differently: Take 200 mg by mouth once daily. ) 30 tablet 0    vitamin D 1000 units Tab Take 1,000 Units by mouth once daily.       No current facility-administered medications on file prior to visit.        Objective Findings:    PHYSICAL EXAM:   Friendly White female, in no acute distress; alert and oriented to person, place and time. Thin, muscle wasting noted.  VITALS: /72 (BP Location: Right arm, Patient Position: Sitting, BP Method: Medium (Automatic))   Pulse 78   Temp 97.9 °F (36.6 °C) (Oral)   Resp 16   Ht 5' 2.52" (1.588 m)   Wt 43 kg (94 lb 12.8 oz)   LMP  (LMP Unknown)   " SpO2 99%   BMI 17.05 kg/m²   HENT: Normocephalic, without obvious abnormality. Oral mucosa pink and moist.   EYES: (+) scleral icterus  NECK: Supple. No masses or cervical adenopathy.  CARDIOVASCULAR: Regular rate and rhythm. No murmurs.  RESPIRATORY: Normal respiratory effort. BBS CTA. No wheezes or crackles.  GI: Soft, non-tender, non-distended. No palpable hepatosplenomegaly. No masses palpable. No ascites.   EXTREMITIES:  No clubbing, cyanosis or edema.  SKIN: Warm and dry. No rashes noted to exposed skin. (+) telangectasias to chest. No palmar erythema.  NEURO:  Normal gait. No asterixis.  PSYCH:  Memory intact. Thought and speech pattern appropriate. Behavior normal. (+) depression and anxiety noted (tearful).       Labs:  Lab Results   Component Value Date    WBC 5.06 08/14/2018    HGB 9.6 (L) 08/14/2018    HCT 29.0 (L) 08/14/2018     08/14/2018    CHOL 150 12/19/2017    TRIG 99 12/19/2017    HDL 54 12/19/2017     (L) 08/14/2018    K 3.5 08/14/2018    CREATININE 0.9 08/14/2018    ALT 26 08/14/2018    AST 31 08/14/2018    ALKPHOS 224 (H) 08/14/2018    BILITOT 3.9 (H) 08/14/2018    ALBUMIN 2.7 (L) 08/14/2018    INR 1.3 (H) 08/14/2018    AFP 3.5 07/26/2018       Diagnostic Studies  EGD - 8/14/18  Findings:       The examined esophagus was normal.       A 2 cm sliding hiatal hernia was found. The proximal extent of the        gastric folds (end of tubular esophagus) was 35 cm from the        incisors. The hiatal narrowing was 37 cm from the incisors. The        Z-line was at the gastroesophageal junction.       Mild portal hypertensive gastropathy was found in the gastric fundus        and in the gastric body.       The exam of the stomach was otherwise normal.       Diffuse mildly congested mucosa was found in the duodenal bulb and        in the second portion of the duodenum.       The exam of the duodenum was otherwise normal.  Impression:    - Normal esophagus.                        - 2 cm  sliding hiatal hernia.                        - Portal hypertensive gastropathy.                        - Congested duodenal mucosa.                        - No specimens collected.    CT abd pelvis w/ contrast - 7/25/18  FINDINGS:  Bilateral subsegmental patchy opacities again noted notably in the posterior basal segment of the left lower lobe with associated volume loss suggestive of subsegmental atelectasis, minimal associated airspace disease not excluded.  No pleural effusion.  Heart is not enlarged.  No pericardial effusion.    Liver is enlarged with nodular contour suggestive of liver cirrhosis.  An ill-defined heterogeneous enhancing lesion in the right hepatic lobe noted roughly measuring 4.4 x 4.1 cm (series 2, image 20) showing some washout on delayed phase imaging concerning for HCC.  Multiphasic MR examination is suggested for further evaluation.  Otherwise diffusely heterogeneous liver parenchyma.  Gallbladder shows gallstones.  No intra or extrahepatic biliary ductal dilatation.  Spleen is mildly enlarged.  Splenorenal shunt noted.    Small hiatal hernia.  Stomach, pancreas, and adrenal glands are within normal limits.    Kidneys are normal in size and location.  No renal mass or hydronephrosis.  Ureters are difficult to trace to the urinary bladder.  Urinary bladder is within normal limits.  Uterus is surgically removed.    Visualized loops of small and large bowel show no evidence of inflammation or obstruction.  Continue mild heterogeneous enhancement can be associated with nephritis, less conspicuous compared to the prior CT with contrast.  Thickening of the right ascending colonic wall likely related to portal colopathy.    Moderate ascites noted in the abdomen and pelvis.  No free intraperitoneal air.  No lymphadenopathy in the abdomen and pelvis.    Abdominal aorta is normal in course and caliber without significant atherosclerotic calcifications.    Osseous structures show no significant  abnormalities.  Extraperitoneal soft tissues are within normal limits.      Impression       Ill-defined, heterogeneously enhancing right hepatic lobe lesion measuring up to 4.4 cm with mild washout on delayed phase imaging, overall more conspicuous than prior exam and is concerning for HCC.  Multiphasic MR evaluation is advised.    Sequela of liver cirrhosis and portal hypertension including; ascites, mild splenomegaly, splenorenal shunt, and portal colopathy.    Bilateral patchy subsegmental opacities within the lower lobes, more in the left and may be seen in the settings of atelectasis, superimposed and airspace disease not excluded, not significantly changed from recent exam.    Cholelithiasis.    Additional findings as detailed above.       MRI abd w/wo contrast - 7/26/18  FINDINGS:  Pulmonary Bases: There is bibasilar atelectasis with small bilateral effusions.    Liver: normal.    The liver is grossly cirrhotic There is diffuse heterogeneous enhancement throughout the liver with ill-defined patchy arterial hyperenhancement and areas of washout seen throughout the right hepatic lobe and caudate (for example series 12 and 16 , image 19, 32 and series 16).  Subtle areas of faint arterial hyperenhancement are noted within the left lobe (for example series 12, image 49) without definite washout, nonspecific.  There is diminutive caliber of the right portal vein and branches.  The main portal vein and intrahepatic veins are patent.    Bile Ducts: normal    Gallbladder: Absent    Pancreas: normal.    Spleen: Enlarged with prominent splenic varices..    Proximal Gastrointestinal tract: Small perigastric varices, otherwise normal.    Mesentery: normal    Adrenal Glands: normal.    Kidneys: normal.    Aorta: Patent    Lymph nodes: no pathologically enlarged lymph nodes are seen.    Body wall: normal    Other: Large volume intra-abdominal ascites.  No marrow enhancing lesion.        Impression       Ill-defined right  hepatic lobe and caudate mass with diminutive caliber of the right portal vein and branches most concerning for infiltrative HCC.  Additional mildly enhancing left lobe foci without definitive washout are nonspecific.  Continued attention at follow-up recommended.    Cirrhosis with sequela of portal hypertension to include large intra-abdominal ascites, splenomegaly, and portosystemic collaterals. Small bilateral pleural effusions.         ASSESSMENT:  48 y.o. White female with:  1. Decompensated alcoholic cirrhosis  MELD-Na score: 19 at 8/17/2018 10:06 AM  MELD score: 14 at 8/17/2018 10:06 AM  Calculated from:  Serum Creatinine: 1 mg/dL at 8/17/2018 10:06 AM  Serum Sodium: 131 mmol/L at 8/17/2018 10:06 AM  Total Bilirubin: 4.3 mg/dL at 8/17/2018 10:06 AM  INR(ratio): 1.2 at 8/17/2018 10:06 AM  Age: 48 years  -- HCC screening: as above  -- Variceal screening: EGD 8/14/18 with PHG, no varices    -- Hepatitis A & B vaccination: vaccines complete    2. Ascites  -- Resolved with current diuretics: lasix 80 mg BID and spironolactone 100 mg daily  -- No h/o SBP  -- Has not required LVP in 2 weeks and no ascites present today     3. Hepatocellular carcinoma  -- Concern for extensive R lobe disease based on imaging / IR review  -- TJ biopsy completed 8/15     4. Alcohol abuse  -- Reports abstinence though has positive PETH from 7/24/18  -- Recommendations for IOP though patient has not participated in any formal rehab     5. Hyponatremia  -- On 1.5L fluid restriction  -- Na 131 today    6. Hypotension  -- BP stable on midodrine 15 mg TID      7. Malnutrition  -- Drinking protein shakes  -- Albumin improved to 3.1     8. Anxiety      EDUCATION / DISCUSSION:   The disease process and manifestations of cirrhosis were discussed. Signs and symptoms of hepatic decompensation were reviewed, including jaundice, ascites, and slowed mentation due to hepatic encephalopathy. The patient should seek medical attention if any of these  things occur.  We discussed the potential for bleeding from esophageal varices with symptoms of hematemesis and melena. The patient should report to the Emergency Department for these symptoms.    Cirrhosis Counseling  -- Strict abstinence from alcohol  -- Avoid non-steroidal anti-inflammatory drugs (NSAIDs) such as ibuprofen (Motrin, Advil), naproxen (Naprosyn, Aleve)  -- Tylenol/acetaminophen as needed for pain, no more than 2000 mg per day  -- No raw seafood due to the risk of infection  -- Low sodium diet, less than 2000 mg per day   -- Daily protein intake of 1.2-1.5 gram protein / kg body weight per day to prevent muscle mass loss  -- Upper endoscopy (frequency based on findings) to screen for varices (enlarged blood vessels) in the stomach and esophagus which can burst and cause fatal bleeding       PLAN:  1. No issues with fluid retention today, continue current diuretics: lasix 80 mg BID and spironolactone 200 mg daily  2. TJ liver biopsy path pending  3. Previous recommendation for IOP though patient has not yet started this, will discuss with SW/txp coordinator. Continue serial PETH monitoring (pending today). Abstinence reinforced. Would also benefit from outpatient psychiatry for anxiety   4. Monitor for s/s HE. Encouraged patient to restart lactulose if experiencing any confusion, disorientation, slowed mentation, etc.  5. Continue midodrine  6. 1.5 L fluid restriction  7. Proceed with transplant evaluation appointments/testing as scheduled   8. Return to clinic in 6-8 weeks            Thank you for allowing me to participate in the care of Annette Stubbs, FNP-C  Hepatology and Liver Transplant

## 2018-08-21 NOTE — TELEPHONE ENCOUNTER
"SW returned the call to the patient at ph# [phone].  SW contact the patient to follow up regarding SW recommendations for IOP and relapse prevention.  Pt explained she has not drank :in a long time."  However, she has begun AA and attempts to attend 2xs weekly unless she has an appointment she must attend.  HAYLIE inquired about [facility] and the patient reported the earliest appointment she was provided was October 3, 2018 for IOP. SW and the pt reviewed the suggestion for IOP.  The pt verbalized the encouragement for adequate coping.  SW provided positive affirmations to the patient for scheduling and attending AA.  Patient verbalized understanding and agreement with the information reviewed, social work availability, and how to access available resources if needed. SW remains available.    "

## 2018-08-21 NOTE — TELEPHONE ENCOUNTER
----- Message from Kierra Rodrigez sent at 8/21/2018 11:38 AM CDT -----  Patient Returning Call from [facility]    Who Left Message for Patient: Kathleen CHAVEZ  Communication Preference: [phone]  Additional Information:

## 2018-08-22 NOTE — TELEPHONE ENCOUNTER
IR Liver Pathology Conference Note    Patient:  Annette Esparza  MRN:   [id number]  YOB: 1970  Date of Transplant:  N/A  Native Diagnosis: Primary Liver Malignancy: Hepatoma (HCC) and Cirrhosis    Discussion/Plan:    Presenter: Hepatologist Allison Johnson    Reason for presenting: diagnosis confirmation    Concerns for Pathologists: 47 yo w/Alcoholic Cirrhosis in evaluation for liver transplant.  New found 4.4 cm right lobe lesion detected on CT w/more extensive right lobe involvement seen on MRI.  Right lobe biopsy done 8/2018    Lab Results  WBC (K/uL)   Date Value   08/17/2018 7.18   08/14/2018 5.06   08/03/2018 8.52     PLT (K/uL)   Date Value   08/17/2018 178   08/14/2018 150   08/03/2018 152     INR (no units)   Date Value   08/17/2018 1.2   08/14/2018 1.3 (H)   08/03/2018 1.4 (H)     AST (U/L)   Date Value   08/17/2018 29     ALT (U/L)   Date Value   08/17/2018 30   08/14/2018 26   08/03/2018 21     BILITOT (mg/dL)   Date Value   08/17/2018 4.3 (H)   08/14/2018 3.9 (H)   08/03/2018 6.2 (H)     ALKPHOS (U/L)   Date Value   08/17/2018 226 (H)   08/14/2018 224 (H)   08/03/2018 351 (H)     CREATININE (mg/dL)   Date Value   08/17/2018 1.0   08/14/2018 0.9   08/03/2018 1.1     AFP (ng/mL)   Date Value   07/26/2018 3.5   06/02/2016 3.9   04/20/2016 8.1     CMVIGGINTERP (no units)   Date Value   07/26/2018 Reactive (A)

## 2018-08-22 NOTE — PROGRESS NOTES
Subjective:      Patient ID: Annette Esparza is a 48 y.o. female.    Chief Complaint:Liver Transplant Evaluation      History of Present Illness    Seen today in consultation from hepatologists for pre transplant eval.  Accompanied by her boyfriend.  Has H/O ETOH use and cirrhosis. Also with cancer diagnosed 1 month ago.  No recurrent infections.  Had VZV. Occ fever blisters. No STDs. No TB exposure.  H/O blood transfusions.  Quit smoking 1 mo ago.  Quit drinking 6 mo ago.  Has travelled to [place] on a cruise. No illness while there.  No plans to travel after transplant.  No pets.  No fishing, hunting or outdoor activities.  Was  or .  No serious childhood infections.    Review of Systems   Constitution: Positive for decreased appetite, weakness, malaise/fatigue and night sweats. Negative for chills, fever, weight gain and weight loss.   HENT: Negative for congestion, ear pain, hearing loss, hoarse voice, sore throat and tinnitus.    Eyes: Negative for blurred vision, redness and visual disturbance.   Cardiovascular: Negative for chest pain, leg swelling and palpitations.   Respiratory: Negative for cough, hemoptysis, shortness of breath and sputum production.    Hematologic/Lymphatic: Negative for adenopathy. Bruises/bleeds easily.   Skin: Positive for itching. Negative for dry skin, rash and suspicious lesions.   Musculoskeletal: Negative for back pain, joint pain, myalgias and neck pain.   Gastrointestinal: Positive for constipation and nausea. Negative for abdominal pain, diarrhea, heartburn and vomiting.   Genitourinary: Negative for dysuria, flank pain, frequency, hematuria, hesitancy and urgency.   Neurological: Positive for headaches. Negative for dizziness, numbness and paresthesias.   Psychiatric/Behavioral: Negative for depression and memory loss. The patient is nervous/anxious. The patient does not have insomnia.      Objective:   Physical Exam   Constitutional: She is oriented to  person, place, and time. She is cooperative.  Non-toxic appearance. No distress.   Thin, jaundice.   HENT:   Head: Normocephalic and atraumatic.   Right Ear: Hearing and external ear normal.   Left Ear: Hearing and external ear normal.   Nose: Nose normal.   Mouth/Throat: Oropharynx is clear and moist.   Eyes: Conjunctivae, EOM and lids are normal. Pupils are equal, round, and reactive to light. Right eye exhibits no discharge. Left eye exhibits no discharge. Right conjunctiva is not injected. Left conjunctiva is not injected. No scleral icterus.   Neck: Normal range of motion. No tracheal deviation present.   Cardiovascular: Normal rate.   Pulmonary/Chest: Effort normal. No accessory muscle usage or stridor. No respiratory distress. She has no wheezes.   Abdominal: Normal appearance. She exhibits no distension.   Musculoskeletal: Normal range of motion. She exhibits no edema.   Neurological: She is alert and oriented to person, place, and time. Coordination normal.   Skin: Skin is warm and dry. No rash noted. She is not diaphoretic. No erythema.   Psychiatric: She has a normal mood and affect. Her speech is normal and behavior is normal. Judgment and thought content normal. Cognition and memory are normal.   Nursing note and vitals reviewed.    Assessment:       1. HCC (hepatocellular carcinoma)          Plan:       Annette was seen today for liver transplant evaluation.    Diagnoses and all orders for this visit:    HCC (hepatocellular carcinoma)  -     Hepatitis B surface antibody; Future    Other orders  -     Cancel: Hepatitis B Vaccine Dialysis or Immunosupressed 4-dose IM; Future  -     Tdap Vaccine; Future  -     Hepatitis B Vaccine (Adult) (IM); Standing       The patient's immunization history was reviewed and based on the patients immunization history and serologies, immunizations were ordered.    The patient was encouraged to contact us about any problems that may develop after immunization and possible  side effects were reviewed.    Transplant Candidacy:   Based on available information, there are no identified significant barriers to transplantation from an infectious disease standpoint.  Final determination of transplant candidacy will be made once evaluation is complete and reviewed by the Transplant Selection Committee.      Counseling:   I discussed with the patient the risk for increased susceptibility to infections following transplantation including increased risk for infection right after transplant and if rejection should occur.  The patients has been counseled on the importance of vaccinations including but not limited to a yearly flu vaccine.  Specific guidance has been provided to the patient regarding the patients occupation, hobbies and activities to avoid future infectious complications including but not limited to avoiding undercooked meats and seafood, proper hygiene, and contact with animals.    More than 50% of this appointment time was spent in counseling.      HepBsAb neg. Needs to repeat series then HepBsAb 4 weeks after.

## 2018-08-22 NOTE — TELEPHONE ENCOUNTER
Call returned.  Annette's evaluation status and future appointment scheduled reviewed.  Annette says she had difficulty finding a rehab program that accepted her insurance, but was advised by a SW to contact the [facility].  States she's scheduled for an initial consultation on Oct 3rd.    ----- Message from Jayy Murphy sent at 8/22/2018  1:36 PM CDT -----  Contact: patient   Patient calling to check the status of her transplant and would like a call today if possible.           Please call [phone]      Thanks`

## 2018-08-23 NOTE — TELEPHONE ENCOUNTER
Liver Pathology Conference:    Rt. Lobe Liver Biopsy: cirrhosis/ piece of scar;  No malignancy in this sample

## 2018-08-23 NOTE — TELEPHONE ENCOUNTER
----- Message from Jesus Whalen MD sent at 8/17/2018 12:55 PM CDT -----  The polyp removed from the colon was benign (no cancerous changes), but considered a risk factor for cancer.  Follow-up as previously recommended with colonoscopy in 5 years.    MA to call patient with results.

## 2018-08-23 NOTE — TELEPHONE ENCOUNTER
Patient notified.     ----- Message from Denia Johnson MD sent at 8/14/2018  2:29 PM CDT -----  Liver tests improving, no changes at this time.

## 2018-08-24 NOTE — LETTER
August 27, 2018        Ger Aguilar  [address]  [facility] [address]  Phone: [phone]  Fax: [phone]             [facility] - Liver Transplant  [address]  [facility] [address]  Phone: [phone]   Patient: Annette Esparza   MR Number: [id number]   YOB: 1970   Date of Visit: 8/24/2018       Dear Dr. Ger Aguilar    Thank you for referring Annette Esparza to me for evaluation. Attached you will find relevant portions of my assessment and plan of care.    If you have questions, please do not hesitate to call me. I look forward to following Annette Esparza along with you.    Sincerely,    Wagner Pearson MD    Enclosure    If you would like to receive this communication electronically, please contact [email or web address] or [phone] to request [employer] Link access.    [employer] Link is a tool which provides read-only access to select patient information with whom you have a relationship. Its easy to use and provides real time access to review your patients record including encounter summaries, notes, results, and demographic information.    If you feel you have received this communication in error or would no longer like to receive these types of communications, please e-mail [email or web address]

## 2018-08-24 NOTE — TELEPHONE ENCOUNTER
Patient: Annette Esparza       MRN: [id number]      : 1970     Age: 48 y.o.  [address]  [place] [address]    Provider: Hepatologist Allison Johnson    Urgency of review: non-urgent    Patient Transplant Status: In Evaluation    Reason for presentation: Reassessment-per previous review, MR concerning for more extensive disease.  Rt lobe bx neg for malignancy    Clinical Summary: 48 year old with decompensated alcohol liver disease new ill-defined liver lesion seen on MR; more definitive on CT.  Normal AFP.    Rt lobe bx 8/15/18--negative for malignancy    IR review 18--MRI with Diffusely heterogenous right hepatic lobe of the liver with patchy areas of washout - highly suspicious for mass lesion but hard to really delineate. CT more concerning for definite enhancement and washout for a 4 x 4 cm mass in segment 8    Imaging to be reviewed: MR, CT    HCC Treatment History: N/A    ABO: A NEG    Platelets:   Lab Results   Component Value Date/Time     2018 10:06 AM     Creatinine:   Lab Results   Component Value Date/Time    CREATININE 1.0 2018 10:06 AM     Bilirubin:   Lab Results   Component Value Date/Time    BILITOT 4.3 (H) 2018 10:06 AM     AFP Last 3 each if available:   Lab Results   Component Value Date/Time    AFP 3.5 2018 03:54 AM    AFP 3.9 2016 10:07 PM    AFP 8.1 2016 08:52 AM       MELD: MELD-Na score: 19 at 2018 10:06 AM  MELD score: 14 at 2018 10:06 AM  Calculated from:  Serum Creatinine: 1 mg/dL at 2018 10:06 AM  Serum Sodium: 131 mmol/L at 2018 10:06 AM  Total Bilirubin: 4.3 mg/dL at 2018 10:06 AM  INR(ratio): 1.2 at 2018 10:06 AM  Age: 48 years    Plan: Previous imaging has been reviewed, no new imaging since biopsy.  Transjug biopsy neg for malignancy.  AFP 3.5, unchanged from 3.9 on 16.  Could consider US guided biopsy after paracentesis, if still concerned for invasive HCC.    IR for paracentesis and  US/g liver biopsy, same day  Orders entered and procedure date requested.      Call placed to patient to advise of IR review and recommendations with no answer.  Voice message left, requesting a return call upon receipt of message.  Contact number provided.     Follow-up Provider: Denia Johnson MD

## 2018-08-24 NOTE — PROGRESS NOTES
Transplant Surgery  Liver Transplant Recipient Evaluation    Referring Provider: Sarahy Banegas    Subjective:     Reason for Visit: evaluation for liver transplant    History of Present Illness: Annette Esparza is a 48 y.o. female who is being evaluated for liver transplant due to Primary Liver Malignancy: Hepatoma (HCC) and Cirrhosis. Annette reports ascites (recurrent paracentesis needed), edema, encephalopathy, fatigue, jaundice, muscle wasting, portal hypertension and sleep disturbance.    Review of Systems   Constitutional: Positive for fatigue.   HENT: Negative for drooling, postnasal drip and sore throat.    Eyes: Negative for discharge and itching.   Respiratory: Positive for cough and wheezing. Negative for choking and stridor.    Gastrointestinal: Negative for rectal pain.   Endocrine: Negative for polydipsia.   Genitourinary: Negative for enuresis and genital sores.   Musculoskeletal: Negative for back pain, neck pain and neck stiffness.   Allergic/Immunologic: Negative for immunocompromised state.   Neurological: Negative for facial asymmetry and numbness.   Hematological: Negative for adenopathy.   Psychiatric/Behavioral: Negative for behavioral problems, self-injury and suicidal ideas.       Objective:     Physical Exam   HENT:   Head: Normocephalic.   Eyes: Pupils are equal, round, and reactive to light.   Neck: No JVD present. No tracheal deviation present. No thyromegaly present.   Cardiovascular: Normal rate, normal heart sounds and intact distal pulses.   Pulses:       Femoral pulses are 2+ on the right side, and 2+ on the left side.  Pulmonary/Chest: No respiratory distress. She has no wheezes. She has no rales. She exhibits no tenderness.   Abdominal: Soft. She exhibits no ascites and no mass. There is no hepatosplenomegaly. There is no tenderness. There is no guarding. No hernia.       Appendix scar  Pfannenstiel scar    Ascites - minimal  Enlarged liver  Umbilical hernia    Small  subcutaneus veins on abdominal wall   Lymphadenopathy:     She has no cervical adenopathy.   Neurological: She is alert. She has normal strength.   Skin: Skin is warm and dry.   Psychiatric: She has a normal mood and affect. Her behavior is normal. Judgment and thought content normal.       MELD-Na score: 19 at 2018 10:06 AM  MELD score: 14 at 2018 10:06 AM  Calculated from:  Serum Creatinine: 1 mg/dL at 2018 10:06 AM  Serum Sodium: 131 mmol/L at 2018 10:06 AM  Total Bilirubin: 4.3 mg/dL at 2018 10:06 AM  INR(ratio): 1.2 at 2018 10:06 AM  Age: 48 years    Diagnoses:  1. Alcoholic cirrhosis of liver with ascites    2. HCC (hepatocellular carcinoma)        Transplant Surgery - Candidacy   Assessment/Plan:     Transplant Candidacy: Annette Esparza is a 48 y.o. female with ESLD secondary to Primary Liver Malignancy: Hepatoma (HCC) and Cirrhosis here for evaluation for possible OLT.  Based on available information, Annette is a suitable liver transplant candidate.  She will be presented to selection committee after all tests and evaluations are complete.    Wagner Pearson MD       [facility] Patient Status  Functional Status: 50% - Requires considerable assistance and frequent medical care  Physical Capacity: No Limitations    Counseling: I discussed with Annette the benefits of liver transplantation.  We discussed the evaluation and listing procedures.  We discussed the MELD system and the associated waiting times.  We discussed national and center specific survival results.  We discussed the option of being multiply listed in different OPOs.  We discussed the option of living donation versus  donor transplantation and the advantages and relative disadvantages of each.   We discussed the risks, benefits and potential complications related to surgery including the risks related to anesthesia, bleeding, infection, primary non-function of the allograft, the risk of reoperation as  well as the risk of death.  We discussed the typical post-operative course, length of hospitalization, the long-term use of immunosuppressive therapy as well as the need for long-term routine followup.    PHS: I discussed the use of organs from donors with PHS increased-risk behavior, including the testing protocols utilized, as well as data from the literature regarding the likelihood of transmission of hepatitis or HIV.  The patient that she is willing to consider such grafts.  DCD: I discussed the use of organs recovered by donation after cardiac death (DCD), including slightly decreased graft survival and greater risk of arterial and biliary complications. The potential advantage to the recipient is possibly receiving a transplant sooner by accepting such an organ. The patient that she is willing to consider such grafts.  HBcAb: I discussed the use of organs from donors with HBcAb in conjunction with long term use of HBV antiviral drugs, such as lamivudine. The small but measurable risk of hepatitis B seroconversion was discussed as well as the potentially life long need to continue antiviral drugs. The patient that she is willing to consider such grafts.  HCV Viremic recipient: I discussed the use of HCV-positive organs in recipients who already have HCV, including the outcomes that are not demonstrably different from HCV-positive recipients receiving HCV-negative organs. The potential advantage to the recipient is possibly receiving a transplant sooner by accepting such an organ.  The patient that she is willing to consider such grafts.   Non-viremic recipient: I discussed the use of HCV-positive organs in naive recipients, including the risk of viral transmission to the patients or others, potential insurance barriers for antiviral medication coverage, risk for fibrosing cholestatic hepatitis, death or graft loss. The potential advantage to the recipient is the possibility of receiving a transplant sooner  with decreased mortality risk by accepting such an organ. The patient that she is willing to consider such grafts.  LDLT: I discussed the nature of living donor liver transplant, including donor risks and more frequent recipient complications. The patient that she is willing to consider such grafts.

## 2018-08-24 NOTE — PROGRESS NOTES
Pt received the Hepatitis B and Tdap vaccinations. Pt tolerated the injections well. Return appts provided. Pt left the unit in NAD.

## 2018-08-24 NOTE — TELEPHONE ENCOUNTER
Patient: Annette Esparza       MRN: [id number]      : 1970     Age: 48 y.o.  [address]  [place] [address]    Provider: Hepatologist Allison Johnson    Urgency of review: non-urgent    Patient Transplant Status: In Evaluation    Reason for presentation: Reassessment-per previous review, MR concerning for more extensive disease.  Rt lobe bx neg for malignancy    Clinical Summary: 48 year old with decompensated alcohol liver disease new ill-defined liver lesion seen on MR; more definitive on CT.  Normal AFP.    Rt lobe bx 8/15/18--negative for malignancy    IR review 18--MRI with Diffusely heterogenous right hepatic lobe of the liver with patchy areas of washout - highly suspicious for mass lesion but hard to really delineate. CT more concerning for definite enhancement and washout for a 4 x 4 cm mass in segment 8    Imaging to be reviewed: MR, CT    HCC Treatment History: N/A    ABO: A NEG    Platelets:   Lab Results   Component Value Date/Time     2018 10:06 AM     Creatinine:   Lab Results   Component Value Date/Time    CREATININE 1.0 2018 10:06 AM     Bilirubin:   Lab Results   Component Value Date/Time    BILITOT 4.3 (H) 2018 10:06 AM     AFP Last 3 each if available:   Lab Results   Component Value Date/Time    AFP 3.5 2018 03:54 AM    AFP 3.9 2016 10:07 PM    AFP 8.1 2016 08:52 AM       MELD: MELD-Na score: 19 at 2018 10:06 AM  MELD score: 14 at 2018 10:06 AM  Calculated from:  Serum Creatinine: 1 mg/dL at 2018 10:06 AM  Serum Sodium: 131 mmol/L at 2018 10:06 AM  Total Bilirubin: 4.3 mg/dL at 2018 10:06 AM  INR(ratio): 1.2 at 2018 10:06 AM  Age: 48 years    Plan:     Follow-up Provider: Denia Johnson MD

## 2018-08-24 NOTE — PROGRESS NOTES
TRANSPLANT NUTRITIONAL ASSESSMENT    Referring Provider: Denia Johnsno MD    Reason for Visit: Pre-liver transplant work-up    Age: 48 y.o.  Sex: female    Patient Active Problem List   Diagnosis    Gallstones    Alcohol use disorder, severe, in early remission    Anemia of chronic disease    Vitamin D deficiency    Folate deficiency    Iron deficiency anemia due to chronic blood loss    Portal hypertensive gastropathy    Alcoholic cirrhosis of liver with ascites    Subclinical hypothyroidism    Anxiety    Cigarette nicotine dependence without complication    Hyperbilirubinemia    Ascites    Mild single current episode of major depressive disorder    Oral thrush    Hypotension    Hyponatremia    General weakness    Hepatic encephalopathy    Hypomagnesemia    Normocytic anemia    Hypophosphatemia    Decompensated hepatic cirrhosis    Thrombocytopenia    Severe malnutrition    HCC (hepatocellular carcinoma)    Pre-transplant evaluation for liver transplant    Panlobular emphysema     Past Medical History:   Diagnosis Date    Acute hypoxemic respiratory failure 12/1/2016    ANDREW (acute kidney injury) 11/28/2016    Alcohol dependence in remission     Alcoholic cirrhosis of liver with ascites 10/22/2015    Anxiety     Ascites due to alcoholic cirrhosis 6/13/2016    Centrilobular emphysema 12/12/2016    Cigarette nicotine dependence without complication 6/2/2016    Folate deficiency 10/22/2015    Gallstones     Hepatorenal syndrome 11/29/2016    Hyperbilirubinemia 6/13/2016    Hyponatremia 11/29/2016    Iron deficiency anemia due to chronic blood loss 10/22/2015    Portal hypertensive gastropathy 10/22/2015    Subclinical hypothyroidism 10/22/2015     Lab Results   Component Value Date     08/17/2018    K 3.8 08/17/2018    PHOS 3.5 07/29/2018    MG 1.3 (L) 07/29/2018    CHOL 150 12/19/2017    HDL 54 12/19/2017    TRIG 99 12/19/2017    ALBUMIN 3.1 (L) 08/17/2018     "PREALBUMIN 4 (L) 07/24/2018    AMMONIA 61 (H) 07/23/2018    HGBA1C Invalid (A) 10/16/2015    CALCIUM 10.0 08/17/2018     Other Pertinent Labs: none    Current Outpatient Medications   Medication Sig    ascorbic acid, vitamin C, (VITAMIN C) 500 MG tablet Take 500 mg by mouth once daily.    b complex vitamins tablet Take 1 tablet by mouth once daily.    cyanocobalamin (VITAMIN B-12) 1000 MCG tablet Take 1 tablet (1,000 mcg total) by mouth once daily.    folic acid (FOLVITE) 1 MG tablet Take 1 tablet (1 mg total) by mouth once daily.    furosemide (LASIX) 80 MG tablet Take 1 tablet (80 mg total) by mouth 2 (two) times daily.    HYDROcodone-acetaminophen (NORCO)  mg per tablet Take 1 tablet by mouth every 8 (eight) hours as needed for Pain.    midodrine (PROAMATINE) 10 MG tablet Take 1.5 tablets (15 mg total) by mouth 3 (three) times daily with meals.    multivitamin (THERAGRAN) tablet Take 1 tablet by mouth once daily.    potassium 99 mg Tab Take 1 tablet by mouth once daily.     spironolactone (ALDACTONE) 100 MG tablet Take 2 tablets (200 mg total) by mouth once daily.    vitamin D 1000 units Tab Take 1,000 Units by mouth once daily.     No current facility-administered medications for this visit.      Allergies: Dilaudid [hydromorphone]    Ht Readings from Last 1 Encounters:   08/24/18 5' 2" (1.575 m)     Wt Readings from Last 1 Encounters:   08/24/18 45.6 kg (100 lb 8.5 oz)      BMI: Body mass index is 18.39 kg/m².    Usual Weight: 100-110 lb  Weight Change/Time: prior to illness onset, UBW was 115-125 lb, was having paracentesis almost weekly but ascites more controlled  Current Diet: Regular  Appetite/Current Intake: good   Exercise/Physical Activity: functional in ADL's, some walking, grocery shopping/house work  Nutritional/Herbal Supplements: maybe 1 Ensure/Boost per day, does not like them  Chewing/Swallowing Problems: none  Symptoms: none    Estimated Kcal Need: 2383-7610 kcal (35-40 " kcal/kg)  Estimated Protein Need: 45-67 gm (1-1.5 gm/kg)    Nutritional History:   Pt present alone today. Pt has significant muscle wasting/muscle & fat loss at this time. Fluid retention controlled currently with oral medications. Pt was requiring paracentesis regularly until about 3 weeks ago. Pt states she's been told to follow a low sodium diet but she is not doing this. She has tried Boost and Ensure and feels that they are too filling, doesn't like the taste. Pt denies any n/v/d/abd pain. Pt provided the following diet recall:  B: sausage & biscuit (from can), strawberries, orange juice  L: sandwich with lunch meat (turkey from [employer] counter), homemade chicken salad, canned soup  Snack: nuts, candy, ice cream, pudding, mandarin oranges  D: out to eat on Fridays varies; home cooked may be baked chicken w/ rice, mixed vegetables, spaghetti & meat sauce, pork or beef roast w/ vegetables, they may be canned/frozen/fresh  Beverages: water, seltzer water, V8 juice, Diet Coke     Nutritional Diagnoses  Problem: limited adherence to nutrition-related recommendations  Etiology: r/t prior instruction to follow low sodium diet  Symptoms: aeb diet recall includes high sodium items on a regular basis    Educational Need? yes  Barriers: none identified  Discussed with: patient  Interventions: Patient taught nutrition information regarding Pre-liver transplant work-up.    Reviewed Low Sodium packet (low Na diet, foods recommended/not recommended, food label strategies, flavoring tips, & sample menu).   Provided education on protein content in foods, goal intake per day, suggestions for ways to reach protein intake goal; nutrition supplements, snacks.   Add in protein shake 2-3 per day. Add snacks between meals.  Goals/Recommendations: diet adherence, small frequent meals and snacks, consumption of silvio nutritional supplements and increase protein intake  Actions Taken: instruct/provide written information  Patient and/or  family comprehend instructions: yes  Outcome: Verbalizes understanding  Monitoring: Follow up in clinic if needed. RD contact info provided.     Counseling Time: 30 minutes

## 2018-08-27 NOTE — PROGRESS NOTES
Transplant Surgery Liver Transplant Recipient Evaluation    Referring Physician: Sarahy Banegas  Corresponding Physician: Ger Aguilar    Subjective:     Reason for Visit: evaluate liver transplant candidacy    History of Present Illness: Annette Esparza is a 48 y.o. year old female who is being evaluated for liver transplantation.    Transplant History: N/A    Native Liver Disease (UNOS terminology): Primary Liver Malignancy: Hepatoma (HCC) and Cirrhosis (based on medical records from referral).    Manifestations of liver disease: ascites (recurrent paracentesis needed), edema, fatigue, muscle wasting and portal hypertension  MELD-Na score: 19 at 8/17/2018 10:06 AM  MELD score: 14 at 8/17/2018 10:06 AM  Calculated from:  Serum Creatinine: 1 mg/dL at 8/17/2018 10:06 AM  Serum Sodium: 131 mmol/L at 8/17/2018 10:06 AM  Total Bilirubin: 4.3 mg/dL at 8/17/2018 10:06 AM  INR(ratio): 1.2 at 8/17/2018 10:06 AM  Age: 48 years    PMH: reviewed  PSH: reviewed    Review of Systems   Constitutional: Negative for activity change and appetite change.   HENT: Negative for congestion and dental problem.    Eyes: Negative for discharge.   Gastrointestinal: Positive for abdominal distention.   Endocrine: Negative for cold intolerance.   Genitourinary: Negative for dysuria and flank pain.   Musculoskeletal: Negative for arthralgias and back pain.   Skin: Negative for color change.   Allergic/Immunologic: Negative for immunocompromised state.   Neurological: Negative for weakness.   Psychiatric/Behavioral: Negative for agitation.         Objective:     Physical Exam:  Constitutional:   Vitals reviewed: yes   Well-nourished and well-groomed: yes  Eyes:   Sclerae icteric: no   Extraocular movements intact: yes  GI:    Bowel sounds normal: yes   Tenderness: no    If yes, quadrant/location: not applicable   Palpable masses: no    If yes, quadrant/location: not applicable   Hepatosplenomegaly: no   Ascites: no   Hernia: no    If  yes, type/location: not applicable   Surgical scars: no    If yes, type/location: not applicable  Resp:   Effort normal: yes   Breath sounds normal: yes    CV:   Regular rate and rhythm: yes   Heart sounds normal: yes   Femoral pulses normal: yes   Extremities edematous: no  Skin:   Rashes or lesions present: no    If yes, describe:not applicable   Jaundice:: no    Musculoskeletal:   Gait normal: yes   Strength normal: yes  Psych:   Oriented to person, place, and time: yes   Affect and mood normal: yes    Additional comments: not applicable         Transplant Surgery - Candidacy   Assessment/Plan:   I see no surgical contraindication to liver transplantation. Based on available information, Annette Esparza is a suitable liver transplant candidate. Final determination of transplant candidacy will be made once evaluation is complete and reviewed by the Liver Transplant Selection Committee.    Wagner Pearson MD         Counseling: We provided Annette Esparza with a group education session today.  We discussed liver transplantation at length with her, including risks, potential complications, and alternatives in the management of her liver disease.  The discussion included complications related to anesthesia, bleeding, infection, primary nonfunction, and vascular thrombosis.  I discussed the typical postoperative course, length of hospitalization, the need for long-term immunosuppression, and the need for long-term routine follow-up.  I discussed living-donor and -donor transplantation and the relative advantages and disadvantages of each.  I also discussed average waiting times for both living donation and  donation.  I discussed national and center-specific survival rates.  I also mentioned the potential benefit of multicenter listing to candidates listed with centers within more than one organ procurement organization.  All questions were answered.    PHS: I discussed the use of organs  from donors with PHS increased-risk behavior, including the testing protocols utilized, as well as data from the literature regarding the likelihood of transmission of hepatitis or HIV.  The patient that she is willing to consider such grafts.  DCD: I discussed the use of organs recovered by donation after cardiac death (DCD), including slightly decreased graft survival and greater risk of arterial and biliary complications. The potential advantage to the recipient is possibly receiving a transplant sooner by accepting such an organ. The patient that she is willing to consider such grafts.  HBcAb: I discussed the use of organs from donors with HBcAb in conjunction with long term use of HBV antiviral drugs, such as lamivudine. The small but measurable risk of hepatitis B seroconversion was discussed as well as the potentially life long need to continue antiviral drugs. The patient that she is willing to consider such grafts.  HCV Viremic recipient: I discussed the use of HCV-positive organs in recipients who already have HCV, including the outcomes that are not demonstrably different from HCV-positive recipients receiving HCV-negative organs. The potential advantage to the recipient is possibly receiving a transplant sooner by accepting such an organ.  The patient that she is willing to consider such grafts.   Non-viremic recipient: I discussed the use of HCV-positive organs in naive recipients, including the risk of viral transmission to the patients or others, potential insurance barriers for antiviral medication coverage, risk for fibrosing cholestatic hepatitis, death or graft loss. The potential advantage to the recipient is the possibility of receiving a transplant sooner with decreased mortality risk by accepting such an organ. The patient that she is willing to consider such grafts.  LDLT: I discussed the nature of living donor liver transplant, including donor risks and more frequent recipient complications.  The patient that she is willing to consider such grafts.

## 2018-08-29 NOTE — TELEPHONE ENCOUNTER
----- Message from Denia Johnson MD sent at 8/23/2018  7:53 PM CDT -----  Patient with osteoporosis.  Needs calcium and vitamin D along with fosamax 70mg weekly.  Repeat DEXA in 2 years

## 2018-08-30 NOTE — TELEPHONE ENCOUNTER
----- Message from Jayy Murphy sent at 8/30/2018  8:33 AM CDT -----  Contact: patient   Patient would like to know the status of her transplant. She also would like to know when can she be place on the list.         Please call [phone]        Thanks!

## 2018-08-30 NOTE — TELEPHONE ENCOUNTER
Spoke with patient in reference to new medication orders, (prescription had already been picked up and started.)  Patient given information about her up coming appointments that need to be completed prior to selection presentation.  Patient questions answered at this time.

## 2018-09-11 NOTE — PATIENT INSTRUCTIONS
We will proceed with liver biopsy for determination of liver cancer.    Once this is complete we will proceed with transplant discussion.    Continue to abstain from alcohol and tobacco.  Congratulations on smoking cessation.    MELD score is 18.    No change in fluid pills.     Return to clinic in 1 month

## 2018-09-11 NOTE — PROGRESS NOTES
Transplant Hepatology Follow-up Note    Chief complaint:     HPI:  Annette Esparza is a 48 y.o. female that presents to hepatology clinic for management of decompensated alcoholic cirrhosis.  She is alone.     Patient first learned of the presence of liver disease in 9/2014.  Given uncertainty regarding presence of cirrhosis, she underwent liver biopsy in 10/2014 which confirmed the presence of cirrhosis.  Serologic w/u was negative and etiology is alcoholic liver disease.  The patient continued to consume alcohol until 11/2016.  She was initially seen in transplant clinic in 1/2017 after a hospitalization in early December 2016.  At that time she had recently discontinued alcohol and had improving MELD from 16 to 8.  Not deemed a transplant candidate and recommended for 3 month follow-up, which did not occur.    It appears the patient was stable until 7/2018.  At that time she presented to [facility] with E coli UTI and bacteremia precipitating decompensation with increased lethargy, ascites, and HE.  She was treated for 4 days and ultimately left AMA.  She presented to [facility] 4 days after leaving AMA due to continued decline.  Patient completed therapy for infection and inpatient transplant evaluation was initiated.  During evaluation noted to have concerning liver mass for HCC.      Regarding liver mass, CT on 7/25/18 with ill defined 4.4cm mass concerning for but not diagnostic for HCC.  MRI obtained 3 days later and no defined mass seen and concern for infiltrative HCC.  Transjugular biopsy of right lobe was pursued and negative for malignancy.  AFP is normal.  At this time it remains unclear if patient has HCC and the extent of disease if present.  Current recommendation is for US guided liver biopsy for improved targeting of abnormalities seen on cross sectional imaging.      The patient has improvement in ascites with lasix 80mg bid and spironolactone 200mg daily.  No LVP since 8/2/2018.  Denies HE, GI  bleeding or jaundice.  She is having issues with nausea.      Has not returned to tobacco since discharge.  Oxygenation is improved at 100% on RA.      The reports significant anxiety regarding her transplant candidacy.      Cirrhosis HCM:  Hepatitis A vaccination:  Immune   Hepatitis B vaccination: immune   HCC screening: CT from 7/2018  Variceal screening: EGD 8/2018, PHG  Pneumococcal vaccination:  2016  Influenza vaccination: 2017  Colonoscopy: 8/2018 - 5 year surveillance for 3mm TA       Patient Active Problem List   Diagnosis    Gallstones    Alcohol use disorder, severe, in early remission    Anemia of chronic disease    Vitamin D deficiency    Folate deficiency    Iron deficiency anemia due to chronic blood loss    Portal hypertensive gastropathy    Alcoholic cirrhosis of liver with ascites    Subclinical hypothyroidism    Anxiety    Cigarette nicotine dependence without complication    Hyperbilirubinemia    Ascites    Mild single current episode of major depressive disorder    Oral thrush    Hypotension    Hyponatremia    General weakness    Hepatic encephalopathy    Hypomagnesemia    Normocytic anemia    Hypophosphatemia    Decompensated hepatic cirrhosis    Thrombocytopenia    Severe malnutrition    HCC (hepatocellular carcinoma)    Pre-transplant evaluation for liver transplant    Panlobular emphysema       Past Medical History:   Diagnosis Date    Acute hypoxemic respiratory failure 12/1/2016    ANDREW (acute kidney injury) 11/28/2016    Alcohol dependence in remission     Alcoholic cirrhosis of liver with ascites 10/22/2015    Anxiety     Ascites due to alcoholic cirrhosis 6/13/2016    Centrilobular emphysema 12/12/2016    Cigarette nicotine dependence without complication 6/2/2016    Folate deficiency 10/22/2015    Gallstones     Hepatorenal syndrome 11/29/2016    Hyperbilirubinemia 6/13/2016    Hyponatremia 11/29/2016    Iron deficiency anemia due to chronic  blood loss 10/22/2015    Portal hypertensive gastropathy 10/22/2015    Subclinical hypothyroidism 10/22/2015       Past Surgical History:   Procedure Laterality Date    ADENOIDECTOMY      APPENDECTOMY      BIOPSY N/A 10/30/2014    [facility] by [facility]    BIOPSY, LIVER, TRANSJUGULAR APPROACH N/A 8/15/2018    [facility]     SECTION      COLONOSCOPY      COLONOSCOPY N/A 2018    Procedure: COLONOSCOPY;  Surgeon: Jesus Whalen MD;  Location: [facility] ([address] [place] [address]);  Service: Endoscopy;  Laterality: N/A;  2nd floor- pt has multiple comorbidities- requiring weekly paracentesis, now requires continuous home O2.  HCC/Cirrhosis, Variceal screening- labs ordered.  PM Prep    COLONOSCOPY N/A 2018    [facility]    COLONOSCOPY N/A 2014    [facility] by [facility] at [facility]    ESOPHAGOGASTRODUODENOSCOPY N/A 2018    Procedure: ESOPHAGOGASTRODUODENOSCOPY (EGD);  Surgeon: Jesus Whalen MD;  Location: [address]);  Service: Endoscopy;  Laterality: N/A;  2nd floor- pt has multiple comorbidities- requiring weekly paracentesis, now requires continuous home O2.  HCC/Cirrhosis, Variceal screening- labs ordered.    ESOPHAGOGASTRODUODENOSCOPY (EGD) N/A 2018    Performed by Jesus Whalen MD at [facility] ([address] [place] [address])    ESOPHAGOGASTRODUODENOSCOPY (EGD) N/A 6/3/2016    [facility] ENDO    ESOPHAGOGASTRODUODENOSCOPY (EGD) N/A 2015    [facility]    HYSTERECTOMY      25 yrs old    LIVER BIOPSY      OOPHORECTOMY Left     25 yrs old    OOPHORECTOMY Right     29 yrs old    TONSILLECTOMY      TRANSJUGULAR BIOPSY OF LIVER N/A 8/15/2018    Procedure: BIOPSY, LIVER, TRANSJUGULAR APPROACH;  Surgeon: [facility] Diagnostic Provider;  Location: [facility] OR [address] [place] [address];  Service: Radiology;  Laterality:  N/A;    TUBAL LIGATION      UPPER GASTROINTESTINAL ENDOSCOPY         Family History   Problem Relation Age of Onset    Rheum arthritis Father     Cancer Mother     No Known Problems Sister     No Known Problems Brother     Colon cancer Neg Hx        Social History     Socioeconomic History    Marital status:      Spouse name: None    Number of children: None    Years of education: None    Highest education level: None   Social Needs    Financial resource strain: None    Food insecurity - worry: None    Food insecurity - inability: None    Transportation needs - medical: None    Transportation needs - non-medical: None   Occupational History    None   Tobacco Use    Smoking status: Former Smoker     Packs/day: 1.00     Years: 35.00     Pack years: 35.00     Types: Cigarettes    Smokeless tobacco: Never Used   Substance and Sexual Activity    Alcohol use: No     Alcohol/week: 0.0 oz    Drug use: No    Sexual activity: Yes     Partners: Male   Other Topics Concern    None   Social History Narrative    None   Patient currently residing with boydriend.  Quit tobacco 7/2018, denies alcohol use for > 1 year but PETH from 7/2018 positive at 57.  Recommended for IOP.     Current Outpatient Medications   Medication Sig Dispense Refill    alendronate (FOSAMAX) 70 MG tablet Take 1 tablet (70 mg total) by mouth every 7 days. 4 tablet 11    ascorbic acid, vitamin C, (VITAMIN C) 500 MG tablet Take 500 mg by mouth once daily.      b complex vitamins tablet Take 1 tablet by mouth once daily.      cyanocobalamin (VITAMIN B-12) 1000 MCG tablet Take 1 tablet (1,000 mcg total) by mouth once daily. 90 tablet 3    folic acid (FOLVITE) 1 MG tablet Take 1 tablet (1 mg total) by mouth once daily. 30 tablet 11    furosemide (LASIX) 80 MG tablet Take 1 tablet (80 mg total) by mouth 2 (two) times daily. 60 tablet 11    HYDROcodone-acetaminophen (NORCO)  mg per tablet Take 1 tablet by mouth every 12  "(twelve) hours as needed for Pain. 90 tablet 0    midodrine (PROAMATINE) 10 MG tablet Take 1.5 tablets (15 mg total) by mouth 3 (three) times daily with meals. 90 tablet 3    multivitamin (THERAGRAN) tablet Take 1 tablet by mouth once daily.      potassium 99 mg Tab Take 1 tablet by mouth once daily.       spironolactone (ALDACTONE) 100 MG tablet Take 2 tablets (200 mg total) by mouth once daily. 180 tablet 3    vitamin D 1000 units Tab Take 1,000 Units by mouth once daily.       No current facility-administered medications for this visit.        Review of patient's allergies indicates:   Allergen Reactions    Morphine Nausea And Vomiting       Review of Systems   Constitutional: Positive for malaise/fatigue. Negative for chills, fever and weight loss.   Eyes: Negative.    Respiratory: Negative for cough and shortness of breath.    Cardiovascular: Negative for chest pain and leg swelling.   Gastrointestinal: Positive for abdominal pain and nausea. Negative for blood in stool, constipation, diarrhea, heartburn and vomiting.   Musculoskeletal: Negative for joint pain and myalgias.   Skin: Negative for itching and rash.   Neurological: Negative for dizziness, focal weakness, weakness and headaches.   Endo/Heme/Allergies: Does not bruise/bleed easily.   Psychiatric/Behavioral: Negative for depression. The patient is nervous/anxious.        Vitals:    09/11/18 0835   BP: 114/74   Pulse: 95   Resp: 16   Temp: 98.6 °F (37 °C)   TempSrc: Oral   SpO2: 100%   Weight: 48.1 kg (106 lb 0.7 oz)   Height: 5' 1" (1.549 m)       Physical Exam   Constitutional: She is oriented to person, place, and time. She appears well-developed. No distress.   Thin female   HENT:   Head: Normocephalic and atraumatic.   Mouth/Throat: Oropharynx is clear and moist. No oropharyngeal exudate.   Eyes: EOM are normal. Pupils are equal, round, and reactive to light. No scleral icterus.   Neck: Normal range of motion. Neck supple. No thyromegaly " present.   Cardiovascular: Normal rate, regular rhythm and normal heart sounds. Exam reveals no gallop and no friction rub.   No murmur heard.  Pulmonary/Chest: Effort normal. No respiratory distress. She has no wheezes. She has no rales.   Abdominal: Soft. Bowel sounds are normal. She exhibits no distension. There is tenderness. There is no rebound and no guarding.   Musculoskeletal: Normal range of motion. She exhibits no edema.   Lymphadenopathy:     She has no cervical adenopathy.   Neurological: She is alert and oriented to person, place, and time. No cranial nerve deficit.   Mental status normal   Skin: Skin is warm and dry. No rash noted.   Psychiatric: She has a normal mood and affect. Her behavior is normal.   Vitals reviewed.      Lab Results   Component Value Date    ALT 25 09/11/2018    AST 25 09/11/2018     (H) 09/30/2014    ALKPHOS 182 (H) 09/11/2018    BILITOT 2.5 (H) 09/11/2018       Lab Results   Component Value Date    WBC 7.18 09/11/2018    HGB 10.2 (L) 09/11/2018    HCT 31.4 (L) 09/11/2018     (H) 09/11/2018     (L) 09/11/2018       Lab Results   Component Value Date     (L) 09/11/2018    K 4.3 09/11/2018    CL 93 (L) 09/11/2018    CO2 25 09/11/2018    BUN 19 09/11/2018    CREATININE 0.8 09/11/2018    CALCIUM 9.9 09/11/2018    ANIONGAP 13 09/11/2018    ESTGFRAFRICA >60.0 09/11/2018    EGFRNONAA >60.0 09/11/2018     MELD-Na score: 18 at 9/11/2018  8:20 AM  MELD score: 12 at 9/11/2018  8:20 AM  Calculated from:  Serum Creatinine: 0.8 mg/dL (Rounded to 1 mg/dL) at 9/11/2018  8:20 AM  Serum Sodium: 131 mmol/L at 9/11/2018  8:20 AM  Total Bilirubin: 2.5 mg/dL at 9/11/2018  8:20 AM  INR(ratio): 1.2 at 9/11/2018  8:20 AM  Age: 48 years    Assessment:  48 y.o. female with alcoholic cirrhosis presenting for liver transplant evaluation.  It is unclear if she is a suitable candidate based on alcohol issues and possible infiltrative HCC.      Plan:  1.  HCC:  Patient with abnormal  cross sectional imaging by CT and MRI but not consistent with HCC by LIRADs criteria.  Transjugular biopsy negative for malignancy but not targeted approach.  If right lobe infiltrative with HCC, then patient would be outside Suleiman criteria and inappropriate for OLT.  Therefore current recommendation is for US guided liver biopsy.  This should be performed as soon as possible.  AFP has remained normal.     2. . Decompensated cirrhosis: MELD 18 and based on current symptoms, medical justification for consideration of OLT.  Continue current diuretic dosing and low sodium diet.  Not requiring LVP at this time.      3.  Alcohol abuse:  While patient reports no alcohol in 1 year, PETH obtained during hospitalization was positive.  Also recommended for IOP, which has not been completed.  SW follow-up indicates patient is attending AA.  This needs to be further addressed as patient currently carries a high risk of relapse.  PETH is currently pending from this office visit.      4.  Osteoporosis:  On therapy with vitamin D, calcium and fosamax     RTC in 1 month

## 2018-09-11 NOTE — LETTER
September 11, 2018        Ger Aguilar  [address]  [place] [address]  Phone: [phone]  Fax: [phone]             [facility] Liver Transplant  [address]  [facility] [address]  Phone: [phone]   Patient: Annette Esparza   MR Number: [id number]   YOB: 1970   Date of Visit: 9/11/2018       Dear Dr. Ger Aguilar    Thank you for referring Annette Esparza to me for evaluation. Attached you will find relevant portions of my assessment and plan of care.    If you have questions, please do not hesitate to call me. I look forward to following Annette Esparza along with you.    Sincerely,    Denia Johnson MD    Enclosure    If you would like to receive this communication electronically, please contact [email or web address] or [phone] to request [employer] access.    [employer] is a tool which provides read-only access to select patient information with whom you have a relationship. Its easy to use and provides real time access to review your patients record including encounter summaries, notes, results, and demographic information.    If you feel you have received this communication in error or would no longer like to receive these types of communications, please e-mail [email or web address]

## 2018-09-18 NOTE — TELEPHONE ENCOUNTER
Call returned.  Patient advised verbal and epic messages have been forwarded to IR requesting a procedure date ASAP.  RN will follow-up accordingly.  Understanding expressed.       ----- Message from Michelle Lin sent at 9/18/2018 10:46 AM CDT -----  Contact: patient   Needs Advice    Reason for call: patient states she was supposed to receive a call to schedule Biopsy and hasn't         Communication Preference: [phone]    Additional Information:

## 2018-09-18 NOTE — TELEPHONE ENCOUNTER
Awaiting procedure date.  IR messaged for appointment ASAP.     ----- Message from Denia Johnson MD sent at 9/11/2018  2:36 PM CDT -----  Labs reviewed in clinic, MELD 18.  Proceed to liver biopsy as previously discussed

## 2018-09-18 NOTE — TELEPHONE ENCOUNTER
Patient notified of positive PETH result and order for  follow-up.  Patient denies current use.  Advised of result and need for total abstinence.  Informed approval for transplant will be denied by insurance carrier and transplant committee with ongoing use.  Informed random testing will be conducted throughout the pre- and post-transplant phase.  Understanding expressed.  Encouraged to keep scheduled intake appointment with  [employer].  Understanding expressed.     ----- Message from Denia Johnson MD sent at 9/17/2018  3:43 PM CDT -----  Patient with positive PETH.  Needs social work follow-up

## 2018-09-20 NOTE — TELEPHONE ENCOUNTER
Called pt about social work  follow up left message to inform her appointment is for 9/27 at pm will call her back to follow up..

## 2018-09-20 NOTE — TELEPHONE ENCOUNTER
Call pt informed her about follow up patient didn't answer have reached out to her several times no answer...

## 2018-09-21 NOTE — PROGRESS NOTES
Orders entered and appointment card completed for clinic follow-up in 1 month (mid October) with labs SW follow-up

## 2018-09-24 NOTE — TELEPHONE ENCOUNTER
Called pt to to inform her about appointment pt didn't answer did leave message to inform pt her schedule time appointment.

## 2018-09-26 NOTE — DISCHARGE INSTRUCTIONS
For scheduling: Call Arina at [phone]    For questions or concerns call: LEONARDO MON-FRI 8 AM- 5PM [phone]. Radiology resident on call [phone].    For immediate concerns that are not emergent, you may call our radiology clinic at: [phone]

## 2018-09-26 NOTE — PROGRESS NOTES
Pt and spouse given all d/c instructions, pt and spouse verbalize understanding of this. Dressing to RUQ clean dry and intact. Pt stable at this time. IV d/c. Pt refusing wheelchair at this time, pt and spouse to ambulate to garage.

## 2018-09-26 NOTE — PROGRESS NOTES
Pt arrived to [facility] bay 3, dressing to right upper quad clean dry and intact. Report received from Guille. Will continue to monitor.

## 2018-09-26 NOTE — H&P
Radiology History & Physical      SUBJECTIVE:     Chief Complaint: preprocedure    History of Present Illness:  Annette Esparza is a 48 y.o. female who presents for image guided liver biopsy.   Past Medical History:   Diagnosis Date    Acute hypoxemic respiratory failure 2016    ANDREW (acute kidney injury) 2016    Alcohol dependence in remission     Alcoholic cirrhosis of liver with ascites 10/22/2015    Anxiety     Ascites due to alcoholic cirrhosis 2016    Centrilobular emphysema 2016    Cigarette nicotine dependence without complication 2016    Folate deficiency 10/22/2015    Gallstones     Hepatorenal syndrome 2016    Hyperbilirubinemia 2016    Hyponatremia 2016    Iron deficiency anemia due to chronic blood loss 10/22/2015    Portal hypertensive gastropathy 10/22/2015    Subclinical hypothyroidism 10/22/2015     Past Surgical History:   Procedure Laterality Date    ADENOIDECTOMY      APPENDECTOMY      BIOPSY N/A 10/30/2014    [facility]le [facility] CATH LAB    BIOPSY, LIVER, TRANSJUGULAR APPROACH N/A 8/15/2018    Performed by [facility] Diagnostic Provider [facility] OR [facility] FLR     SECTION      COLONOSCOPY      COLONOSCOPY N/A 2018    Procedure: COLONOSCOPY;  Surgeon: Jesus Whalen MD;  Location: [address]);  Service: Endoscopy;  Laterality: N/A;  2nd floor- pt has multiple comorbidities- requiring weekly paracentesis, now requires continuous home O2.  HCC/Cirrhosis, Variceal screening- labs ordered.  PM Prep    COLONOSCOPY N/A 2018    [facility]    COLONOSCOPY N/A 2014    [facility]    ESOPHAGOGASTRODUODENOSCOPY N/A 2018    Procedure: ESOPHAGOGASTRODUODENOSCOPY (EGD);  Surgeon: Jesus Whalen MD;  Location: [address]);  Service: Endoscopy;  Laterality: N/A;  2nd floor- pt has multiple  comorbidities- requiring weekly paracentesis, now requires continuous home O2.  HCC/Cirrhosis, Variceal screening- labs ordered.    ESOPHAGOGASTRODUODENOSCOPY (EGD) N/A 8/14/2018    [facility]    ESOPHAGOGASTRODUODENOSCOPY (EGD) N/A 6/3/2016    [facility]by Dixon [facility] ENDO    ESOPHAGOGASTRODUODENOSCOPY (EGD) N/A 1/2/2015    Performed by [facility] at [facility]    HYSTERECTOMY      25 yrs old    LIVER BIOPSY      OOPHORECTOMY Left     25 yrs old    OOPHORECTOMY Right     29 yrs old    TONSILLECTOMY      TRANSJUGULAR BIOPSY OF LIVER N/A 8/15/2018    Procedure: BIOPSY, LIVER, TRANSJUGULAR APPROACH;  Surgeon: Juan Diagnostic Provider;  Location: [facility] OR [address] [place] [address];  Service: Radiology;  Laterality: N/A;    TUBAL LIGATION      UPPER GASTROINTESTINAL ENDOSCOPY         Home Meds:   Prior to Admission medications    Medication Sig Start Date End Date Taking? Authorizing Provider   alendronate (FOSAMAX) 70 MG tablet Take 1 tablet (70 mg total) by mouth every 7 days. 8/29/18 8/29/19 Yes Denia Johnson MD   ascorbic acid, vitamin C, (VITAMIN C) 500 MG tablet Take 500 mg by mouth once daily.   Yes Historical Provider, MD   b complex vitamins tablet Take 1 tablet by mouth once daily.   Yes Historical Provider, MD   cyanocobalamin (VITAMIN B-12) 1000 MCG tablet Take 1 tablet (1,000 mcg total) by mouth once daily. 8/24/18  Yes Endy Pfeiffer MD   folic acid (FOLVITE) 1 MG tablet Take 1 tablet (1 mg total) by mouth once daily. 7/20/18 7/20/19 Yes Lissy Caputo DO   furosemide (LASIX) 80 MG tablet Take 1 tablet (80 mg total) by mouth 2 (two) times daily. 7/29/18 7/29/19 Yes Justino Edward MD   HYDROcodone-acetaminophen (NORCO)  mg per tablet Take 1 tablet by mouth every 12 (twelve) hours as needed for Pain. 8/30/18   Endy Pfeiffer MD   midodrine (PROAMATINE) 10 MG tablet Take 1.5 tablets (15 mg total) by mouth 3 (three) times  daily with meals. 7/29/18 7/29/19  Justino Edward MD   multivitamin (THERAGRAN) tablet Take 1 tablet by mouth once daily.    Historical Provider, MD   potassium 99 mg Tab Take 1 tablet by mouth once daily.     Historical Provider, MD   spironolactone (ALDACTONE) 100 MG tablet Take 2 tablets (200 mg total) by mouth once daily. 8/20/18 8/20/19  Endy Pfeiffer MD   vitamin D 1000 units Tab Take 1,000 Units by mouth once daily.    Historical Provider, MD     Anticoagulants/Antiplatelets: no anticoagulation    Allergies:   Review of patient's allergies indicates:   Allergen Reactions    Dilaudid [hydromorphone] Hallucinations     Sedation History:  no adverse reactions    Review of Systems:   Hematological: no known coagulopathies  Respiratory: no shortness of breath  Cardiovascular: no chest pain  Gastrointestinal: no abdominal pain  Genito-Urinary: no dysuria  Musculoskeletal: negative  Neurological: no TIA or stroke symptoms         OBJECTIVE:     Vital Signs (Most Recent)  Pulse: 78 (09/26/18 0805)  Resp: 16 (09/26/18 0805)  BP: (!) 95/57 (09/26/18 0805)  SpO2: 96 % (09/26/18 0805)    Physical Exam:  ASA: 2  Mallampati: 2  General: no acute distress  Mental Status: alert and oriented to person, place and time  HEENT: normocephalic, atraumatic  Chest: unlabored breathing  Heart: regular heart rate  Abdomen: nondistended  Extremity: moves all extremities    Laboratory  Lab Results   Component Value Date    INR 1.2 09/11/2018       Lab Results   Component Value Date    WBC 7.18 09/11/2018    HGB 10.2 (L) 09/11/2018    HCT 31.4 (L) 09/11/2018     (H) 09/11/2018     (L) 09/11/2018      Lab Results   Component Value Date    GLU 79 09/11/2018     (L) 09/11/2018    K 4.3 09/11/2018    CL 93 (L) 09/11/2018    CO2 25 09/11/2018    BUN 19 09/11/2018    CREATININE 0.8 09/11/2018    CALCIUM 9.9 09/11/2018    MG 1.3 (L) 07/29/2018    ALT 25 09/11/2018    AST 25 09/11/2018    ALBUMIN 3.3 (L) 09/11/2018     BILITOT 2.5 (H) 09/11/2018    BILIDIR 7.4 (H) 07/19/2018       ASSESSMENT/PLAN:     Sedation Plan: Moderate sedation.  Patient will undergo image guided liver biopsy. Informed consent obtained.    Poncho Aldana MD  Interventional Radiology PGY-2  [facility]

## 2018-09-26 NOTE — PROGRESS NOTES
Procedure complete, pt tolerates well.  DSD applied to procedure site, c/d/i.  Right side down until 1010am  VSS.  Pt to [facility] for recovery.

## 2018-09-26 NOTE — PROGRESS NOTES
Pt arrives to  via stretcher for a liver biopsy.  Transferred to table without issue, cardiac monitor, BP cuff, sp02 applied. Consents on chart.

## 2018-09-27 NOTE — DISCHARGE SUMMARY
Radiology Discharge Summary      Hospital Course: No complications    Admit Date: 9/26/2018  Discharge Date: 09/26/2018     Instructions Given to Patient: Yes  Diet: Resume prior diet  Activity: activity as tolerated and no driving for today    Description of Condition on Discharge: Stable  Vital Signs (Most Recent): Temp: 98.1 °F (36.7 °C) (09/26/18 0915)  Pulse: 87 (09/26/18 1215)  Resp: 16 (09/26/18 1215)  BP: (!) 91/55 (09/26/18 1215)  SpO2: 96 % (09/26/18 1215)    Discharge Disposition: Home    Discharge Diagnosis:     Cirrhosis    Procedure: right liver lesion biopsy     Follow-up:   Per referring physician    Regino Washington MD  Department of Radiology  Pager: [phone]

## 2018-09-27 NOTE — PROCEDURES
Radiology Post-Procedure Note    Pre Op Diagnosis: right liver lesion    Post Op Diagnosis: right liver lesion    Procedure: right liver biopsy    Procedure performed by: Regino Washington MD    Written Informed Consent Obtained: Yes    Specimen Removed: YES 7 x 20 gauge    Estimated Blood Loss: Minimal    Findings:   Using CT guidance a 19g sheath needle was placed into ill defined right liver lesion. 20g monopty biopsy gun used to take 7 core biopsy samples. Specimen sent to pathology.     Patient tolerated procedure well.    Regino Washington MD  Department of Radiology  Pager: [phone]

## 2018-10-04 NOTE — TELEPHONE ENCOUNTER
Liver Pathology Conference:    Rt. Lobe Targeted Liver Biopsy(CT guided: scar/ cirrhosis/ bile plugging/  No definate malignancy

## 2018-10-04 NOTE — TELEPHONE ENCOUNTER
IR Liver Pathology Conference Note    Patient:  Annette Esparza  MRN:   [id number]  YOB: 1970  Date of Transplant:  N/A  Native Diagnosis: Primary Liver Malignancy: Hepatoma (HCC) and Cirrhosis    Discussion/Plan:    Presenter: Hepatologist Allison Johnson    Reason for presenting: diagnosis confirmation    Concerns for Pathologists: Patient with abnormal cross sectional imaging by CT and MRI but not consistent with HCC by LIRADs criteria.  Transjugular biopsy negative for malignancy but not targeted approach.  If right lobe infiltrative with HCC, then patient would be outside [place] criteria and inappropriate for OLT.       Lab Results  WBC (K/uL)   Date Value   09/11/2018 7.18   08/17/2018 7.18   08/14/2018 5.06     PLT (K/uL)   Date Value   09/11/2018 130 (L)   08/17/2018 178   08/14/2018 150     INR (no units)   Date Value   09/11/2018 1.2   08/17/2018 1.2   08/14/2018 1.3 (H)     AST (U/L)   Date Value   09/11/2018 25     ALT (U/L)   Date Value   09/11/2018 25   08/17/2018 30   08/14/2018 26     BILITOT (mg/dL)   Date Value   09/11/2018 2.5 (H)   08/17/2018 4.3 (H)   08/14/2018 3.9 (H)     ALKPHOS (U/L)   Date Value   09/11/2018 182 (H)   08/17/2018 226 (H)   08/14/2018 224 (H)     CREATININE (mg/dL)   Date Value   09/11/2018 0.8   08/17/2018 1.0   08/14/2018 0.9     AFP (ng/mL)   Date Value   07/26/2018 3.5   06/02/2016 3.9   04/20/2016 8.1     CMVIGGINTERP (no units)   Date Value   07/26/2018 Reactive (A)

## 2018-10-09 PROBLEM — R10.11 RUQ PAIN: Status: ACTIVE | Noted: 2018-01-01

## 2018-10-09 PROBLEM — K74.60 DECOMPENSATED HEPATIC CIRRHOSIS: Status: RESOLVED | Noted: 2018-07-23 | Resolved: 2018-01-01

## 2018-10-09 PROBLEM — D53.9 MACROCYTIC ANEMIA: Status: ACTIVE | Noted: 2018-01-01

## 2018-10-09 PROBLEM — Z01.818 PRE-TRANSPLANT EVALUATION FOR LIVER TRANSPLANT: Status: RESOLVED | Noted: 2018-07-31 | Resolved: 2018-01-01

## 2018-10-09 PROBLEM — N17.9 AKI (ACUTE KIDNEY INJURY): Status: ACTIVE | Noted: 2018-01-01

## 2018-10-09 PROBLEM — K72.90 DECOMPENSATED HEPATIC CIRRHOSIS: Status: RESOLVED | Noted: 2018-07-23 | Resolved: 2018-01-01

## 2018-10-09 PROBLEM — R79.89 ELEVATED SERUM CREATININE: Status: ACTIVE | Noted: 2018-01-01

## 2018-10-09 PROBLEM — D69.6 THROMBOCYTOPENIA: Status: RESOLVED | Noted: 2018-07-24 | Resolved: 2018-01-01

## 2018-10-09 NOTE — PROGRESS NOTES
Transplant Hepatology Follow-up Note    Chief complaint: abdominal pain, in evaluation for liver transplant     HPI:  Annette Espraza is a 48 y.o. female that presents to hepatology clinic for evaluation of RUQ abdominal pain in setting of decompensated alcoholic cirrhosis.  She is alone.     Reports RUQ pain, initially started post mass guided liver biopsy, improved slightly (rated 5/10) for approx 1 week, then increased in severity and frequency approx 1.5 weeks ago, now reports 10/10 RUQ pain that radiates to back and right shoulder x2 days. Patient currently crying for total duration of visit due to significant pain. Reports taking Norco and ibuprofen without any improvement    Patient first learned of the presence of liver disease in 9/2014.  Given uncertainty regarding presence of cirrhosis, she underwent liver biopsy in 10/2014 which confirmed the presence of cirrhosis.  Serologic w/u was negative and etiology is alcoholic liver disease.  The patient continued to consume alcohol until 11/2016.  She was initially seen in transplant clinic in 1/2017 after a hospitalization in early December 2016.  At that time she had recently discontinued alcohol and had improving MELD from 16 to 8.  Not deemed a transplant candidate and recommended for 3 month follow-up, which did not occur.    It appears the patient was stable until 7/2018.  At that time she presented to [facility] with E coli UTI and bacteremia precipitating decompensation with increased lethargy, ascites, and HE.  She was treated for 4 days and ultimately left AMA.  She presented to [facility] 4 days after leaving AMA due to continued decline.  Patient completed therapy for infection and inpatient transplant evaluation was initiated.  During evaluation noted to have concerning liver mass for HCC.      Regarding liver mass, CT on 7/25/18 with ill defined 4.4cm mass concerning for but not diagnostic for HCC.  MRI obtained 3 days later and no defined mass seen and  "concern for infiltrative HCC.  Transjugular biopsy of right lobe was pursued and negative for malignancy, but not targeted approach.  Concern was If right lobe infiltrative with HCC, then patient would be outside [place] criteria and inappropriate for OLT. Therefore, CT guided biopsy done 9/26/18 in IR. Image-guided biopsy of ill-defined right hepatic lobe lesion done and submitted to PATH conference. Path conference 10/4/18: Rt. Lobe Targeted Liver Biopsy(CT guided: scar/ cirrhosis/ bile plugging/  No definate malignancy   AFP is normal (3.5) 7/26/18    Complications:   1. Ascites: well controlled on Lasix 80 mg BID, [place] 200 mg daily, taking OTC potassium supplement. No ascites or BLE edema on exam. Reports was having Paracentesis frequently for month before initial visit, last paracentesis in 8/2/18, markedly improved/controlled with diuretic therapy. Attempting to follow low Na diet   2. HE : not taking Lactulose, last dose 3 months ago. Denies confusion, disorientation, memory loss, altered sleep/wake cycle     Denies any variceal bleeding in past     Cirrhosis Health Maintenance:   -- Last EGD - EGD 8/2018, PHG. Due for next EGD 8/2019  -- Last colonoscopy : Colonoscopy: 8/2018 - 5 year surveillance for 3mm TA  (2021)  -- HCC screening   CT from 7/2018, see above   AFP WNL 7/2018, see above  -- + Immunity to Hep A and B     Alcohol use:   + PETH 9/11/18 (139), upcoming  appt. Per chart notes, pt instructed to keep scheduled intake appointment with  [facility]    Pt reports liqour intake ~6-8 drinks within few days in early September, denies any alcohol use at all since 1st week in Sept. Pt with alcohol intake despite counseling to abstain from alcohol, reports "I couldn't take the stress"    Upcoming PETH repeat planned with return visit     Patient reports she is awaiting appt with  [facility]. Intake last Wednesday  [facility], was instructed they " "would "try to expedite it and would contact her". Pt is unsure of details of program yet but was recommended to participate in IOP by .     Patient Active Problem List   Diagnosis    Gallstones    Alcohol use disorder, severe, in early remission    Anemia of chronic disease    Vitamin D deficiency    Folate deficiency    Iron deficiency anemia due to chronic blood loss    Portal hypertensive gastropathy    Alcoholic cirrhosis of liver with ascites    Subclinical hypothyroidism    Anxiety    Cigarette nicotine dependence without complication    Hyperbilirubinemia    Ascites    Mild single current episode of major depressive disorder    Oral thrush    Hypotension    Hyponatremia    General weakness    Hepatic encephalopathy    Hypomagnesemia    Normocytic anemia    Hypophosphatemia    Decompensated hepatic cirrhosis    Thrombocytopenia    Severe malnutrition    HCC (hepatocellular carcinoma)    Pre-transplant evaluation for liver transplant    Panlobular emphysema       Past Medical History:   Diagnosis Date    Acute hypoxemic respiratory failure 12/1/2016    ANDREW (acute kidney injury) 11/28/2016    Alcohol dependence in remission     Alcoholic cirrhosis of liver with ascites 10/22/2015    Anxiety     Ascites due to alcoholic cirrhosis 6/13/2016    Centrilobular emphysema 12/12/2016    Cigarette nicotine dependence without complication 6/2/2016    Folate deficiency 10/22/2015    Gallstones     Hepatorenal syndrome 11/29/2016    Hyperbilirubinemia 6/13/2016    Hyponatremia 11/29/2016    Iron deficiency anemia due to chronic blood loss 10/22/2015    Portal hypertensive gastropathy 10/22/2015    Subclinical hypothyroidism 10/22/2015       Past Surgical History:   Procedure Laterality Date    ADENOIDECTOMY      APPENDECTOMY      BIOPSY N/A 10/30/2014    [facility]    BIOPSY, LIVER, TRANSJUGULAR APPROACH N/A 8/15/2018    " [facility]     SECTION      COLONOSCOPY      COLONOSCOPY N/A 2018    Procedure: COLONOSCOPY;  Surgeon: Jesus Whalen MD;  Location: [facility] (2ND FLR);  Service: Endoscopy;  Laterality: N/A;  2nd floor- pt has multiple comorbidities- requiring weekly paracentesis, now requires continuous home O2.  HCC/Cirrhosis, Variceal screening- labs ordered.  PM Prep    COLONOSCOPY N/A 2018    Performed by Jesus Whalen MD at [facility] (2ND FLR)    COLONOSCOPY N/A 2014    [facility] at [facility]    ESOPHAGOGASTRODUODENOSCOPY N/A 2018    Procedure: ESOPHAGOGASTRODUODENOSCOPY (EGD);  Surgeon: Jesus Whalen MD;  Location: [facility] (2ND FLR);  Service: Endoscopy;  Laterality: N/A;  2nd floor- pt has multiple comorbidities- requiring weekly paracentesis, now requires continuous home O2.  HCC/Cirrhosis, Variceal screening- labs ordered.    ESOPHAGOGASTRODUODENOSCOPY (EGD) N/A 2018    Performed by Jesus Whalen MD at [facility] (2ND FLR)    ESOPHAGOGASTRODUODENOSCOPY (EGD) N/A 6/3/2016    Performed [facility] Dixon [facility] ENDO    ESOPHAGOGASTRODUODENOSCOPY (EGD) N/A 2015    [facility] at [facility]    HYSTERECTOMY      25 yrs old    LIVER BIOPSY      OOPHORECTOMY Left     25 yrs old    OOPHORECTOMY Right     29 yrs old    TONSILLECTOMY      TRANSJUGULAR BIOPSY OF LIVER N/A 8/15/2018    Procedure: BIOPSY, LIVER, TRANSJUGULAR APPROACH;  Surgeon: [facility] Diagnostic Provider;  [facility];  Service: Radiology;  Laterality: N/A;    TUBAL LIGATION      UPPER GASTROINTESTINAL ENDOSCOPY         Family History   Problem Relation Age of Onset    Rheum arthritis Father     Cancer Mother     No Known Problems Sister     No Known Problems Brother     Colon cancer Neg Hx        Social History     Socioeconomic History    Marital status:      Spouse  name: None    Number of children: None    Years of education: None    Highest education level: None   Social Needs    Financial resource strain: None    Food insecurity - worry: None    Food insecurity - inability: None    Transportation needs - medical: None    Transportation needs - non-medical: None   Occupational History    None   Tobacco Use    Smoking status: Former Smoker     Packs/day: 1.00     Years: 35.00     Pack years: 35.00     Types: Cigarettes    Smokeless tobacco: Never Used   Substance and Sexual Activity    Alcohol use: No     Alcohol/week: 0.0 oz     Comment: beer intake last ~2017, liquour last drink 9/2018    Drug use: No    Sexual activity: Yes     Partners: Male   Other Topics Concern    None   Social History Narrative    None   Patient currently residing with boydriend.  Quit tobacco 7/2018, denies alcohol use currently but last alcohol drink 1st week in September, despite extensive counseling for sobriety per chart notes    Current Outpatient Medications   Medication Sig Dispense Refill    alendronate (FOSAMAX) 70 MG tablet Take 1 tablet (70 mg total) by mouth every 7 days. 4 tablet 11    ascorbic acid, vitamin C, (VITAMIN C) 500 MG tablet Take 500 mg by mouth once daily.      b complex vitamins tablet Take 1 tablet by mouth once daily.      cyanocobalamin (VITAMIN B-12) 1000 MCG tablet Take 1 tablet (1,000 mcg total) by mouth once daily. 90 tablet 3    folic acid (FOLVITE) 1 MG tablet Take 1 tablet (1 mg total) by mouth once daily. 30 tablet 11    furosemide (LASIX) 40 MG tablet       furosemide (LASIX) 80 MG tablet Take 1 tablet (80 mg total) by mouth 2 (two) times daily. 60 tablet 11    HYDROcodone-acetaminophen (NORCO)  mg per tablet Take 1 tablet by mouth every 12 (twelve) hours as needed for Pain. 60 tablet 0    lactulose (CHRONULAC) 10 gram/15 mL solution       midodrine (PROAMATINE) 10 MG tablet Take 1.5 tablets (15 mg total) by mouth 3 (three) times  "daily with meals. 90 tablet 3    multivitamin (THERAGRAN) tablet Take 1 tablet by mouth once daily.      potassium 99 mg Tab Take 1 tablet by mouth once daily.       spironolactone (ALDACTONE) 100 MG tablet Take 2 tablets (200 mg total) by mouth once daily. 180 tablet 3    vitamin D 1000 units Tab Take 1,000 Units by mouth once daily.       No current facility-administered medications for this visit.        Review of patient's allergies indicates:   Allergen Reactions    Morphine Nausea And Vomiting       Review of Systems   Constitutional: Positive for malaise/fatigue. Negative for chills, fever and weight loss.   HENT: Negative for congestion, sinus pain and sore throat.    Eyes: Negative.    Respiratory: Negative for cough, shortness of breath and wheezing.    Cardiovascular: Negative for chest pain and leg swelling.   Gastrointestinal: Positive for abdominal pain (RUQ abd pain, worsening in past 2 days, currently 10/10 pain, radiates to right shoulder and back). Negative for blood in stool, constipation, diarrhea, heartburn, nausea and vomiting.   Genitourinary: Positive for flank pain. Negative for dysuria, frequency, hematuria and urgency.   Musculoskeletal: Negative for joint pain and myalgias.   Skin: Negative for itching and rash.   Neurological: Negative for dizziness, focal weakness, weakness and headaches.   Endo/Heme/Allergies: Does not bruise/bleed easily.   Psychiatric/Behavioral: Negative for depression. The patient is nervous/anxious.        Vitals:    10/09/18 1326   BP: 96/63   Pulse: 72   Resp: 18   Temp: 98 °F (36.7 °C)   TempSrc: Oral   SpO2: 98%   Weight: 51.1 kg (112 lb 10.5 oz)   Height: 5' 1" (1.549 m)       Physical Exam   Constitutional: She is oriented to person, place, and time. She appears well-developed. No distress.   Thin female   HENT:   Head: Normocephalic and atraumatic.   Mouth/Throat: Oropharynx is clear and moist. No oropharyngeal exudate.   Eyes: EOM are normal. Pupils " are equal, round, and reactive to light. No scleral icterus.   Neck: Normal range of motion. No thyromegaly present.   Cardiovascular: Normal rate, regular rhythm and normal heart sounds. Exam reveals no gallop and no friction rub.   No murmur heard.  Pulmonary/Chest: Effort normal. No respiratory distress. She has no wheezes. She has no rales.   Decreased right lower lobe breath sounds    Abdominal: Soft. Bowel sounds are normal. She exhibits no distension. There is no tenderness. There is no rebound and no guarding.   Genitourinary:   Genitourinary Comments: + right CVA tenderness   Musculoskeletal: Normal range of motion. She exhibits no edema.   Lymphadenopathy:     She has no cervical adenopathy.   Neurological: She is alert and oriented to person, place, and time. No cranial nerve deficit.   Mental status normal, no asterixis   Skin: Skin is warm and dry. No rash noted.   Psychiatric: She has a normal mood and affect. Her behavior is normal.   Vitals reviewed.      Lab Results   Component Value Date    ALT 21 10/09/2018    AST 21 10/09/2018     (H) 09/30/2014    ALKPHOS 175 (H) 10/09/2018    BILITOT 1.4 (H) 10/09/2018       Lab Results   Component Value Date    WBC 8.92 10/09/2018    HGB 10.5 (L) 10/09/2018    HCT 32.2 (L) 10/09/2018     (H) 10/09/2018     10/09/2018       Lab Results   Component Value Date     (L) 10/09/2018    K 3.9 10/09/2018    CL 98 10/09/2018    CO2 22 (L) 10/09/2018    BUN 26 (H) 10/09/2018    CREATININE 1.6 (H) 10/09/2018    CALCIUM 9.6 10/09/2018    ANIONGAP 12 10/09/2018    ESTGFRAFRICA 43.3 (A) 10/09/2018    EGFRNONAA 37.6 (A) 10/09/2018     MELD-Na score: 18 at 10/9/2018 12:05 PM  MELD score: 14 at 10/9/2018 12:05 PM  Calculated from:  Serum Creatinine: 1.6 mg/dL at 10/9/2018 12:05 PM  Serum Sodium: 132 mmol/L at 10/9/2018 12:05 PM  Total Bilirubin: 1.4 mg/dL at 10/9/2018 12:05 PM  INR(ratio): 1.2 at 10/9/2018 12:05 PM  Age: 48 years    Assessment:  48  y.o. female with alcoholic cirrhosis presenting for RUQ abd pain     Plan:  1. RUQ abd pain, recent h/o liver biopsy 9/26/18  -- taking Norco and ibuprofen without any improvement  -- instructed not to take Ibuprofen with liver disease  -- Reports RUQ pain, initially started post mass guided liver biopsy, improved slightly (rated 5/10) for approx 1 week, then increased in severity and frequency approx 1.5 weeks ago, now reports 10/10 RUQ pain that radiates to back and right shoulder x2 days. Patient currently crying for total duration of visit due to significant pain     2. Elevated creatinine   -- Creatinine 1.6 today   -- denies any urinary symptoms, hematuria  -- + right CVA tenderness  -- to assess fully after etiology of abd pain determined in ED, trend labs. Until etiology determined, decrease Lasix to 80 mg qAM, 40 mg qPM. Continue [place] 200 mg daily. D/c OTC KCL supplement. May need to decrease current diuretic dosing further given future trend in labs    3.  HCC:  Regarding liver mass, CT on 7/25/18 with ill defined 4.4cm mass concerning for but not diagnostic for HCC.  MRI obtained 3 days later and no defined mass seen and concern for infiltrative HCC.  Transjugular biopsy of right lobe was pursued and negative for malignancy, but not targeted approach.  Concern was If right lobe infiltrative with HCC, then patient would be outside [place] criteria and inappropriate for OLT. Therefore, CT guided biopsy done 9/26/18 in IR. Image-guided biopsy of ill-defined right hepatic lobe lesion done and submitted to PATH conference. Path conference 10/4/18: Rt. Lobe Targeted Liver Biopsy(CT guided: scar/ cirrhosis/ bile plugging/  No definate malignancy   AFP is normal (3.5) 7/26/18  Will determine plan for further HCC screening imaging at upcoming appt in 2 weeks    4. . Decompensated cirrhosis: MELD 18 and based on current symptoms, medical justification for consideration of OLT.  Continue current diuretic dosing and  "low sodium diet.  Not requiring LVP at this time.      5.  Alcohol abuse:  Patient admits to alcohol use in early Sept, despite extensive counseling to abstain.   -- Pt reports liqour intake ~6-8 drinks within few days in early September, denies any alcohol use at all since 1st week in Sept. Pt with alcohol intake despite counseling to abstain from alcohol, reports "I couldn't take the stress"  -- Upcoming PETH repeat planned with return visit   -- She is currently attending AA since before relapse. Patient reports she is awaiting appt with  [facility]. Intake last Wednesday  [facility], was instructed they would "try to expedite it and would contact her". Pt is unsure of details of program yet but was recommended to participate in IOP by .     6.  Osteoporosis:  On therapy with vitamin D, calcium and fosamax     7. Hypotension  -- on midodrine 15 mg TID  -- BP stable    Discussed case with Dr. Johnson, arranged to obtain ASAP US today but, per coordinator report, patient was unable to schedule US until tomorrow due to scheduling conflicts related to sharing a car with her SO. US pending for tomorrow. Pt currently reports 10/10 pain for 48 hours, crying in exam room in pain. Given multiple possible differentials (pleural effusion, renal etiology, biliary versus post biopsy complication), recommend evaluation ASAP, recommended to patient to present to ED, clinic nurse to escort pt to ED. Pt reports she cannot present to ED currently. Long discussion with patient about health priorities, pt reporting significant pain and desires w/u asap, yet cannot schedule ASAP outpatient imaging or present to ED currently. Pt reports she will return home currently and have parents bring her to the ED asap, sometime this afternoon. Chart forwarded to JUSTEN Pena, transplant coordinator. If patient admitted, recommend inpatient addiction psych eval.     F/u as scheduled with Dr. Johnson 10/23 with " labs before, including repeat PETH

## 2018-10-09 NOTE — PATIENT INSTRUCTIONS
Please report to your visit this afternoon as scheduled. Report to the emergency room if you experience any worsening symptoms prior to visit.      If your condition worsens or fails to improve we recommend that you receive another evaluation at the emergency room immediately or contact your primary medical clinic to discuss your concerns.   You must understand that you have received an Urgent Care treatment only and that you may be released before all of your medical problems are known or treated. You, the patient, will arrange for follow up care as instructed.

## 2018-10-09 NOTE — ED NOTES
"Patient identifiers verified and correct for MS Giang  C/C: Pain to RUQ abd pain  radiating to shoulder and back, abnormal labs  APPEARANCE: awake and alert in NAD.  SKIN: warm, dry and intact. No breakdown or bruising.  MUSCULOSKELETAL: Patient moving all extremities spontaneously, no obvious swelling or deformities noted. Ambulates independently.  RESPIRATORY: Denies shortness of breath.Respirations unlabored.   CARDIAC: Denies CP, 2+ distal pulses; no peripheral edema  ABDOMEN: ABdomen soft, tender to LUQ, denies fever, denies nausea, Bm yesterday "normal"   : voids spontaneously, denies difficulty  Neurologic: AAO x 4; follows commands equal strength in all extremities; denies numbness/tingling. Denies dizziness Denies weakness    "

## 2018-10-09 NOTE — TELEPHONE ENCOUNTER
Call received from MANUELITO Washington with [facility].  Says patient presents with c/o RUQ pain radiating to the right shoulder, which started after a liver biopsy performed 9/26/18.  Requesting an clinic appointment for further assessment.      Appointment scheduled 10/9 @ 1:40p with LEIGHTON Butcher NP.

## 2018-10-09 NOTE — PROGRESS NOTES
"Subjective:       Patient ID: Annette Esparza is a 48 y.o. female.    Vitals:  height is 5' 1" (1.549 m) and weight is 48.5 kg (107 lb). Her temperature is 98.4 °F (36.9 °C). Her blood pressure is 89/58 (abnormal) and her pulse is 83. Her respiration is 18 and oxygen saturation is 98%.     Chief Complaint: Flank Pain (Right Side )    Patient states that she had a liver biopsy in September and two days after she started having right sided pain. Patient states that it starts at her liver and goes to her shoulder down to her mid back.       Flank Pain   This is a new problem. The current episode started 1 to 4 weeks ago. The problem occurs constantly. The problem has been gradually worsening since onset. The quality of the pain is described as burning and aching. Radiates to: Right Shoulder and Mid Back  The pain is at a severity of 8/10. The pain is severe. The pain is the same all the time. Pertinent negatives include no abdominal pain, chest pain, fever or headaches. Treatments tried: Norco and Ibuprofen  The treatment provided no relief.     Review of Systems   Constitution: Negative for chills and fever.   HENT: Negative for sore throat.    Eyes: Negative for blurred vision.   Cardiovascular: Negative for chest pain.   Respiratory: Negative for shortness of breath.    Skin: Negative for rash.   Musculoskeletal: Negative for back pain and joint pain.   Gastrointestinal: Negative for abdominal pain, diarrhea, nausea and vomiting.   Genitourinary: Positive for flank pain.   Neurological: Negative for headaches.   Psychiatric/Behavioral: The patient is not nervous/anxious.        Objective:      Physical Exam   Constitutional: She is oriented to person, place, and time. She appears well-developed and well-nourished. She appears distressed.   Tearful throughout visit.   HENT:   Head: Normocephalic and atraumatic.   Right Ear: External ear normal.   Left Ear: External ear normal.   Nose: Nose normal.   Eyes: EOM " are normal. Right eye exhibits no discharge. Left eye exhibits no discharge.   Neck: Normal range of motion. Neck supple.   Cardiovascular: Normal rate, regular rhythm and normal heart sounds. Exam reveals no gallop and no friction rub.   No murmur heard.  Pulmonary/Chest: Effort normal and breath sounds normal. No stridor. No respiratory distress. She has no decreased breath sounds. She has no wheezes. She has no rhonchi. She has no rales.   Abdominal: She exhibits distension and ascites. There is generalized tenderness and tenderness in the right upper quadrant. There is rigidity.   Musculoskeletal: Normal range of motion.   Neurological: She is alert and oriented to person, place, and time.   Skin: Skin is warm and dry. No rash noted. She is not diaphoretic. No erythema.   Psychiatric: She has a normal mood and affect. Her behavior is normal.   Nursing note and vitals reviewed.      Assessment:       1. RUQ pain        Plan:       Discussed with Dr. Locke. Called pts transplant team to discuss.  Was able to schedule patient for this afternoon with her transplant team.  RUQ pain      Patient Instructions   Please report to your visit this afternoon as scheduled. Report to the emergency room if you experience any worsening symptoms prior to visit.      If your condition worsens or fails to improve we recommend that you receive another evaluation at the emergency room immediately or contact your primary medical clinic to discuss your concerns.   You must understand that you have received an Urgent Care treatment only and that you may be released before all of your medical problems are known or treated. You, the patient, will arrange for follow up care as instructed.

## 2018-10-09 NOTE — TELEPHONE ENCOUNTER
Call placed to patient to follow on message received from PA.  Patient admits to c/o RUQ pain (8/10) radiating to right shoulder/neck, which started ~3 days after biopsy.  Denies complaints of respiratory or chest pain/discomfort.  Advised of labs and office visit scheduled today at noon and 1340; instructed to report to the ED for worsening or onset of new symptoms.  Understanding expressed.    Patient states she completed the intake process with  [facility] Service Dept on 10/3 and is awaiting an appointment with the psychiatrist.      Advised of biopsy results/review and that her coordinator, moving forward, will be JUSTEN Pena RN. Understanding expressed.     Report given to JUSTEN Pena RN

## 2018-10-09 NOTE — ED TRIAGE NOTES
Patient states RUQ pain to shoulder and mid back after biopsy 2 weeks PTA.. States she had labs this am, kidney numbers were low, sent to ED by transplant NP. Norco 10 mg this am with no relief.

## 2018-10-09 NOTE — LETTER
October 9, 2018        Ger Aguilar  [address]  [facility] [address]  Phone: [phone]  Fax: [phone]             [facility]  [address]  [facility] [address]  Phone: [phone]   Patient: Annette Esparza   MR Number: [id number]   YOB: 1970   Date of Visit: 10/9/2018       Dear Dr. Ger Aguilar    Thank you for referring Annette Esparza to me for evaluation. Attached you will find relevant portions of my assessment and plan of care.    If you have questions, please do not hesitate to call me. I look forward to following Annette Esparza along with you.    Sincerely,    Hyacinth Butcher NP    Enclosure    If you would like to receive this communication electronically, please contact [email or web address] or [phone] to request [facility] [employer] access.    [facility] is a tool which provides read-only access to select patient information with whom you have a relationship. Its easy to use and provides real time access to review your patients record including encounter summaries, notes, results, and demographic information.    If you feel you have received this communication in error or would no longer like to receive these types of communications, please e-mail [email or web address]

## 2018-10-10 NOTE — ASSESSMENT & PLAN NOTE
ASSESSMENT     Annette Esparza is a 48 y.o. female with a past psychiatric history of alcohol use disorder, severe, who presented to [facility] on 10/9/2018 from hepatology clinic for worsening RUQ pain and ANDREW on labs. Pt was evaluated for liver transplant candidacy in July 2018 and deemed high risk, with requirement to abstain from alcohol, complete IOP, and attending regular AA meetings. Since that time, pt has been unable to remain abstinent from EtOH (most recent relapse, September 2018), has not completed IOP, but does claim to have attended intermittent AA meetings.    IMPRESSION  Alcohol use disorder, severe    RECOMMENDATION(S)     1. Scheduled Medication(s):  None at this time    2. PRN Medication(s):  None at this time    3. Other:  · HIGH RISK at this time  · Recommend serial PETH testing  · Recommend serial urine toxicology screening  · Recommend completion of day program and continued attendance in AA, prior to re-evaluation as an acceptable transplant candidate

## 2018-10-10 NOTE — ASSESSMENT & PLAN NOTE
decompensated etoh cirrhosis (follows in our hepatology clinic) who presented to ER from hepatology clinic for worsening RUQ pain and ANDREW on labs. She has improved overnight.  CT abdomen without post biopsy changes.  No evidence of malignancy on biopsy.    Counseled extensively on etoh abstinence, which she expressed understanding.     Recs:  Follow up blood cultures  Addiction psych consultation  IVFs and albumin for volume expansion, treatment of ANDREW  Pain control per primary    Likely home today

## 2018-10-10 NOTE — PLAN OF CARE
Endy Pfeiffer MD  [address] / [place] [address]    Future Appointments   Date Time Provider Department Center   10/10/2018 12:45 PM KN MAMMO1 [facility] MAMMO [facility]   10/23/2018  8:05 AM LAB, TRANSPLANT [facility] LABTX [facility]   10/23/2018  9:30 AM Denia Johnson MD [facility] LIVERTX [facility]   10/23/2018 10:30 AM LIVER,  TRANSPLANT [facility]   2/22/2019  9:10 AM INJECTION, INFECTIOUS DISEASES [facility] ID INJ [facility]   3/22/2019  9:00 AM LAB, APPOINTMENT [facility] LAB VNP [id number] - [place] - [address]  [address]  [place] [address]  Phone: [phone] Fax: [phone]      Payor: MEDICAID / Plan: [facility] [employer] [facility] / Product Type: Managed Medicaid /        10/10/18 1232   Discharge Assessment   Assessment Type Discharge Planning Assessment   Confirmed/corrected address and phone number on facesheet? Yes   Assessment information obtained from? Patient   Expected Length of Stay (days) 3   Communicated expected length of stay with patient/caregiver yes   Prior to hospitilization cognitive status: Alert/Oriented   Prior to hospitalization functional status: Independent   Current cognitive status: Alert/Oriented   Current Functional Status: Independent   Lives With significant other   Able to Return to Prior Arrangements yes   Is patient able to care for self after discharge? Yes   Who are your caregiver(s) and their phone number(s)? (Orestes  significant other [phone])   Patient's perception of discharge disposition home or selfcare   Patient currently receives any other outside agency services? No   Equipment Currently Used at Home none   Does the patient have transportation home? Yes   Transportation Available family or friend will provide   Discharge Plan A Home   Discharge Plan B Home;Home Health   Patient/Family In Agreement With Plan yes
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
## 2018-10-10 NOTE — PLAN OF CARE
Problem: Occupational Therapy Goal  Goal: Occupational Therapy Goal  Pt is not currently displaying a need for acute OT services. D/C acute OT services and recommend pt D/C home no needs.    Comments: Aakash Navarro OTR/L  10/10/2018

## 2018-10-10 NOTE — PT/OT/SLP EVAL
Physical Therapy Evaluation and Discharge Note    Patient Name:  Annette Esparza   MRN:  [id number]    Recommendations:     Discharge Recommendations:  home   Discharge Equipment Recommendations: none   Barriers to discharge: None    Assessment:     Annette Esparza is a 48 y.o. female admitted with a medical diagnosis of ANDREW (acute kidney injury). .  At this time, patient is functioning at their prior level of function and does not require further acute PT services.     Recent Surgery: * No surgery found *      Plan:     During this hospitalization, patient does not require further acute PT services.  Please re-consult if situation changes.      Subjective     Chief Complaint: none  Patient/Family Comments/goals: pt states that she wants to get back to her exercise program  Pain/Comfort:  · Pain Rating 1: 0/10  · Pain Rating Post-Intervention 1: 0/10    Patients cultural, spiritual, Jehovah's witness conflicts given the current situation: none stated    Living Environment:  Pt lives in a 2nd story apartment with her boyfriend.   Prior to admission, patients level of function was ( I), driving but not working. Pt states she was ( I) with all ADLs.  Equipment used at home: none.  DME owned (not currently used): none.  Upon discharge, patient will have assistance from boyfriend and family as needd.    Objective:     Communicated with RN prior to session.  Patient found supine upon PT entry to room found with: (telemetry; peripheral IV)     General Precautions: Standard, fall   Orthopedic Precautions:N/A   Braces: N/A     Exams:  · Cognitive Exam:  Patient is oriented to Person, Place, Time and Situation  · Fine Motor Coordination: -       Intact  · Gross Motor Coordination:  WFL  · Postural Exam:  Patient presented with the following abnormalities: -       No postural abnormalities identified  · Sensation: -       Intact  · Skin Integrity/Edema:  -       Skin integrity: Visible skin intact  · RLE ROM: WFL  · RLE  Strength: WFL  · LLE ROM: WFL  · LLE Strength: WFL    Functional Mobility:  · Bed Mobility:  Rolling Left:  independence  · Supine to Sit: independence  · Sit to Supine: independence  · Transfers:  Sit to Stand:  independence with no AD  · Gait: x   74 feet with ( I ) no postural/gait abnormalities noted  · Balance: ( I) with dynamic gait; no increas in sway or LOB  · Stairs:  Pt ascended/descended 2 flight(s) with No Assistive Device with right handrail with Independent.     AM-PAC 6 CLICK MOBILITY  Total Score:24       Therapeutic Activities and Exercises:  ·  Whiteboard updated in patients room to current assistance level  · All of patients questions were answered within the scope of PT    Patient education  · Patient educated on the role of PT and POC  · Patient educated on importance  activity while in the hosptial per tolerance for improved endurance and to limit deconditioning   · Patient educated on safe transfers with nursing as appropriate  · Patient educated on energy conservation, pursed lip breathing  · Patient educated on proper transfer mechanics and safety        AM-PAC 6 CLICK MOBILITY  Total Score:24     Patient left HOB elevated with all lines intact and call button in reach.    GOALS:   Multidisciplinary Problems     Physical Therapy Goals     Not on file          Multidisciplinary Problems (Resolved)        Problem: Physical Therapy Goal    Goal Priority Disciplines Outcome Goal Variances Interventions   Physical Therapy Goal   (Resolved)     PT, PT/OT Outcome(s) achieved                     History:     Past Medical History:   Diagnosis Date    Acute hypoxemic respiratory failure 12/1/2016    ANDREW (acute kidney injury) 11/28/2016    Alcohol dependence in remission     Alcoholic cirrhosis     Alcoholic cirrhosis of liver with ascites 10/22/2015    Anxiety     Ascites due to alcoholic cirrhosis 6/13/2016    Centrilobular emphysema 12/12/2016    Cigarette nicotine dependence without  complication 2016    Folate deficiency 10/22/2015    Gallstones     Hepatic encephalopathy     Hepatorenal syndrome 2016    Hyperbilirubinemia 2016    Hyponatremia 2016    Iron deficiency anemia due to chronic blood loss 10/22/2015    Portal hypertensive gastropathy 10/22/2015    Subclinical hypothyroidism 10/22/2015       Past Surgical History:   Procedure Laterality Date    ADENOIDECTOMY      APPENDECTOMY      BIOPSY N/A 10/30/2014    Performed by [facility]yer Ovalle [facility] at [facility] CATH LAB    BIOPSY, LIVER, TRANSJUGULAR APPROACH N/A 8/15/2018    Performed by [facility] OR 2ND FLR     SECTION      COLONOSCOPY      COLONOSCOPY N/A 2018    Procedure: COLONOSCOPY;  Surgeon: Jesus Whalen MD;  Location: Psychiatric (2ND FLR);  Service: Endoscopy;  Laterality: N/A;  2nd floor- pt has multiple comorbidities- requiring weekly paracentesis, now requires continuous home O2.  HCC/Cirrhosis, Variceal screening- labs ordered.  PM Prep    COLONOSCOPY N/A 2018    Performed by Jesus Whalen MD at Psychiatric (2ND FLR)    COLONOSCOPY N/A 2014    Performed by Maritza García MD at [facility]    ESOPHAGOGASTRODUODENOSCOPY N/A 2018    Procedure: ESOPHAGOGASTRODUODENOSCOPY (EGD);  Surgeon: Jesus Whalen MD;  Location: [address]);  Service: Endoscopy;  Laterality: N/A;  2nd floor- pt has multiple comorbidities- requiring weekly paracentesis, now requires continuous home O2.  HCC/Cirrhosis, Variceal screening- labs ordered.    ESOPHAGOGASTRODUODENOSCOPY (EGD) N/A 2018    Performed by Jesus Whalen MD at [facility] (2ND FLR)    ESOPHAGOGASTRODUODENOSCOPY (EGD) N/A 6/3/2016    Performed [facility] Abby Dixon MD at [facility] ENDO    ESOPHAGOGASTRODUODENOSCOPY (EGD) N/A 2015    Performed by Mar[facility]za García [facility] at [facility]    HYSTERECTOMY      25 yrs old    LIVER BIOPSY      OOPHORECTOMY Left     25 yrs old    OOPHORECTOMY  Right     29 yrs old    TONSILLECTOMY      TRANSJUGULAR BIOPSY OF LIVER N/A 8/15/2018    Procedure: BIOPSY, LIVER, TRANSJUGULAR APPROACH;  Surgeon: Juan Diagnostic Provider;  Location[facility] OR [address] [place] [address];  Service: Radiology;  Laterality: N/A;    TUBAL LIGATION      UPPER GASTROINTESTINAL ENDOSCOPY         Clinical Decision Making:     History  Co-morbidities and personal factors that may impact the plan of care Examination  Body Structures and Functions, activity limitations and participation restrictions that may impact the plan of care Clinical Presentation   Decision Making/ Complexity Score   Co-morbidities:   [] Time since onset of injury / illness / exacerbation  [] Status of current condition  []Patient's cognitive status and safety concerns    [x] Multiple Medical Problems (see med hx)  Personal Factors:   [] Patient's age  [] Prior Level of function   [] Patient's home situation (environment and family support)  [] Patient's level of motivation  [] Expected progression of patient      HISTORY:(criteria)    [] 23053 - no personal factors/history    [x] 14861 - has 1-2 personal factor/comorbidity     [] 81418 - has >3 personal factor/comorbidity     Body Regions:  [] Objective examination findings  [] Head     []  Neck  [] Trunk   [] Upper Extremity  [] Lower Extremity    Body Systems:  [] For communication ability, affect, cognition, language, and learning style: the assessment of the ability to make needs known, consciousness, orientation (person, place, and time), expected emotional /behavioral responses, and learning preferences (eg, learning barriers, education  needs)  [] For the neuromuscular system: a general assessment of gross coordinated movement (eg, balance, gait, locomotion, transfers, and transitions) and motor function  (motor control and motor learning)  [] For the musculoskeletal system: the assessment of gross symmetry, gross range of motion, gross strength, height, and weight  [x] For  the integumentary system: the assessment of pliability(texture), presence of scar formation, skin color, and skin integrity  [] For cardiovascular/pulmonary system: the assessment of heart rate, respiratory rate, blood pressure, and edema     Activity limitations:    [] Patient's cognitive status and saf ety concerns          [x] Status of current condition      [] Weight bearing restriction  [] Cardiopulmunary Restriction    Participation Restrictions:   [] Goals and goal agreement with the patient     [] Rehab potential (prognosis) and probable outcome      Examination of Body System: (criteria)    [] 25507 - addressing 1-2 elements    [x] 21295 - addressing a total of 3 or more elements     [] 74153 -  Addressing a total of 4 or more elements         Clinical Presentation: (criteria)  Stable - 18128     On examination of body system using standardized tests and measures patient presents with 1-2 elements from any of the following: body structures and functions, activity limitations, and/or participation restrictions.  Leading to a clinical presentation that is considered stable and/or uncomplicated                              Clinical Decision Making  (Eval Complexity):  Low- 43462     Time Tracking:     PT Received On: 10/10/18  PT Start Time: 1155     PT Stop Time: 1203  PT Total Time (min): 8 min     Billable Minutes: Evaluation 8 min      Stalin Schneider, PT  10/10/2018

## 2018-10-10 NOTE — CONSULTS
Consult received. Chart reviewed.    Ms. Annette Esparza is a 48 y.o. female with ETOH cirrhosis (now MELD 19) complicated by HE, ascites (in the past, now resolved), post gastropathy, and mild hyponatremia, prior concern for HCC, ongoing ETOH abuse, non-adherance/ non-compliance , chronic anemia, presented to the ED on 10/9 after being seen by Hepatology Clinic where labwork showed ANDREW. Consult ordered for re-evaluation of liver transplant candidacy s/p 7/2018 transplant evaluation, at which time, pt was deemed high risk for relapse. Questionable adherence/compliance with substance use cessation, since that time. Have ordered urine toxicology and PETH, for confirmation.    In cases of emergency, daily coverage provided by Acute/ER Psych MD, NP, or SW, with contact numbers located in [facility] On Call Schedule.    Case discussed with addiction psychiatry staff: MD Kirby Loving MD, JAEL  [facility], PGY-2  Pager Number [phone]  [facility]

## 2018-10-10 NOTE — PLAN OF CARE
Problem: Physical Therapy Goal  Goal: Physical Therapy Goal  Outcome: Outcome(s) achieved Date Met: 10/10/18  Patient at this time is at their functional baseline and does not require skilled acute PT services at this time. Please re consult PT if pt has change in functional status.   Stalin Schneider PT, DPT  10/10/2018  Pager: [phone]

## 2018-10-10 NOTE — PROGRESS NOTES
"Consult: This  (SW) was notified the patient (pt) has been admitted and is currently in evaluation for liver transplant.  SW presented to the pt for follow up, continuity of care and to discuss her most recent Positive Peth test from September after a reported one year sobriety.       The pt was observed to be laying in bed, alert, oriented x3 and communicative.  The pts bedside nurse was working with her administering medications.  The pt invited SW to engage in conversation.  SW confirmed pt identifiers and begin to discuss SW reason for consult.  SW presented the pt with the positive peth results and the pt immediately begin to cry.  The pt stated "I spilled up.  I had stopped drinking but with everything going on and waiting to be on the list I couldn't take it.  I drank."  SW discussed with the pt the recommendation for IOP and her attending the October 3rd appointment she reported she obtained.  The [place]. Pt reported she is currently waiting to be seen by the psychiatrist.   HAYLIE obtained permission to contact [facility] to verify enrollment.  Pt provided permission to HAYLIE for her to follow up with [facility]     HAYLIE questioned the supports the pt has in place.  The pt confirmed she has support from family and does not want them to know that she drank.  SW inquired as to why the pt did not want to include family in this portion of her care.  Pt reported "they will be upset with me and they constantly ask me why I'm not on the list yet."  SW discussed at this time the relapse and lack of resources and no enrollment in IOP deems her to be high risk for relapse post transplant.     Pt reported "I know but I keep telling everyone I cannot deal with anxiety and all the information I'm being given and telling me I have cancer and then may not have cancer."  SW provided emotional support, " "reflective listening and normalization.      SW discussed with the patient her expectations and understanding of being a transplant candidate.  The patient reported "I know I messed up but I can't take it back."      Per Today's Consult:  Alcohol: Per patient, her last ETOH use was in September when she drank for a week straight due to "stress and pressure."  Patient reported drinking Vodka.  Patient reported she used ETOH as a coping mechanism. SW attempt to discuss healthy coping mechanism that the pt can substitute as she await her appointment with [facility].  Pt reported she doesn't know.        SW discussed with the patient addressing her Alcohol and Mental Health at [facility].  Pt reported "I'm trying."  SW assessed for mental health diagnosis and the patient denied.  The pt denied being prescribed any anti anxiety medications or anti depressants.  SW offered to request a psychiatric consult while inpatient for assessment and management.  The pt is agreeable.  OF NOTE: Pt previously reported she was in treatment for depreesion with [facility] but discontinued.      Patient is agreeable to enrolling in Intensive Outpatient treatment SW discussed she should complete prior to transplant.       Interview Behavior: Patient presents as alert and oriented x 4, pleasant, good eye contact, well groomed, recall good, concentration/judgement good, calm, communicative, cooperative and asking and answering questions appropriately. Pt was tearful but still exhibit inability to manage medical condition and exhibit poor coping by using alcohol for stress relief.          Transplant Social Work - Candidacy  Assessment/Plan:     Psychosocial Suitability: Patient presents as an unacceptable candidate for liver transplant at this time. Based on psychosocial risk factors, patient presents as High risk, due to patient needing to follow through with completing intensive outpatient treatment Prior to Liver transplant. Patient identified alcohol " as a coping mechanism for emotional distress. SW discussed with the pt that these are issues that will only increase with transplant, therefore increasing her risk for relapse. SW recommends that the patient complete an Intensive Outpatient Program and maintain sobriety prior to transplant. Pt agreeable to enroll in mental health/substance abuse treatment prior to Liver transplant.       Recommendations/Additional Comments:   -randomized PETH test  -Enrollment and completion of IOP patient's agreement to sign a TESSIE       Psychiatric consult request by this SW to Dr. Wagner Amaya

## 2018-10-10 NOTE — SUBJECTIVE & OBJECTIVE
Review of Systems   Constitutional: Positive for activity change. Negative for chills, fatigue and fever.   HENT: Negative for mouth sores and nosebleeds.    Eyes: Negative for pain and redness.   Respiratory: Negative for cough and shortness of breath.    Cardiovascular: Negative for chest pain and palpitations.   Gastrointestinal: Positive for abdominal pain. Negative for blood in stool.   Genitourinary: Negative for dysuria and hematuria.   Musculoskeletal: Negative for arthralgias and joint swelling.   Skin: Positive for pallor.   Neurological: Negative for seizures and facial asymmetry.   Psychiatric/Behavioral: Negative for agitation and confusion. The patient is nervous/anxious.        Past Medical History:   Diagnosis Date    Acute hypoxemic respiratory failure 2016    ANDREW (acute kidney injury) 2016    Alcohol dependence in remission     Alcoholic cirrhosis     Alcoholic cirrhosis of liver with ascites 10/22/2015    Anxiety     Ascites due to alcoholic cirrhosis 2016    Centrilobular emphysema 2016    Cigarette nicotine dependence without complication 2016    Folate deficiency 10/22/2015    Gallstones     Hepatic encephalopathy     Hepatorenal syndrome 2016    Hyperbilirubinemia 2016    Hyponatremia 2016    Iron deficiency anemia due to chronic blood loss 10/22/2015    Portal hypertensive gastropathy 10/22/2015    Subclinical hypothyroidism 10/22/2015       Past Surgical History:   Procedure Laterality Date    ADENOIDECTOMY      APPENDECTOMY      BIOPSY N/A 10/30/2014    Performed [facility]le [facility] at [facility] CATH LAB    BIOPSY, LIVER, TRANSJUGULAR APPROACH N/A 8/15/2018    Performed by [facility] Diagnostic Provider at [facility] OR [address] [place] [address]     SECTION      COLONOSCOPY      COLONOSCOPY N/A 2018    Procedure: COLONOSCOPY;  Surgeon: Jesus Whalen MD;  Location: [facility] ([address] [place] [address]);  Service: Endoscopy;  Laterality: N/A;  2nd floor- pt has  multiple comorbidities- requiring weekly paracentesis, now requires continuous home O2.  HCC/Cirrhosis, Variceal screening- labs ordered.  PM Prep    COLONOSCOPY N/A 8/14/2018    Performed by [facility] Whalen MD at [facility] (2ND FLR)    COLONOSCOPY N/A 11/19/2014    Performed by Maritza García MD at [facility]    ESOPHAGOGASTRODUODENOSCOPY N/A 8/14/2018    Procedure: ESOPHAGOGASTRODUODENOSCOPY (EGD);  Surgeon: Jesus hWalen MD;  Location: [facility] (2ND FLR);  Service: Endoscopy;  Laterality: N/A;  2nd floor- pt has multiple comorbidities- requiring weekly paracentesis, now requires continuous home O2.  HCC/Cirrhosis, Variceal screening- labs ordered.    ESOPHAGOGASTRODUODENOSCOPY (EGD) N/A 8/14/2018    Performed by Jesus Whalen MD at [facility] (2ND FLR)    ESOPHAGOGASTRODUODENOSCOPY (EGD) N/A 6/3/2016    Performed by Abby Dixon [facility] at [facility]    ESOPHAGOGASTRODUODENOSCOPY (EGD) N/A 1/2/2015    Performed by Maritza García MD at [facility]    HYSTERECTOMY      25 yrs old    LIVER BIOPSY      OOPHORECTOMY Left     25 yrs old    OOPHORECTOMY Right     29 yrs old    TONSILLECTOMY      TRANSJUGULAR BIOPSY OF LIVER N/A 8/15/2018    Procedure: BIOPSY, LIVER, TRANSJUGULAR APPROACH;  Surgeon: [facility] Diagnostic Provider;  Location: [facility] OR [address] [place] [address];  Service: Radiology;  Laterality: N/A;    TUBAL LIGATION      UPPER GASTROINTESTINAL ENDOSCOPY         Family history of liver disease: No    Review of patient's allergies indicates:   Allergen Reactions    Dilaudid [hydromorphone] Hallucinations       Tobacco Use    Smoking status: Former Smoker     Packs/day: 1.00     Years: 35.00     Pack years: 35.00     Types: Cigarettes    Smokeless tobacco: Never Used   Substance and Sexual Activity    Alcohol use: No     Alcohol/week: 0.0 oz     Comment: beer intake last ~2017, liquour last drink 9/2018    Drug use: No    Sexual activity: Yes     Partners: Male       Medications Prior to  Admission   Medication Sig Dispense Refill Last Dose    alendronate (FOSAMAX) 70 MG tablet Take 1 tablet (70 mg total) by mouth every 7 days. 4 tablet 11 Past Week    folic acid (FOLVITE) 1 MG tablet Take 1 tablet (1 mg total) by mouth once daily. 30 tablet 11 10/8/2018    furosemide (LASIX) 80 MG tablet 80 mg qAM, 40 mg qPM, dose change 10/9/18 120 tablet 2 10/8/2018    HYDROcodone-acetaminophen (NORCO)  mg per tablet Take 1 tablet by mouth every 12 (twelve) hours as needed for Pain. 60 tablet 0 10/9/2018    midodrine (PROAMATINE) 10 MG tablet Take 1.5 tablets (15 mg total) by mouth 3 (three) times daily with meals. 90 tablet 3 10/8/2018    spironolactone (ALDACTONE) 100 MG tablet Take 2 tablets (200 mg total) by mouth once daily. 180 tablet 3 10/8/2018    ascorbic acid, vitamin C, (VITAMIN C) 500 MG tablet Take 500 mg by mouth once daily.   Taking    b complex vitamins tablet Take 1 tablet by mouth once daily.   Taking    cyanocobalamin (VITAMIN B-12) 1000 MCG tablet Take 1 tablet (1,000 mcg total) by mouth once daily. 90 tablet 3 Taking    lactulose (CHRONULAC) 10 gram/15 mL solution    More than a month    multivitamin (THERAGRAN) tablet Take 1 tablet by mouth once daily.   Taking    vitamin D 1000 units Tab Take 1,000 Units by mouth once daily.   Taking       Objective:     Vital Signs (Most Recent):  Temp: 98.6 °F (37 °C) (10/10/18 1146)  Pulse: 60 (10/10/18 1146)  Resp: 18 (10/10/18 1146)  BP: (!) 99/53 (10/10/18 1146)  SpO2: (!) 94 % (10/10/18 1146) Vital Signs (24h Range):  Temp:  [97.9 °F (36.6 °C)-99.4 °F (37.4 °C)] 98.6 °F (37 °C)  Pulse:  [55-79] 60  Resp:  [16-18] 18  SpO2:  [94 %-100 %] 94 %  BP: ()/(51-61) 99/53     Weight: 50.8 kg (111 lb 15.9 oz) (10/10/18 0500)  Body mass index is 21.16 kg/m².    Physical Exam   Constitutional: She is oriented to person, place, and time. No distress.   Appears well ; tearful on interview, remorseful   HENT:   Head: Normocephalic and  atraumatic.   Eyes: Conjunctivae are normal. No scleral icterus.   Neck: Neck supple.   Cardiovascular: Normal rate and intact distal pulses.   Pulmonary/Chest: Effort normal. No stridor. No respiratory distress.   Abdominal: Soft. She exhibits no distension. There is tenderness (mildly TTP in RUQ without guarding). There is no rebound and no guarding.   Musculoskeletal: She exhibits no tenderness or deformity.   Neurological: She is alert and oriented to person, place, and time.   Skin: Skin is warm and dry. No rash noted.   Psychiatric: She has a normal mood and affect. Her behavior is normal.   Anxious but appropriate   Vitals reviewed.      MELD-Na score: 15 at 10/10/2018  4:07 AM  MELD score: 12 at 10/10/2018  4:07 AM  Calculated from:  Serum Creatinine: 1.2 mg/dL at 10/10/2018  4:07 AM  Serum Sodium: 134 mmol/L at 10/10/2018  4:07 AM  Total Bilirubin: 1.3 mg/dL at 10/10/2018  4:07 AM  INR(ratio): 1.3 at 10/10/2018  4:07 AM  Age: 48 years    Significant Labs:  CBC:   Recent Labs   Lab  10/10/18   0407   WBC  7.16   RBC  2.99*   HGB  9.9*   HCT  29.8*   PLT  151     BMP:   Recent Labs   Lab  10/10/18   0407   GLU  104   NA  134*   K  3.8   CL  102   CO2  21*   BUN  24*   CREATININE  1.2   CALCIUM  9.0     CMP:   Recent Labs   Lab  10/10/18   0407   GLU  104   CALCIUM  9.0   ALBUMIN  3.1*   PROT  7.1   NA  134*   K  3.8   CO2  21*   CL  102   BUN  24*   CREATININE  1.2   ALKPHOS  161*   ALT  17   AST  17   BILITOT  1.3*     Coagulation:   Recent Labs   Lab  10/10/18   0407   INR  1.3*       Significant Imaging:  Labs: Reviewed
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
## 2018-10-10 NOTE — PT/OT/SLP EVAL
Occupational Therapy   Evaluation and Discharge Note    Name: Annette Esparza  MRN: [id number]  Admitting Diagnosis:  ANDREW (acute kidney injury)      Recommendations:     Discharge Recommendations: home  Discharge Equipment Recommendations:  none  Barriers to discharge:  None    History:     Occupational Profile:  Living Environment: Pt lives in a 2nd floor apt w/ 1 flight of stairs to enter w/ B/L HR which cannot be reached at this same time.   Previous level of function: Indep  Roles and Routines: N/A  Equipment Owned:  none  Assistance upon Discharge: Pt has assistance upon D/C.     Past Medical History:   Diagnosis Date    Acute hypoxemic respiratory failure 2016    ANDREW (acute kidney injury) 2016    Alcohol dependence in remission     Alcoholic cirrhosis     Alcoholic cirrhosis of liver with ascites 10/22/2015    Anxiety     Ascites due to alcoholic cirrhosis 2016    Centrilobular emphysema 2016    Cigarette nicotine dependence without complication 2016    Folate deficiency 10/22/2015    Gallstones     Hepatic encephalopathy     Hepatorenal syndrome 2016    Hyperbilirubinemia 2016    Hyponatremia 2016    Iron deficiency anemia due to chronic blood loss 10/22/2015    Portal hypertensive gastropathy 10/22/2015    Subclinical hypothyroidism 10/22/2015       Past Surgical History:   Procedure Laterality Date    ADENOIDECTOMY      APPENDECTOMY      BIOPSY N/A 10/30/2014    Per[facility]le [facility] C[facility] LAB    BIOPSY, LIVER, TRANSJUGULAR APPROACH N/A 8/15/2018    Performed by [facility] Diagnostic Provider at [facility] OR 2ND FLR     SECTION      COLONOSCOPY      COLONOSCOPY N/A 2018    Procedure: COLONOSCOPY;  Surgeon: Jesus Whalen MD;  [facility]  Service: Endoscopy;  Laterality: N/A;  2nd floor- pt has multiple comorbidities- requiring weekly paracentesis, now requires continuous home O2.  HCC/Cirrhosis,  Variceal screening- labs ordered.  PM Prep    COLONOSCOPY N/A 8/14/2018    Performed by [facility] Whalen MD at [facility] (2ND FLR)    COLONOSCOPY N/A 11/19/2014    [facility]    ESOPHAGOGASTRODUODENOSCOPY N/A 8/14/2018    Procedure: ESOPHAGOGASTRODUODENOSCOPY (EGD);  Surgeon: Jesus Whalen MD;  Location: [facility] (2ND FLR);  Service: Endoscopy;  Laterality: N/A;  2nd floor- pt has multiple comorbidities- requiring weekly paracentesis, now requires continuous home O2.  HCC/Cirrhosis, Variceal screening- labs ordered.    ESOPHAGOGASTRODUODENOSCOPY (EGD) N/A 8/14/2018    Performed by Jesus Whalen MD at [facility] (2ND FLR)    ESOPHAGOGASTRODUODENOSCOPY (EGD) N/A 6/3/2016    Performed by [facility] at [facility]    ESOPHAGOGASTRODUODENOSCOPY (EGD) N/A 1/2/2015    [facility]rc[facility]    HYSTERECTOMY      25 yrs old    LIVER BIOPSY      OOPHORECTOMY Left     25 yrs old    OOPHORECTOMY Right     29 yrs old    TONSILLECTOMY      TRANSJUGULAR BIOPSY OF LIVER N/A 8/15/2018    Procedure: BIOPSY, LIVER, TRANSJUGULAR APPROACH;  Surgeon: Juan Diagnostic Provider;  [facility];  Service: Radiology;  Laterality: N/A;    TUBAL LIGATION      UPPER GASTROINTESTINAL ENDOSCOPY         Subjective     Chief Complaint: no complaints   Patient/Family stated goals: return home  Communicated with: RN prior to session.  Pain/Comfort:  · Pain Rating 1: 0/10  · Pain Rating Post-Intervention 1: 0/10    Patients cultural, spiritual, Congregational conflicts given the current situation:      Objective:     Patient found with: telemetry, peripheral IV    General Precautions: Standard, fall   Orthopedic Precautions:N/A   Braces: N/A     Occupational Performance:    Bed Mobility:    · Patient completed Scooting/Bridging with independence  · Patient completed Supine to Sit with independence  · Patient completed Sit to Supine with  "independence    Functional Mobility/Transfers:  · Patient completed Sit <> Stand Transfer with independence  with  no assistive device   · Functional Mobility: Pt ambulated ~80 ft indep. Pt ascended/descended 2 flights of stairs at spv w/ use of 1 HR.     Activities of Daily Living:  · Lower Body Dressing: independence donned socks seated EOB    Cognitive/Visual Perceptual:  Cognitive/Psychosocial Skills:     -       Oriented to: Person, Place, Time and Situation   -       Follows Commands/attention:Follows multistep  commands  -       Communication: clear/fluent  -       Memory: No Deficits noted  -       Safety awareness/insight to disability: intact   -       Mood/Affect/Coping skills/emotional control: Appropriate to situation  Visual/Perceptual:      -Intact      Physical Exam:  Balance:    -       Pt w/ no noted deficits   Postural examination/scapula alignment:    -       No postural abnormalities identified  Skin integrity: Visible skin intact  Upper Extremity Range of Motion:     -       Right Upper Extremity: WFL    -       Left Upper Extremity: WFL      Upper Extremity Strength:    -       Right Upper Extremity: WFL  -       Left Upper Extremity: WFL     Strength:    -       Right Upper Extremity: WFL    -       Left Upper Extremity: WFL    Fine Motor Coordination:    -       Intact  Gross motor coordination:   WFL    Patient left up in chair with all lines intact and call button in reach    AMPAC 6 Click:  AMPAC Total Score: 24    Treatment & Education:  Pt educated on POC.   Education:    Assessment:     Annette Esparza is a 48 y.o. female with a medical diagnosis of ANDREW (acute kidney injury). At this time, patient is functioning at their prior level of function and does not require further acute OT services.     Clinical Decision Makin.  OT Low:  "Pt evaluation falls under low complexity for evaluation coding due to performance deficits noted in 1-3 areas as stated above and 0 " "co-morbities affecting current functional status. Data obtained from problem focused assessments. No modifications or assistance was required for completion of evaluation. Only brief occupational profile and history review completed."     Plan:     During this hospitalization, patient does not require further acute OT services.  Please re-consult if situation changes.    · Plan of Care Reviewed with: patient    This Plan of care has been discussed with the patient who was involved in its development and understands and is in agreement with the identified goals and treatment plan    GOALS:   Multidisciplinary Problems     Occupational Therapy Goals        Problem: Occupational Therapy Goal    Goal Priority Disciplines Outcome Interventions   Occupational Therapy Goal     OT, PT/OT                     Time Tracking:     OT Date of Treatment: 10/10/18  OT Start Time: 1154  OT Stop Time: 1202  OT Total Time (min): 8 min    Billable Minutes:Evaluation 8 minutes    Aakash Navarro OT  10/10/2018    "

## 2018-10-10 NOTE — CONSULTS
Please see my previous consult note.    Kirby Witt MD, JAEL  [facility], PGY-2  Pager Number [phone]  [facility]

## 2018-10-10 NOTE — HPI
This is a 47 y/o female hx of decompensated etoh cirrhosis (follows in our hepatology clinic) who presented to ER from hepatology clinic for worsening RUQ pain and ANDREW on labs. The pain initially started after her ct guided liver biopsy on 9/26. Pain is 10/10 radiating to the back and right shoulder. Was taking ibuprofen and norco at home without great relief. No relation to PO intake.   Denies F/C, no N/V, no changes in stool.   Labs on admission showed Cr of 1.6 (baseline approx 0.8)and Na of 130. She admits adherence to her lasix 80 / aldactone 200 regimen, which has controlled her ascites.  She continues to drink etoh, last time being early September. PETH test 9/11 positive at 139. She states she was drinking to help coping with her illness and stress. She understands how important abstinence is and detrimental is to her health.    Prior liver hx:  Diagnosed in 2014 by liver biopsy. Patient continued to drink. Then seen again in 2017 and off etoh for a little while and MELD had improved.  She was then admitted to [facility] 7/2018 for UTI and ecoli bacteremia, with ascites and HE. Was noted to have imaging concerning for HCC.  TJ liver biopsy was negative for malignancy. Then had CT guided biopsy 9/26/18   Path conference 10/4/18: Rt. Lobe Targeted Liver Biopsy(CT guided: scar/ cirrhosis/ bile plugging/  No definate malignancy   AFP is normal (3.5) 7/26/18

## 2018-10-10 NOTE — CONSULTS
[facility]  Hepatology  Consult Note    Patient Name: Annette Esparza  MRN: [id number]  Admission Date: 10/9/2018  Hospital Length of Stay: 1 days  Attending Provider: Justino Edward MD   Primary Care Physician: Endy Pfeiffer MD  Principal Problem:ANDREW (acute kidney injury)    Inpatient consult to Hepatology  Consult performed by: Won Becerra MD  Consult ordered by: Elma Sesay MD        Subjective:     Transplant status: No    HPI:  This is a 47 y/o female hx of decompensated etoh cirrhosis (follows in our hepatology clinic) who presented to ER from hepatology clinic for worsening RUQ pain and ANDREW on labs. The pain initially started after her ct guided liver biopsy on 9/26. Pain is 10/10 radiating to the back and right shoulder. Was taking ibuprofen and norco at home without great relief. No relation to PO intake.   Denies F/C, no N/V, no changes in stool.   Labs on admission showed Cr of 1.6 (baseline approx 0.8)and Na of 130. She admits adherence to her lasix 80 / aldactone 200 regimen, which has controlled her ascites.  She continues to drink etoh, last time being early September. PETH test 9/11 positive at 139. She states she was drinking to help coping with her illness and stress. She understands how important abstinence is and detrimental is to her health.    Prior liver hx:  Diagnosed in 2014 by liver biopsy. Patient continued to drink. Then seen again in 2017 and off etoh for a little while and MELD had improved.  She was then admitted to [facility] 7/2018 for UTI and ecoli bacteremia, with ascites and HE. Was noted to have imaging concerning for HCC.  TJ liver biopsy was negative for malignancy. Then had CT guided biopsy 9/26/18   Path conference 10/4/18: Rt. Lobe Targeted Liver Biopsy(CT guided: scar/ cirrhosis/ bile plugging/  No definate malignancy   AFP is normal (3.5) 7/26/18         Review of Systems   Constitutional: Positive for activity change. Negative for  chills, fatigue and fever.   HENT: Negative for mouth sores and nosebleeds.    Eyes: Negative for pain and redness.   Respiratory: Negative for cough and shortness of breath.    Cardiovascular: Negative for chest pain and palpitations.   Gastrointestinal: Positive for abdominal pain. Negative for blood in stool.   Genitourinary: Negative for dysuria and hematuria.   Musculoskeletal: Negative for arthralgias and joint swelling.   Skin: Positive for pallor.   Neurological: Negative for seizures and facial asymmetry.   Psychiatric/Behavioral: Negative for agitation and confusion. The patient is nervous/anxious.        Past Medical History:   Diagnosis Date    Acute hypoxemic respiratory failure 2016    ANDREW (acute kidney injury) 2016    Alcohol dependence in remission     Alcoholic cirrhosis     Alcoholic cirrhosis of liver with ascites 10/22/2015    Anxiety     Ascites due to alcoholic cirrhosis 2016    Centrilobular emphysema 2016    Cigarette nicotine dependence without complication 2016    Folate deficiency 10/22/2015    Gallstones     Hepatic encephalopathy     Hepatorenal syndrome 2016    Hyperbilirubinemia 2016    Hyponatremia 2016    Iron deficiency anemia due to chronic blood loss 10/22/2015    Portal hypertensive gastropathy 10/22/2015    Subclinical hypothyroidism 10/22/2015       Past Surgical History:   Procedure Laterality Date    ADENOIDECTOMY      APPENDECTOMY      BIOPSY N/A 10/30/2014    Performed by [facility] Ovalle [facility] at [facility] CATH LAB    BIOPSY, LIVER, TRANSJUGULAR APPROACH N/A 8/15/2018    [facility]     SECTION      COLONOSCOPY      COLONOSCOPY N/A 2018    Procedure: COLONOSCOPY;  Surgeon: Jesus Whalen MD;  Location: [facility] ([address] [place] [address]);  Service: Endoscopy;  Laterality: N/A;  2nd floor- pt has multiple comorbidities- requiring weekly paracentesis, now requires continuous  home O2.  HCC/Cirrhosis, Variceal screening- labs ordered.  PM Prep    COLONOSCOPY N/A 8/14/2018    Performed by [facility]alen MD at [facility] (2ND FLR)    COLONOSCOPY N/A 11/19/2014    [facility]    ESOPHAGOGASTRODUODENOSCOPY N/A 8/14/2018    Procedure: ESOPHAGOGASTRODUODENOSCOPY (EGD);  Surgeon: Jesus Whalen MD;  Location: [facility] (2ND FLR);  Service: Endoscopy;  Laterality: N/A;  2nd floor- pt has multiple comorbidities- requiring weekly paracentesis, now requires continuous home O2.  HCC/Cirrhosis, Variceal screening- labs ordered.    ESOPHAGOGASTRODUODENOSCOPY (EGD) N/A 8/14/2018    Performed by [facility] Whalen MD at [facility] (2ND FLR)    ESOPHAGOGASTRODUODENOSCOPY (EGD) N/A 6/3/2016    [facility]    ESOPHAGOGASTRODUODENOSCOPY (EGD) N/A 1/2/2015    [facility]    HYSTERECTOMY      25 yrs old    LIVER BIOPSY      OOPHORECTOMY Left     25 yrs old    OOPHORECTOMY Right     29 yrs old    TONSILLECTOMY      TRANSJUGULAR BIOPSY OF LIVER N/A 8/15/2018    Procedure: BIOPSY, LIVER, TRANSJUGULAR APPROACH;  Surgeon: Juan Diagnostic Provider;  Location: [facility] OR [address] [place] [address];  Service: Radiology;  Laterality: N/A;    TUBAL LIGATION      UPPER GASTROINTESTINAL ENDOSCOPY         Family history of liver disease: No    Review of patient's allergies indicates:   Allergen Reactions    Dilaudid [hydromorphone] Hallucinations       Tobacco Use    Smoking status: Former Smoker     Packs/day: 1.00     Years: 35.00     Pack years: 35.00     Types: Cigarettes    Smokeless tobacco: Never Used   Substance and Sexual Activity    Alcohol use: No     Alcohol/week: 0.0 oz     Comment: beer intake last ~2017, liquour last drink 9/2018    Drug use: No    Sexual activity: Yes     Partners: Male       Medications Prior to Admission   Medication Sig Dispense Refill Last Dose    alendronate (FOSAMAX) 70 MG  tablet Take 1 tablet (70 mg total) by mouth every 7 days. 4 tablet 11 Past Week    folic acid (FOLVITE) 1 MG tablet Take 1 tablet (1 mg total) by mouth once daily. 30 tablet 11 10/8/2018    furosemide (LASIX) 80 MG tablet 80 mg qAM, 40 mg qPM, dose change 10/9/18 120 tablet 2 10/8/2018    HYDROcodone-acetaminophen (NORCO)  mg per tablet Take 1 tablet by mouth every 12 (twelve) hours as needed for Pain. 60 tablet 0 10/9/2018    midodrine (PROAMATINE) 10 MG tablet Take 1.5 tablets (15 mg total) by mouth 3 (three) times daily with meals. 90 tablet 3 10/8/2018    spironolactone (ALDACTONE) 100 MG tablet Take 2 tablets (200 mg total) by mouth once daily. 180 tablet 3 10/8/2018    ascorbic acid, vitamin C, (VITAMIN C) 500 MG tablet Take 500 mg by mouth once daily.   Taking    b complex vitamins tablet Take 1 tablet by mouth once daily.   Taking    cyanocobalamin (VITAMIN B-12) 1000 MCG tablet Take 1 tablet (1,000 mcg total) by mouth once daily. 90 tablet 3 Taking    lactulose (CHRONULAC) 10 gram/15 mL solution    More than a month    multivitamin (THERAGRAN) tablet Take 1 tablet by mouth once daily.   Taking    vitamin D 1000 units Tab Take 1,000 Units by mouth once daily.   Taking       Objective:     Vital Signs (Most Recent):  Temp: 98.6 °F (37 °C) (10/10/18 1146)  Pulse: 60 (10/10/18 1146)  Resp: 18 (10/10/18 1146)  BP: (!) 99/53 (10/10/18 1146)  SpO2: (!) 94 % (10/10/18 1146) Vital Signs (24h Range):  Temp:  [97.9 °F (36.6 °C)-99.4 °F (37.4 °C)] 98.6 °F (37 °C)  Pulse:  [55-79] 60  Resp:  [16-18] 18  SpO2:  [94 %-100 %] 94 %  BP: ()/(51-61) 99/53     Weight: 50.8 kg (111 lb 15.9 oz) (10/10/18 0500)  Body mass index is 21.16 kg/m².    Physical Exam   Constitutional: She is oriented to person, place, and time. No distress.   Appears well ; tearful on interview, remorseful   HENT:   Head: Normocephalic and atraumatic.   Eyes: Conjunctivae are normal. No scleral icterus.   Neck: Neck supple.    Cardiovascular: Normal rate and intact distal pulses.   Pulmonary/Chest: Effort normal. No stridor. No respiratory distress.   Abdominal: Soft. She exhibits no distension. There is tenderness (mildly TTP in RUQ without guarding). There is no rebound and no guarding.   Musculoskeletal: She exhibits no tenderness or deformity.   Neurological: She is alert and oriented to person, place, and time.   Skin: Skin is warm and dry. No rash noted.   Psychiatric: She has a normal mood and affect. Her behavior is normal.   Anxious but appropriate   Vitals reviewed.      MELD-Na score: 15 at 10/10/2018  4:07 AM  MELD score: 12 at 10/10/2018  4:07 AM  Calculated from:  Serum Creatinine: 1.2 mg/dL at 10/10/2018  4:07 AM  Serum Sodium: 134 mmol/L at 10/10/2018  4:07 AM  Total Bilirubin: 1.3 mg/dL at 10/10/2018  4:07 AM  INR(ratio): 1.3 at 10/10/2018  4:07 AM  Age: 48 years    Significant Labs:  CBC:   Recent Labs   Lab  10/10/18   0407   WBC  7.16   RBC  2.99*   HGB  9.9*   HCT  29.8*   PLT  151     BMP:   Recent Labs   Lab  10/10/18   0407   GLU  104   NA  134*   K  3.8   CL  102   CO2  21*   BUN  24*   CREATININE  1.2   CALCIUM  9.0     CMP:   Recent Labs   Lab  10/10/18   0407   GLU  104   CALCIUM  9.0   ALBUMIN  3.1*   PROT  7.1   NA  134*   K  3.8   CO2  21*   CL  102   BUN  24*   CREATININE  1.2   ALKPHOS  161*   ALT  17   AST  17   BILITOT  1.3*     Coagulation:   Recent Labs   Lab  10/10/18   0407   INR  1.3*       Significant Imaging:  Labs: Reviewed    Assessment/Plan:     Decompensated hepatic cirrhosis    decompensated etoh cirrhosis (follows in our hepatology clinic) who presented to ER from hepatology clinic for worsening RUQ pain and ANDREW on labs. She has improved overnight.  CT abdomen without post biopsy changes.  No evidence of malignancy on biopsy.    Counseled extensively on etoh abstinence, which she expressed understanding.     Recs:  Follow up blood cultures  Addiction psych consultation  IVFs and albumin  for volume expansion, treatment of ANDREW  Pain control per primary    Likely home today            Thank you for your consult. I will follow-up with patient. Please contact us if you have any additional questions.    Won Becerra MD  Hepatology  [facility]

## 2018-10-10 NOTE — PLAN OF CARE
Problem: Patient Care Overview  Goal: Plan of Care Review  Outcome: Ongoing (interventions implemented as appropriate)  Pt AAOx4. Pt tolerating renal diet with no complaints of discomfort or nausea. Pain managed with PRN pain meds. VSS on RA. Pt ambulates independently to the bathroom. Pt slept between care. Neuro checks Q4, no changes. Call light in reach and bed in lowest position. Pt remains free of falls and injury. No acute events this shift. Will continue to monitor.

## 2018-10-10 NOTE — H&P
History and Physical   Hospital Medicine     Patient Name: Annette Esparza  MRN:  [id number]  Hospital Medicine Team: Networked reference to record PCT  Elma Sesay MD  Date of Admission:  10/9/2018     Principal Problem:  ANDREW (acute kidney injury)   Primary Care Physician: Endy Pfeiffer MD      History of Present Illness:     Ms. Annette Esparza is a 48 y.o. female with ETOH cirrhosis (now MELD 19) complicated by HE, ascites (in the past, now resolved), post gastropathy, and mild hyponatremia, prior concern for HCC, ongoing ETOH abuse, non-adherance/ non-compliance , chronic anemia, presented to the ED on 10/9 after being seen by Hepatology Clinic where labwork showed ANDREW. Patient also presenting with worsening RUQ abdominal pain. Patient sent from Hepatology clinic to the ED for these issues.     Regarding ANDREW, patient has been on lasix 80 and spironolactone 200mg, which has controlled her ascites and swelling. On this regimen, pt no long has clinical ascites. On admission, Cr of 1.6 from baseline of 0.8. Na of 130. Has been making adequate urine.     Regarding pt's RUQ pain, it stated 2 days after her CT guided liver cx on 9/26. Constant pain, sharp, radiating up to her posterior thorax. Has hx of cholelithiasis with no prior cholecystitis. Not associated with food intake, no worsening with inspiration. Has been worsening in the past several days. No fevers, chill. No associated N/V or diarrhea. No ascites at this time as noted above. Lipase of 53. U/S limited with no acute process. No prior VTE/ thrombi as per patient. hbg of 11.5, from 10, plts of 154, INR of 1.2. US limited w/o doppler in the ED noted no acute abnormalities.     Patient previously had high concern for infiltrative HCC after a 4.4 cm liver mass was found on CT in 7/2018, was then re evaluated with MRI. Transjugular biopsy of R lobe was then pursued and negative for malignancy, but not targeted approach. CT guided  "biopsy done 9/26/18 in IR. Image-guided biopsy of ill-defined right hepatic lobe lesion done and submitted to PATH conference. Path conference 10/4/18: "Rt. Lobe Targeted Liver Biopsy(CT guided: scar/ cirrhosis/ bile plugging/  No definate malignancy." AFP is normal (3.5) 7/26/18.     Has been on midodrine 15 TID for hypotension. Has been prescribed lactulose but not taking. When asked, pt thought it was for her swelling and since her swelling is now controlled/ no ascites, she stopped. Ammonia of 80 on admit, no AMS/ overt HE. Received IV fentanyl in the ED, which resolved the RUQ    Liver Transplant Candidate - undergoing eval  Liver Transplant Work Up    ESLD/ Cirrhosis Etiology : ETOH cirrhosis    ESLD Complications: + hx of Ascites,+ HE, +Portal HTN, +Hyponatremia , + Hypotensions on midodrine    LabTesting  MELD-Na score: 19 at 10/9/2018  5:30 PM  MELD score: 13 at 10/9/2018  5:30 PM  Calculated from:  Serum Creatinine: 1.5 mg/dL at 10/9/2018  5:30 PM  Serum Sodium: 130 mmol/L at 10/9/2018  5:30 PM  Total Bilirubin: 1.3 mg/dL at 10/9/2018  5:30 PM  INR(ratio): 1.2 at 10/9/2018 12:05 PM  Age: 48 years     - AFP 3.5 on 7/26/2018  - Vit D - 8, low, on therapy with Vit D PO and alendronate   - Viral studies - negative for HCV. + Immunity to Hep A and B   - Urine drug screen - + opioids in the past  - PETH- + PETH 9/11/18 (139, PETH prior to that was negative), high risk as per prior addiction psych eval, rec'ed OIP . 7/2018 psych recs " High risk for relapse, poor insight, poor medical health, poor coping skills, poor judgement". Continues to drink due to stress. Per chart notes, pt instructed to keep scheduled intake appointment with  [employer]    Cardiopulmonary Evaluation  - 2D echo - 7/2018 Severe left atrial enlargement, EF 65%, The estimated PA systolic pressure is 27 mmHg  - Cardiac Stress Testing- Dobutamine stress echo- 7/2018 No evidence of stress induced myocardial ischemia    Cancer " "Screening  - HCC screening/ staging - as above  - CT scan/ MRI abdomen - as above  - Mammogram- pending   - Pap Smear- pending   - Colonoscopy- 8/2018 - 5 year surveillance for 3mm TA  (2021)    EGD/ Esophageal Varices Screening  EGD 8/2018, PHG    DEXA scan/ Bone Density  8/2018 DEXA- Osteoporosis of the femoral neck and total hip    Dental Evaluation   N/a    Dietary Evaluation   Alb 3.5. Nutrition 7/2018 rec'ed low Na diet and boost plus    Immunizations   Refer to  Hepatology clinic notes, needs flu shot    Psychosocial Evaluation   High risk as per prior addiction psych eval, rec'ed OIP . 7/2018 psych recs " High risk for relapse, poor insight, poor medical health, poor coping skills, poor judgement". Continues to drink due to stress. Per chart notes, pt instructed to keep scheduled intake appointment with  [employer]    Review of Systems   Constitutional: Negative for chills, fatigue, fever.   HENT: Negative for sore throat, trouble swallowing.    Eyes: Negative for photophobia, visual disturbance.   Respiratory: Negative for cough, shortness of breath.    Cardiovascular: Negative for chest pain, palpitations, leg swelling.   Gastrointestinal: as Per HPI  Endocrine: Negative for cold intolerance, heat intolerance.   Genitourinary: Negative for dysuria, frequency.   Musculoskeletal: Negative for arthralgias, myalgias.   Skin: Negative for rash, wound, erythema   Neurological: Negative for dizziness, syncope, weakness, light-headedness.   Psychiatric/Behavioral: Negative for confusion, hallucinations, anxiety  All other systems reviewed and are negative.      Past Medical History:   Past Medical History:   Diagnosis Date    Acute hypoxemic respiratory failure 12/1/2016    ANDREW (acute kidney injury) 11/28/2016    Alcohol dependence in remission     Alcoholic cirrhosis     Alcoholic cirrhosis of liver with ascites 10/22/2015    Anxiety     Ascites due to alcoholic cirrhosis 6/13/2016    " Centrilobular emphysema 2016    Cigarette nicotine dependence without complication 2016    Folate deficiency 10/22/2015    Gallstones     Hepatic encephalopathy     Hepatorenal syndrome 2016    Hyperbilirubinemia 2016    Hyponatremia 2016    Iron deficiency anemia due to chronic blood loss 10/22/2015    Portal hypertensive gastropathy 10/22/2015    Subclinical hypothyroidism 10/22/2015       Past Surgical History:   Past Surgical History:   Procedure Laterality Date    ADENOIDECTOMY      APPENDECTOMY      BIOPSY N/A 10/30/2014    Performed by [facility] Ovalle [facility] at [facility] CATH LAB    BIOPSY, LIVER, TRANSJUGULAR APPROACH N/A 8/15/2018    Performed by [facility] at [facility] OR 2ND FLR     SECTION      COLONOSCOPY      COLONOSCOPY N/A 2018    Procedure: COLONOSCOPY;  Surgeon: Jesus Whalen MD;  Location: [facility] (2ND FLR);  Service: Endoscopy;  Laterality: N/A;  2nd floor- pt has multiple comorbidities- requiring weekly paracentesis, now requires continuous home O2.  HCC/Cirrhosis, Variceal screening- labs ordered.  PM Prep    COLONOSCOPY N/A 2018    Performed by Jesus Whalen MD at [facility] (2ND FLR)    COLONOSCOPY N/A 2014    Performed by Maritza Garc[facility] at [facility]    ESOPHAGOGASTRODUODENOSCOPY N/A 2018    Procedure: ESOPHAGOGASTRODUODENOSCOPY (EGD);  Surgeon: Jesus Whalen MD;  [facility]  Service: Endoscopy;  Laterality: N/A;  2nd floor- pt has multiple comorbidities- requiring weekly paracentesis, now requires continuous home O2.  HCC/Cirrhosis, Variceal screening- labs ordered.    ESOPHAGOGASTRODUODENOSCOPY (EGD) N/A 2018    Performed by Jesus Whalen MD at [facility] (2ND FLR)    ESOPHAGOGASTRODUODENOSCOPY (EGD) N/A 6/3/2016    [facility] ENDO    ESOPHAGOGASTRODUODENOSCOPY (EGD) N/A 2015    Performed by [facility]za Garc[facility] at [facility]     HYSTERECTOMY      25 yrs old    LIVER BIOPSY      OOPHORECTOMY Left     25 yrs old    OOPHORECTOMY Right     29 yrs old    TONSILLECTOMY      TRANSJUGULAR BIOPSY OF LIVER N/A 8/15/2018    Procedure: BIOPSY, LIVER, TRANSJUGULAR APPROACH;  Surgeon: Juan Diagnostic Provider;  Location: [facility] OR [address] [place] [address];  Service: Radiology;  Laterality: N/A;    TUBAL LIGATION      UPPER GASTROINTESTINAL ENDOSCOPY         Social History:   Social History     Tobacco Use    Smoking status: Former Smoker     Packs/day: 1.00     Years: 35.00     Pack years: 35.00     Types: Cigarettes    Smokeless tobacco: Never Used   Substance Use Topics    Alcohol use: No     Alcohol/week: 0.0 oz     Comment: beer intake last ~2017, liquour last drink 9/2018    Drug use: No       Family History:   Family History   Problem Relation Age of Onset    Rheum arthritis Father     Cancer Mother     No Known Problems Sister     No Known Problems Brother     Colon cancer Neg Hx          Medications: Scheduled Meds:   [START ON 10/10/2018] albumin human 25%  25 g Intravenous BID    [START ON 10/10/2018] cyanocobalamin  1,000 mcg Oral Daily    [START ON 10/10/2018] folic acid  1 mg Oral Daily    heparin (porcine)  5,000 Units Subcutaneous Q8H    lactulose  15 g Oral TID    midodrine  15 mg Oral TID WM    [START ON 10/10/2018] multivitamin  1 tablet Oral Daily    octreotide  100 mcg Subcutaneous Q8H    [START ON 10/10/2018] vitamin D  1,000 Units Oral Daily     Continuous Infusions:  PRN Meds:.acetaminophen, dextrose 50%, dextrose 50%, glucagon (human recombinant), glucose, glucose, ondansetron, oxyCODONE, ramelteon, sodium chloride 0.9%, traMADol    Allergies: Patient is allergic to dilaudid [hydromorphone].    Physical Exam:     Vital Signs (Most Recent):  Temp: 97.9 °F (36.6 °C) (10/09/18 2224)  Pulse: 68 (10/09/18 2224)  Resp: 16 (10/09/18 2224)  BP: (!) 115/59 (10/09/18 2224)  SpO2: 96 % (10/09/18 2224) Vital Signs Range (Last  24H):  Temp:  [97.9 °F (36.6 °C)-98.8 °F (37.1 °C)]   Pulse:  [68-83]   Resp:  [16-18]   BP: ()/(58-63)   SpO2:  [96 %-100 %]    Body mass index is 21.16 kg/m².     Physical Exam:  Constitutional: appears chronically ill, non-distressed, not diaphoretic.   HENT: NC/AT, external ears normal, oropharynx clear, MMM w/o exudates.   Eyes: PERRL, EOMI, conjunctiva normal, no discharge b/l, no scleral icterus   Neck: normal ROM, supple  CV: RRR, no m/r/g, no carotid bruits, +2 peripheral pulses.  Pulmonary/Chest wall: Breathing comfortably w/o distress, CTAB, no w/r/r, no crackles.  GI: no overt ascites present on PE, +hepatomegaly, no tenderness to palpation but s/p IV fentanyl in the ED  Musculoskeletal: Normal ROM, no edema, + mild atrophy, no tenderness throughout   Neurological: AAO x 4, CN II-XI in tact  Skin: warm, dry, (-) erythema, (-) rash, (-)pallor  Psych: normal mood and affect, normal behavior, thought content and judgement.  Vitals reviewed.    Labs:    Ejection Fractions:    EF   Date Value Ref Range Status   07/27/2018 65 55 - 65    07/25/2018 60 55 - 65           Chemistries:   Recent Labs   Lab  10/09/18   1205  10/09/18   1730   NA  132*  130*   K  3.9  4.2   CL  98  99   CO2  22*  19*   BUN  26*  26*   CREATININE  1.6*  1.5*   CALCIUM  9.6  9.4   PROT  7.9  8.5*   BILITOT  1.4*  1.3*   ALKPHOS  175*  194*   ALT  21  21   AST  21  29        CBC/Anemia Labs: Coags:    Recent Labs   Lab  10/09/18   1205  10/09/18   1730   WBC  8.92  9.95   HGB  10.5*  11.5*   HCT  32.2*  34.7*   PLT  158  154   MCV  102*  102*   RDW  13.9  14.0    Recent Labs   Lab  10/09/18   1205   INR  1.2        POCT Glucose: HbA1c:    Recent Labs   Lab  10/09/18   2111   POCTGLUCOSE  145*    Hemoglobin A1C   Date Value Ref Range Status   10/16/2015 Invalid (A) 4.5 - 6.2 % Final   10/06/2015 4.2 (L) 4.5 - 6.2 % Final        Diagnostic Results:    U/S RUQ doppler 10/8/18  Findings consistent with portal hypertension with reversal  of flow in the main and right portal veins.  The left portal vein demonstrates appropriate forward flow.  These findings are the reverse of finding on prior liver ultrasound Doppler.  Reversal flow in the SMV and splenic veins.  Several splenic collateral vessels.    U/S limited  1. Cholelithiasis without sonographic evidence for acute cholecystitis.  2. Cirrhosis and mild hepatomegaly.    CT chest abdomen w/o cont 10/10/18  1. Diffuse emphysematous change of the pulmonary parenchyma and bibasilar atelectatic change as detailed above.  Multiple calcified pulmonary nodules, likely reflecting sequela of prior granulomatous disease, unchanged from prior CT chest examinations.  2. Hepatomegaly and findings suggestive of underlying cirrhosis.  3. Mild splenomegaly and findings suggestive of associated portal hypertension.  4. Cholelithiasis.  5. Small hiatal hernia.  6. Atherosclerosis.    Assessment and Plan:     Ms. Annette Esparza is a 48 y.o. female ETOH cirrhosis (now MELD 19) complicated by HE, ascites (in the past, now resolved), post gastropathy, and mild hyponatremia, prior concern for HCC, ongoing ETOH abuse, non-adherance/ non-compliance , chronic anemia, presented to the ED on 10/9 after being seen by Hepatology Clinic where labwork showed ANDREW  With Cr of 1.6 from wnl, as well as worsening RUQ after  A CT guided liver bx on 9/26    Active Hospital Problems    Diagnosis  POA    *ANDREW (acute kidney injury) [N17.9]  Yes     Priority: 1 - High    RUQ pain [R10.11]  Yes     Priority: 2     Hypotension [I95.9]  Yes     Priority: 2     Pre-transplant evaluation for liver transplant [Z01.818]  Not Applicable     Priority: 3     Alcoholic cirrhosis of liver without ascites [K70.30]  Yes     Priority: 3     Alcohol use disorder, severe, in early remission [F10.21]  Yes     Priority: 5     Macrocytic anemia [D53.9]  Yes    Chronic Hepatic encephalopathy [K72.90]  Yes    Hyponatremia [E87.1]  Yes     "Anemia of chronic disease [D63.8]  Yes      Resolved Hospital Problems   No resolved problems to display.     ANDREW (acute kidney injury)  - likely pre renal given aggressive diuretic regimen as outpt. vs HRS but less likely . Admit Cr of 1.6, from 0.8  - s/p 500cc IVF in the ED, no volume overload on PE  - IV albumin  - PO midodrine 15 TID (home dose)  - SubQ octreotide  - check urine studies  - renal diet    RUQ pain  - worsening post CT guided liver bx, U/S imaging with no bleed, hbg stable, no acute cholecystitis. No portal thrombus on U/S with doppler. Low suspicion for PNA. No fractures given osteoporosis  - may be possibly 2/2 hepatomegaly. Unclear etiology at this time  - CT chest and abdomen w/o cont  Ordered 10/10- " Diffuse emphysematous change of the pulmonary parenchyma and bibasilar atelectatic change as detailed above.  Multiple calcified pulmonary nodules, likely reflecting sequela of prior granulomatous disease, unchanged from prior CT chest examinations"  - PO tramadol / oxy for mod/ severe pain    Alcoholic cirrhosis of liver without ascites  Pre-transplant evaluation for liver transplant  MELD-Na score: 19 at 10/9/2018  5:30 PM  MELD score: 13 at 10/9/2018  5:30 PM  Calculated from:  Serum Creatinine: 1.5 mg/dL at 10/9/2018  5:30 PM  Serum Sodium: 130 mmol/L at 10/9/2018  5:30 PM  Total Bilirubin: 1.3 mg/dL at 10/9/2018  5:30 PM  INR(ratio): 1.2 at 10/9/2018 12:05 PM  Age: 48 years  - cont home multivitamin. B12, folic acid  - PO lactulose  - no ascites on exam or imaging, very low suspicion for SBP at this time  - transplant process as above  - believe needs a pap smear  - recheck PETH  - Psych consult  - Hepatology consulted for the AM     Hypotension   - chronic, likely 2/2 cirrhosis.   - cont home Midodrine 15 TID  - monitor Cr    Alcohol use disorder, severe  - addiction psychiatry consult  - ongoing EOTH use, as above, per HPI    Chronic Hepatic encephalopathy  - admit ammonia of 80, non " compliant at home, poor understanding of effects and necessity of lactulose  - PO lactulose TID , titrate for 3 BMs per day  - limiting sedatives  - needs more aggressive counseling in regards to lactulose    Anemia of chronic disease  Macrocytic anemia  - cont home B12/ folic acid   - hbg of 11, stable , above baseline of 10  - very low concern for bleeding at this time    Hyponatremia   - Na of 130 on admit, possibly 2/2 diuretics  - s/p IVF, as above  - monitor     Diet:   Low na  GI PPx:  n/a  DVT PPx:  Heparin   Goals of Care:  Admitted to  L    Signing Physician:     Elma Sesay MD  Department of Hospital Medicine   [facility]  Pager [phone]  Spectra [phone]  10/10/2018

## 2018-10-10 NOTE — PLAN OF CARE
Problem: Patient Care Overview  Goal: Plan of Care Review  Outcome: Ongoing (interventions implemented as appropriate)  Plan of care reviewed, all questions and concerns addressed. Patient AAOx4 w/  vital signs remain normal and stable for patient, with no complaints of SOB, headaches, or dizziness. Patient urine output remains adequate, Patient is tolerating diet well with no bowel movements this shift and no complaints of nausea or vomiting. Patient pain well controlled with ordered pain medications. Patient is resting quietly with side rails up and call light with in reach and remains free from falls or injury. Will continue to monitor patient status.

## 2018-10-11 NOTE — PROGRESS NOTES
Progress Note   Hospital Medicine         Patient Name: Annette Esparza  MRN:  [id number]  [facility] Medicine Team: [facility] [place] [facility] MED L Justino Edward MD  Date of Admission:  10/9/2018     Length of Stay:  LOS: 2 days   Expected Discharge Date: 10/11/2018  Principal Problem:  ANDREW (acute kidney injury)       Subjective:     Interval History/Overnight Events:  Patient doing well today; ANDREW resolved as has abdominal pain; d/c home    Review of Systems   Constitutional: Negative for chills, fatigue, fever.   HENT: Negative for sore throat, trouble swallowing.    Eyes: Negative for photophobia, visual disturbance.   Respiratory: Negative for cough, shortness of breath.    Cardiovascular: Negative for chest pain, palpitations, leg swelling.   Gastrointestinal: Negative for abdominal pain, constipation, diarrhea, nausea, vomiting.   Endocrine: Negative for cold intolerance, heat intolerance.   Genitourinary: Negative for dysuria, frequency.   Musculoskeletal: Negative for arthralgias, myalgias.   Skin: Negative for rash, wound, erythema   Neurological: Negative for dizziness, syncope, weakness, light-headedness.   Psychiatric/Behavioral: Negative for confusion, hallucinations, anxiety  All other systems reviewed and are negative.    Objective:     Temp:  [98.4 °F (36.9 °C)-99.2 °F (37.3 °C)]   Pulse:  [61-68]   Resp:  [18]   BP: ()/(48-56)   SpO2:  [92 %-98 %]       Physical Exam:  Constitutional: Appears well-developed and well-nourished.   Head: Normocephalic and atraumatic.   Mouth/Throat: Oropharynx is clear and moist.   Eyes: EOM are normal. Pupils are equal, round, and reactive to light. No scleral icterus.   Neck: Normal range of motion. Neck supple.   Cardiovascular: Normal rate and regular rhythm.  No murmur heard.  Pulmonary/Chest: Effort normal and breath sounds normal. No respiratory distress. No wheezes, rales, or rhonchi  Abdominal: Soft. Bowel sounds are normal.  No distension or  tenderness  Musculoskeletal: Normal range of motion. No edema.   Neurological: Alert and oriented to person, place, and time.   Skin: Skin is warm and dry.   Psychiatric: Normal mood and affect. Behavior is normal.     Recent Labs   Lab  10/09/18   1205  10/09/18   1730  10/10/18   0407  10/11/18   0552   WBC  8.92  9.95  7.16  6.10   HGB  10.5*  11.5*  9.9*  9.6*   HCT  32.2*  34.7*  29.8*  31.2*   PLT  158  154  151  152     Recent Labs   Lab  10/09/18   1730  10/10/18   0407  10/11/18   0552   NA  130*  134*  136   K  4.2  3.8  4.1   CL  99  102  107   CO2  19*  21*  20*   BUN  26*  24*  19   CREATININE  1.5*  1.2  0.7   GLU  77  104  89   CALCIUM  9.4  9.0  9.3   MG  2.6   --    --    PHOS  4.8*  4.4  3.3     Recent Labs   Lab  10/09/18   1205  10/09/18   1730  10/10/18   0407  10/11/18   0552   ALKPHOS  175*  194*  161*  143*   ALT  21  21  17  13   AST  21  29  17  14   ALBUMIN  3.5  3.5  3.1*  3.4*   PROT  7.9  8.5*  7.1  6.9   BILITOT  1.4*  1.3*  1.3*  1.3*   INR  1.2   --   1.3*  1.2     Recent Labs   Lab  10/09/18   2111  10/10/18   1012   POCTGLUCOSE  145*  128*           Assessment and Plan     Ms. Annette Esparza is a 48 y.o. female who presented to [facility] on 10/9/2018 with     Hospital Course:    Ms. Annette Esparza was admitted to Hospital Medicine for management of     Active Hospital Problems    Diagnosis  POA    *ANDREW (acute kidney injury) [N17.9]  Yes    RUQ pain [R10.11]  Yes    Macrocytic anemia [D53.9]  Yes    Pre-transplant evaluation for liver transplant [Z01.818]  Not Applicable    Decompensated hepatic cirrhosis [K72.90]  Yes    Chronic Hepatic encephalopathy [K72.90]  Yes    Hyponatremia [E87.1]  Yes    Hypotension [I95.9]  Yes    Alcoholic cirrhosis of liver without ascites [K70.30]  Yes    Anemia of chronic disease [D63.8]  Yes    Alcohol use disorder, severe, in early remission [F10.21]  Yes      Resolved Hospital Problems   No resolved problems to display.  "          ANDREW (acute kidney injury)  - likely pre renal given aggressive diuretic regimen as outpt. vs HRS but less likely . Admit Cr of 1.6, from 0.8  - s/p 500cc IVF in the ED, no volume overload on PE  - IV albumin  - PO midodrine 15 TID (home dose)  - SubQ octreotide  - - patient's Cr back to baseline; will send home on lower dose of diuretics, lasix 40 mg daily and aldactone 100 mg daily and monitor fluid status;      RUQ pain  - worsening post CT guided liver bx, U/S imaging with no bleed, hbg stable, no acute cholecystitis. No portal thrombus on U/S with doppler. Low suspicion for PNA. No fractures given osteoporosis  - may be possibly 2/2 hepatomegaly. Unclear etiology at this time  - CT chest and abdomen w/o cont  Ordered 10/10- " Diffuse emphysematous change of the pulmonary parenchyma and bibasilar atelectatic change as detailed above.  Multiple calcified pulmonary nodules, likely reflecting sequela of prior granulomatous disease, unchanged from prior CT chest examinations"  - PO tramadol / oxy for mod/ severe pain    - resolved, sending home on prn tramadol; possibly due to her mass and cirrhosis      Alcoholic cirrhosis of liver without ascites  Pre-transplant evaluation for liver transplant  MELD-Na score: 9 at 10/11/2018  5:52 AM  MELD score: 9 at 10/11/2018  5:52 AM  Calculated from:  Serum Creatinine: 0.7 mg/dL (Rounded to 1 mg/dL) at 10/11/2018  5:52 AM  Serum Sodium: 136 mmol/L at 10/11/2018  5:52 AM  Total Bilirubin: 1.3 mg/dL at 10/11/2018  5:52 AM  INR(ratio): 1.2 at 10/11/2018  5:52 AM  Age: 48 years    - cont home multivitamin. B12, folic acid  - PO lactulose  - no ascites on exam or imaging, very low suspicion for SBP at this time  - transplant process as above  - believe needs a pap smear  - recheck PETH  - Psych consult- deem her high risk as she relapsed recent due to "pressures around her"; she is working on getting into an outpatient rehab; psych does not recommend and SSRI currently; "   - Hepatology consulted      Hypotension   - chronic, likely 2/2 cirrhosis.   - cont home Midodrine 15 TID  - monitor Cr     Alcohol use disorder, severe  - addiction psychiatry consult  - ongoing EOTH use, as above, per HPI     Chronic Hepatic encephalopathy  - admit ammonia of 80, non compliant at home, poor understanding of effects and necessity of lactulose  - PO lactulose TID , titrate for 3 BMs per day  - limiting sedatives  - needs more aggressive counseling in regards to lactulose     Anemia of chronic disease  Macrocytic anemia  - cont home B12/ folic acid   - hbg of 11, stable , above baseline of 10  - very low concern for bleeding at this time     Hyponatremia   - Na of 130 on admit, possibly 2/2 diuretics  - s/p IVF, as above  - monitor     Diet:   Low na  GI PPx:  n/a  DVT PPx:  Heparin   Goals of Care:  Admitted to IM L      Discharge Disposition- d/c home today with outpatient alcohol rehab and hepatology follow up    Justino Edward MD  Medical Director [facility] Medicine  Spectra:  [phone]  Pager: [phone]

## 2018-10-11 NOTE — PLAN OF CARE
Problem: Patient Care Overview  Goal: Plan of Care Review  Outcome: Ongoing (interventions implemented as appropriate)  Pt AAOx4. Pt tolerating diet with no complaints of discomfort or nausea. Pain managed with PRN pain meds.low BP overnight, 5mg midodrine tab and 250cc NS bolus ordered.  Pt ambulates independently. Pt slept between care. TEDs on. Call light in reach and bed in lowest position. Pt remains free of falls and injury. No acute events this shift. Will continue to monitor.

## 2018-10-11 NOTE — DISCHARGE SUMMARY
Discharge Summary  Hospital Medicine    Patient Name: Annette Esparza  MRN:  [id number]  Hospital Medicine Team: [facility] [place] [facility] MED L Justino Edward MD  Date of Admission:  10/9/2018     Date of Discharge:  10/11/2018  Length of Stay:  LOS: 2 days   Principal Problem:  ANDREW (acute kidney injury)     Active Hospital Problems    Diagnosis  POA    *ANDREW (acute kidney injury) [N17.9]  Yes    RUQ pain [R10.11]  Yes    Macrocytic anemia [D53.9]  Yes    Pre-transplant evaluation for liver transplant [Z01.818]  Not Applicable    Decompensated hepatic cirrhosis [K72.90]  Yes    Chronic Hepatic encephalopathy [K72.90]  Yes    Hyponatremia [E87.1]  Yes    Hypotension [I95.9]  Yes    Alcoholic cirrhosis of liver without ascites [K70.30]  Yes    Anemia of chronic disease [D63.8]  Yes    Alcohol use disorder, severe, in early remission [F10.21]  Yes      Resolved Hospital Problems   No resolved problems to display.       History of Present Illness:      Ms. Annette Esparza is a 48 y.o. female with ETOH cirrhosis (now MELD 19) complicated by HE, ascites (in the past, now resolved), post gastropathy, and mild hyponatremia, prior concern for HCC, ongoing ETOH abuse, non-adherance/ non-compliance , chronic anemia, presented to the ED on 10/9 after being seen by Hepatology Clinic where labwork showed ANDREW. Patient also presenting with worsening RUQ abdominal pain. Patient sent from Hepatology clinic to the ED for these issues.      Regarding ANDREW, patient has been on lasix 80 and spironolactone 200mg, which has controlled her ascites and swelling. On this regimen, pt no long has clinical ascites. On admission, Cr of 1.6 from baseline of 0.8. Na of 130. Has been making adequate urine.      Regarding pt's RUQ pain, it stated 2 days after her CT guided liver cx on 9/26. Constant pain, sharp, radiating up to her posterior thorax. Has hx of cholelithiasis with no prior cholecystitis. Not associated with food intake, no  "worsening with inspiration. Has been worsening in the past several days. No fevers, chill. No associated N/V or diarrhea. No ascites at this time as noted above. Lipase of 53. U/S limited with no acute process. No prior VTE/ thrombi as per patient. hbg of 11.5, from 10, plts of 154, INR of 1.2. US limited w/o doppler in the ED noted no acute abnormalities.      Patient previously had high concern for infiltrative HCC after a 4.4 cm liver mass was found on CT in 7/2018, was then re evaluated with MRI. Transjugular biopsy of R lobe was then pursued and negative for malignancy, but not targeted approach. CT guided biopsy done 9/26/18 in IR. Image-guided biopsy of ill-defined right hepatic lobe lesion done and submitted to PATH conference. Path conference 10/4/18: "Rt. Lobe Targeted Liver Biopsy(CT guided: scar/ cirrhosis/ bile plugging/  No definate malignancy." AFP is normal (3.5) 7/26/18.      Has been on midodrine 15 TID for hypotension. Has been prescribed lactulose but not taking. When asked, pt thought it was for her swelling and since her swelling is now controlled/ no ascites, she stopped. Ammonia of 80 on admit, no AMS/ overt HE. Received IV fentanyl in the ED, which resolved the Q        Hospital Course of Principle Problem Addressed:       ANDREW (acute kidney injury)  - likely pre renal given aggressive diuretic regimen as outpt. vs HRS but less likely . Admit Cr of 1.6, from 0.8  - s/p 500cc IVF in the ED, no volume overload on PE  - IV albumin  - PO midodrine 15 TID (home dose)  - SubQ octreotide  - - patient's Cr back to baseline; will send home on lower dose of diuretics, lasix 40 mg daily and aldactone 100 mg daily and monitor fluid status;      RUQ pain  - worsening post CT guided liver bx, U/S imaging with no bleed, hbg stable, no acute cholecystitis. No portal thrombus on U/S with doppler. Low suspicion for PNA. No fractures given osteoporosis  - may be possibly 2/2 hepatomegaly. Unclear etiology at " "this time  - CT chest and abdomen w/o cont  Ordered 10/10- " Diffuse emphysematous change of the pulmonary parenchyma and bibasilar atelectatic change as detailed above.  Multiple calcified pulmonary nodules, likely reflecting sequela of prior granulomatous disease, unchanged from prior CT chest examinations"  - PO tramadol / oxy for mod/ severe pain     - resolved, sending home on prn tramadol; possibly due to her mass and cirrhosis      Alcoholic cirrhosis of liver without ascites  Pre-transplant evaluation for liver transplant  MELD-Na score: 9 at 10/11/2018  5:52 AM  MELD score: 9 at 10/11/2018  5:52 AM  Calculated from:  Serum Creatinine: 0.7 mg/dL (Rounded to 1 mg/dL) at 10/11/2018  5:52 AM  Serum Sodium: 136 mmol/L at 10/11/2018  5:52 AM  Total Bilirubin: 1.3 mg/dL at 10/11/2018  5:52 AM  INR(ratio): 1.2 at 10/11/2018  5:52 AM  Age: 48 years     - cont home multivitamin. B12, folic acid  - PO lactulose  - no ascites on exam or imaging, very low suspicion for SBP at this time  - transplant process as above  - believe needs a pap smear  - recheck PETH  - Psych consult- deem her high risk as she relapsed recent due to "pressures around her"; she is working on getting into an outpatient rehab; psych does not recommend and SSRI currently;   - Hepatology consulted      Hypotension   - chronic, likely 2/2 cirrhosis.   - cont home Midodrine 15 TID  - monitor Cr     Alcohol use disorder, severe  - addiction psychiatry consult  - ongoing EOTH use, as above, per HPI     Chronic Hepatic encephalopathy  - admit ammonia of 80, non compliant at home, poor understanding of effects and necessity of lactulose  - PO lactulose TID , titrate for 3 BMs per day  - limiting sedatives  - needs more aggressive counseling in regards to lactulose     Anemia of chronic disease  Macrocytic anemia  - cont home B12/ folic acid   - hbg of 11, stable , above baseline of 10  - very low concern for bleeding at this time     Hyponatremia   - Na " of 130 on admit, possibly 2/2 diuretics  - s/p IVF, as above  - monitor     Diet:   Low na  GI PPx:  n/a  DVT PPx:  Heparin   Goals of Care:  Admitted to IM L        Discharge Disposition- d/c home today with outpatient alcohol rehab and hepatology follow up           Other Medical Problems Addressed in the Hospital:         Significant Diagnostic Tests/Imaging:     Recent Labs   Lab  10/09/18   1205  10/09/18   1730  10/10/18   0407  10/11/18   0552   WBC  8.92  9.95  7.16  6.10   HGB  10.5*  11.5*  9.9*  9.6*   HCT  32.2*  34.7*  29.8*  31.2*   PLT  158  154  151  152     Recent Labs   Lab  10/09/18   1205  10/09/18   1730  10/10/18   0407  10/11/18   0552   NA  132*  130*  134*  136   K  3.9  4.2  3.8  4.1   CL  98  99  102  107   CO2  22*  19*  21*  20*   BUN  26*  26*  24*  19   CREATININE  1.6*  1.5*  1.2  0.7   CALCIUM  9.6  9.4  9.0  9.3   MG   --   2.6   --    --    PHOS   --   4.8*  4.4  3.3   PROT  7.9  8.5*  7.1  6.9   BILITOT  1.4*  1.3*  1.3*  1.3*   ALKPHOS  175*  194*  161*  143*   ALT  21  21  17  13   AST  21  29  17  14         Special Treatments/Procedures:         Discharge Medications:      Discharge Medication List as of 10/11/2018 10:25 AM      START taking these medications    Details   lactulose (CHRONULAC) 20 gram/30 mL Soln Take 22.5 mLs (15 g total) by mouth 3 (three) times daily. TITRATE TO 3-4 BOWEL MOVEMENTS DAILY, Starting Thu 10/11/2018, Until Sat 11/10/2018, Normal      traMADol (ULTRAM) 50 mg tablet Take 1 tablet (50 mg total) by mouth every 6 (six) hours as needed for Pain., Starting Thu 10/11/2018, Print         CONTINUE these medications which have CHANGED    Details   furosemide (LASIX) 80 MG tablet Take 0.5 tablets (40 mg total) by mouth once daily., Starting Thu 10/11/2018, No Print      spironolactone (ALDACTONE) 100 MG tablet Take 1 tablet (100 mg total) by mouth once daily., Starting Thu 10/11/2018, Until Fri 10/11/2019, No Print         CONTINUE these medications which  have NOT CHANGED    Details   alendronate (FOSAMAX) 70 MG tablet Take 1 tablet (70 mg total) by mouth every 7 days., Starting Wed 8/29/2018, Until Thu 8/29/2019, Normal      ascorbic acid, vitamin C, (VITAMIN C) 500 MG tablet Take 500 mg by mouth once daily., Historical Med      b complex vitamins tablet Take 1 tablet by mouth once daily., Historical Med      cyanocobalamin (VITAMIN B-12) 1000 MCG tablet Take 1 tablet (1,000 mcg total) by mouth once daily., Starting Fri 8/24/2018, Normal      ferrous sulfate (FEOSOL) 325 mg (65 mg iron) Tab tablet Take 325 mg by mouth once daily., Historical Med      folic acid (FOLVITE) 1 MG tablet Take 1 tablet (1 mg total) by mouth once daily., Starting Fri 7/20/2018, Until Sat 7/20/2019, Normal      midodrine (PROAMATINE) 10 MG tablet Take 1.5 tablets by mouth 3 times daily  With meals, Historical Med      multivitamin (THERAGRAN) tablet Take 1 tablet by mouth once daily., Historical Med      vitamin D 1000 units Tab Take 1,000 Units by mouth once daily., Historical Med         STOP taking these medications       HYDROcodone-acetaminophen (NORCO)  mg per tablet Comments:   Reason for Stopping:         ibuprofen (ADVIL,MOTRIN) 200 MG tablet Comments:   Reason for Stopping:         potassium 99 mg Tab Comments:   Reason for Stopping:               Discharge Diet:2 gram sodium diet    Activity: activity as tolerated    Discharge Condition: Good    Disposition: Home or Self Care    Time spent on the discharge of the patient including review of hospital course with the patient. reviewing discharge medications and arranging follow-up care 35 minutes.  Patient was seen and examined on the date of discharge and determined to be suitable for discharge.    Future Appointments   Date Time Provider Department Center   10/23/2018  8:05 AM LAB, TRANSPLANT [facility] LABSARITA [facility]   10/23/2018  9:30 AM Denia Johnson MD [facility] LIVERTX [facility]   10/23/2018 10:30 AM LIVER,   TRANSPLANT [facility] LIVERTX [place]   2/22/2019  9:10 AM INJECTION, INFECTIOUS DISEASES [facility] ID INJ [facility]   3/22/2019  9:00 AM LAB, [facility]       Justino Edward MD  Medical Director [facility]  Spectra:  [phone]  Pager: [phone]

## 2018-10-11 NOTE — PROGRESS NOTES
MELD-Na score: 15 at 10/10/2018  4:07 AM  MELD score: 12 at 10/10/2018  4:07 AM  Calculated from:  Serum Creatinine: 1.2 mg/dL at 10/10/2018  4:07 AM  Serum Sodium: 134 mmol/L at 10/10/2018  4:07 AM  Total Bilirubin: 1.3 mg/dL at 10/10/2018  4:07 AM  INR(ratio): 1.3 at 10/10/2018  4:07 AM  Age: 48 years    - patient had recent binge drinking due to stressors in her life.  CT ab/pelv is negative for acute process.  ANDREW has improved.  Addiction psych states patient is high risk and needs IOP; patient states she is intake process at [facility]; they do not recommend an anti-depressive at this time and think a lot of this is alcohol related; if pain is improved tomorrow can be d/darshana early in the AM

## 2018-10-23 NOTE — PROGRESS NOTES
Transplant Hepatology Follow-up Note    Chief complaint: decompensated alcoholic cirrhosis    HPI:  Annette Esparza is a 48 y.o. female that presents to hepatology clinic for management of decompensated alcoholic cirrhosis.  She is alone.     Patient first learned of the presence of liver disease in 9/2014.  Given uncertainty regarding presence of cirrhosis, she underwent liver biopsy in 10/2014 which confirmed the presence of cirrhosis.  Serologic w/u was negative and etiology is alcoholic liver disease.  The patient continued to consume alcohol until 11/2016.  She was initially seen in transplant clinic in 1/2017 after a hospitalization in early December 2016.  At that time she had recently discontinued alcohol and had improving MELD from 16 to 8.  Not deemed a transplant candidate and recommended for 3 month follow-up, which did not occur.    It appears the patient was stable until 7/2018.  At that time she presented to [facility] with E coli UTI and bacteremia precipitating decompensation with increased lethargy, ascites, and HE.  She was treated for 4 days and ultimately left AMA.  She presented to [facility] 4 days after leaving AMA due to continued decline.  Patient completed therapy for infection and inpatient transplant evaluation was initiated.  During evaluation noted to have concerning liver mass for HCC.      Regarding liver mass, CT on 7/25/18 with ill defined 4.4cm mass concerning for but not diagnostic for HCC.  MRI obtained 3 days later and no defined mass seen and concern for infiltrative HCC.  Transjugular biopsy of right lobe was pursued and negative for malignancy.  AFP is normal.  At this time it remains unclear if patient has HCC and the extent of disease if present.  Current recommendation is for US guided liver biopsy for improved targeting of abnormalities seen on cross sectional imaging.      Targeted mass biopsy completed and no evidence of HCC.      Recent hospitalization for post- liver  biopsy pain.    Diuretics reduced to lasix 40mg and spironolactone 100mg daily  Last drink September 2018, states that she has completed intake evaluation for [facility] but has not been scheduled for follow-up at this time    Cirrhosis HCM:  Hepatitis A vaccination:  Immune   Hepatitis B vaccination: immune   HCC screening: CT from 7/2018  Variceal screening: EGD 8/2018, PHG  Pneumococcal vaccination:  2016  Influenza vaccination: 2018  Colonoscopy: 8/2018 - 5 year surveillance for 3mm TA     Patient Active Problem List   Diagnosis    Gallstones    Alcohol use disorder, severe, in early remission    Anemia of chronic disease    Vitamin D deficiency    Folate deficiency    Iron deficiency anemia due to chronic blood loss    Portal hypertensive gastropathy    Alcoholic cirrhosis of liver without ascites    Subclinical hypothyroidism    Anxiety    Cigarette nicotine dependence without complication    Mild single current episode of major depressive disorder    Oral thrush    Hypotension    Hyponatremia    General weakness    Chronic Hepatic encephalopathy    Hypomagnesemia    Normocytic anemia    Hypophosphatemia    Decompensated hepatic cirrhosis    Severe malnutrition    HCC (hepatocellular carcinoma)    Pre-transplant evaluation for liver transplant    Panlobular emphysema    Elevated serum creatinine    ANDREW (acute kidney injury)    RUQ pain    Macrocytic anemia       Past Medical History:   Diagnosis Date    Acute hypoxemic respiratory failure 12/1/2016    ANDREW (acute kidney injury) 11/28/2016    Alcohol dependence in remission     Alcoholic cirrhosis     Alcoholic cirrhosis of liver with ascites 10/22/2015    Anxiety     Ascites due to alcoholic cirrhosis 6/13/2016    Centrilobular emphysema 12/12/2016    Cigarette nicotine dependence without complication 6/2/2016    Folate deficiency 10/22/2015    Gallstones     Hepatic encephalopathy     Hepatorenal syndrome 11/29/2016     Hyperbilirubinemia 2016    Hyponatremia 2016    Iron deficiency anemia due to chronic blood loss 10/22/2015    Portal hypertensive gastropathy 10/22/2015    Subclinical hypothyroidism 10/22/2015       Past Surgical History:   Procedure Laterality Date    ADENOIDECTOMY      APPENDECTOMY      BIOPSY N/A 10/30/2014    Performed by [facility]yer Ovalle [facility] at [facility] CATH LAB    BIOPSY, LIVER, TRANSJUGULAR APPROACH N/A 8/15/2018    Performed by [facility] Diagnostic Provider at [facility] OR 2ND FLR     SECTION      COLONOSCOPY      COLONOSCOPY N/A 2018    Procedure: COLONOSCOPY;  Surgeon: Jesus Whalen MD;  [facility]  Service: Endoscopy;  Laterality: N/A;  2nd floor- pt has multiple comorbidities- requiring weekly paracentesis, now requires continuous home O2.  HCC/Cirrhosis, Variceal screening- labs ordered.  PM Prep    COLONOSCOPY N/A 2018    Performed by [facility]alen MD at [facility] (2ND FLR)    COLONOSCOPY N/A 2014    Performed by Maritza Gracía MD at [facility]    ESOPHAGOGASTRODUODENOSCOPY N/A 2018    Procedure: ESOPHAGOGASTRODUODENOSCOPY (EGD);  Surgeon: Jesus Whalen MD;  Location: [address]);  Service: Endoscopy;  Laterality: N/A;  2nd floor- pt has multiple comorbidities- requiring weekly paracentesis, now requires continuous home O2.  HCC/Cirrhosis, Variceal screening- labs ordered.    ESOPHAGOGASTRODUODENOSCOPY (EGD) N/A 2018    [facility]    ESOPHAGOGASTRODUODENOSCOPY (EGD) N/A 6/3/2016    Performed by Abby Dixon MD at [facility] ENDO    ESOPHAGOGASTRODUODENOSCOPY (EGD) N/A 2015    Performed by Maritza García MD at [facility]    HYSTERECTOMY      25 yrs old    LIVER BIOPSY      OOPHORECTOMY Left     25 yrs old    OOPHORECTOMY Right     29 yrs old    TONSILLECTOMY      TRANSJUGULAR BIOPSY OF LIVER N/A 8/15/2018    Procedure: BIOPSY, LIVER, TRANSJUGULAR  APPROACH;  Surgeon: Dosc Diagnostic Provider;  Location: [facility] OR [address] [place] [address];  Service: Radiology;  Laterality: N/A;    TUBAL LIGATION      UPPER GASTROINTESTINAL ENDOSCOPY         Family History   Problem Relation Age of Onset    Rheum arthritis Father     Cancer Mother     No Known Problems Sister     No Known Problems Brother     Colon cancer Neg Hx        Social History     Socioeconomic History    Marital status:      Spouse name: Not on file    Number of children: Not on file    Years of education: Not on file    Highest education level: Not on file   Social Needs    Financial resource strain: Not on file    Food insecurity - worry: Not on file    Food insecurity - inability: Not on file    Transportation needs - medical: Not on file    Transportation needs - non-medical: Not on file   Occupational History    Not on file   Tobacco Use    Smoking status: Former Smoker     Packs/day: 1.00     Years: 35.00     Pack years: 35.00     Types: Cigarettes    Smokeless tobacco: Never Used   Substance and Sexual Activity    Alcohol use: No     Alcohol/week: 0.0 oz     Comment: beer intake last ~2017, liquour last drink 9/2018    Drug use: No    Sexual activity: Yes     Partners: Male   Other Topics Concern    Not on file   Social History Narrative    Not on file   Patient currently residing with boyfriend.  Quit tobacco 7/2018, relapse of alcohol use, most recent September 2018.  Recommended for IOP.     Current Outpatient Medications   Medication Sig Dispense Refill    alendronate (FOSAMAX) 70 MG tablet Take 1 tablet (70 mg total) by mouth every 7 days. 4 tablet 11    ascorbic acid, vitamin C, (VITAMIN C) 500 MG tablet Take 500 mg by mouth once daily.      b complex vitamins tablet Take 1 tablet by mouth once daily.      cyanocobalamin (VITAMIN B-12) 1000 MCG tablet Take 1 tablet (1,000 mcg total) by mouth once daily. 90 tablet 3    ferrous sulfate (FEOSOL) 325 mg (65 mg iron) Tab tablet  "Take 325 mg by mouth once daily.      folic acid (FOLVITE) 1 MG tablet Take 1 tablet (1 mg total) by mouth once daily. 30 tablet 11    furosemide (LASIX) 80 MG tablet Take 0.5 tablets (40 mg total) by mouth once daily. 120 tablet 2    lactulose (CHRONULAC) 20 gram/30 mL Soln Take 22.5 mLs (15 g total) by mouth 3 (three) times daily. TITRATE TO 3-4 BOWEL MOVEMENTS DAILY 2025 mL 0    midodrine (PROAMATINE) 10 MG tablet Take 1.5 tablets by mouth 3 times daily  With meals      multivitamin (THERAGRAN) tablet Take 1 tablet by mouth once daily.      spironolactone (ALDACTONE) 100 MG tablet Take 1 tablet (100 mg total) by mouth once daily. 90 tablet 3    traMADol (ULTRAM) 50 mg tablet Take 1 tablet (50 mg total) by mouth every 6 (six) hours as needed for Pain. 15 tablet 0    vitamin D 1000 units Tab Take 1,000 Units by mouth once daily.       No current facility-administered medications for this visit.        Review of patient's allergies indicates:   Allergen Reactions    Morphine Nausea And Vomiting       Review of Systems   Constitutional: Positive for malaise/fatigue. Negative for chills, fever and weight loss.   Eyes: Negative.    Respiratory: Negative for cough and shortness of breath.    Cardiovascular: Negative for chest pain and leg swelling.   Gastrointestinal: Positive for abdominal pain and nausea. Negative for blood in stool, constipation, diarrhea, heartburn and vomiting.   Musculoskeletal: Negative for joint pain and myalgias.   Skin: Negative for itching and rash.   Neurological: Negative for dizziness, focal weakness, weakness and headaches.   Endo/Heme/Allergies: Does not bruise/bleed easily.   Psychiatric/Behavioral: Negative for depression. The patient is nervous/anxious.        Vitals:    10/23/18 0934   BP: 110/68   Pulse: 78   Resp: 16   Temp: 99 °F (37.2 °C)   TempSrc: Oral   SpO2: 99%   Weight: 53.9 kg (118 lb 13.3 oz)   Height: 5' 1" (1.549 m)       Physical Exam   Constitutional: She is " oriented to person, place, and time. She appears well-developed. No distress.   Thin female   HENT:   Head: Normocephalic and atraumatic.   Mouth/Throat: Oropharynx is clear and moist. No oropharyngeal exudate.   Eyes: EOM are normal. Pupils are equal, round, and reactive to light. No scleral icterus.   Neck: Normal range of motion. Neck supple. No thyromegaly present.   Cardiovascular: Normal rate, regular rhythm and normal heart sounds. Exam reveals no gallop and no friction rub.   No murmur heard.  Pulmonary/Chest: Effort normal. No respiratory distress. She has no wheezes. She has no rales.   Abdominal: Soft. Bowel sounds are normal. She exhibits no distension. There is tenderness. There is no rebound and no guarding.   Musculoskeletal: Normal range of motion. She exhibits no edema.   Lymphadenopathy:     She has no cervical adenopathy.   Neurological: She is alert and oriented to person, place, and time. No cranial nerve deficit.   Mental status normal   Skin: Skin is warm and dry. No rash noted.   Psychiatric: She has a normal mood and affect. Her behavior is normal.   Vitals reviewed.      Lab Results   Component Value Date    ALT 15 10/17/2018    AST 17 10/17/2018     (H) 09/30/2014    ALKPHOS 190 (H) 10/17/2018    BILITOT 1.6 (H) 10/17/2018       Lab Results   Component Value Date    WBC 4.22 10/17/2018    HGB 10.7 (L) 10/17/2018    HCT 33.0 (L) 10/17/2018     (H) 10/17/2018     (L) 10/17/2018       Lab Results   Component Value Date     10/17/2018    K 3.6 10/17/2018     10/17/2018    CO2 25 10/17/2018    BUN 20 10/17/2018    CREATININE 1.0 10/17/2018    CALCIUM 10.2 10/17/2018    ANIONGAP 10 10/17/2018    ESTGFRAFRICA >60.0 10/17/2018    EGFRNONAA >60.0 10/17/2018     MELD-Na score: 11 at 10/17/2018  8:44 AM  MELD score: 11 at 10/17/2018  8:44 AM  Calculated from:  Serum Creatinine: 1 mg/dL at 10/17/2018  8:44 AM  Serum Sodium: 140 mmol/L (Rounded to 137 mmol/L) at  10/17/2018  8:44 AM  Total Bilirubin: 1.6 mg/dL at 10/17/2018  8:44 AM  INR(ratio): 1.3 at 10/17/2018  8:44 AM  Age: 48 years    Assessment:  48 y.o. female with alcoholic cirrhosis presenting for liver transplant evaluation.  It is unclear if she is a suitable candidate based on alcohol issues.      Plan:  1.  HCC:  Patient has no evidence of HCC at this time.  Will return to standard surveillance.      2. . Decompensated cirrhosis: MELD decreased to 11 in setting of abstinence. Continued reduced diuretic dosing.  Discussed importance of low sodium diet.        3.  Alcohol abuse:  Patient needs intensive alcohol relapse prevention given relapse while undergoing evaluation.  She has attended intake for [facility] but needs enrollment for completion of IOP prior to moving forward with discussion of candidacy.  HAYLIE following up with patient today.        4.  Osteoporosis:  On therapy with vitamin D, calcium and fosamax     RTC in 3 months    Karnofsky performance score: 70%  No limitations of mobility

## 2018-10-23 NOTE — PROGRESS NOTES
"Transplant Recipient Adult Psychosocial Assessment UPDATE    Annette Esparza  [address]  [facility] [address]  Telephone Information:   Mobile [phone]   Home  [phone] (home)  Work  There is no work phone number on file.  E-mail  [email or web address]    Sex: female  YOB: 1970  Age: 48 y.o.    SW confirmed with pt caregiver plan is as stated below.    PRIMARY CAREGIVER: Lacibreanna Bryson, patient's father will be primary caregiver, phone number [phone].     Additional Significant Others who will Assist with Transplant:  Name: Isi Horne [phone]  Age: 33  City: [place] State: [place]  Relationship: sister  Does person drive? yes    Name: Elly Keita  # [phone]  Age: 47  City: [place] State: [place]  Relationship: sister  Does person drive? yes     Name: Cornelia Bryson  # [phone]  Age: 47  City: [place] State: [place]  Relationship: step-mother  Does person drive? yes    Monthly Income:  SSI: $957 - Pt reporting today having received confirmation in mail of Medicaid coverage being renewed for next year.    Per Today's Psychosocial:  Tobacco: Patient reporting today that she has now quit smoking as of July 2018.  Pt reports using nicotine patch for "the first couple of weeks" after ceasing cigarette smoking.    Alcohol: Patient maintains today that her last ETOH use occurred in September 2018.  Pt expresses desire to maintain abstinence from ETOH; however, pt shows reluctance to seek treatment or assistance.    Illicit Drugs/Non-prescribed Medications: none, patient denies any use.      Mental Health: When asked if pt has experienced any recent symptoms of anxiety or depression, pt denied initially and then reported anxiety "when I come to these appointments".  Pt states "I'm worried about the outcome" when referring to transplant evaluation process.  Pt reports having attended an evaluation appointment at [facility] ([facility]) "2 or 3 weeks ago".  Pt " "reports being told at the appointment that someone would contact her to set up a follow-up appointment with a psychiatrist but states no one has contacted her yet.  Pt denies having discussed any further treatment, including IOP/substance abuse treatment, at time of evaluation at [facility].  Pt did not report making any efforts to contact [facility] to follow up.  Supervising HAYLIE Oro, TELLO encouraged pt to call [facility] to follow-up to make sure pt is still being scheduled for a follow-up appointment.  With pt's permission, HAYLIE had pt sign Release of Information document to send to [facility] to confirm and communicate about pt's mental health/substance abuse treatment.       Feelings or Concerns: As stated above, pt reports anxiety at time of psychosocial update and worry about overall outcome of transplant evaluation process.    Coping: Pt reports having her family as a source of support; however, pt still refusing to discuss her relapse with her family.  When HAYLIE questioned pt further, pt became slightly tearful and stated, "I don't want to disappointment them."  SW encouraged pt to be open and honest with her family about her struggles and allow others to support her.  Pt is adamant about keeping her relapse a secret but reports her family is aware of her struggles with ETOH use.    Interview Behavior: Pt presented to appointment alone.  Pt was alert and oriented x 4.  Pt was observed shifting in her seat and visibly appeared uncomfortable throughout interview.  Pt also appeared guarded as she provided very brief responses to SW's questions.  Pt quickly exited room after SW ended visit.         Transplant Social Work - Candidacy  Assessment/Plan:     Psychosocial Suitability: Patient presents as a marginal candidate for liver transplant at this time. Based on psychosocial risk factors, patient presents as high risk due to patient having about one month of abstinence from ETOH, still needing to follow through with " engagement in substance abuse/intensive outpatient treatment, lack of coping skills in response to stressors, and lack of communication with support system about her recent struggle with ETOH use.  Protective factors include successful cessation of cigarette smoking, adequate insurance, and willingness to achieve life-long abstinence from ETOH.    Recommendations/Additional Comments:   - randomized PETH test  - Continue pursuing enrollment in substance abuse program / IOP  - Fundraising    Leo Macario  Supervised by Carlos Oro LMSW

## 2018-10-23 NOTE — PATIENT INSTRUCTIONS
Your current MELD score is 11.    It is important to work toward complete alcohol cessation. I will have social work follow up with you.    There is no evidence of liver cancer.    Continue current doses of fluid pills.    Return to clinic in 3 months

## 2018-10-23 NOTE — LETTER
October 29, 2018        Ger Aguilar  [address]  [facility] [address]  Phone: [phone]  Fax: [phone]             Iggy Mack - Liver Transplant  [address]  [facility] [address]  Phone: [phone]   Patient: Annette Esparza   MR Number: [id number]   YOB: 1970   Date of Visit: 10/23/2018       Dear Dr. Ger Aguilar    Thank you for referring Annette Esparza to me for evaluation. Attached you will find relevant portions of my assessment and plan of care.    If you have questions, please do not hesitate to call me. I look forward to following Annette Esparza along with you.    Sincerely,    Denia Johnson MD    Enclosure    If you would like to receive this communication electronically, please contact [email or web address] or [phone] to request [employer] access.    [employer] is a tool which provides read-only access to select patient information with whom you have a relationship. Its easy to use and provides real time access to review your patients record including encounter summaries, notes, results, and demographic information.    If you feel you have received this communication in error or would no longer like to receive these types of communications, please e-mail [email or web address]

## 2018-11-02 NOTE — TELEPHONE ENCOUNTER
After difficulty faxing pt's TESSIE (completed at last  clinic visit) to [facility] ([facility]),  emailed TESSIE to Mo Gonzalez with [facility] ([email or web address]).  Mo confirmed receiving email with attached TESSIE.

## 2019-01-01 ENCOUNTER — HOSPITAL ENCOUNTER (INPATIENT)
Facility: HOSPITAL | Age: 49
LOS: 1 days | DRG: 871 | End: 2019-08-04
Attending: EMERGENCY MEDICINE | Admitting: INTERNAL MEDICINE
Payer: MEDICARE

## 2019-01-01 ENCOUNTER — OFFICE VISIT (OUTPATIENT)
Dept: TRANSPLANT | Facility: CLINIC | Age: 49
End: 2019-01-01
Payer: MEDICAID

## 2019-01-01 ENCOUNTER — CLINICAL SUPPORT (OUTPATIENT)
Dept: INFECTIOUS DISEASES | Facility: CLINIC | Age: 49
End: 2019-01-01
Payer: MEDICAID

## 2019-01-01 ENCOUNTER — LAB VISIT (OUTPATIENT)
Dept: LAB | Facility: HOSPITAL | Age: 49
End: 2019-01-01
Attending: INTERNAL MEDICINE
Payer: MEDICAID

## 2019-01-01 VITALS
OXYGEN SATURATION: 99 % | WEIGHT: 117.75 LBS | HEART RATE: 94 BPM | HEIGHT: 64 IN | TEMPERATURE: 99 F | BODY MASS INDEX: 20.1 KG/M2 | RESPIRATION RATE: 18 BRPM | SYSTOLIC BLOOD PRESSURE: 111 MMHG | DIASTOLIC BLOOD PRESSURE: 66 MMHG

## 2019-01-01 VITALS
SYSTOLIC BLOOD PRESSURE: 117 MMHG | TEMPERATURE: 99 F | OXYGEN SATURATION: 92 % | WEIGHT: 120 LBS | RESPIRATION RATE: 18 BRPM | HEIGHT: 60 IN | DIASTOLIC BLOOD PRESSURE: 71 MMHG | BODY MASS INDEX: 23.56 KG/M2

## 2019-01-01 DIAGNOSIS — K72.90 DECOMPENSATED HEPATIC CIRRHOSIS: ICD-10-CM

## 2019-01-01 DIAGNOSIS — K70.31 ALCOHOLIC CIRRHOSIS OF LIVER WITH ASCITES: Primary | ICD-10-CM

## 2019-01-01 DIAGNOSIS — K76.82 HEPATIC ENCEPHALOPATHY: ICD-10-CM

## 2019-01-01 DIAGNOSIS — K65.2 SBP (SPONTANEOUS BACTERIAL PERITONITIS): ICD-10-CM

## 2019-01-01 DIAGNOSIS — K74.60 DECOMPENSATED HEPATIC CIRRHOSIS: ICD-10-CM

## 2019-01-01 DIAGNOSIS — K70.31 ALCOHOLIC CIRRHOSIS OF LIVER WITH ASCITES: ICD-10-CM

## 2019-01-01 DIAGNOSIS — Z76.82 ORGAN TRANSPLANT CANDIDATE: ICD-10-CM

## 2019-01-01 DIAGNOSIS — I95.9 HYPOTENSION, UNSPECIFIED HYPOTENSION TYPE: ICD-10-CM

## 2019-01-01 DIAGNOSIS — A41.9 SEPTIC SHOCK: ICD-10-CM

## 2019-01-01 DIAGNOSIS — R65.21 SEPTIC SHOCK: ICD-10-CM

## 2019-01-01 DIAGNOSIS — E87.20 LACTIC ACIDOSIS: Primary | ICD-10-CM

## 2019-01-01 DIAGNOSIS — J81.1 PULMONARY EDEMA: ICD-10-CM

## 2019-01-01 LAB
ABO + RH BLD: NORMAL
AFP SERPL-MCNC: 3.8 NG/ML
ALBUMIN FLD-MCNC: 0.5 G/DL
ALBUMIN SERPL BCP-MCNC: 1.8 G/DL (ref 3.5–5.2)
ALBUMIN SERPL BCP-MCNC: 2.8 G/DL (ref 3.5–5.2)
ALBUMIN SERPL BCP-MCNC: 3.1 G/DL (ref 3.5–5.2)
ALBUMIN SERPL BCP-MCNC: 3.3 G/DL
ALLENS TEST: ABNORMAL
ALP SERPL-CCNC: 111 U/L (ref 55–135)
ALP SERPL-CCNC: 111 U/L (ref 55–135)
ALP SERPL-CCNC: 121 U/L (ref 55–135)
ALP SERPL-CCNC: 180 U/L
ALP SERPL-CCNC: 196 U/L (ref 55–135)
ALT SERPL W/O P-5'-P-CCNC: 12 U/L
ALT SERPL W/O P-5'-P-CCNC: 13 U/L (ref 10–44)
ALT SERPL W/O P-5'-P-CCNC: 13 U/L (ref 10–44)
ALT SERPL W/O P-5'-P-CCNC: 14 U/L (ref 10–44)
ALT SERPL W/O P-5'-P-CCNC: 19 U/L (ref 10–44)
AMMONIA PLAS-SCNC: 143 UMOL/L (ref 10–50)
AMPHET+METHAMPHET UR QL: NEGATIVE
AMYLASE SERPL-CCNC: 331 U/L (ref 20–110)
AMYLASE, BODY FLUID: 795 U/L
ANION GAP SERPL CALC-SCNC: 12 MMOL/L
ANION GAP SERPL CALC-SCNC: 18 MMOL/L (ref 8–16)
ANION GAP SERPL CALC-SCNC: 18 MMOL/L (ref 8–16)
ANION GAP SERPL CALC-SCNC: 22 MMOL/L (ref 8–16)
ANION GAP SERPL CALC-SCNC: 22 MMOL/L (ref 8–16)
ANION GAP SERPL CALC-SCNC: ABNORMAL MMOL/L (ref 8–16)
ANISOCYTOSIS BLD QL SMEAR: SLIGHT
APPEARANCE FLD: NORMAL
APTT BLDCRRT: 51.3 SEC (ref 21–32)
ASCENDING AORTA: 3.12 CM
AST SERPL-CCNC: 12 U/L
AST SERPL-CCNC: 28 U/L (ref 10–40)
AST SERPL-CCNC: 37 U/L (ref 10–40)
AST SERPL-CCNC: 37 U/L (ref 10–40)
AST SERPL-CCNC: 52 U/L (ref 10–40)
AV INDEX (PROSTH): 0.67
AV MEAN GRADIENT: 6 MMHG
AV PEAK GRADIENT: 9 MMHG
AV VALVE AREA: 2.19 CM2
AV VELOCITY RATIO: 0.6
BACTERIA #/AREA URNS AUTO: ABNORMAL /HPF
BACTERIA #/AREA URNS AUTO: ABNORMAL /HPF
BARBITURATES UR QL SCN>200 NG/ML: NEGATIVE
BASO STIPL BLD QL SMEAR: ABNORMAL
BASOPHILS # BLD AUTO: 0.01 K/UL (ref 0–0.2)
BASOPHILS # BLD AUTO: 0.01 K/UL (ref 0–0.2)
BASOPHILS # BLD AUTO: 0.03 K/UL (ref 0–0.2)
BASOPHILS # BLD AUTO: 0.04 K/UL
BASOPHILS # BLD AUTO: 0.05 K/UL (ref 0–0.2)
BASOPHILS # BLD AUTO: ABNORMAL K/UL (ref 0–0.2)
BASOPHILS NFR BLD: 0 % (ref 0–1.9)
BASOPHILS NFR BLD: 0.1 % (ref 0–1.9)
BASOPHILS NFR BLD: 0.1 % (ref 0–1.9)
BASOPHILS NFR BLD: 0.3 % (ref 0–1.9)
BASOPHILS NFR BLD: 0.4 % (ref 0–1.9)
BASOPHILS NFR BLD: 0.5 %
BENZODIAZ UR QL SCN>200 NG/ML: NEGATIVE
BILIRUB DIRECT SERPL-MCNC: 4.2 MG/DL (ref 0.1–0.3)
BILIRUB SERPL-MCNC: 0.8 MG/DL
BILIRUB SERPL-MCNC: 3 MG/DL (ref 0.1–1)
BILIRUB SERPL-MCNC: 5.1 MG/DL (ref 0.1–1)
BILIRUB SERPL-MCNC: 5.1 MG/DL (ref 0.1–1)
BILIRUB SERPL-MCNC: 5.8 MG/DL (ref 0.1–1)
BILIRUB UR QL STRIP: NEGATIVE
BILIRUB UR QL STRIP: NEGATIVE
BLD GP AB SCN CELLS X3 SERPL QL: NORMAL
BLD PROD TYP BPU: NORMAL
BLOOD UNIT EXPIRATION DATE: NORMAL
BLOOD UNIT TYPE CODE: 600
BLOOD UNIT TYPE: NORMAL
BODY FLD TYPE: NORMAL
BODY FLUID SOURCE AMYLASE: NORMAL
BODY FLUID SOURCE, CREATININE: NORMAL
BODY FLUID SOURCE, LDH: NORMAL
BSA FOR ECHO PROCEDURE: 1.52 M2
BUN SERPL-MCNC: 22 MG/DL
BUN SERPL-MCNC: 23 MG/DL (ref 6–20)
BUN SERPL-MCNC: 24 MG/DL (ref 6–20)
BUN SERPL-MCNC: 25 MG/DL (ref 6–20)
BURR CELLS BLD QL SMEAR: ABNORMAL
BZE UR QL SCN: NEGATIVE
CA-I BLDV-SCNC: 0.89 MMOL/L (ref 1.06–1.42)
CALCIUM SERPL-MCNC: 6.1 MG/DL (ref 8.7–10.5)
CALCIUM SERPL-MCNC: 6.5 MG/DL (ref 8.7–10.5)
CALCIUM SERPL-MCNC: 6.5 MG/DL (ref 8.7–10.5)
CALCIUM SERPL-MCNC: 6.6 MG/DL (ref 8.7–10.5)
CALCIUM SERPL-MCNC: 7.6 MG/DL (ref 8.7–10.5)
CALCIUM SERPL-MCNC: 9.7 MG/DL
CANNABINOIDS UR QL SCN: NEGATIVE
CHLORIDE SERPL-SCNC: 102 MMOL/L
CHLORIDE SERPL-SCNC: 92 MMOL/L (ref 95–110)
CHLORIDE SERPL-SCNC: 92 MMOL/L (ref 95–110)
CHLORIDE SERPL-SCNC: 94 MMOL/L (ref 95–110)
CLARITY UR REFRACT.AUTO: ABNORMAL
CLARITY UR REFRACT.AUTO: ABNORMAL
CO2 SERPL-SCNC: 10 MMOL/L (ref 23–29)
CO2 SERPL-SCNC: 10 MMOL/L (ref 23–29)
CO2 SERPL-SCNC: 21 MMOL/L
CO2 SERPL-SCNC: 8 MMOL/L (ref 23–29)
CO2 SERPL-SCNC: 8 MMOL/L (ref 23–29)
CO2 SERPL-SCNC: <5 MMOL/L (ref 23–29)
CODING SYSTEM: NORMAL
COLOR FLD: YELLOW
COLOR UR AUTO: ABNORMAL
COLOR UR AUTO: YELLOW
CORTIS SERPL-MCNC: 57.4 UG/DL
CREAT FLD-MCNC: 2.4 MG/DL
CREAT SERPL-MCNC: 1 MG/DL
CREAT SERPL-MCNC: 2.6 MG/DL (ref 0.5–1.4)
CREAT SERPL-MCNC: 2.7 MG/DL (ref 0.5–1.4)
CREAT SERPL-MCNC: 3 MG/DL (ref 0.5–1.4)
CREAT UR-MCNC: 14 MG/DL (ref 15–325)
CREAT UR-MCNC: 31 MG/DL (ref 15–325)
CV ECHO LV RWT: 0.36 CM
DACRYOCYTES BLD QL SMEAR: ABNORMAL
DACRYOCYTES BLD QL SMEAR: ABNORMAL
DELSYS: ABNORMAL
DIFFERENTIAL METHOD: ABNORMAL
DISPENSE STATUS: NORMAL
DOHLE BOD BLD QL SMEAR: PRESENT
DOP CALC AO PEAK VEL: 1.53 M/S
DOP CALC AO VTI: 18.89 CM
DOP CALC LVOT AREA: 3.3 CM2
DOP CALC LVOT DIAMETER: 2.04 CM
DOP CALC LVOT PEAK VEL: 0.92 M/S
DOP CALC LVOT STROKE VOLUME: 41.46 CM3
DOP CALCLVOT PEAK VEL VTI: 12.69 CM
E WAVE DECELERATION TIME: 90.88 MSEC
E/A RATIO: 3.7
E/E' RATIO: 6.53 M/S
ECHO LV POSTERIOR WALL: 0.75 CM (ref 0.6–1.1)
EOSINOPHIL # BLD AUTO: 0 K/UL (ref 0–0.5)
EOSINOPHIL # BLD AUTO: 0 K/UL (ref 0–0.5)
EOSINOPHIL # BLD AUTO: 0.1 K/UL
EOSINOPHIL # BLD AUTO: 0.1 K/UL (ref 0–0.5)
EOSINOPHIL # BLD AUTO: 0.1 K/UL (ref 0–0.5)
EOSINOPHIL # BLD AUTO: ABNORMAL K/UL (ref 0–0.5)
EOSINOPHIL NFR BLD: 0 % (ref 0–8)
EOSINOPHIL NFR BLD: 1.6 % (ref 0–8)
EOSINOPHIL NFR BLD: 1.6 % (ref 0–8)
EOSINOPHIL NFR BLD: 1.7 %
ERYTHROCYTE [DISTWIDTH] IN BLOOD BY AUTOMATED COUNT: 17.5 % (ref 11.5–14.5)
ERYTHROCYTE [DISTWIDTH] IN BLOOD BY AUTOMATED COUNT: 18.4 % (ref 11.5–14.5)
ERYTHROCYTE [DISTWIDTH] IN BLOOD BY AUTOMATED COUNT: 19.1 % (ref 11.5–14.5)
ERYTHROCYTE [DISTWIDTH] IN BLOOD BY AUTOMATED COUNT: 19.1 % (ref 11.5–14.5)
ERYTHROCYTE [DISTWIDTH] IN BLOOD BY AUTOMATED COUNT: 19.8 % (ref 11.5–14.5)
ERYTHROCYTE [DISTWIDTH] IN BLOOD BY AUTOMATED COUNT: 21.2 %
ERYTHROCYTE [SEDIMENTATION RATE] IN BLOOD BY WESTERGREN METHOD: 18 MM/H
ERYTHROCYTE [SEDIMENTATION RATE] IN BLOOD BY WESTERGREN METHOD: 28 MM/H
ERYTHROCYTE [SEDIMENTATION RATE] IN BLOOD BY WESTERGREN METHOD: 31 MM/H
EST. GFR  (AFRICAN AMERICAN): 20.1 ML/MIN/1.73 M^2
EST. GFR  (AFRICAN AMERICAN): 22.8 ML/MIN/1.73 M^2
EST. GFR  (AFRICAN AMERICAN): 23.9 ML/MIN/1.73 M^2
EST. GFR  (AFRICAN AMERICAN): >60 ML/MIN/1.73 M^2
EST. GFR  (NON AFRICAN AMERICAN): 17.4 ML/MIN/1.73 M^2
EST. GFR  (NON AFRICAN AMERICAN): 19.8 ML/MIN/1.73 M^2
EST. GFR  (NON AFRICAN AMERICAN): 20.7 ML/MIN/1.73 M^2
EST. GFR  (NON AFRICAN AMERICAN): >60 ML/MIN/1.73 M^2
ETHANOL UR-MCNC: <10 MG/DL
FIBRINOGEN PPP-MCNC: 243 MG/DL (ref 182–366)
FIO2: 50
FIO2: 55
FRACTIONAL SHORTENING: 32 % (ref 28–44)
GIANT PLATELETS BLD QL SMEAR: PRESENT
GLUCOSE SERPL-MCNC: 100 MG/DL (ref 70–110)
GLUCOSE SERPL-MCNC: 101 MG/DL (ref 70–110)
GLUCOSE SERPL-MCNC: 115 MG/DL (ref 70–110)
GLUCOSE SERPL-MCNC: 115 MG/DL (ref 70–110)
GLUCOSE SERPL-MCNC: 119 MG/DL (ref 70–110)
GLUCOSE SERPL-MCNC: 144 MG/DL
GLUCOSE SERPL-MCNC: 154 MG/DL (ref 70–110)
GLUCOSE SERPL-MCNC: 154 MG/DL (ref 70–110)
GLUCOSE SERPL-MCNC: 186 MG/DL (ref 70–110)
GLUCOSE SERPL-MCNC: 36 MG/DL (ref 70–110)
GLUCOSE SERPL-MCNC: 75 MG/DL (ref 70–110)
GLUCOSE SERPL-MCNC: 77 MG/DL (ref 70–110)
GLUCOSE UR QL STRIP: NEGATIVE
GLUCOSE UR QL STRIP: NEGATIVE
GRAM STN SPEC: NORMAL
GRAM STN SPEC: NORMAL
HCO3 UR-SCNC: 10.1 MMOL/L (ref 24–28)
HCO3 UR-SCNC: 10.9 MMOL/L (ref 24–28)
HCO3 UR-SCNC: 6.9 MMOL/L (ref 24–28)
HCO3 UR-SCNC: 7.1 MMOL/L (ref 24–28)
HCO3 UR-SCNC: 8.8 MMOL/L (ref 24–28)
HCT VFR BLD AUTO: 18.5 % (ref 37–48.5)
HCT VFR BLD AUTO: 18.5 % (ref 37–48.5)
HCT VFR BLD AUTO: 23.6 % (ref 37–48.5)
HCT VFR BLD AUTO: 26.2 % (ref 37–48.5)
HCT VFR BLD AUTO: 27.1 % (ref 37–48.5)
HCT VFR BLD AUTO: 31.2 %
HGB BLD-MCNC: 5.3 G/DL (ref 12–16)
HGB BLD-MCNC: 5.3 G/DL (ref 12–16)
HGB BLD-MCNC: 6 G/DL (ref 12–16)
HGB BLD-MCNC: 7.6 G/DL (ref 12–16)
HGB BLD-MCNC: 8.2 G/DL (ref 12–16)
HGB BLD-MCNC: 9.6 G/DL
HGB UR QL STRIP: ABNORMAL
HGB UR QL STRIP: ABNORMAL
HYALINE CASTS UR QL AUTO: 0 /LPF
HYALINE CASTS UR QL AUTO: 2 /LPF
HYPOCHROMIA BLD QL SMEAR: ABNORMAL
IMM GRANULOCYTES # BLD AUTO: 0.04 K/UL
IMM GRANULOCYTES # BLD AUTO: 0.13 K/UL (ref 0–0.04)
IMM GRANULOCYTES # BLD AUTO: 0.17 K/UL (ref 0–0.04)
IMM GRANULOCYTES # BLD AUTO: 0.17 K/UL (ref 0–0.04)
IMM GRANULOCYTES # BLD AUTO: 0.39 K/UL (ref 0–0.04)
IMM GRANULOCYTES # BLD AUTO: ABNORMAL K/UL (ref 0–0.04)
IMM GRANULOCYTES NFR BLD AUTO: 0.5 %
IMM GRANULOCYTES NFR BLD AUTO: 1.3 % (ref 0–0.5)
IMM GRANULOCYTES NFR BLD AUTO: 1.9 % (ref 0–0.5)
IMM GRANULOCYTES NFR BLD AUTO: 1.9 % (ref 0–0.5)
IMM GRANULOCYTES NFR BLD AUTO: 3.4 % (ref 0–0.5)
IMM GRANULOCYTES NFR BLD AUTO: ABNORMAL % (ref 0–0.5)
INR PPP: 1.1
INR PPP: 2.6 (ref 0.8–1.2)
INR PPP: 2.7 (ref 0.8–1.2)
INTERVENTRICULAR SEPTUM: 0.89 CM (ref 0.6–1.1)
IP: 15
IP: 15
IT: 0.09
IT: 0.9
IVRT: 0.06 MSEC
KETONES UR QL STRIP: NEGATIVE
KETONES UR QL STRIP: NEGATIVE
LA MAJOR: 5.49 CM
LA MINOR: 5.73 CM
LA WIDTH: 3.87 CM
LACTATE SERPL-SCNC: 7.8 MMOL/L (ref 0.5–2.2)
LACTATE SERPL-SCNC: 8.6 MMOL/L (ref 0.5–2.2)
LACTATE SERPL-SCNC: 8.7 MMOL/L (ref 0.5–2.2)
LACTATE SERPL-SCNC: 9.4 MMOL/L (ref 0.5–2.2)
LACTATE SERPL-SCNC: >12 MMOL/L (ref 0.5–2.2)
LACTATE SERPL-SCNC: >12 MMOL/L (ref 0.5–2.2)
LDH FLD L TO P-CCNC: 781 U/L
LEFT ATRIUM SIZE: 4.15 CM
LEFT ATRIUM VOLUME INDEX: 51 ML/M2
LEFT ATRIUM VOLUME: 76.55 CM3
LEFT INTERNAL DIMENSION IN SYSTOLE: 2.83 CM (ref 2.1–4)
LEFT VENTRICLE DIASTOLIC VOLUME INDEX: 51.4 ML/M2
LEFT VENTRICLE DIASTOLIC VOLUME: 77.22 ML
LEFT VENTRICLE MASS INDEX: 69 G/M2
LEFT VENTRICLE SYSTOLIC VOLUME INDEX: 20.2 ML/M2
LEFT VENTRICLE SYSTOLIC VOLUME: 30.32 ML
LEFT VENTRICULAR INTERNAL DIMENSION IN DIASTOLE: 4.17 CM (ref 3.5–6)
LEFT VENTRICULAR MASS: 103.44 G
LEUKOCYTE ESTERASE UR QL STRIP: ABNORMAL
LEUKOCYTE ESTERASE UR QL STRIP: ABNORMAL
LIPASE SERPL-CCNC: 15 U/L (ref 4–60)
LIPASE SERPL-CCNC: 25 U/L (ref 4–60)
LV LATERAL E/E' RATIO: 6.53 M/S
LV SEPTAL E/E' RATIO: 6.53 M/S
LYMPHOCYTES # BLD AUTO: 0.4 K/UL (ref 1–4.8)
LYMPHOCYTES # BLD AUTO: 0.5 K/UL (ref 1–4.8)
LYMPHOCYTES # BLD AUTO: 0.5 K/UL (ref 1–4.8)
LYMPHOCYTES # BLD AUTO: 0.8 K/UL (ref 1–4.8)
LYMPHOCYTES # BLD AUTO: 0.9 K/UL
LYMPHOCYTES # BLD AUTO: ABNORMAL K/UL (ref 1–4.8)
LYMPHOCYTES NFR BLD: 10.6 %
LYMPHOCYTES NFR BLD: 4.1 % (ref 18–48)
LYMPHOCYTES NFR BLD: 5 % (ref 18–48)
LYMPHOCYTES NFR BLD: 5.3 % (ref 18–48)
LYMPHOCYTES NFR BLD: 5.3 % (ref 18–48)
LYMPHOCYTES NFR BLD: 7.1 % (ref 18–48)
LYMPHOCYTES NFR FLD MANUAL: 2 %
MAGNESIUM SERPL-MCNC: 1.4 MG/DL (ref 1.6–2.6)
MAGNESIUM SERPL-MCNC: 1.6 MG/DL (ref 1.6–2.6)
MAGNESIUM SERPL-MCNC: 1.7 MG/DL (ref 1.6–2.6)
MAGNESIUM SERPL-MCNC: 2 MG/DL (ref 1.6–2.6)
MCH RBC QN AUTO: 27.7 PG (ref 27–31)
MCH RBC QN AUTO: 27.7 PG (ref 27–31)
MCH RBC QN AUTO: 28.2 PG (ref 27–31)
MCH RBC QN AUTO: 28.3 PG
MCH RBC QN AUTO: 28.7 PG (ref 27–31)
MCH RBC QN AUTO: 29 PG (ref 27–31)
MCHC RBC AUTO-ENTMCNC: 25.4 G/DL (ref 32–36)
MCHC RBC AUTO-ENTMCNC: 28.6 G/DL (ref 32–36)
MCHC RBC AUTO-ENTMCNC: 28.6 G/DL (ref 32–36)
MCHC RBC AUTO-ENTMCNC: 29 G/DL (ref 32–36)
MCHC RBC AUTO-ENTMCNC: 30.3 G/DL (ref 32–36)
MCHC RBC AUTO-ENTMCNC: 30.8 G/DL
MCV RBC AUTO: 100 FL (ref 82–98)
MCV RBC AUTO: 111 FL (ref 82–98)
MCV RBC AUTO: 92 FL
MCV RBC AUTO: 95 FL (ref 82–98)
MCV RBC AUTO: 97 FL (ref 82–98)
MCV RBC AUTO: 97 FL (ref 82–98)
METAMYELOCYTES NFR BLD MANUAL: 11 %
METHADONE UR QL SCN>300 NG/ML: NEGATIVE
MICROSCOPIC COMMENT: ABNORMAL
MICROSCOPIC COMMENT: ABNORMAL
MIN VOL: 8.95
MODE: ABNORMAL
MONOCYTES # BLD AUTO: 0.6 K/UL (ref 0.3–1)
MONOCYTES # BLD AUTO: 0.6 K/UL (ref 0.3–1)
MONOCYTES # BLD AUTO: 0.7 K/UL
MONOCYTES # BLD AUTO: 0.8 K/UL (ref 0.3–1)
MONOCYTES # BLD AUTO: 0.8 K/UL (ref 0.3–1)
MONOCYTES # BLD AUTO: ABNORMAL K/UL (ref 0.3–1)
MONOCYTES NFR BLD: 10 % (ref 4–15)
MONOCYTES NFR BLD: 6.6 % (ref 4–15)
MONOCYTES NFR BLD: 6.6 % (ref 4–15)
MONOCYTES NFR BLD: 7.1 % (ref 4–15)
MONOCYTES NFR BLD: 7.8 % (ref 4–15)
MONOCYTES NFR BLD: 9 %
MONOS+MACROS NFR FLD MANUAL: 10 %
MV PEAK A VEL: 0.3 M/S
MV PEAK E VEL: 1.11 M/S
MYELOCYTES NFR BLD MANUAL: 7 %
NEUTROPHILS # BLD AUTO: 6.4 K/UL
NEUTROPHILS # BLD AUTO: 7.6 K/UL (ref 1.8–7.7)
NEUTROPHILS # BLD AUTO: 7.6 K/UL (ref 1.8–7.7)
NEUTROPHILS # BLD AUTO: 8.8 K/UL (ref 1.8–7.7)
NEUTROPHILS # BLD AUTO: 9.3 K/UL (ref 1.8–7.7)
NEUTROPHILS NFR BLD: 63 % (ref 38–73)
NEUTROPHILS NFR BLD: 77.7 %
NEUTROPHILS NFR BLD: 82 % (ref 38–73)
NEUTROPHILS NFR BLD: 84.5 % (ref 38–73)
NEUTROPHILS NFR BLD: 84.5 % (ref 38–73)
NEUTROPHILS NFR BLD: 86.5 % (ref 38–73)
NEUTROPHILS NFR FLD MANUAL: 88 %
NEUTS BAND NFR BLD MANUAL: 4 %
NITRITE UR QL STRIP: NEGATIVE
NITRITE UR QL STRIP: POSITIVE
NRBC BLD-RTO: 0 /100 WBC
NRBC BLD-RTO: 1 /100 WBC
NRBC BLD-RTO: 2 /100 WBC
OPIATES UR QL SCN: ABNORMAL
OSMOLALITY UR: 264 MOSM/KG (ref 50–1200)
OVALOCYTES BLD QL SMEAR: ABNORMAL
PCO2 BLDA: 20.9 MMHG (ref 35–45)
PCO2 BLDA: 23.3 MMHG (ref 35–45)
PCO2 BLDA: 31.9 MMHG (ref 35–45)
PCO2 BLDA: 35 MMHG (ref 35–45)
PCO2 BLDA: 39.7 MMHG (ref 35–45)
PCP UR QL SCN>25 NG/ML: NEGATIVE
PEEP: 10
PEEP: 10
PEEP: 5
PH SMN: 7.01 [PH] (ref 7.35–7.45)
PH SMN: 7.05 [PH] (ref 7.35–7.45)
PH SMN: 7.09 [PH] (ref 7.35–7.45)
PH SMN: 7.1 [PH] (ref 7.35–7.45)
PH SMN: 7.13 [PH] (ref 7.35–7.45)
PH UR STRIP: 5 [PH] (ref 5–8)
PH UR STRIP: 5 [PH] (ref 5–8)
PHOSPHATE SERPL-MCNC: 4.5 MG/DL (ref 2.7–4.5)
PHOSPHATE SERPL-MCNC: 5.3 MG/DL (ref 2.7–4.5)
PHOSPHATE SERPL-MCNC: 5.6 MG/DL (ref 2.7–4.5)
PHOSPHATE SERPL-MCNC: 5.9 MG/DL (ref 2.7–4.5)
PHOSPHATIDYLETHANOL (PETH): 75 NG/ML
PIP: 20
PIP: 22
PIP: 25
PISA TR MAX VEL: 2.51 M/S
PLATELET # BLD AUTO: 188 K/UL (ref 150–350)
PLATELET # BLD AUTO: 188 K/UL (ref 150–350)
PLATELET # BLD AUTO: 192 K/UL (ref 150–350)
PLATELET # BLD AUTO: 194 K/UL
PLATELET # BLD AUTO: 224 K/UL (ref 150–350)
PLATELET # BLD AUTO: 274 K/UL (ref 150–350)
PLATELET BLD QL SMEAR: ABNORMAL
PMV BLD AUTO: 10.2 FL (ref 9.2–12.9)
PMV BLD AUTO: 10.2 FL (ref 9.2–12.9)
PMV BLD AUTO: 10.3 FL
PMV BLD AUTO: 10.3 FL (ref 9.2–12.9)
PMV BLD AUTO: 10.4 FL (ref 9.2–12.9)
PMV BLD AUTO: 10.9 FL (ref 9.2–12.9)
PO2 BLDA: 42 MMHG (ref 40–60)
PO2 BLDA: 46 MMHG (ref 40–60)
PO2 BLDA: 50 MMHG (ref 40–60)
PO2 BLDA: 52 MMHG (ref 40–60)
PO2 BLDA: 90 MMHG (ref 80–100)
POC BE: -19 MMOL/L
POC BE: -21 MMOL/L
POC BE: -22 MMOL/L
POC BE: -22 MMOL/L
POC BE: -23 MMOL/L
POC SATURATED O2: 60 % (ref 95–100)
POC SATURATED O2: 63 % (ref 95–100)
POC SATURATED O2: 66 % (ref 95–100)
POC SATURATED O2: 74 % (ref 95–100)
POC SATURATED O2: 94 % (ref 95–100)
POC TCO2: 10 MMOL/L (ref 24–29)
POC TCO2: 11 MMOL/L (ref 24–29)
POC TCO2: 12 MMOL/L (ref 24–29)
POC TCO2: 8 MMOL/L (ref 23–27)
POC TCO2: 8 MMOL/L (ref 24–29)
POCT GLUCOSE: 100 MG/DL (ref 70–110)
POCT GLUCOSE: 105 MG/DL (ref 70–110)
POCT GLUCOSE: 108 MG/DL (ref 70–110)
POCT GLUCOSE: 115 MG/DL (ref 70–110)
POCT GLUCOSE: 115 MG/DL (ref 70–110)
POCT GLUCOSE: 136 MG/DL (ref 70–110)
POCT GLUCOSE: 143 MG/DL (ref 70–110)
POCT GLUCOSE: 162 MG/DL (ref 70–110)
POCT GLUCOSE: 169 MG/DL (ref 70–110)
POCT GLUCOSE: 186 MG/DL (ref 70–110)
POCT GLUCOSE: 197 MG/DL (ref 70–110)
POCT GLUCOSE: 43 MG/DL (ref 70–110)
POCT GLUCOSE: 51 MG/DL (ref 70–110)
POCT GLUCOSE: 53 MG/DL (ref 70–110)
POCT GLUCOSE: 59 MG/DL (ref 70–110)
POCT GLUCOSE: 75 MG/DL (ref 70–110)
POCT GLUCOSE: 81 MG/DL (ref 70–110)
POIKILOCYTOSIS BLD QL SMEAR: SLIGHT
POLYCHROMASIA BLD QL SMEAR: ABNORMAL
POTASSIUM SERPL-SCNC: 2.8 MMOL/L (ref 3.5–5.1)
POTASSIUM SERPL-SCNC: 2.8 MMOL/L (ref 3.5–5.1)
POTASSIUM SERPL-SCNC: 3.2 MMOL/L (ref 3.5–5.1)
POTASSIUM SERPL-SCNC: 3.5 MMOL/L (ref 3.5–5.1)
POTASSIUM SERPL-SCNC: 3.8 MMOL/L (ref 3.5–5.1)
POTASSIUM SERPL-SCNC: 3.9 MMOL/L
PROT FLD-MCNC: 1.3 G/DL
PROT SERPL-MCNC: 5.1 G/DL (ref 6–8.4)
PROT SERPL-MCNC: 5.1 G/DL (ref 6–8.4)
PROT SERPL-MCNC: 5.5 G/DL (ref 6–8.4)
PROT SERPL-MCNC: 5.6 G/DL (ref 6–8.4)
PROT SERPL-MCNC: 7.7 G/DL
PROT UR QL STRIP: ABNORMAL
PROT UR QL STRIP: NEGATIVE
PROTHROMBIN TIME: 11.3 SEC
PROTHROMBIN TIME: 25.1 SEC (ref 9–12.5)
PROTHROMBIN TIME: 26.5 SEC (ref 9–12.5)
RA MAJOR: 4.57 CM
RA PRESSURE: 8 MMHG
RA WIDTH: 2.88 CM
RBC # BLD AUTO: 1.91 M/UL (ref 4–5.4)
RBC # BLD AUTO: 1.91 M/UL (ref 4–5.4)
RBC # BLD AUTO: 2.13 M/UL (ref 4–5.4)
RBC # BLD AUTO: 2.62 M/UL (ref 4–5.4)
RBC # BLD AUTO: 2.86 M/UL (ref 4–5.4)
RBC # BLD AUTO: 3.39 M/UL
RBC #/AREA URNS AUTO: 38 /HPF (ref 0–4)
RBC #/AREA URNS AUTO: >100 /HPF (ref 0–4)
RIGHT VENTRICULAR END-DIASTOLIC DIMENSION: 3.12 CM
RV TISSUE DOPPLER FREE WALL SYSTOLIC VELOCITY 1 (APICAL 4 CHAMBER VIEW): 12.24 CM/S
SAMPLE: ABNORMAL
SINUS: 2.58 CM
SITE: ABNORMAL
SODIUM SERPL-SCNC: 120 MMOL/L (ref 136–145)
SODIUM SERPL-SCNC: 122 MMOL/L (ref 136–145)
SODIUM SERPL-SCNC: 124 MMOL/L (ref 136–145)
SODIUM SERPL-SCNC: 124 MMOL/L (ref 136–145)
SODIUM SERPL-SCNC: 125 MMOL/L (ref 136–145)
SODIUM SERPL-SCNC: 135 MMOL/L
SODIUM UR-SCNC: 73 MMOL/L (ref 20–250)
SP GR UR STRIP: 1.01 (ref 1–1.03)
SP GR UR STRIP: 1.01 (ref 1–1.03)
SP02: 100
SP02: 89
SP02: 90
SP02: 94
SPECIMEN SOURCE: NORMAL
SPECIMEN SOURCE: NORMAL
SQUAMOUS #/AREA URNS AUTO: 1 /HPF
SQUAMOUS #/AREA URNS AUTO: 7 /HPF
STJ: 2.27 CM
TARGETS BLD QL SMEAR: ABNORMAL
TDI LATERAL: 0.17 M/S
TDI SEPTAL: 0.17 M/S
TDI: 0.17 M/S
TOXIC GRANULES BLD QL SMEAR: PRESENT
TOXICOLOGY INFORMATION: ABNORMAL
TR MAX PG: 25 MMHG
TRANS ERYTHROCYTES VOL PATIENT: NORMAL ML
TRICUSPID ANNULAR PLANE SYSTOLIC EXCURSION: 1.71 CM
TSH SERPL DL<=0.005 MIU/L-ACNC: 2.62 UIU/ML (ref 0.4–4)
TV REST PULMONARY ARTERY PRESSURE: 33 MMHG
URN SPEC COLLECT METH UR: ABNORMAL
URN SPEC COLLECT METH UR: ABNORMAL
UUN UR-MCNC: 47 MG/DL (ref 140–1050)
VANCOMYCIN SERPL-MCNC: 18.1 UG/ML
VT: 360
VT: 505
VT: 546
WBC # BLD AUTO: 10.19 K/UL (ref 3.9–12.7)
WBC # BLD AUTO: 11.36 K/UL (ref 3.9–12.7)
WBC # BLD AUTO: 11.61 K/UL (ref 3.9–12.7)
WBC # BLD AUTO: 8.19 K/UL
WBC # BLD AUTO: 8.98 K/UL (ref 3.9–12.7)
WBC # BLD AUTO: 8.98 K/UL (ref 3.9–12.7)
WBC # FLD: 7970 /CU MM
WBC #/AREA URNS AUTO: 18 /HPF (ref 0–5)
WBC #/AREA URNS AUTO: >100 /HPF (ref 0–5)
WBC CLUMPS UR QL AUTO: ABNORMAL

## 2019-01-01 PROCEDURE — 63600175 PHARM REV CODE 636 W HCPCS: Performed by: STUDENT IN AN ORGANIZED HEALTH CARE EDUCATION/TRAINING PROGRAM

## 2019-01-01 PROCEDURE — 25000003 PHARM REV CODE 250: Performed by: STUDENT IN AN ORGANIZED HEALTH CARE EDUCATION/TRAINING PROGRAM

## 2019-01-01 PROCEDURE — 63600175 PHARM REV CODE 636 W HCPCS: Performed by: HOSPITALIST

## 2019-01-01 PROCEDURE — P9047 ALBUMIN (HUMAN), 25%, 50ML: HCPCS | Mod: JG | Performed by: STUDENT IN AN ORGANIZED HEALTH CARE EDUCATION/TRAINING PROGRAM

## 2019-01-01 PROCEDURE — 93010 EKG 12-LEAD: ICD-10-PCS | Mod: ,,, | Performed by: INTERNAL MEDICINE

## 2019-01-01 PROCEDURE — 99900035 HC TECH TIME PER 15 MIN (STAT)

## 2019-01-01 PROCEDURE — 87205 SMEAR GRAM STAIN: CPT | Mod: 59

## 2019-01-01 PROCEDURE — 36600 WITHDRAWAL OF ARTERIAL BLOOD: CPT

## 2019-01-01 PROCEDURE — 87106 FUNGI IDENTIFICATION YEAST: CPT | Mod: 59

## 2019-01-01 PROCEDURE — 85027 COMPLETE CBC AUTOMATED: CPT

## 2019-01-01 PROCEDURE — 27000221 HC OXYGEN, UP TO 24 HOURS

## 2019-01-01 PROCEDURE — 87086 URINE CULTURE/COLONY COUNT: CPT

## 2019-01-01 PROCEDURE — 27200188 HC TRANSDUCER, ART ADULT/PEDS

## 2019-01-01 PROCEDURE — 99223 1ST HOSP IP/OBS HIGH 75: CPT | Mod: GC,,, | Performed by: INTERNAL MEDICINE

## 2019-01-01 PROCEDURE — 43752 NASAL/OROGASTRIC W/TUBE PLMT: CPT

## 2019-01-01 PROCEDURE — 93005 ELECTROCARDIOGRAM TRACING: CPT

## 2019-01-01 PROCEDURE — 85610 PROTHROMBIN TIME: CPT | Mod: TXP

## 2019-01-01 PROCEDURE — 25000003 PHARM REV CODE 250: Performed by: PHYSICIAN ASSISTANT

## 2019-01-01 PROCEDURE — 99215 PR OFFICE/OUTPT VISIT, EST, LEVL V, 40-54 MIN: ICD-10-PCS | Mod: S$PBB,,, | Performed by: INTERNAL MEDICINE

## 2019-01-01 PROCEDURE — 85384 FIBRINOGEN ACTIVITY: CPT

## 2019-01-01 PROCEDURE — 84443 ASSAY THYROID STIM HORMONE: CPT

## 2019-01-01 PROCEDURE — 83690 ASSAY OF LIPASE: CPT

## 2019-01-01 PROCEDURE — 36430 TRANSFUSION BLD/BLD COMPNT: CPT

## 2019-01-01 PROCEDURE — 83935 ASSAY OF URINE OSMOLALITY: CPT

## 2019-01-01 PROCEDURE — 83735 ASSAY OF MAGNESIUM: CPT

## 2019-01-01 PROCEDURE — 82570 ASSAY OF URINE CREATININE: CPT

## 2019-01-01 PROCEDURE — C1751 CATH, INF, PER/CENT/MIDLINE: HCPCS

## 2019-01-01 PROCEDURE — 80307 DRUG TEST PRSMV CHEM ANLYZR: CPT

## 2019-01-01 PROCEDURE — 87186 SC STD MICRODIL/AGAR DIL: CPT

## 2019-01-01 PROCEDURE — 84100 ASSAY OF PHOSPHORUS: CPT

## 2019-01-01 PROCEDURE — 85025 COMPLETE CBC W/AUTO DIFF WBC: CPT

## 2019-01-01 PROCEDURE — 83605 ASSAY OF LACTIC ACID: CPT | Mod: 91

## 2019-01-01 PROCEDURE — 36620 INSERTION CATHETER ARTERY: CPT

## 2019-01-01 PROCEDURE — 87070 CULTURE OTHR SPECIMN AEROBIC: CPT

## 2019-01-01 PROCEDURE — 83615 LACTATE (LD) (LDH) ENZYME: CPT

## 2019-01-01 PROCEDURE — 84540 ASSAY OF URINE/UREA-N: CPT

## 2019-01-01 PROCEDURE — C9113 INJ PANTOPRAZOLE SODIUM, VIA: HCPCS | Performed by: STUDENT IN AN ORGANIZED HEALTH CARE EDUCATION/TRAINING PROGRAM

## 2019-01-01 PROCEDURE — 51702 INSERT TEMP BLADDER CATH: CPT

## 2019-01-01 PROCEDURE — 99223 PR INITIAL HOSPITAL CARE,LEVL III: ICD-10-PCS | Mod: GC,,, | Performed by: INTERNAL MEDICINE

## 2019-01-01 PROCEDURE — 87070 CULTURE OTHR SPECIMN AEROBIC: CPT | Mod: 59

## 2019-01-01 PROCEDURE — 99999 PR PBB SHADOW E&M-EST. PATIENT-LVL V: ICD-10-PCS | Mod: PBBFAC,TXP,, | Performed by: INTERNAL MEDICINE

## 2019-01-01 PROCEDURE — 63600175 PHARM REV CODE 636 W HCPCS: Mod: JG | Performed by: STUDENT IN AN ORGANIZED HEALTH CARE EDUCATION/TRAINING PROGRAM

## 2019-01-01 PROCEDURE — 12000002 HC ACUTE/MED SURGE SEMI-PRIVATE ROOM

## 2019-01-01 PROCEDURE — 80053 COMPREHEN METABOLIC PANEL: CPT

## 2019-01-01 PROCEDURE — 63600175 PHARM REV CODE 636 W HCPCS: Performed by: INTERNAL MEDICINE

## 2019-01-01 PROCEDURE — 82533 TOTAL CORTISOL: CPT

## 2019-01-01 PROCEDURE — 83735 ASSAY OF MAGNESIUM: CPT | Mod: 91

## 2019-01-01 PROCEDURE — 85610 PROTHROMBIN TIME: CPT

## 2019-01-01 PROCEDURE — 25000003 PHARM REV CODE 250

## 2019-01-01 PROCEDURE — 99999 PR PBB SHADOW E&M-EST. PATIENT-LVL V: CPT | Mod: PBBFAC,TXP,, | Performed by: INTERNAL MEDICINE

## 2019-01-01 PROCEDURE — 87077 CULTURE AEROBIC IDENTIFY: CPT

## 2019-01-01 PROCEDURE — 87102 FUNGUS ISOLATION CULTURE: CPT

## 2019-01-01 PROCEDURE — 82105 ALPHA-FETOPROTEIN SERUM: CPT | Mod: TXP

## 2019-01-01 PROCEDURE — 25000003 PHARM REV CODE 250: Performed by: HOSPITALIST

## 2019-01-01 PROCEDURE — 87075 CULTR BACTERIA EXCEPT BLOOD: CPT

## 2019-01-01 PROCEDURE — 81001 URINALYSIS AUTO W/SCOPE: CPT

## 2019-01-01 PROCEDURE — 82330 ASSAY OF CALCIUM: CPT

## 2019-01-01 PROCEDURE — 80321 ALCOHOLS BIOMARKERS 1OR 2: CPT

## 2019-01-01 PROCEDURE — 80053 COMPREHEN METABOLIC PANEL: CPT | Mod: 91

## 2019-01-01 PROCEDURE — 87305 ASPERGILLUS AG IA: CPT

## 2019-01-01 PROCEDURE — P9021 RED BLOOD CELLS UNIT: HCPCS

## 2019-01-01 PROCEDURE — 82803 BLOOD GASES ANY COMBINATION: CPT

## 2019-01-01 PROCEDURE — 82962 GLUCOSE BLOOD TEST: CPT

## 2019-01-01 PROCEDURE — 63600175 PHARM REV CODE 636 W HCPCS: Performed by: PHYSICIAN ASSISTANT

## 2019-01-01 PROCEDURE — 90471 IMMUNIZATION ADMIN: CPT | Mod: PBBFAC,TXP

## 2019-01-01 PROCEDURE — 82150 ASSAY OF AMYLASE: CPT | Mod: 91

## 2019-01-01 PROCEDURE — 49082 ABD PARACENTESIS: CPT

## 2019-01-01 PROCEDURE — 84300 ASSAY OF URINE SODIUM: CPT

## 2019-01-01 PROCEDURE — 87040 BLOOD CULTURE FOR BACTERIA: CPT

## 2019-01-01 PROCEDURE — 82150 ASSAY OF AMYLASE: CPT

## 2019-01-01 PROCEDURE — 99291 CRITICAL CARE FIRST HOUR: CPT | Mod: ,,, | Performed by: INTERNAL MEDICINE

## 2019-01-01 PROCEDURE — 83690 ASSAY OF LIPASE: CPT | Mod: 91

## 2019-01-01 PROCEDURE — 93010 ELECTROCARDIOGRAM REPORT: CPT | Mod: ,,, | Performed by: INTERNAL MEDICINE

## 2019-01-01 PROCEDURE — 36415 COLL VENOUS BLD VENIPUNCTURE: CPT | Mod: TXP

## 2019-01-01 PROCEDURE — 85025 COMPLETE CBC W/AUTO DIFF WBC: CPT | Mod: TXP

## 2019-01-01 PROCEDURE — 63600175 PHARM REV CODE 636 W HCPCS: Performed by: EMERGENCY MEDICINE

## 2019-01-01 PROCEDURE — 96360 HYDRATION IV INFUSION INIT: CPT

## 2019-01-01 PROCEDURE — 99215 OFFICE O/P EST HI 40 MIN: CPT | Mod: PBBFAC,25,TXP | Performed by: INTERNAL MEDICINE

## 2019-01-01 PROCEDURE — S0030 INJECTION, METRONIDAZOLE: HCPCS | Performed by: STUDENT IN AN ORGANIZED HEALTH CARE EDUCATION/TRAINING PROGRAM

## 2019-01-01 PROCEDURE — 63600175 PHARM REV CODE 636 W HCPCS

## 2019-01-01 PROCEDURE — C1729 CATH, DRAINAGE: HCPCS

## 2019-01-01 PROCEDURE — 80202 ASSAY OF VANCOMYCIN: CPT

## 2019-01-01 PROCEDURE — 36556 INSERT NON-TUNNEL CV CATH: CPT

## 2019-01-01 PROCEDURE — 86703 HIV-1/HIV-2 1 RESULT ANTBDY: CPT

## 2019-01-01 PROCEDURE — 99215 OFFICE O/P EST HI 40 MIN: CPT | Mod: S$PBB,,, | Performed by: INTERNAL MEDICINE

## 2019-01-01 PROCEDURE — 80069 RENAL FUNCTION PANEL: CPT

## 2019-01-01 PROCEDURE — 85730 THROMBOPLASTIN TIME PARTIAL: CPT

## 2019-01-01 PROCEDURE — 86901 BLOOD TYPING SEROLOGIC RH(D): CPT

## 2019-01-01 PROCEDURE — 99285 EMERGENCY DEPT VISIT HI MDM: CPT | Mod: 25

## 2019-01-01 PROCEDURE — 87088 URINE BACTERIA CULTURE: CPT

## 2019-01-01 PROCEDURE — 99291 CRITICAL CARE FIRST HOUR: CPT | Mod: ,,, | Performed by: EMERGENCY MEDICINE

## 2019-01-01 PROCEDURE — 82042 OTHER SOURCE ALBUMIN QUAN EA: CPT

## 2019-01-01 PROCEDURE — 94002 VENT MGMT INPAT INIT DAY: CPT

## 2019-01-01 PROCEDURE — 80053 COMPREHEN METABOLIC PANEL: CPT | Mod: TXP

## 2019-01-01 PROCEDURE — 80321 ALCOHOLS BIOMARKERS 1OR 2: CPT | Mod: TXP

## 2019-01-01 PROCEDURE — 85007 BL SMEAR W/DIFF WBC COUNT: CPT

## 2019-01-01 PROCEDURE — 99291 PR CRITICAL CARE, E/M 30-74 MINUTES: ICD-10-PCS | Mod: ,,, | Performed by: INTERNAL MEDICINE

## 2019-01-01 PROCEDURE — 94761 N-INVAS EAR/PLS OXIMETRY MLT: CPT

## 2019-01-01 PROCEDURE — 89051 BODY FLUID CELL COUNT: CPT

## 2019-01-01 PROCEDURE — 84157 ASSAY OF PROTEIN OTHER: CPT

## 2019-01-01 PROCEDURE — 51701 INSERT BLADDER CATHETER: CPT

## 2019-01-01 PROCEDURE — 87184 SC STD DISK METHOD PER PLATE: CPT

## 2019-01-01 PROCEDURE — 25500020 PHARM REV CODE 255: Performed by: INTERNAL MEDICINE

## 2019-01-01 PROCEDURE — 84100 ASSAY OF PHOSPHORUS: CPT | Mod: 91

## 2019-01-01 PROCEDURE — 87205 SMEAR GRAM STAIN: CPT

## 2019-01-01 PROCEDURE — 85025 COMPLETE CBC W/AUTO DIFF WBC: CPT | Mod: 91

## 2019-01-01 PROCEDURE — 86920 COMPATIBILITY TEST SPIN: CPT

## 2019-01-01 PROCEDURE — 99291 PR CRITICAL CARE, E/M 30-74 MINUTES: ICD-10-PCS | Mod: ,,, | Performed by: EMERGENCY MEDICINE

## 2019-01-01 PROCEDURE — 82140 ASSAY OF AMMONIA: CPT

## 2019-01-01 PROCEDURE — 82248 BILIRUBIN DIRECT: CPT

## 2019-01-01 RX ORDER — MAGNESIUM SULFATE HEPTAHYDRATE 40 MG/ML
2 INJECTION, SOLUTION INTRAVENOUS
Status: DISCONTINUED | OUTPATIENT
Start: 2019-01-01 | End: 2019-08-05 | Stop reason: HOSPADM

## 2019-01-01 RX ORDER — MORPHINE SULFATE 2 MG/ML
INJECTION, SOLUTION INTRAMUSCULAR; INTRAVENOUS
Status: DISPENSED
Start: 2019-01-01 | End: 2019-01-01

## 2019-01-01 RX ORDER — FENTANYL CITRATE 50 UG/ML
50 INJECTION, SOLUTION INTRAMUSCULAR; INTRAVENOUS ONCE
Status: DISCONTINUED | OUTPATIENT
Start: 2019-01-01 | End: 2019-08-05 | Stop reason: HOSPADM

## 2019-01-01 RX ORDER — ONDANSETRON 4 MG/1
4 TABLET, ORALLY DISINTEGRATING ORAL ONCE
Status: COMPLETED | OUTPATIENT
Start: 2019-01-01 | End: 2019-01-01

## 2019-01-01 RX ORDER — PANTOPRAZOLE SODIUM 40 MG/10ML
80 INJECTION, POWDER, LYOPHILIZED, FOR SOLUTION INTRAVENOUS DAILY
Status: DISCONTINUED | OUTPATIENT
Start: 2019-01-01 | End: 2019-01-01

## 2019-01-01 RX ORDER — LACTULOSE 10 G/15ML
30 SOLUTION ORAL 3 TIMES DAILY
Status: DISCONTINUED | OUTPATIENT
Start: 2019-01-01 | End: 2019-01-01

## 2019-01-01 RX ORDER — ONDANSETRON 2 MG/ML
INJECTION INTRAMUSCULAR; INTRAVENOUS
Status: COMPLETED
Start: 2019-01-01 | End: 2019-01-01

## 2019-01-01 RX ORDER — LORAZEPAM 2 MG/ML
1 INJECTION INTRAMUSCULAR EVERY 4 HOURS PRN
Status: DISCONTINUED | OUTPATIENT
Start: 2019-01-01 | End: 2019-08-05 | Stop reason: HOSPADM

## 2019-01-01 RX ORDER — MIDODRINE HYDROCHLORIDE 5 MG/1
10 TABLET ORAL
Status: COMPLETED | OUTPATIENT
Start: 2019-01-01 | End: 2019-01-01

## 2019-01-01 RX ORDER — LORAZEPAM 2 MG/ML
2 INJECTION INTRAMUSCULAR EVERY 10 MIN PRN
Status: DISCONTINUED | OUTPATIENT
Start: 2019-01-01 | End: 2019-08-05 | Stop reason: HOSPADM

## 2019-01-01 RX ORDER — VANCOMYCIN HCL IN 5 % DEXTROSE 1G/250ML
20 PLASTIC BAG, INJECTION (ML) INTRAVENOUS
Status: COMPLETED | OUTPATIENT
Start: 2019-01-01 | End: 2019-01-01

## 2019-01-01 RX ORDER — POTASSIUM CHLORIDE 7.45 MG/ML
10 INJECTION INTRAVENOUS ONCE
Status: COMPLETED | OUTPATIENT
Start: 2019-01-01 | End: 2019-01-01

## 2019-01-01 RX ORDER — LORAZEPAM 2 MG/ML
4 INJECTION INTRAMUSCULAR ONCE
Status: COMPLETED | OUTPATIENT
Start: 2019-01-01 | End: 2019-01-01

## 2019-01-01 RX ORDER — ONDANSETRON 8 MG/1
8 TABLET, ORALLY DISINTEGRATING ORAL EVERY 8 HOURS PRN
Status: DISCONTINUED | OUTPATIENT
Start: 2019-01-01 | End: 2019-08-05 | Stop reason: HOSPADM

## 2019-01-01 RX ORDER — PHENYLEPHRINE HCL IN 0.9% NACL 1 MG/10 ML
100 SYRINGE (ML) INTRAVENOUS ONCE
Status: COMPLETED | OUTPATIENT
Start: 2019-01-01 | End: 2019-01-01

## 2019-01-01 RX ORDER — FENTANYL CITRATE-0.9 % NACL/PF 10 MCG/ML
25 PLASTIC BAG, INJECTION (ML) INTRAVENOUS CONTINUOUS
Status: DISCONTINUED | OUTPATIENT
Start: 2019-01-01 | End: 2019-01-01

## 2019-01-01 RX ORDER — PANTOPRAZOLE SODIUM 40 MG/10ML
40 INJECTION, POWDER, LYOPHILIZED, FOR SOLUTION INTRAVENOUS 2 TIMES DAILY
Status: DISCONTINUED | OUTPATIENT
Start: 2019-01-01 | End: 2019-01-01

## 2019-01-01 RX ORDER — PANTOPRAZOLE SODIUM 40 MG/10ML
40 INJECTION, POWDER, LYOPHILIZED, FOR SOLUTION INTRAVENOUS 2 TIMES DAILY
Status: DISCONTINUED | OUTPATIENT
Start: 2019-01-01 | End: 2019-08-05 | Stop reason: HOSPADM

## 2019-01-01 RX ORDER — ALBUMIN HUMAN 250 G/1000ML
50 SOLUTION INTRAVENOUS DAILY
Status: DISCONTINUED | OUTPATIENT
Start: 2019-01-01 | End: 2019-08-05 | Stop reason: HOSPADM

## 2019-01-01 RX ORDER — ALBUMIN HUMAN 250 G/1000ML
50 SOLUTION INTRAVENOUS ONCE
Status: COMPLETED | OUTPATIENT
Start: 2019-01-01 | End: 2019-01-01

## 2019-01-01 RX ORDER — PANTOPRAZOLE SODIUM 40 MG/10ML
80 INJECTION, POWDER, LYOPHILIZED, FOR SOLUTION INTRAVENOUS ONCE
Status: COMPLETED | OUTPATIENT
Start: 2019-01-01 | End: 2019-01-01

## 2019-01-01 RX ORDER — OCTREOTIDE ACETATE 100 UG/ML
100 INJECTION, SOLUTION INTRAVENOUS; SUBCUTANEOUS ONCE
Status: COMPLETED | OUTPATIENT
Start: 2019-01-01 | End: 2019-01-01

## 2019-01-01 RX ORDER — THIAMINE HCL 100 MG
100 TABLET ORAL 3 TIMES DAILY
Status: DISCONTINUED | OUTPATIENT
Start: 2019-08-07 | End: 2019-08-05 | Stop reason: HOSPADM

## 2019-01-01 RX ORDER — INDOMETHACIN 25 MG/1
50 CAPSULE ORAL ONCE
Status: COMPLETED | OUTPATIENT
Start: 2019-01-01 | End: 2019-01-01

## 2019-01-01 RX ORDER — FENTANYL CITRATE-0.9 % NACL/PF 10 MCG/ML
25 PLASTIC BAG, INJECTION (ML) INTRAVENOUS CONTINUOUS
Status: DISCONTINUED | OUTPATIENT
Start: 2019-01-01 | End: 2019-08-05 | Stop reason: HOSPADM

## 2019-01-01 RX ORDER — FUROSEMIDE 10 MG/ML
120 INJECTION INTRAMUSCULAR; INTRAVENOUS ONCE
Status: DISCONTINUED | OUTPATIENT
Start: 2019-01-01 | End: 2019-01-01

## 2019-01-01 RX ORDER — HYDROCODONE BITARTRATE AND ACETAMINOPHEN 500; 5 MG/1; MG/1
TABLET ORAL
Status: DISCONTINUED | OUTPATIENT
Start: 2019-01-01 | End: 2019-08-05 | Stop reason: HOSPADM

## 2019-01-01 RX ORDER — DEXMEDETOMIDINE HYDROCHLORIDE 4 UG/ML
0.2 INJECTION, SOLUTION INTRAVENOUS CONTINUOUS
Status: DISCONTINUED | OUTPATIENT
Start: 2019-01-01 | End: 2019-08-05 | Stop reason: HOSPADM

## 2019-01-01 RX ORDER — CEFEPIME HYDROCHLORIDE 2 G/1
2 INJECTION, POWDER, FOR SOLUTION INTRAVENOUS
Status: DISCONTINUED | OUTPATIENT
Start: 2019-01-01 | End: 2019-01-01

## 2019-01-01 RX ORDER — INDOMETHACIN 25 MG/1
CAPSULE ORAL
Status: COMPLETED
Start: 2019-01-01 | End: 2019-01-01

## 2019-01-01 RX ORDER — LORAZEPAM 2 MG/ML
INJECTION INTRAMUSCULAR
Status: COMPLETED
Start: 2019-01-01 | End: 2019-01-01

## 2019-01-01 RX ORDER — FOLIC ACID 1 MG/1
1 TABLET ORAL DAILY
Status: DISCONTINUED | OUTPATIENT
Start: 2019-01-01 | End: 2019-08-05 | Stop reason: HOSPADM

## 2019-01-01 RX ORDER — FAMOTIDINE 20 MG/1
20 TABLET, FILM COATED ORAL 2 TIMES DAILY
Status: DISCONTINUED | OUTPATIENT
Start: 2019-01-01 | End: 2019-01-01

## 2019-01-01 RX ORDER — SUCCINYLCHOLINE CHLORIDE 20 MG/ML
INJECTION INTRAMUSCULAR; INTRAVENOUS
Status: DISPENSED
Start: 2019-01-01 | End: 2019-01-01

## 2019-01-01 RX ORDER — GLUCAGON 1 MG
1 KIT INJECTION
Status: DISCONTINUED | OUTPATIENT
Start: 2019-01-01 | End: 2019-08-05 | Stop reason: HOSPADM

## 2019-01-01 RX ORDER — ONDANSETRON 2 MG/ML
INJECTION INTRAMUSCULAR; INTRAVENOUS
Status: DISPENSED
Start: 2019-01-01 | End: 2019-01-01

## 2019-01-01 RX ORDER — ETOMIDATE 2 MG/ML
10 INJECTION INTRAVENOUS ONCE
Status: COMPLETED | OUTPATIENT
Start: 2019-01-01 | End: 2019-01-01

## 2019-01-01 RX ORDER — VASOPRESSIN 20 [USP'U]/ML
INJECTION, SOLUTION INTRAMUSCULAR; SUBCUTANEOUS
Status: DISPENSED
Start: 2019-01-01 | End: 2019-01-01

## 2019-01-01 RX ORDER — MORPHINE SULFATE 2 MG/ML
2 INJECTION, SOLUTION INTRAMUSCULAR; INTRAVENOUS ONCE
Status: COMPLETED | OUTPATIENT
Start: 2019-01-01 | End: 2019-01-01

## 2019-01-01 RX ORDER — LACTULOSE 10 G/15ML
30 SOLUTION ORAL 3 TIMES DAILY
Status: DISCONTINUED | OUTPATIENT
Start: 2019-01-01 | End: 2019-08-05 | Stop reason: HOSPADM

## 2019-01-01 RX ORDER — CEFEPIME HYDROCHLORIDE 2 G/1
2 INJECTION, POWDER, FOR SOLUTION INTRAVENOUS
Status: COMPLETED | OUTPATIENT
Start: 2019-01-01 | End: 2019-01-01

## 2019-01-01 RX ORDER — NOREPINEPHRINE BITARTRATE/D5W 4MG/250ML
0.05 PLASTIC BAG, INJECTION (ML) INTRAVENOUS CONTINUOUS
Status: DISCONTINUED | OUTPATIENT
Start: 2019-01-01 | End: 2019-01-01

## 2019-01-01 RX ORDER — SODIUM CHLORIDE 0.9 % (FLUSH) 0.9 %
10 SYRINGE (ML) INJECTION
Status: DISCONTINUED | OUTPATIENT
Start: 2019-01-01 | End: 2019-08-05 | Stop reason: HOSPADM

## 2019-01-01 RX ORDER — PROPOFOL 10 MG/ML
5 INJECTION, EMULSION INTRAVENOUS CONTINUOUS
Status: DISCONTINUED | OUTPATIENT
Start: 2019-01-01 | End: 2019-01-01

## 2019-01-01 RX ORDER — SUCCINYLCHOLINE CHLORIDE 20 MG/ML
80 INJECTION INTRAMUSCULAR; INTRAVENOUS ONCE
Status: COMPLETED | OUTPATIENT
Start: 2019-01-01 | End: 2019-01-01

## 2019-01-01 RX ORDER — ALBUMIN HUMAN 250 G/1000ML
50 SOLUTION INTRAVENOUS ONCE
Status: DISCONTINUED | OUTPATIENT
Start: 2019-01-01 | End: 2019-01-01

## 2019-01-01 RX ORDER — ETOMIDATE 2 MG/ML
INJECTION INTRAVENOUS
Status: DISCONTINUED
Start: 2019-01-01 | End: 2019-01-01 | Stop reason: WASHOUT

## 2019-01-01 RX ORDER — CHLORHEXIDINE GLUCONATE ORAL RINSE 1.2 MG/ML
15 SOLUTION DENTAL 2 TIMES DAILY
Status: DISCONTINUED | OUTPATIENT
Start: 2019-01-01 | End: 2019-08-05 | Stop reason: HOSPADM

## 2019-01-01 RX ORDER — ONDANSETRON 2 MG/ML
8 INJECTION INTRAMUSCULAR; INTRAVENOUS ONCE
Status: COMPLETED | OUTPATIENT
Start: 2019-01-01 | End: 2019-01-01

## 2019-01-01 RX ORDER — ETOMIDATE 2 MG/ML
INJECTION INTRAVENOUS
Status: COMPLETED
Start: 2019-01-01 | End: 2019-01-01

## 2019-01-01 RX ORDER — HYDROCODONE BITARTRATE AND ACETAMINOPHEN 500; 5 MG/1; MG/1
TABLET ORAL CONTINUOUS
Status: DISCONTINUED | OUTPATIENT
Start: 2019-01-01 | End: 2019-08-05 | Stop reason: HOSPADM

## 2019-01-01 RX ORDER — CEFEPIME HYDROCHLORIDE 1 G/1
1 INJECTION, POWDER, FOR SOLUTION INTRAMUSCULAR; INTRAVENOUS
Status: DISCONTINUED | OUTPATIENT
Start: 2019-01-01 | End: 2019-08-05 | Stop reason: HOSPADM

## 2019-01-01 RX ORDER — LIDOCAINE HYDROCHLORIDE 10 MG/ML
10 INJECTION INFILTRATION; PERINEURAL ONCE
Status: COMPLETED | OUTPATIENT
Start: 2019-01-01 | End: 2019-01-01

## 2019-01-01 RX ORDER — PHENYLEPHRINE HCL IN 0.9% NACL 1 MG/10 ML
SYRINGE (ML) INTRAVENOUS
Status: COMPLETED
Start: 2019-01-01 | End: 2019-01-01

## 2019-01-01 RX ORDER — POTASSIUM CHLORIDE 14.9 MG/ML
20 INJECTION INTRAVENOUS ONCE
Status: COMPLETED | OUTPATIENT
Start: 2019-01-01 | End: 2019-01-01

## 2019-01-01 RX ORDER — FLUDROCORTISONE ACETATE 0.1 MG/1
100 TABLET ORAL DAILY
Status: DISCONTINUED | OUTPATIENT
Start: 2019-01-01 | End: 2019-08-05 | Stop reason: HOSPADM

## 2019-01-01 RX ORDER — MIDODRINE HYDROCHLORIDE 5 MG/1
10 TABLET ORAL
Status: DISCONTINUED | OUTPATIENT
Start: 2019-01-01 | End: 2019-01-01

## 2019-01-01 RX ORDER — PROPOFOL 10 MG/ML
INJECTION, EMULSION INTRAVENOUS
Status: COMPLETED
Start: 2019-01-01 | End: 2019-01-01

## 2019-01-01 RX ORDER — FENTANYL CITRATE 50 UG/ML
25 INJECTION, SOLUTION INTRAMUSCULAR; INTRAVENOUS
Status: DISCONTINUED | OUTPATIENT
Start: 2019-01-01 | End: 2019-01-01

## 2019-01-01 RX ORDER — VANCOMYCIN HCL IN 5 % DEXTROSE 1G/250ML
20 PLASTIC BAG, INJECTION (ML) INTRAVENOUS ONCE
Status: DISCONTINUED | OUTPATIENT
Start: 2019-01-01 | End: 2019-01-01

## 2019-01-01 RX ORDER — MORPHINE SULFATE 2 MG/ML
4 INJECTION, SOLUTION INTRAMUSCULAR; INTRAVENOUS ONCE
Status: COMPLETED | OUTPATIENT
Start: 2019-01-01 | End: 2019-01-01

## 2019-01-01 RX ORDER — THIAMINE HCL 100 MG
100 TABLET ORAL DAILY
Status: DISCONTINUED | OUTPATIENT
Start: 2019-08-14 | End: 2019-08-05 | Stop reason: HOSPADM

## 2019-01-01 RX ORDER — ROCURONIUM BROMIDE 10 MG/ML
INJECTION, SOLUTION INTRAVENOUS
Status: COMPLETED
Start: 2019-01-01 | End: 2019-01-01

## 2019-01-01 RX ORDER — METRONIDAZOLE 500 MG/100ML
500 INJECTION, SOLUTION INTRAVENOUS
Status: DISCONTINUED | OUTPATIENT
Start: 2019-01-01 | End: 2019-08-05 | Stop reason: HOSPADM

## 2019-01-01 RX ORDER — ONDANSETRON 2 MG/ML
4 INJECTION INTRAMUSCULAR; INTRAVENOUS
Status: COMPLETED | OUTPATIENT
Start: 2019-01-01 | End: 2019-01-01

## 2019-01-01 RX ORDER — LIDOCAINE HYDROCHLORIDE AND EPINEPHRINE 10; 10 MG/ML; UG/ML
10 INJECTION, SOLUTION INFILTRATION; PERINEURAL ONCE
Status: DISCONTINUED | OUTPATIENT
Start: 2019-01-01 | End: 2019-08-05 | Stop reason: HOSPADM

## 2019-01-01 RX ADMIN — Medication 1.5 MCG/KG/MIN: at 02:08

## 2019-01-01 RX ADMIN — CEFEPIME 2 G: 2 INJECTION, POWDER, FOR SOLUTION INTRAVENOUS at 08:08

## 2019-01-01 RX ADMIN — DEXTROSE 250 ML: 10 SOLUTION INTRAVENOUS at 10:08

## 2019-01-01 RX ADMIN — MORPHINE SULFATE 4 MG: 2 INJECTION, SOLUTION INTRAMUSCULAR; INTRAVENOUS at 07:08

## 2019-01-01 RX ADMIN — METRONIDAZOLE 500 MG: 500 SOLUTION INTRAVENOUS at 02:08

## 2019-01-01 RX ADMIN — Medication 100 MCG: at 12:08

## 2019-01-01 RX ADMIN — LIDOCAINE HYDROCHLORIDE 10 ML: 10 INJECTION, SOLUTION INFILTRATION; PERINEURAL at 05:08

## 2019-01-01 RX ADMIN — SUCCINYLCHOLINE CHLORIDE 80 MG: 20 INJECTION, SOLUTION INTRAMUSCULAR; INTRAVENOUS at 07:08

## 2019-01-01 RX ADMIN — VANCOMYCIN HYDROCHLORIDE 1000 MG: 1 INJECTION, POWDER, LYOPHILIZED, FOR SOLUTION INTRAVENOUS at 08:08

## 2019-01-01 RX ADMIN — SODIUM BICARBONATE 50 MEQ: 84 INJECTION, SOLUTION INTRAVENOUS at 12:08

## 2019-01-01 RX ADMIN — CALCIUM GLUCONATE 1 G: 98 INJECTION, SOLUTION INTRAVENOUS at 03:08

## 2019-01-01 RX ADMIN — PANTOPRAZOLE SODIUM 80 MG: 40 INJECTION, POWDER, FOR SOLUTION INTRAVENOUS at 11:08

## 2019-01-01 RX ADMIN — DEXMEDETOMIDINE HYDROCHLORIDE 0.2 MCG/KG/HR: 4 INJECTION, SOLUTION INTRAVENOUS at 08:08

## 2019-01-01 RX ADMIN — VASOPRESSIN 0.04 UNITS/MIN: 20 INJECTION INTRAVENOUS at 05:08

## 2019-01-01 RX ADMIN — HYDROCORTISONE SODIUM SUCCINATE 100 MG: 100 INJECTION, POWDER, FOR SOLUTION INTRAMUSCULAR; INTRAVENOUS at 02:08

## 2019-01-01 RX ADMIN — INDOMETHACIN 50 MEQ: 25 CAPSULE ORAL at 12:08

## 2019-01-01 RX ADMIN — LORAZEPAM 4 MG: 2 INJECTION INTRAMUSCULAR; INTRAVENOUS at 06:08

## 2019-01-01 RX ADMIN — ETOMIDATE 10 MG: 2 INJECTION INTRAVENOUS at 07:08

## 2019-01-01 RX ADMIN — CHLORHEXIDINE GLUCONATE 0.12% ORAL RINSE 15 ML: 1.2 LIQUID ORAL at 10:08

## 2019-01-01 RX ADMIN — VANCOMYCIN HYDROCHLORIDE 500 MG: 500 INJECTION, POWDER, LYOPHILIZED, FOR SOLUTION INTRAVENOUS at 02:08

## 2019-01-01 RX ADMIN — METRONIDAZOLE 500 MG: 500 SOLUTION INTRAVENOUS at 05:08

## 2019-01-01 RX ADMIN — IOHEXOL 75 ML: 350 INJECTION, SOLUTION INTRAVENOUS at 01:08

## 2019-01-01 RX ADMIN — ONDANSETRON 4 MG: 2 INJECTION INTRAMUSCULAR; INTRAVENOUS at 09:08

## 2019-01-01 RX ADMIN — LORAZEPAM 2 MG: 2 INJECTION INTRAMUSCULAR; INTRAVENOUS at 06:08

## 2019-01-01 RX ADMIN — Medication 0.05 MCG/KG/MIN: at 03:08

## 2019-01-01 RX ADMIN — SODIUM CHLORIDE 1632 ML: 0.9 INJECTION, SOLUTION INTRAVENOUS at 07:08

## 2019-01-01 RX ADMIN — OCTREOTIDE ACETATE 50 MCG/HR: 1000 INJECTION, SOLUTION INTRAVENOUS; SUBCUTANEOUS at 10:08

## 2019-01-01 RX ADMIN — ONDANSETRON 8 MG: 2 INJECTION INTRAMUSCULAR; INTRAVENOUS at 06:08

## 2019-01-01 RX ADMIN — SODIUM CHLORIDE 1000 ML: 0.9 INJECTION, SOLUTION INTRAVENOUS at 12:08

## 2019-01-01 RX ADMIN — METRONIDAZOLE 500 MG: 500 SOLUTION INTRAVENOUS at 10:08

## 2019-01-01 RX ADMIN — SODIUM BICARBONATE: 84 INJECTION, SOLUTION INTRAVENOUS at 12:08

## 2019-01-01 RX ADMIN — POTASSIUM CHLORIDE 10 MEQ: 10 INJECTION, SOLUTION INTRAVENOUS at 04:08

## 2019-01-01 RX ADMIN — AZITHROMYCIN MONOHYDRATE 500 MG: 500 INJECTION, POWDER, LYOPHILIZED, FOR SOLUTION INTRAVENOUS at 02:08

## 2019-01-01 RX ADMIN — ETOMIDATE 10 MG: 2 INJECTION, SOLUTION INTRAVENOUS at 07:08

## 2019-01-01 RX ADMIN — MORPHINE SULFATE 2 MG: 2 INJECTION, SOLUTION INTRAMUSCULAR; INTRAVENOUS at 06:08

## 2019-01-01 RX ADMIN — MIDODRINE HYDROCHLORIDE 10 MG: 5 TABLET ORAL at 09:08

## 2019-01-01 RX ADMIN — DEXMEDETOMIDINE HYDROCHLORIDE 0.6 MCG/KG/HR: 4 INJECTION, SOLUTION INTRAVENOUS at 05:08

## 2019-01-01 RX ADMIN — OCTREOTIDE ACETATE 100 MCG: 100 INJECTION, SOLUTION INTRAVENOUS; SUBCUTANEOUS at 10:08

## 2019-01-01 RX ADMIN — Medication 0.05 MCG/KG/MIN: at 01:08

## 2019-01-01 RX ADMIN — ALBUMIN (HUMAN) 50 G: 25 SOLUTION INTRAVENOUS at 11:08

## 2019-01-01 RX ADMIN — ONDANSETRON 4 MG: 4 TABLET, ORALLY DISINTEGRATING ORAL at 10:08

## 2019-01-01 RX ADMIN — LORAZEPAM 4 MG: 2 INJECTION INTRAMUSCULAR at 06:08

## 2019-01-01 RX ADMIN — ALBUMIN (HUMAN) 50 G: 25 SOLUTION INTRAVENOUS at 10:08

## 2019-01-01 RX ADMIN — CEFEPIME 2 G: 2 INJECTION, POWDER, FOR SOLUTION INTRAVENOUS at 10:08

## 2019-01-01 RX ADMIN — Medication 1 MCG/KG/MIN: at 04:08

## 2019-01-01 RX ADMIN — OCTREOTIDE ACETATE 50 MCG/HR: 1000 INJECTION, SOLUTION INTRAVENOUS; SUBCUTANEOUS at 11:08

## 2019-01-01 RX ADMIN — Medication 300 MCG/HR: at 06:08

## 2019-01-01 RX ADMIN — ALBUMIN (HUMAN) 50 G: 25 SOLUTION INTRAVENOUS at 02:08

## 2019-01-01 RX ADMIN — FAMOTIDINE 20 MG: 20 TABLET, FILM COATED ORAL at 10:08

## 2019-01-01 RX ADMIN — ONDANSETRON 8 MG: 8 TABLET, ORALLY DISINTEGRATING ORAL at 03:08

## 2019-01-01 RX ADMIN — LACTULOSE 30 G: 20 SOLUTION ORAL at 02:08

## 2019-01-01 RX ADMIN — PHYTONADIONE 10 MG: 10 INJECTION, EMULSION INTRAMUSCULAR; INTRAVENOUS; SUBCUTANEOUS at 12:08

## 2019-01-01 RX ADMIN — NOREPINEPHRINE BITARTRATE 1 MCG/KG/MIN: 1 INJECTION, SOLUTION, CONCENTRATE INTRAVENOUS at 06:08

## 2019-01-01 RX ADMIN — Medication 25 MCG/HR: at 09:08

## 2019-01-01 RX ADMIN — DEXTROSE 250 ML: 10 SOLUTION INTRAVENOUS at 12:08

## 2019-01-01 RX ADMIN — PANTOPRAZOLE SODIUM 40 MG: 40 INJECTION, POWDER, FOR SOLUTION INTRAVENOUS at 10:08

## 2019-01-01 RX ADMIN — POTASSIUM CHLORIDE 20 MEQ: 200 INJECTION, SOLUTION INTRAVENOUS at 10:08

## 2019-03-06 NOTE — PATIENT INSTRUCTIONS
Your current MELD score is 7.    You do not need liver transplant at this time.    Continue to abstain from alcohol, this is allowing for recovery of your liver.    Your vaccinations and screenings are up to date.    Schedule liver ultrasound for April or May 2019.    Return to HEPATOLOGY clinic for 6 months

## 2019-03-06 NOTE — PROGRESS NOTES
Transplant Hepatology Follow-up Note    Chief complaint: decompensated alcoholic cirrhosis    HPI:  Annette Esparza is a 49 y.o. female that presents to hepatology clinic for management of decompensated alcoholic cirrhosis.  She is alone.     Patient first learned of the presence of liver disease in 9/2014.  Given uncertainty regarding presence of cirrhosis, she underwent liver biopsy in 10/2014 which confirmed the presence of cirrhosis.  Serologic w/u was negative and etiology is alcoholic liver disease.  The patient continued to consume alcohol until 11/2016.  She was initially seen in transplant clinic in 1/2017 after a hospitalization in early December 2016.  At that time she had recently discontinued alcohol and had improving MELD from 16 to 8.  Not deemed a transplant candidate and recommended for 3 month follow-up, which did not occur.    It appears the patient was stable until 7/2018.  At that time she presented to [facility] with E coli UTI and bacteremia precipitating decompensation with increased lethargy, ascites, and HE.  She was treated for 4 days and ultimately left AMA.  She presented to [facility] 4 days after leaving AMA due to continued decline.  Patient completed therapy for infection and inpatient transplant evaluation was initiated.  During evaluation noted to have concerning liver mass for HCC.      Regarding liver mass, CT on 7/25/18 with ill defined 4.4cm mass concerning for but not diagnostic for HCC.  MRI obtained 3 days later and no defined mass seen and concern for infiltrative HCC.  Transjugular biopsy of right lobe was pursued and negative for malignancy.  AFP is normal.  The patient underwent a second mass biopsy which was negative for malignancy.  Review of imaging at this time reveals on evidence of liver cancer.      Interval history:  No hospitalizations.  Current cold symptoms and taking OTC medication. Denies alcohol use since September 2018. Continued tobacco use.      Taking lasix  40mg and spironolactone 100mg daily.  Volume well controlled.  Improved appetite and weight gain.  No jaundice, ascites, HE or GI bleeding.      Cirrhosis HCM:  Hepatitis A vaccination:  Immune, completed vaccine   Hepatitis B vaccination: immune, completed vaccine  HCC screening: US 10/2018  Variceal screening: EGD 8/2018, PHG  Pneumococcal vaccination:  2016  Influenza vaccination: 2018  Colonoscopy: 8/2018 - 5 year surveillance for 3mm TA     Patient Active Problem List   Diagnosis    Gallstones    Alcohol use disorder, severe, in early remission    Anemia of chronic disease    Vitamin D deficiency    Folate deficiency    Iron deficiency anemia due to chronic blood loss    Portal hypertensive gastropathy    Alcoholic cirrhosis of liver without ascites    Subclinical hypothyroidism    Anxiety    Cigarette nicotine dependence without complication    Mild single current episode of major depressive disorder    Oral thrush    Hypotension    Hyponatremia    General weakness    Chronic Hepatic encephalopathy    Hypomagnesemia    Normocytic anemia    Hypophosphatemia    Decompensated hepatic cirrhosis    Severe malnutrition    HCC (hepatocellular carcinoma)    Pre-transplant evaluation for liver transplant    Panlobular emphysema    Elevated serum creatinine    ANDREW (acute kidney injury)    RUQ pain    Macrocytic anemia       Past Medical History:   Diagnosis Date    Acute hypoxemic respiratory failure 12/1/2016    ANDREW (acute kidney injury) 11/28/2016    Alcohol dependence in remission     Alcoholic cirrhosis     Alcoholic cirrhosis of liver with ascites 10/22/2015    Anxiety     Ascites due to alcoholic cirrhosis 6/13/2016    Centrilobular emphysema 12/12/2016    Cigarette nicotine dependence without complication 6/2/2016    Folate deficiency 10/22/2015    Gallstones     Hepatic encephalopathy     Hepatorenal syndrome 11/29/2016    Hyperbilirubinemia 6/13/2016    Hyponatremia  2016    Iron deficiency anemia due to chronic blood loss 10/22/2015    Portal hypertensive gastropathy 10/22/2015    Subclinical hypothyroidism 10/22/2015       Past Surgical History:   Procedure Laterality Date    ADENOIDECTOMY      APPENDECTOMY      BIOPSY N/A 10/30/2014    Performed by [facility]le [facility] at [facility] CATH LAB    BIOPSY, LIVER, TRANSJUGULAR APPROACH N/A 8/15/2018    Performed by [facility] at [facility] OR 2ND FLR     SECTION      COLONOSCOPY      COLONOSCOPY N/A 2018    Performed by Jesus Whalen [facility] at [facility] ENDO (2ND FLR)    COLONOSCOPY N/A 2014    Performed by Maritza García MD at [facility] ENDO    ESOPHAGOGASTRODUODENOSCOPY (EGD) N/A 2018    Performed by [facility]alen [facility] at [facility] ENDO (2ND FLR)    ESOPHAGOGASTRODUODENOSCOPY (EGD) N/A 6/3/2016    [facility] ENDO    ESOPHAGOGASTRODUODENOSCOPY (EGD) N/A 2015    [facility] at [facility]    HYSTERECTOMY      25 yrs old    LIVER BIOPSY      OOPHORECTOMY Left     25 yrs old    OOPHORECTOMY Right     29 yrs old    TONSILLECTOMY      TUBAL LIGATION      UPPER GASTROINTESTINAL ENDOSCOPY         Family History   Problem Relation Age of Onset    Rheum arthritis Father     Cancer Mother     No Known Problems Sister     No Known Problems Brother     Colon cancer Neg Hx        Social History     Socioeconomic History    Marital status:      Spouse name: None    Number of children: None    Years of education: None    Highest education level: None   Social Needs    Financial resource strain: None    Food insecurity - worry: None    Food insecurity - inability: None    Transportation needs - medical: None    Transportation needs - non-medical: None   Occupational History    None   Tobacco Use    Smoking status: Former Smoker     Packs/day: 1.00     Years: 35.00     Pack years: 35.00     Types: Cigarettes    Smokeless  tobacco: Never Used   Substance and Sexual Activity    Alcohol use: No     Alcohol/week: 0.0 oz     Comment: beer intake last ~2017, liquour last drink 9/2018    Drug use: No    Sexual activity: Yes     Partners: Male   Other Topics Concern    None   Social History Narrative    None   Patient currently residing with boyfriend.  Ongoing tobacco, last alcohol reported 9/2018    Current Outpatient Medications   Medication Sig Dispense Refill    alendronate (FOSAMAX) 70 MG tablet Take 1 tablet (70 mg total) by mouth every 7 days. 4 tablet 11    ascorbic acid, vitamin C, (VITAMIN C) 500 MG tablet Take 500 mg by mouth once daily.      b complex vitamins tablet Take 1 tablet by mouth once daily.      cyanocobalamin (VITAMIN B-12) 1000 MCG tablet Take 1 tablet (1,000 mcg total) by mouth once daily. 90 tablet 3    ferrous sulfate (FEOSOL) 325 mg (65 mg iron) Tab tablet Take 325 mg by mouth once daily.      folic acid (FOLVITE) 1 MG tablet Take 1 tablet (1 mg total) by mouth once daily. 30 tablet 11    furosemide (LASIX) 80 MG tablet Take 0.5 tablets (40 mg total) by mouth once daily. 90 tablet 3    HYDROcodone-acetaminophen (NORCO)  mg per tablet Take 1 tablet by mouth every 12 (twelve) hours as needed for Pain. 60 tablet 0    midodrine (PROAMATINE) 10 MG tablet Take 1.5 tablets by mouth 3 times daily  With meals      multivitamin (THERAGRAN) tablet Take 1 tablet by mouth once daily.      spironolactone (ALDACTONE) 100 MG tablet Take 1 tablet (100 mg total) by mouth once daily. 90 tablet 3    vitamin D 1000 units Tab Take 1,000 Units by mouth once daily.       No current facility-administered medications for this visit.        Review of patient's allergies indicates:   Allergen Reactions    Morphine Nausea And Vomiting       Review of Systems   Constitutional: Positive for malaise/fatigue. Negative for chills, fever and weight loss.   HENT: Positive for congestion.    Eyes: Negative.    Respiratory:  "Negative for cough and shortness of breath.    Cardiovascular: Negative for chest pain and leg swelling.   Gastrointestinal: Negative for abdominal pain, blood in stool, constipation, diarrhea, heartburn, nausea and vomiting.   Musculoskeletal: Negative for joint pain and myalgias.   Skin: Negative for itching and rash.   Neurological: Negative for dizziness, focal weakness, weakness and headaches.   Endo/Heme/Allergies: Does not bruise/bleed easily.   Psychiatric/Behavioral: Negative for depression. The patient is not nervous/anxious.        Vitals:    03/06/19 0946   BP: 111/66   Pulse: 94   Resp: 18   Temp: 98.7 °F (37.1 °C)   TempSrc: Oral   SpO2: 99%   Weight: 53.4 kg (117 lb 11.6 oz)   Height: 5' 3.54" (1.614 m)       Physical Exam   Constitutional: She is oriented to person, place, and time. She appears well-developed. No distress.   Thin female   HENT:   Head: Normocephalic and atraumatic.   Mouth/Throat: Oropharynx is clear and moist. No oropharyngeal exudate.   Eyes: EOM are normal. Pupils are equal, round, and reactive to light. No scleral icterus.   Neck: Normal range of motion. Neck supple. No thyromegaly present.   Cardiovascular: Normal rate, regular rhythm and normal heart sounds. Exam reveals no gallop and no friction rub.   No murmur heard.  Pulmonary/Chest: Effort normal. No respiratory distress. She has no wheezes. She has no rales.   Abdominal: Soft. Bowel sounds are normal. She exhibits no distension. There is no tenderness. There is no rebound and no guarding.   Musculoskeletal: Normal range of motion. She exhibits no edema.   Lymphadenopathy:     She has no cervical adenopathy.   Neurological: She is alert and oriented to person, place, and time. No cranial nerve deficit.   Mental status normal   Skin: Skin is warm and dry. No rash noted.   Psychiatric: She has a normal mood and affect. Her behavior is normal.   Vitals reviewed.      Lab Results   Component Value Date    ALT 12 03/06/2019    " AST 12 03/06/2019     (H) 09/30/2014    ALKPHOS 180 (H) 03/06/2019    BILITOT 0.8 03/06/2019       Lab Results   Component Value Date    WBC 8.19 03/06/2019    HGB 9.6 (L) 03/06/2019    HCT 31.2 (L) 03/06/2019    MCV 92 03/06/2019     03/06/2019       Lab Results   Component Value Date     (L) 03/06/2019    K 3.9 03/06/2019     03/06/2019    CO2 21 (L) 03/06/2019    BUN 22 (H) 03/06/2019    CREATININE 1.0 03/06/2019    CALCIUM 9.7 03/06/2019    ANIONGAP 12 03/06/2019    ESTGFRAFRICA >60.0 03/06/2019    EGFRNONAA >60.0 03/06/2019     MELD-Na score: 7 at 3/6/2019  9:09 AM  MELD score: 7 at 3/6/2019  9:09 AM  Calculated from:  Serum Creatinine: 1 mg/dL at 3/6/2019  9:09 AM  Serum Sodium: 135 mmol/L at 3/6/2019  9:09 AM  Total Bilirubin: 0.8 mg/dL (Rounded to 1 mg/dL) at 3/6/2019  9:09 AM  INR(ratio): 1.1 at 3/6/2019  9:09 AM  Age: 49 years    Assessment:  49 y.o. female with alcoholic cirrhosis presenting for routine follow-up.  Patient has no indications for liver transplant at this time given drop in MELD score < 15 following abstinence and no evidence of HCC.     Plan:  1.  HCC:  Patient has no evidence of HCC at this time.  Will return to standard surveillance.      2. . Decompensated cirrhosis: MELD decreased to 7.  HCM updated as above.      3.  Alcohol abuse:  Encouraged ongoing alcohol abstinence       4.  Osteoporosis:  On therapy with vitamin D, calcium and fosamax     RTC in 6 months, decline for transplant based on medical improvement and f/u in hepatology clinic    Karnofsky performance score: 70%  No limitations of mobility

## 2019-03-20 NOTE — PROGRESS NOTES
MELD 7.  Patient to follow-up in hepatology clinic.  Recall entered.  Transplant episode and snapshot updated.

## 2019-08-03 PROBLEM — J98.4 CAVITARY LESION OF LUNG: Status: ACTIVE | Noted: 2019-01-01

## 2019-08-03 PROBLEM — D51.9 ANEMIA, B12 DEFICIENCY: Status: ACTIVE | Noted: 2019-01-01

## 2019-08-03 PROBLEM — K65.2 SBP (SPONTANEOUS BACTERIAL PERITONITIS): Status: ACTIVE | Noted: 2019-01-01

## 2019-08-03 PROBLEM — J84.10 CALCIFIED GRANULOMA OF LUNG: Status: ACTIVE | Noted: 2019-01-01

## 2019-08-03 NOTE — ED TRIAGE NOTES
"Pt arrived via  EMS from home. Pt reports having generalized body pain to head, neck, shoulders, back and abd for the past few days. Hx of cirrhosis. +increasing SOB with distended abd. Pt states "I haven't been able to pee the past 2 days." Takes chronic pain meds and has irregular BMs. Dry cracked lips. Pt states " I've been drinking plenty of water."   "

## 2019-08-03 NOTE — ED PROVIDER NOTES
Encounter Date: 8/3/2019       History     Chief Complaint   Patient presents with    Shortness of Breath     SOB x 3 days. Upon EMS arrival, pt's CBG was 21 mg/dl with GCS 15. Pt is awake, alert and lethargic upon arrival.      Mrs. Esparza is a 50 yo F w/ a past medical history significant for ETOH cirrhosis, chronic anemia, tobacco abuse, and COPD who presented to the ED w/ a chief complaint of worsening abdominal pain and shortness of breath. She reports that her symptoms began about two weeks ago and have been worsening. She states that she has generalized abdominal pain. She reports that she has shortness of breath at rest which is also worsened by exertion. She states that she has not urinated in 24-48 hours and has not had a bowel movement in a week. She reports compliance with her cirrhosis regimen (lasix 40mg, spironolactone 100mg daily, and midodrine 10mg TID). One week ago she reports an episode of black stool. Last BM 2 days ago.  She also mentions that she has been having nausea. She denies fever, chills, dysuria, syncope, weakness, chest pain, or chest discomfort.         Review of patient's allergies indicates:   Allergen Reactions    Dilaudid [hydromorphone] Hallucinations     Past Medical History:   Diagnosis Date    Acute hypoxemic respiratory failure 12/1/2016    ANDREW (acute kidney injury) 11/28/2016    Alcohol dependence in remission     Alcoholic cirrhosis     Alcoholic cirrhosis of liver with ascites 10/22/2015    Anxiety     Ascites due to alcoholic cirrhosis 6/13/2016    Centrilobular emphysema 12/12/2016    Cigarette nicotine dependence without complication 6/2/2016    Folate deficiency 10/22/2015    Gallstones     Hepatic encephalopathy     Hepatorenal syndrome 11/29/2016    Hyperbilirubinemia 6/13/2016    Hyponatremia 11/29/2016    Iron deficiency anemia due to chronic blood loss 10/22/2015    Portal hypertensive gastropathy 10/22/2015    Subclinical hypothyroidism  10/22/2015     Past Surgical History:   Procedure Laterality Date    ADENOIDECTOMY      APPENDECTOMY      BIOPSY N/A 10/30/2014    Performed by Sawyer Ovalle MD at [facility] CATH LAB    BIOPSY, LIVER, TRANSJUGULAR APPROACH N/A 8/15/2018    Performed by [facility] Provider at [facility] OR 2ND FLR     SECTION      COLONOSCOPY      COLONOSCOPY N/A 2018    Performed by Jesus Whalen MD at [facility] ENDO (2ND FLR)    COLONOSCOPY N/A 2014    Performed by Maritza García MD at [facility] ENDO    ESOPHAGOGASTRODUODENOSCOPY (EGD) N/A 2018    Performed by Jesus Whalen MD at [facility] ENDO (2ND FLR)    ESOPHAGOGASTRODUODENOSCOPY (EGD) N/A 6/3/2016    Performed by Abby Dixon [facility] at [facility] ENDO    ESOPHAGOGASTRODUODENOSCOPY (EGD) N/A 2015    Performed by Maritza García MD at [facility] ENDO    HYSTERECTOMY      25 yrs old    LIVER BIOPSY      OOPHORECTOMY Left     25 yrs old    OOPHORECTOMY Right     29 yrs old    TONSILLECTOMY      TUBAL LIGATION      UPPER GASTROINTESTINAL ENDOSCOPY       Family History   Problem Relation Age of Onset    Rheum arthritis Father     Cancer Mother     No Known Problems Sister     No Known Problems Brother     Colon cancer Neg Hx      Social History     Tobacco Use    Smoking status: Current Every Day Smoker     Packs/day: 0.50     Years: 35.00     Pack years: 17.50     Types: Cigarettes    Smokeless tobacco: Never Used   Substance Use Topics    Alcohol use: No     Alcohol/week: 0.0 oz     Comment: beer intake last , liquour last drink 2018    Drug use: No     Review of Systems   Constitutional: Positive for appetite change. Negative for chills and fever.   HENT: Negative for congestion, rhinorrhea, sinus pressure and sneezing.    Eyes: Negative.    Respiratory: Positive for shortness of breath. Negative for cough, chest tightness and wheezing.    Cardiovascular: Negative for chest pain, palpitations and leg swelling.   Gastrointestinal:  Positive for abdominal distention, abdominal pain, constipation and nausea. Negative for anal bleeding, blood in stool, diarrhea and vomiting.   Endocrine: Negative.    Genitourinary: Negative for difficulty urinating, dysuria, flank pain, frequency and urgency.        Anuric     Musculoskeletal: Positive for back pain.   Skin: Negative.    Allergic/Immunologic: Negative for immunocompromised state.   Neurological: Negative for dizziness, tremors, syncope, weakness, light-headedness, numbness and headaches.   Hematological: Negative.    Psychiatric/Behavioral: Negative for agitation, behavioral problems and confusion.       Physical Exam     Initial Vitals   BP Pulse Resp Temp SpO2   08/03/19 1812 08/03/19 1812 08/03/19 1812 08/03/19 2009 08/03/19 1812   (!) 89/68 (!) 113 (!) 22 (!) 94.6 °F (34.8 °C) 100 %      MAP       --                Physical Exam    Nursing note and vitals reviewed.  Constitutional: She appears well-developed and well-nourished. She is not diaphoretic.   Chronically ill appearing, thin, mildly uncomfortable secondary to pain   HENT:   Head: Normocephalic and atraumatic.   Cracked lips, dry mucous membranes   Eyes: Conjunctivae and EOM are normal. Pupils are equal, round, and reactive to light. No scleral icterus.   Neck: Normal range of motion. Neck supple. No JVD present.   Cardiovascular: Regular rhythm, normal heart sounds and intact distal pulses. Tachycardia present.  Exam reveals no gallop and no friction rub.    No murmur heard.  Pulmonary/Chest: No respiratory distress. She has no wheezes. She has no rhonchi. She has rales. She exhibits no tenderness.   Mild increased work of breathing but able to speak in full sentences without difficulty   Abdominal: Soft. She exhibits distension. Bowel sounds are decreased. There is hepatosplenomegaly. There is generalized tenderness. There is no rigidity, no rebound, no guarding, no CVA tenderness, no tenderness at McBurney's point and negative  Wiley's sign.   Distended abdomen with diffuse tenderness   Musculoskeletal: Normal range of motion. She exhibits no edema.   Neurological: She is alert and oriented to person, place, and time. She has normal strength and normal reflexes. She displays normal reflexes. No cranial nerve deficit or sensory deficit. GCS score is 15. GCS eye subscore is 4. GCS verbal subscore is 5. GCS motor subscore is 6.   Skin: Skin is warm and dry. No rash noted. No erythema. No pallor.   Psychiatric: She has a normal mood and affect. Thought content normal.         ED Course   Procedures  Labs Reviewed   CBC W/ AUTO DIFFERENTIAL - Abnormal; Notable for the following components:       Result Value    RBC 2.13 (*)     Hemoglobin 6.0 (*)     Hematocrit 23.6 (*)     Mean Corpuscular Volume 111 (*)     Mean Corpuscular Hemoglobin Conc 25.4 (*)     RDW 19.8 (*)     Immature Granulocytes 1.3 (*)     Gran # (ANC) 8.8 (*)     Immature Grans (Abs) 0.13 (*)     Lymph # 0.4 (*)     Gran% 86.5 (*)     Lymph% 4.1 (*)     All other components within normal limits   COMPREHENSIVE METABOLIC PANEL - Abnormal; Notable for the following components:    Sodium 125 (*)     Potassium 3.2 (*)     Chloride 92 (*)     CO2 <5 (*)     Glucose 36 (*)     BUN, Bld 24 (*)     Creatinine 3.0 (*)     Calcium 7.6 (*)     Total Protein 5.5 (*)     Albumin 1.8 (*)     Total Bilirubin 3.0 (*)     Alkaline Phosphatase 196 (*)     eGFR if  20.1 (*)     eGFR if non  17.4 (*)     All other components within normal limits   LACTIC ACID, PLASMA - Abnormal; Notable for the following components:    Lactate (Lactic Acid) >12.0 (*)     All other components within normal limits   PROTIME-INR - Abnormal; Notable for the following components:    Prothrombin Time 25.1 (*)     INR 2.6 (*)     All other components within normal limits   PHOSPHORUS - Abnormal; Notable for the following components:    Phosphorus 5.9 (*)     All other components within  normal limits   AMMONIA - Abnormal; Notable for the following components:    Ammonia 143 (*)     All other components within normal limits   POCT GLUCOSE - Abnormal; Notable for the following components:    POCT Glucose 59 (*)     All other components within normal limits   POCT GLUCOSE - Abnormal; Notable for the following components:    POCT Glucose 43 (*)     All other components within normal limits   POCT GLUCOSE - Abnormal; Notable for the following components:    POCT Glucose 186 (*)     All other components within normal limits   MAGNESIUM   LIPASE   PROTIME-INR   POCT GLUCOSE MONITORING CONTINUOUS   POCT GLUCOSE MONITORING CONTINUOUS   POCT GLUCOSE MONITORING CONTINUOUS   POCT GLUCOSE MONITORING CONTINUOUS   POCT GLUCOSE MONITORING CONTINUOUS   POCT GLUCOSE MONITORING CONTINUOUS   POCT GLUCOSE MONITORING CONTINUOUS          Imaging Results          X-Ray Chest AP Portable (Final result)  Result time 08/04/19 02:36:02    Final result by Mark Jacobsen MD (08/04/19 02:36:02)                 Impression:      Left IJ catheter placed with tip overlying the SVC/RA junction.  No pneumothorax.    Cavitary lesion right apex and bilateral interstitial changes, similar to prior.      Electronically signed by: Mark Jacobsen MD  Date:    08/04/2019  Time:    02:36             Narrative:    EXAMINATION:  XR CHEST AP PORTABLE    CLINICAL HISTORY:  LIJ placement;    TECHNIQUE:  Single frontal view of the chest was performed.    COMPARISON:  August 3, 2019    FINDINGS:  Left IJ catheter placed with tip overlying the SVC/RA junction.  No pneumothorax.    Cavitary lesion right apex and bilateral interstitial changes, similar to prior.  Stable calcified nodule left upper lung.    Heart and lungs  appear unchanged when allowing for differences in technique and positioning.                                CT Chest Without Contrast (Final result)  Result time 08/04/19 00:05:39    Final result by Mark Jacobsen MD  (08/04/19 00:05:39)                 Impression:      1. Large thick-walled cavitary lesion with surrounding scattered calcifications in the right upper lobe.  This may represent bronchogenic carcinoma or an infectious process such as tuberculosis.  The right upper lobe apical segment bronchus leads up to this cavitary lesion.  Suggest bronchoscopy for further assessment.  2. Tree-in-bud type centrilobular nodules with branching pattern in the right upper lobe posterior segment.  This can be seen with both infectious/inflammatory and neoplastic etiologies.  3. Scattered ground-glass infiltrates throughout both lungs which may represent aspiration, pulmonary hemorrhage, or an infectious/inflammatory process.  4. Areas of dependent airspace opacity and air bronchograms in the bilateral lower lobes which may reflect aspiration or atelectasis.  5.  Large volume of ascites in the upper abdomen.  This report was flagged in Epic as abnormal.    Electronically signed by resident: Devang Robledo  Date:    08/03/2019  Time:    23:23    Electronically signed by: Mark Jacobsen MD  Date:    08/04/2019  Time:    00:05             Narrative:    EXAMINATION:  CT CHEST WITHOUT CONTRAST    CLINICAL HISTORY:  new cavitary lesion;    TECHNIQUE:  Using low dose technique the chest was surveyed from above the pulmonary apices through the posterior costophrenic angles.  Data was reconstructed for multiplanar images in axial, sagittal and coronal planes and for maximal intensity projection images in the axial plane.    COMPARISON:  Chest radiograph 08/03/2019.  CT chest abdomen 10/09/2018.    FINDINGS:  Base of Neck: No significant abnormality.    Aorta: Left-sided aortic arch with 3 arterial branches. The aorta maintains normal caliber, contour and course. There is calcification of the thoracic aorta.    Heart/pericardium: Normal size.  No pericardial effusion or calcification.    Pulmonary vasculature: Pulmonary arteries distribute  normally.    Kaya/Mediastinum: Multiple normal to upper limits normal mediastinal lymph nodes.    Pleura: No pleural calcification.    Esophagus: Small hiatal hernia.  The esophagus contains air and fluid placing the patient at risk for aspiration.    Upper Abdomen: Large amount of ascites.    Thoracic soft tissues: Normal.    Bones: No acute fracture. No suspicious lytic or sclerotic lesion.    Airways: Patent.    Lungs: Centrilobular emphysematous changes with an upper lobe predominance.  There is a large thick-walled cavitary lesion with surrounding scattered calcification within the apical segment of the right upper lobe measuring approximately 5.6 x 3.4 cm in maximum AP and transverse dimensions (axial series 3, image 47) and approximately 3.3 cm in maximum craniocaudal dimension (coronal series 5, image 34).  Punctate calcifications throughout both lungs, likely sequela prior granulomatous disease.  There is scattered ground-glass infiltrates throughout both lungs.  Tree-in-bud type centrilobular nodules with branching pattern in the posterior segment right upper lobe.  Areas of dependent airspace opacity and air bronchograms in the bilateral lower lobes and lingula.                               X-Ray Chest AP Portable (Final result)  Result time 08/03/19 21:13:03    Final result by Wilson Mullins MD (08/03/19 21:13:03)                 Impression:      Unchanged examination of cavitary lesion in the right upper lobe additional interstitial findings.      Electronically signed by: Wilson Mullins MD  Date:    08/03/2019  Time:    21:13             Narrative:    EXAMINATION:  XR CHEST AP PORTABLE    CLINICAL HISTORY:  Cavitary lesion;    TECHNIQUE:  Single frontal view of the chest was performed.    COMPARISON:  08/03/2019    FINDINGS:  Monitoring EKG leads are present.  The trachea is unremarkable.  The cardiomediastinal silhouette is within normal limits.  The hemidiaphragms are unremarkable.  There is no evidence  of free air beneath the hemidiaphragms.  There are no pleural effusions.  There is no evidence of a pneumothorax.  There is no evidence of pneumomediastinum.  There is unchanged appearance of a cavitary lesion in the right lung apex.  There is also unchanged appearance of a calcified lesion in the left upper lobe.  The overall evaluation of the lung fields are unchanged with suspected interstitial opacities.  The osseous structures are unremarkable.                               X-Ray Chest AP Portable (Final result)  Result time 08/03/19 19:28:55    Final result by Christopher Muhammad MD (08/03/19 19:28:55)                 Impression:      Cavitary opacity RIGHT upper lobe, apical region for which CT examination of chest recommended for further evaluation.      Electronically signed by: Christopher Muhammad MD  Date:    08/03/2019  Time:    19:28             Narrative:    EXAMINATION:  XR CHEST AP PORTABLE    CLINICAL HISTORY:  Sepsis;    TECHNIQUE:  Single frontal view of the chest was performed.    COMPARISON:  10/09/2018    FINDINGS:  Opacity identified in the RIGHT upper lobe with cavitation.  Infectious etiology could have this appearance.  Tuberculosis/mycobacterium not excluded.  CT examination of the chest recommended.Nonspecific prominence of interstitial markings.  Stable calcified granuloma LEFT upper lobe.    The cardiac silhouette is unchanged in size.  The hilar and mediastinal contours are unremarkable.  No evidence for effusion.  No evidence for pneumothorax    Bones are intact.                                 Medical Decision Making:   Initial Assessment:   48 yo F w/ a PMHx significant for alcoholic cirrhosis presenting with generalized abdominal pain, SOB, and fatigue. Hypotensive and tachycardic on arrival. Abdomen soft but distended and diffusely tender. Appears volume overloaded but intravascularly depleted. Ddx includes SBP, sepsis, anemia, metabolic derangement, pneumonia, decompensated cirrhosis.  Broad work-up ordered includes BCx2, Ucx, CXR. Will plan for diagnostic paracentesis. IVF bolus ordered with broad spectrum abx - Vanc and Cefepime for suspected sepsis.   Differential Diagnosis:        Clinical Tests:   Lab Tests: Ordered  Radiological Study: Ordered  Medical Tests: Ordered  ED Management:  Work-up reviewed. Anemia - H/H 6/23.6. Rectal negative for any gross blood, minimal stool in rectal vault. No evidence of active GIB though pt reports remote hx of melena about a year ago. Type and screened and consented for 1u prbc. Labs also notable for decompensated cirrhosis - MELD 35 with INR 2.6. Acidotic -Bicarb <5. ANDREW - BUN 24/Cr 3. CXR with right upper cavitary opacity. Lactate >12. Consulted ICU for admission for continued management. Pt and family updated on the plan.     Katia Rodríguez, PGY-3  Other:   I discussed test(s) with the performing physician.              Attending Attestation:         Attending Critical Care:   Critical Care Times:   Direct Patient Care (initial evaluation, reassessments, and time considering the case)................................................................30 minutes.   Additional History from reviewing old medical records or taking additional history from the family, EMS, PCP, etc.......................5 minutes.   Ordering, Reviewing, and Interpreting Diagnostic Studies...............................................................................................................5 minutes.   Documentation..................................................................................................................................................................................5 minutes.   Consultation with other Physicians. .................................................................................................................................................5 minutes.   ==============================================================  · Total Critical  Care Time - exclusive of procedural time: 50 minutes.  ==============================================================  Critical care was necessary to treat or prevent imminent or life-threatening deterioration of the following conditions: sepsis, hepatic failure and metabolic crisis.   Critical care was time spent personally by me on the following activities: obtaining history from patient or relative, examination of patient, review of old charts, ordering lab, x-rays, and/or EKG, development of treatment plan with patient or relative, ordering and performing treatments and interventions, evaluation of patient's response to treatment, discussion with consultants, interpretation of cardiac measurements and re-evaluation of patient's conition.   Critical Care Condition: critical       Attending ED Notes:   I evaluated this patient with the resident physician and was involved in every aspect of the treatment rendered.  I had a face-to-face encounter with the patient and performed a physical examination.     The patient is a 49-year-old female with history of alcoholic cirrhosis of the liver who presented with 2 weeks of worsening abdominal distention, abdominal pain, shortness of breath. The patient was found to be hypothermic, hypotensive, and tachycardic.  There was great concern for sepsis and SBP.  Aggressive management with IV fluids and warming was initiated early in the management.  Broad-spectrum antibiotics were administered.  On lab review, there was no white blood cell count elevation but severe anemia.  Significant derangements on review chemistry panel including hyponatremia, hypokalemia, hypochloremia, severely depressed serum bicarb level, and acute kidney injury. Initial lactic acid greater than 12.  Critical care consult to early in the management of this patient.  The patient was admitted to the ICU for further evaluation management.    I agree with the history, examination, workup, assessment, and  plan as documented by the resident.     Iftikhar Ponce III, M.D. - Attending             Clinical Impression:       ICD-10-CM ICD-9-CM   1. Lactic acidosis E87.2 276.2   2. SBP (spontaneous bacterial peritonitis) K65.2 567.23   3. Hypotension, unspecified hypotension type I95.9 458.9         Disposition:   Disposition: Admitted  Condition: Critical                        Michelle Rodríguez MD  Resident  08/04/19 0340       Iftikhar Ponce III, MD  08/07/19 1131

## 2019-08-04 PROBLEM — R57.8 HEMORRHAGIC SHOCK: Status: ACTIVE | Noted: 2019-01-01

## 2019-08-04 PROBLEM — K72.90 DECOMPENSATED HEPATIC CIRRHOSIS: Chronic | Status: ACTIVE | Noted: 2018-07-23

## 2019-08-04 PROBLEM — R65.21 SEPTIC SHOCK: Status: ACTIVE | Noted: 2019-01-01

## 2019-08-04 PROBLEM — A41.9 SEPTIC SHOCK: Status: ACTIVE | Noted: 2019-01-01

## 2019-08-04 PROBLEM — K74.60 DECOMPENSATED HEPATIC CIRRHOSIS: Chronic | Status: ACTIVE | Noted: 2018-07-23

## 2019-08-04 PROBLEM — N17.9 AKI (ACUTE KIDNEY INJURY): Chronic | Status: ACTIVE | Noted: 2018-01-01

## 2019-08-04 NOTE — CONSULTS
Patient seen and examined  Patient in extremis on significant amount of pressors  Very possible there was an injury during paracentesis given reported findings   Patient with significant liver disease at baseline, now decompensated  Patient will not survive intervention even if suspected etiology is confirmed with CT scan  Can proceed with imaging if family desires, however will unlikely     Full consult note to follow    Hi Minaya MD PGY V  [phone]

## 2019-08-04 NOTE — ASSESSMENT & PLAN NOTE
Likely from ischemic ATN from hypotension requiring vasopressors and severe anemia.      Plan:  - Patient with severe metabolic acidosis, oliguric, will start SLED  - Dialysate adjusted to most recent parameters  - Urine microscopy by MD (UPDATE: 8/4/2019 abundant coarse granular casts)  - Renal US  - U/A, UPCr  - MAP > 65  - Strict I/Os and chart  - Avoid nephrotoxic meds, NSAIDs, IV contrast, etc  - Hb > 7 gm/dL  - Panculture

## 2019-08-04 NOTE — SUBJECTIVE & OBJECTIVE
Past Medical History:   Diagnosis Date    Acute hypoxemic respiratory failure 2016    ANDREW (acute kidney injury) 2016    Alcohol dependence in remission     Alcoholic cirrhosis     Alcoholic cirrhosis of liver with ascites 10/22/2015    Anxiety     Ascites due to alcoholic cirrhosis 2016    Centrilobular emphysema 2016    Cigarette nicotine dependence without complication 2016    Folate deficiency 10/22/2015    Gallstones     Hepatic encephalopathy     Hepatorenal syndrome 2016    Hyperbilirubinemia 2016    Hyponatremia 2016    Iron deficiency anemia due to chronic blood loss 10/22/2015    Portal hypertensive gastropathy 10/22/2015    Subclinical hypothyroidism 10/22/2015       Past Surgical History:   Procedure Laterality Date    ADENOIDECTOMY      APPENDECTOMY      BIOPSY N/A 10/30/2014    Performed by Sawyer Ovalle MD at [facility] CATH LAB    BIOPSY, LIVER, TRANSJUGULAR APPROACH N/A 8/15/2018    Performed by [facility] at [facility] OR 2ND FLR     SECTION      COLONOSCOPY      COLONOSCOPY N/A 2018    Performed by Jesus Whalen MD at [facility] ENDO (2ND FLR)    COLONOSCOPY N/A 2014    Performed by Maritza García MD at [facility] ENDO    ESOPHAGOGASTRODUODENOSCOPY (EGD) N/A 2018    Performed by Jesus Whalen MD at [facility] ENDO (2ND FLR)    ESOPHAGOGASTRODUODENOSCOPY (EGD) N/A 6/3/2016    Performed by Abby Dixon MD at [facility] ENDO    ESOPHAGOGASTRODUODENOSCOPY (EGD) N/A 2015    Performed by Maritza García MD at [facility] ENDO    HYSTERECTOMY      25 yrs old    LIVER BIOPSY      OOPHORECTOMY Left     25 yrs old    OOPHORECTOMY Right     29 yrs old    TONSILLECTOMY      TUBAL LIGATION      UPPER GASTROINTESTINAL ENDOSCOPY         Review of patient's allergies indicates:   Allergen Reactions    Dilaudid [hydromorphone] Hallucinations     Family History     Problem Relation (Age of Onset)    Cancer Mother     No Known Problems Sister, Brother    Rheum arthritis Father        Tobacco Use    Smoking status: Current Every Day Smoker     Packs/day: 0.50     Years: 35.00     Pack years: 17.50     Types: Cigarettes    Smokeless tobacco: Never Used   Substance and Sexual Activity    Alcohol use: No     Alcohol/week: 0.0 oz     Comment: beer intake last ~2017, liquour last drink 9/2018    Drug use: No    Sexual activity: Yes     Partners: Male     Review of Systems   Unable to perform ROS: Mental status change     Objective:     Vital Signs (Most Recent):  Temp: 96.8 °F (36 °C) (08/04/19 0956)  Pulse: 100 (08/04/19 0956)  Resp: (!) 26 (08/04/19 0956)  BP: 122/61 (08/04/19 0950)  SpO2: (!) 91 % (08/04/19 0956) Vital Signs (24h Range):  Temp:  [93.8 °F (34.3 °C)-97.6 °F (36.4 °C)] 96.8 °F (36 °C)  Pulse:  [] 100  Resp:  [16-31] 26  SpO2:  [88 %-100 %] 91 %  BP: ()/(38-81) 122/61     Weight: 54.4 kg (120 lb) (08/03/19 1812)  Body mass index is 23.44 kg/m².      Intake/Output Summary (Last 24 hours) at 8/4/2019 1058  Last data filed at 8/4/2019 1017  Gross per 24 hour   Intake 6887.6 ml   Output 220 ml   Net 6667.6 ml       Lines/Drains/Airways     Central Venous Catheter Line                 Percutaneous Central Line Insertion/Assessment - triple lumen  08/04/19 0130 left internal jugular less than 1 day          Drain                 NG/OG Tube 08/04/19 0820 orogastric Center mouth less than 1 day         Urethral Catheter 08/04/19 0800 Non-latex 16 Fr. less than 1 day          Airway                 Airway - Non-Surgical 08/04/19 0743 Endotracheal Tube less than 1 day          Peripheral Intravenous Line                 Peripheral IV - Single Lumen 08/03/19 2004 18 G Right  less than 1 day                Physical Exam   Constitutional: No distress.   HENT:   Head: Normocephalic and atraumatic.   Cardiovascular:   Sinus tachycardia   Pulmonary/Chest: Effort normal and breath sounds normal.   Abdominal: Soft.  Bowel sounds are normal. She exhibits distension (and tympanic on percussion ). She exhibits no mass. There is no tenderness. There is no rebound and no guarding. No hernia.   Musculoskeletal: She exhibits no edema.   Neurological:   Intubated and sedated   Skin: She is not diaphoretic.   Nursing note and vitals reviewed.      Significant Labs:  CBC:   Recent Labs   Lab 08/03/19  1903 08/04/19  0340 08/04/19  0815   WBC 10.19 8.98  8.98 11.36   HGB 6.0* 5.3*  5.3* 7.6*   HCT 23.6* 18.5*  18.5* 26.2*    188  188 224     CMP:   Recent Labs   Lab 08/04/19  0815   GLU 77   CALCIUM 6.6*   ALBUMIN 3.1*   PROT 5.6*   *   K 3.5   CO2 10*   CL 94*   BUN 23*   CREATININE 2.6*   ALKPHOS 121   ALT 19   AST 52*   BILITOT 5.8*     Coagulation:   Recent Labs   Lab 08/04/19  0815   INR 2.7*   APTT 51.3*       Significant Imaging:  Imaging results within the past 24 hours have been reviewed.

## 2019-08-04 NOTE — EKG INTERPRETATIONS - EMERGENCY DEPT.
ED EKG Interpretations:      Time of study: 8/3/19 18:45    Independent interpretation by ED physician.    Sinus tachycardia.  Ventricular rate 110 beats per minute.  Low voltage QRS.  Normal axis.  Normal QRS interval. Prolonged QTc.  No ST segment elevation or depression.  Inferolateral T-wave flattening that is new compared to most recent previous study.

## 2019-08-04 NOTE — PROCEDURES
"Annette Esparza is a 49 y.o. female patient.    Temp: 96.6 °F (35.9 °C) (19)  Pulse: 87 (19)  Resp: (!) 27 (19)  BP: (!) 82/54 (19)  SpO2: (!) 94 % (19)  Weight: 54.4 kg (120 lb) (19)  Height: 5' (152.4 cm) (19)       Paracentesis  Date/Time: 2019 2:19 PM  Location procedure was performed: [facility] EMERGENCY DEPARTMENT  Performed by: Eric Golden MD  Authorized by: Eric Golden MD   Assisting provider: Behram Khan, MD  Pre-operative diagnosis: undifferentiated shock  Consent Done: Yes  Consent: Written consent obtained.  Consent given by: patient  Patient understanding: patient states understanding of the procedure being performed  Patient consent: the patient's understanding of the procedure matches consent given  Procedure consent: procedure consent matches procedure scheduled  Relevant documents: relevant documents present and verified  Test results: test results available and properly labeled  Site marked: the operative site was marked  Imaging studies: imaging studies available  Required items: required blood products, implants, devices, and special equipment available  Patient identity confirmed: , MRN, name, verbally with patient and provided demographic data  Time out: Immediately prior to procedure a "time out" was called to verify the correct patient, procedure, equipment, support staff and site/side marked as required.  Initial or subsequent exam: initial  Procedure purpose: therapeutic and diagnostic  Indications: abdominal discomfort secondary to ascites and suspected peritonitis  Anesthesia: local infiltration    Anesthesia:  Local Anesthetic: lidocaine 1% with epinephrine  Anesthetic total: 5 mL  Patient sedated: no  Preparation: Patient was prepped and draped in the usual sterile fashion.  Description of findings: diffuse ascites   Needle gauge: 14.  Ultrasound guidance: used for landmarking.  Puncture " site: right lower quadrant  Fluid removed: 3700(ml)  Fluid appearance: cloudy (greenish brown)  Dressing: 4x4 sterile gauze  Complications: No  Estimated blood loss (mL): 2  Specimens: Yes  Implants: No  Patient tolerance: Patient tolerated the procedure well with no immediate complications          Eric Golden  8/4/2019

## 2019-08-04 NOTE — ASSESSMENT & PLAN NOTE
Pt with tense ascites and decreased urine output presents with elevated creatinine. Urine lytes consistent with pre-renal etiology. Differentials include hepatorenal syndrome vs abdominal compartment syndrome vs hypoperfusion.     Plan  - Consult nephrology  - Obtain bladder pressure  - RP ultrasound   - Would avoid lasix challenge currently

## 2019-08-04 NOTE — HPI
50 y/o White woma w PMHx significant for EtOH cirrhosis, portal hypertensive gastropathy (last EGD 8/2018) and osteopenia, admitted [place] [facility] [place] after she presented to ED with generalized pain more prominent in lower abdomen, along with abdominal distention despite being on diuretic regimen.  At ED patient was hypotensive, tachycardic, significant increase in sCr up to 3, short of breath, and anemic Hb 5, and severe metabolic acidosis .  Most of HP gathered from record and nursing.    Nephrology consulted for Krissy and metabolic acidosis.

## 2019-08-04 NOTE — PROGRESS NOTES
Pharmacokinetic Initial Assessment: IV Vancomycin    Assessment/Plan:    Vancomycin 1000 mg IVPB x 1 given in ED.  In setting of ANDREW, check random vancomycin level with AM labs to determine if scheduling subsequent doses appropriate or if intermittent dosing indicated.  Desired empiric serum trough concentration is 10 to 20 mcg/mL.  Draw vancomycin random level with AM labs on 08/04/2019.  Pharmacy will continue to follow and monitor vancomycin.      Please contact pharmacy at extension [phone] with any questions regarding this assessment.     Thank you for the consult,   Yannick Maurice     Patient brief summary:  Annette Esparza is a 49 y.o. female initiated on antimicrobial therapy with IV Vancomycin for treatment of suspected bacteremia.    Drug Allergies:   Review of patient's allergies indicates:   Allergen Reactions    Dilaudid [hydromorphone] Hallucinations     Actual Body Weight:   54.4 kg    Renal Function:   Estimated Creatinine Clearance: 16.1 mL/min (A) (based on SCr of 3 mg/dL (H)).  Patient has ANDREW, baseline SCr = 1.    CBC (last 72 hours):  Recent Labs   Lab Result Units 08/03/19  1903   WBC K/uL 10.19   Hemoglobin g/dL 6.0*   Hematocrit % 23.6*   Platelets K/uL 274   Gran% % 86.5*   Lymph% % 4.1*   Mono% % 7.8   Eosinophil% % 0.0   Basophil% % 0.3   Differential Method  Automated       Metabolic Panel (last 72 hours):  Recent Labs   Lab Result Units 08/03/19  1903 08/03/19  2119   Sodium mmol/L 125*  --    Potassium mmol/L 3.2*  --    Chloride mmol/L 92*  --    CO2 mmol/L <5*  --    Glucose mg/dL 36*  --    Glucose, UA   --  Negative   BUN, Bld mg/dL 24*  --    Creatinine mg/dL 3.0*  --    Creatinine, Random Ur mg/dL  --  31.0   Albumin g/dL 1.8*  --    Total Bilirubin mg/dL 3.0*  --    Alkaline Phosphatase U/L 196*  --    AST U/L 28  --    ALT U/L 14  --    Magnesium mg/dL 2.0  --    Phosphorus mg/dL 5.9*  --        Drug levels (last 3 results):  No results for input(s): VANCOMYCINRA,  VANCOMYCINPE, VANCOMYCINTR in the last 72 hours.    Microbiologic Results:  Microbiology Results (last 7 days)     Procedure Component Value Units Date/Time    Urine culture [[id number]] Collected:  08/03/19 2119    Order Status:  No result Specimen:  Urine Updated:  08/03/19 2243    Blood culture [[id number]]     Order Status:  Canceled Specimen:  Blood     Blood culture [[id number]]     Order Status:  Canceled Specimen:  Blood     Blood culture x two cultures. Draw prior to antibiotics. [[id number]] Collected:  08/03/19 1934    Order Status:  Sent Specimen:  Blood from Peripheral, Hand, Left Updated:  08/03/19 2000    Blood culture x two cultures. Draw prior to antibiotics. [[id number]] Collected:  08/03/19 1934    Order Status:  Sent Specimen:  Blood from Peripheral, Hand, Right Updated:  08/03/19 1958

## 2019-08-04 NOTE — SUBJECTIVE & OBJECTIVE
Past Medical History:   Diagnosis Date    Acute hypoxemic respiratory failure 2016    ANDREW (acute kidney injury) 2016    Alcohol dependence in remission     Alcoholic cirrhosis     Alcoholic cirrhosis of liver with ascites 10/22/2015    Anxiety     Ascites due to alcoholic cirrhosis 2016    Centrilobular emphysema 2016    Cigarette nicotine dependence without complication 2016    Folate deficiency 10/22/2015    Gallstones     Hepatic encephalopathy     Hepatorenal syndrome 2016    Hyperbilirubinemia 2016    Hyponatremia 2016    Iron deficiency anemia due to chronic blood loss 10/22/2015    Portal hypertensive gastropathy 10/22/2015    Subclinical hypothyroidism 10/22/2015       Past Surgical History:   Procedure Laterality Date    ADENOIDECTOMY      APPENDECTOMY      BIOPSY N/A 10/30/2014    Performed by [facility]le MD at [facility] CATH LAB    BIOPSY, LIVER, TRANSJUGULAR APPROACH N/A 8/15/2018    Performed by [facility] Diagnostic Provider at [facility] OR 2ND FLR     SECTION      COLONOSCOPY      COLONOSCOPY N/A 2018    Performed by Jesus Whalen MD at [facility] ENDO (2ND FLR)    COLONOSCOPY N/A 2014    Performed by Maritza García MD at [facility] ENDO    ESOPHAGOGASTRODUODENOSCOPY (EGD) N/A 2018    Performed by Jesus Whalen MD at [facility] ENDO (2ND FLR)    ESOPHAGOGASTRODUODENOSCOPY (EGD) N/A 6/3/2016    Performed by Abby Dixon MD at [facility] ENDO    ESOPHAGOGASTRODUODENOSCOPY (EGD) N/A 2015    Performed by Maritza García MD at [facility] ENDO    HYSTERECTOMY      25 yrs old    LIVER BIOPSY      OOPHORECTOMY Left     25 yrs old    OOPHORECTOMY Right     29 yrs old    TONSILLECTOMY      TUBAL LIGATION      UPPER GASTROINTESTINAL ENDOSCOPY         Review of patient's allergies indicates:   Allergen Reactions    Dilaudid [hydromorphone] Hallucinations     Current Facility-Administered Medications   Medication Frequency     0.9%  NaCl infusion (for blood administration) Q24H PRN    0.9%  NaCl infusion (for blood administration) Q24H PRN    albumin human 25% bottle 50 g Daily    ceFEPIme injection 2 g Q24H    chlorhexidine 0.12 % solution 15 mL BID    dexmedetomidine (PRECEDEX) 400mcg/100mL 0.9% NaCL infusion Continuous    dextrose 10% (D10W) Bolus PRN    dextrose 10% (D10W) Bolus PRN    fentaNYL 2500 mcg in 0.9% sodium chloride 250 mL infusion premix (titrating) Continuous    fludrocortisone tablet 100 mcg Daily    folic acid tablet 1 mg Daily    glucagon (human recombinant) injection 1 mg PRN    hydrocortisone sodium succinate injection 100 mg Q8H    lactulose 20 gram/30 mL solution Soln 30 g TID    lidocaine-EPINEPHrine 1%-1:100,000 injection 10 mL Once    lorazepam injection 1 mg Q4H PRN    metronidazole IVPB 500 mg Q8H    morphine 2 mg/mL injection     morphine 2 mg/mL injection     norepinephrine 32 mg in dextrose 5 % 250 mL infusion Continuous    octreotide (SANDOSTATIN) 500 mcg in sodium chloride 0.9% 100 mL infusion Continuous    ondansetron disintegrating tablet 8 mg Q8H PRN    pantoprazole injection 40 mg BID    phenylephrine HCl in 0.9% NaCl 1 mg/10 mL (100 mcg/mL) syringe 100 mcg Once    phenylephrine HCl in 0.9% NaCl 1 mg/10 mL (100 mcg/mL) syringe 100 mcg Once    sodium bicarbonate 1 mEq/mL (8.4 %) 150 mEq in dextrose 5 % 1,000 mL infusion Continuous    sodium chloride 0.9% flush 10 mL PRN    thiamine (B-1) 250 mg in dextrose 5 % 50 mL IVPB Daily    Followed by    [START ON 8/7/2019] thiamine tablet 100 mg TID    Followed by    [START ON 8/14/2019] thiamine tablet 100 mg Daily    vasopressin (PITRESSIN) 0.2 Units/mL in dextrose 5 % 100 mL infusion Continuous    vasopressin (PITRESSIN) 20 unit/mL injection      Family History     Problem Relation (Age of Onset)    Cancer Mother    No Known Problems Sister, Brother    Rheum arthritis Father        Tobacco Use    Smoking status: Current  Every Day Smoker     Packs/day: 0.50     Years: 35.00     Pack years: 17.50     Types: Cigarettes    Smokeless tobacco: Never Used   Substance and Sexual Activity    Alcohol use: No     Alcohol/week: 0.0 oz     Comment: beer intake last ~2017, liquour last drink 9/2018    Drug use: No    Sexual activity: Yes     Partners: Male     Review of Systems   Unable to perform ROS: Intubated     Objective:     Vital Signs (Most Recent):  Temp: 96.6 °F (35.9 °C) (08/04/19 1127)  Pulse: 87 (08/04/19 1127)  Resp: (!) 27 (08/04/19 1127)  BP: (!) 82/54 (08/04/19 1127)  SpO2: (!) 94 % (08/04/19 1127)  O2 Device (Oxygen Therapy): ventilator (08/04/19 1113) Vital Signs (24h Range):  Temp:  [93.8 °F (34.3 °C)-97.6 °F (36.4 °C)] 96.6 °F (35.9 °C)  Pulse:  [] 87  Resp:  [16-31] 27  SpO2:  [88 %-100 %] 94 %  BP: ()/(38-81) 82/54     Weight: 54.4 kg (120 lb) (08/03/19 1812)  Body mass index is 23.44 kg/m².  Body surface area is 1.52 meters squared.    I/O last 3 completed shifts:  In: 6817.6 [I.V.:1058.6; Blood:595; IV Piggyback:5164]  Out: 220 [Urine:220]    Physical Exam   Constitutional: No distress.   Critically ill, sedated on mechanical ventilation   HENT:   Head: Normocephalic and atraumatic.   Cardiovascular:   Sinus tachycardia   Pulmonary/Chest: Effort normal and breath sounds normal.   Vent transmitted breath sounds   Abdominal: Soft. Bowel sounds are normal. She exhibits distension (and tympanic on percussion ). There is no tenderness. There is no guarding.   Musculoskeletal: She exhibits no edema.   Neurological:   Intubated and sedated   Skin: Skin is warm. She is not diaphoretic.   Nursing note and vitals reviewed.      Significant Labs:  CBC:   Recent Labs   Lab 08/04/19  0815   WBC 11.36   RBC 2.62*   HGB 7.6*   HCT 26.2*      *   MCH 29.0   MCHC 29.0*     CMP:   Recent Labs   Lab 08/04/19  0815   GLU 77   CALCIUM 6.6*   ALBUMIN 3.1*   PROT 5.6*   *   K 3.5   CO2 10*   CL 94*   BUN 23*    CREATININE 2.6*   ALKPHOS 121   ALT 19   AST 52*   BILITOT 5.8*     All labs within the past 24 hours have been reviewed.    Significant Imaging:  CXR personally reviewed.

## 2019-08-04 NOTE — HPI
49 year old female with a history of alcoholic cirrhosis on who GI is being consulted for suspected GI bleed.    History obtained from ED notes and in speaking to patient's nurses. Per ED notes she came in with GCS of 15 complaining of shortness of breath, abdominal pain, and decrease urine output. She also reported 1 episode of melena 1 week ago with no BM in 2 days.    Upon arrival she hypotensive, tachycardiac, in ANDREW with significant LA elevation and anemia. She was cultures (also had paracentesis + for SBP), started on antibiotics, and was transfused. Per ICU nurse there was no evidence of active bleeding once in the unit but she did notice some coffee ground emesis on her clothes. Unfortunately patient decompensated further requiring emergent intubation and initiation of pressors.     Prior GI Procedures (not all inclusive):  EGD 8/2018  - Normal esophagus.  - 2 cm sliding hiatal hernia.  - Portal hypertensive gastropathy.  - Congested duodenal mucosa.    Colon 8/2018  - Perianal skin tags found on perianal exam.  - One 3 mm polyp in the sigmoid colon, removed with a jumbo cold forceps. Resected and retrieved.  - Internal hemorrhoids.  - The examination was otherwise normal on direct and retroflexion views.

## 2019-08-04 NOTE — PROCEDURES
"Annette Esparza is a 49 y.o. female patient.    Temp: 98.4 °F (36.9 °C) (08/04/19 1714)  Pulse: 96 (08/04/19 1714)  Resp: (!) 26 (08/04/19 1714)  BP: 115/65 (08/04/19 1714)  SpO2: 95 % (08/04/19 1714)  Weight: 54.4 kg (120 lb) (08/03/19 1812)  Height: 5' (152.4 cm) (08/03/19 1812)       Central Line  Date/Time: 8/4/2019 6:00 PM  Location procedure was performed: [facility] CRITICAL CARE MEDICINE  Performed by: Behram Khan, MD  Pre-operative Diagnosis: Decompensated cirrhosis  Post-operative diagnosis: Decompensated cirrhosis   Consent Done: Yes  Time out: Immediately prior to procedure a "time out" was called to verify the correct patient, procedure, equipment, support staff and site/side marked as required.  Indications: med administration and vascular access  Anesthesia: local infiltration  Preparation: skin prepped with ChloraPrep  Location details: left internal jugular  Catheter type: triple lumen  Catheter size: 7 Fr  Catheter Length: 20cm    Ultrasound guidance: yes  Vessel Caliber: large, patent, compressibility normal  Needle advanced into vessel with real time Ultrasound guidance.  Guidewire confirmed in vessel.  Sterile sheath used.  Manometry: Yes  Number of attempts: 1  Assessment: placement verified by x-ray  Technical procedures used: Ralf  Complications: none  Estimated blood loss (mL): 0  Specimens: No  Implants: No  Post-procedure: line sutured  Complications: No  Comments: Insertion site was located with ultrasound and marked prior to procedure. Provider donned mask, gown and sterile gloves. Insertion site was sterilized and pt was draped. Ultrasound probe was sheathed sterilely and left IJ vein was found with ultrasound. Angiocath was inserted into central vein with ultrasound guidance. Placement in venous site was confirmed with manometry and identification of guidewire in venous space with ultrasound. Catheter was inserted after dilation of site. Catheter was sutured in place and " chlorhexadine button was placed. Catheter was then dressed with fenestrated dressing.             Behram Khan  8/4/2019

## 2019-08-04 NOTE — CONSULTS
[facility]  Nephrology  Consult Note    Patient Name: Annette Esparza  MRN: [id number]  Admission Date: 8/3/2019  Hospital Length of Stay: 1 days  Attending Provider: Tucker Su MD   Primary Care Physician: Endy Pfeiffer MD  Principal Problem:<principal problem not specified>    Inpatient consult to Nephrology  Consult performed by: Tho Toledo MD  Consult ordered by: Behram Khan, MD        Subjective:     HPI: 48 y/o White woma w PMHx significant for EtOH cirrhosis, portal hypertensive gastropathy (last EGD 8/2018) and osteopenia, admitted to [facility] ICU after she presented to ED with generalized pain more prominent in lower abdomen, along with abdominal distention despite being on diuretic regimen.  At ED patient was hypotensive, tachycardic, significant increase in sCr up to 3, short of breath, and anemic Hb 5, and severe metabolic acidosis .  Most of HP gathered from record and nursing.    Nephrology consulted for Andrew and metabolic acidosis.    Past Medical History:   Diagnosis Date    Acute hypoxemic respiratory failure 12/1/2016    ANDREW (acute kidney injury) 11/28/2016    Alcohol dependence in remission     Alcoholic cirrhosis     Alcoholic cirrhosis of liver with ascites 10/22/2015    Anxiety     Ascites due to alcoholic cirrhosis 6/13/2016    Centrilobular emphysema 12/12/2016    Cigarette nicotine dependence without complication 6/2/2016    Folate deficiency 10/22/2015    Gallstones     Hepatic encephalopathy     Hepatorenal syndrome 11/29/2016    Hyperbilirubinemia 6/13/2016    Hyponatremia 11/29/2016    Iron deficiency anemia due to chronic blood loss 10/22/2015    Portal hypertensive gastropathy 10/22/2015    Subclinical hypothyroidism 10/22/2015       Past Surgical History:   Procedure Laterality Date    ADENOIDECTOMY      APPENDECTOMY      BIOPSY N/A 10/30/2014    Performed by [facility] Ovalle [facility] at [facility] CATH LAB    BIOPSY, LIVER,  TRANSJUGULAR APPROACH N/A 8/15/2018    Performed by [facility] Diagnostic Provider at [facility] OR 2ND FLR     SECTION      COLONOSCOPY      COLONOSCOPY N/A 2018    Performed by Jesus Whalen MD at [facility] ENDO (2ND FLR)    COLONOSCOPY N/A 2014    Performed by [facility]itza [facility] at [facility] ENDO    ESOPHAGOGASTRODUODENOSCOPY (EGD) N/A 2018    Performed by Jesus Whalen MD at [facility] ENDO (2ND FLR)    ESOPHAGOGASTRODUODENOSCOPY (EGD) N/A 6/3/2016    Performed by [facility]by Dixon [facility] at [facility] ENDO    ESOPHAGOGASTRODUODENOSCOPY (EGD) N/A 2015    [facility] by [facility] at [facility]    HYSTERECTOMY      25 yrs old    LIVER BIOPSY      OOPHORECTOMY Left     25 yrs old    OOPHORECTOMY Right     29 yrs old    TONSILLECTOMY      TUBAL LIGATION      UPPER GASTROINTESTINAL ENDOSCOPY         Review of patient's allergies indicates:   Allergen Reactions    Dilaudid [hydromorphone] Hallucinations     Current Facility-Administered Medications   Medication Frequency    0.9%  NaCl infusion (for blood administration) Q24H PRN    0.9%  NaCl infusion (for blood administration) Q24H PRN    albumin human 25% bottle 50 g Daily    ceFEPIme injection 2 g Q24H    chlorhexidine 0.12 % solution 15 mL BID    dexmedetomidine (PRECEDEX) 400mcg/100mL 0.9% NaCL infusion Continuous    dextrose 10% (D10W) Bolus PRN    dextrose 10% (D10W) Bolus PRN    fentaNYL 2500 mcg in 0.9% sodium chloride 250 mL infusion premix (titrating) Continuous    fludrocortisone tablet 100 mcg Daily    folic acid tablet 1 mg Daily    glucagon (human recombinant) injection 1 mg PRN    hydrocortisone sodium succinate injection 100 mg Q8H    lactulose 20 gram/30 mL solution Soln 30 g TID    lidocaine-EPINEPHrine 1%-1:100,000 injection 10 mL Once    lorazepam injection 1 mg Q4H PRN    metronidazole IVPB 500 mg Q8H    morphine 2 mg/mL injection     morphine 2 mg/mL injection     norepinephrine 32 mg in dextrose  5 % 250 mL infusion Continuous    octreotide (SANDOSTATIN) 500 mcg in sodium chloride 0.9% 100 mL infusion Continuous    ondansetron disintegrating tablet 8 mg Q8H PRN    pantoprazole injection 40 mg BID    phenylephrine HCl in 0.9% NaCl 1 mg/10 mL (100 mcg/mL) syringe 100 mcg Once    phenylephrine HCl in 0.9% NaCl 1 mg/10 mL (100 mcg/mL) syringe 100 mcg Once    sodium bicarbonate 1 mEq/mL (8.4 %) 150 mEq in dextrose 5 % 1,000 mL infusion Continuous    sodium chloride 0.9% flush 10 mL PRN    thiamine (B-1) 250 mg in dextrose 5 % 50 mL IVPB Daily    Followed by    [START ON 8/7/2019] thiamine tablet 100 mg TID    Followed by    [START ON 8/14/2019] thiamine tablet 100 mg Daily    vasopressin (PITRESSIN) 0.2 Units/mL in dextrose 5 % 100 mL infusion Continuous    vasopressin (PITRESSIN) 20 unit/mL injection      Family History     Problem Relation (Age of Onset)    Cancer Mother    No Known Problems Sister, Brother    Rheum arthritis Father        Tobacco Use    Smoking status: Current Every Day Smoker     Packs/day: 0.50     Years: 35.00     Pack years: 17.50     Types: Cigarettes    Smokeless tobacco: Never Used   Substance and Sexual Activity    Alcohol use: No     Alcohol/week: 0.0 oz     Comment: beer intake last ~2017, liquour last drink 9/2018    Drug use: No    Sexual activity: Yes     Partners: Male     Review of Systems   Unable to perform ROS: Intubated     Objective:     Vital Signs (Most Recent):  Temp: 96.6 °F (35.9 °C) (08/04/19 1127)  Pulse: 87 (08/04/19 1127)  Resp: (!) 27 (08/04/19 1127)  BP: (!) 82/54 (08/04/19 1127)  SpO2: (!) 94 % (08/04/19 1127)  O2 Device (Oxygen Therapy): ventilator (08/04/19 1113) Vital Signs (24h Range):  Temp:  [93.8 °F (34.3 °C)-97.6 °F (36.4 °C)] 96.6 °F (35.9 °C)  Pulse:  [] 87  Resp:  [16-31] 27  SpO2:  [88 %-100 %] 94 %  BP: ()/(38-81) 82/54     Weight: 54.4 kg (120 lb) (08/03/19 1812)  Body mass index is 23.44 kg/m².  Body surface area is  1.52 meters squared.    I/O last 3 completed shifts:  In: 6817.6 [I.V.:1058.6; Blood:595; IV Piggyback:5164]  Out: 220 [Urine:220]    Physical Exam   Constitutional: No distress.   Critically ill, sedated on mechanical ventilation   HENT:   Head: Normocephalic and atraumatic.   Cardiovascular:   Sinus tachycardia   Pulmonary/Chest: Effort normal and breath sounds normal.   Vent transmitted breath sounds   Abdominal: Soft. Bowel sounds are normal. She exhibits distension (and tympanic on percussion ). There is no tenderness. There is no guarding.   Musculoskeletal: She exhibits no edema.   Neurological:   Intubated and sedated   Skin: Skin is warm. She is not diaphoretic.   Nursing note and vitals reviewed.      Significant Labs:  CBC:   Recent Labs   Lab 08/04/19  0815   WBC 11.36   RBC 2.62*   HGB 7.6*   HCT 26.2*      *   MCH 29.0   MCHC 29.0*     CMP:   Recent Labs   Lab 08/04/19  0815   GLU 77   CALCIUM 6.6*   ALBUMIN 3.1*   PROT 5.6*   *   K 3.5   CO2 10*   CL 94*   BUN 23*   CREATININE 2.6*   ALKPHOS 121   ALT 19   AST 52*   BILITOT 5.8*     All labs within the past 24 hours have been reviewed.    Significant Imaging:  CXR personally reviewed.    Assessment/Plan:     ANDREW (acute kidney injury)  Likely from ischemic ATN from hypotension requiring vasopressors and severe anemia.      Plan:  - Patient with severe metabolic acidosis, oliguric, will start SLED  - Dialysate adjusted to most recent parameters  - Urine microscopy by MD (UPDATE: 8/4/2019 abundant coarse granular casts)  - Renal US  - U/A, UPCr  - MAP > 65  - Strict I/Os and chart  - Avoid nephrotoxic meds, NSAIDs, IV contrast, etc  - Hb > 7 gm/dL  - Panculture    Decompensated hepatic cirrhosis  Managed by primary team    SBP (spontaneous bacterial peritonitis)  Managed by primary team  Broad spec abx  F/u cultures    Iron deficiency anemia due to chronic blood loss  Managed by primary team        Thank you for your consult. I will  follow-up with patient. Please contact us if you have any additional questions.    Tho Toledo MD  Nephrology  [facility]

## 2019-08-04 NOTE — PROGRESS NOTES
CCS notified of critical lab values. H/H down post 1 PRBC 5.3/18.5, Ca+ 6.5, CO2 8, Lactic acid 9.4. See orders for packed cells

## 2019-08-04 NOTE — CONSULTS
[facility]  Gastroenterology  Consult Note    Patient Name: Annette Esparza  MRN: [id number]  Admission Date: 8/3/2019  Hospital Length of Stay: 1 days  Code Status: Full Code   Attending Provider: Tcuker Su MD   Consulting Provider: Sheila Bolanos MD  Primary Care Physician: Endy Pfeiffer MD  Principal Problem:Acute hypoxemic respiratory failure    Inpatient consult to Gastroenterology  Consult performed by: Sheila Bolanos MD  Consult ordered by: Behram Khan, MD        Subjective:     HPI:  49 year old female with a history of alcoholic cirrhosis on who GI is being consulted for suspected GI bleed.    History obtained from ED notes and in speaking to patient's nurses. Per ED notes she came in with GCS of 15 complaining of shortness of breath, abdominal pain, and decrease urine output. She also reported 1 episode of melena 1 week ago with no BM in 2 days.    Upon arrival she hypotensive, tachycardiac, in ANDREW with significant LA elevation and anemia. She was cultures (also had paracentesis + for SBP), started on antibiotics, and was transfused. Per ICU nurse there was no evidence of active bleeding once in the unit but she did notice some coffee ground emesis on her clothes. Unfortunately patient decompensated further requiring emergent intubation and initiation of pressors.     Prior GI Procedures (not all inclusive):  EGD 8/2018  - Normal esophagus.  - 2 cm sliding hiatal hernia.  - Portal hypertensive gastropathy.  - Congested duodenal mucosa.    Colon 8/2018  - Perianal skin tags found on perianal exam.  - One 3 mm polyp in the sigmoid colon, removed with a jumbo cold forceps. Resected and retrieved.  - Internal hemorrhoids.  - The examination was otherwise normal on direct and retroflexion views.        Past Medical History:   Diagnosis Date    Acute hypoxemic respiratory failure 12/1/2016    ANDREW (acute kidney injury) 11/28/2016    Alcohol dependence in remission      Alcoholic cirrhosis     Alcoholic cirrhosis of liver with ascites 10/22/2015    Anxiety     Ascites due to alcoholic cirrhosis 2016    Centrilobular emphysema 2016    Cigarette nicotine dependence without complication 2016    Folate deficiency 10/22/2015    Gallstones     Hepatic encephalopathy     Hepatorenal syndrome 2016    Hyperbilirubinemia 2016    Hyponatremia 2016    Iron deficiency anemia due to chronic blood loss 10/22/2015    Portal hypertensive gastropathy 10/22/2015    Subclinical hypothyroidism 10/22/2015       Past Surgical History:   Procedure Laterality Date    ADENOIDECTOMY      APPENDECTOMY      BIOPSY N/A 10/30/2014    Performed by [facility] Ovalle MD at [facility] CATH LAB    BIOPSY, LIVER, TRANSJUGULAR APPROACH N/A 8/15/2018    Performed by [facility] Diagnostic Provider at [facility] OR 2ND FLR     SECTION      COLONOSCOPY      COLONOSCOPY N/A 2018    Performed by Jesus Whalen MD at [facility] ENDO (2ND FLR)    COLONOSCOPY N/A 2014    Performed by Maritza García MD at [facility] ENDO    ESOPHAGOGASTRODUODENOSCOPY (EGD) N/A 2018    Performed by Jesus Whaeln MD at [facility] ENDO (2ND FLR)    ESOPHAGOGASTRODUODENOSCOPY (EGD) N/A 6/3/2016    Performed by [facility] Dixon MD at [facility] ENDO    ESOPHAGOGASTRODUODENOSCOPY (EGD) N/A 2015    Performed by Maritza García MD at [facility] ENDO    HYSTERECTOMY      25 yrs old    LIVER BIOPSY      OOPHORECTOMY Left     25 yrs old    OOPHORECTOMY Right     29 yrs old    TONSILLECTOMY      TUBAL LIGATION      UPPER GASTROINTESTINAL ENDOSCOPY         Review of patient's allergies indicates:   Allergen Reactions    Dilaudid [hydromorphone] Hallucinations     Family History     Problem Relation (Age of Onset)    Cancer Mother    No Known Problems Sister, Brother    Rheum arthritis Father        Tobacco Use    Smoking status: Current Every Day Smoker     Packs/day: 0.50     Years: 35.00      Pack years: 17.50     Types: Cigarettes    Smokeless tobacco: Never Used   Substance and Sexual Activity    Alcohol use: No     Alcohol/week: 0.0 oz     Comment: beer intake last ~2017, liquour last drink 9/2018    Drug use: No    Sexual activity: Yes     Partners: Male     Review of Systems   Unable to perform ROS: Mental status change     Objective:     Vital Signs (Most Recent):  Temp: 96.8 °F (36 °C) (08/04/19 0956)  Pulse: 100 (08/04/19 0956)  Resp: (!) 26 (08/04/19 0956)  BP: 122/61 (08/04/19 0950)  SpO2: (!) 91 % (08/04/19 0956) Vital Signs (24h Range):  Temp:  [93.8 °F (34.3 °C)-97.6 °F (36.4 °C)] 96.8 °F (36 °C)  Pulse:  [] 100  Resp:  [16-31] 26  SpO2:  [88 %-100 %] 91 %  BP: ()/(38-81) 122/61     Weight: 54.4 kg (120 lb) (08/03/19 1812)  Body mass index is 23.44 kg/m².      Intake/Output Summary (Last 24 hours) at 8/4/2019 1058  Last data filed at 8/4/2019 1017  Gross per 24 hour   Intake 6887.6 ml   Output 220 ml   Net 6667.6 ml       Lines/Drains/Airways     Central Venous Catheter Line                 Percutaneous Central Line Insertion/Assessment - triple lumen  08/04/19 0130 left internal jugular less than 1 day          Drain                 NG/OG Tube 08/04/19 0820 orogastric Center mouth less than 1 day         Urethral Catheter 08/04/19 0800 Non-latex 16 Fr. less than 1 day          Airway                 Airway - Non-Surgical 08/04/19 0743 Endotracheal Tube less than 1 day          Peripheral Intravenous Line                 Peripheral IV - Single Lumen 08/03/19 2004 18 G Right  less than 1 day                Physical Exam   Constitutional: No distress.   HENT:   Head: Normocephalic and atraumatic.   Cardiovascular:   Sinus tachycardia   Pulmonary/Chest: Effort normal and breath sounds normal.   Abdominal: Soft. Bowel sounds are normal. She exhibits distension (and tympanic on percussion ). She exhibits no mass. There is no tenderness. There is no rebound and no guarding. No  hernia.   Musculoskeletal: She exhibits no edema.   Neurological:   Intubated and sedated   Skin: She is not diaphoretic.   Nursing note and vitals reviewed.      Significant Labs:  CBC:   Recent Labs   Lab 08/03/19  1903 08/04/19  0340 08/04/19  0815   WBC 10.19 8.98  8.98 11.36   HGB 6.0* 5.3*  5.3* 7.6*   HCT 23.6* 18.5*  18.5* 26.2*    188  188 224     CMP:   Recent Labs   Lab 08/04/19  0815   GLU 77   CALCIUM 6.6*   ALBUMIN 3.1*   PROT 5.6*   *   K 3.5   CO2 10*   CL 94*   BUN 23*   CREATININE 2.6*   ALKPHOS 121   ALT 19   AST 52*   BILITOT 5.8*     Coagulation:   Recent Labs   Lab 08/04/19  0815   INR 2.7*   APTT 51.3*       Significant Imaging:  Imaging results within the past 24 hours have been reviewed.    Assessment/Plan:     Normocytic anemia  49 year old female with a history of alcoholic cirrhosis on who GI is being consulted for suspected GI bleed. Patient critically ill in the ICU with multiple problem however we feel that a brisk upper bleed (such as from varices) is the main . In an abundance of caution can continue octreotide but will hold off on emergent/urgent EGD at this time. Agree with ICU plan to obtain cross sectional imaging.     Recommendations:  -NPO  -Maintain IV access with 2 large bore IVs  -Intravascular resuscitation/support with IVFs   -Serial H/H's and pRBCs transfusion as indicated  -Protonix 40 mg IV BID  -Octreotide gtt at 50mcg/hr  -Ceftriaxone 1g IV Q daily for primary SBP prophylaxis  -Discontinue all NSAIDs and Heparin products  -Please correct any coagulopathy with platelets and FFP to a goal of platelets >50K and INR <2.0  -Please promptly notify GI team if there is significant change in patient's clinical status          Thank you for your consult. I will follow-up with patient. Please contact us if you have any additional questions.    Sheila Bolanos M.D.  Gastroenterology Fellow, PGY-VI  Pager: [phone]  [facility]

## 2019-08-04 NOTE — HPI
Ms Esparza is a 50 yo F w PMHx significant for EtOH cirrhosis, portal hypertensive gastropathy (last EGD 8/2018) and osteopenia.     Pt presents to emergency department for evaluation of generalized pain of two weeks' duration. While generalized, her pain is worst across her lower abdomen, right shoulder and upper back near her right scapula. The pain is rated 9/10 and has progressively worsened. She also complains of concomitant worsening abdominal distension and fluid accumulation despite adherence to diuretic regimen. Over the past day, she has noticed becoming increasingly short of breath worse on exertion. These symptoms are also associated with a decrease in urine output over the last week. She denies burning with urination, fever or confusion. Although reporting adherence to diuretic regimen, she has not been taking her lactulose over the past two weeks because she was unsure of which provider to ask a refill.     Review of systems was positive for nausea (without emesis) and dark bowel movements. She reports taking iron supplementation and taking bismuth recently. During physical exam, she was noted to have an abrasion over her left knee and recalled a fall but could not ascribe when it happened. She denies hitting her head or losing consciousness during the fall.      She denies fever, chills, dysuria, radha red blood in her stool, syncope, weakness, chest pain, or chest discomfort.     SOCIAL  Pt reports last drink was two days ago.   Reports 25-day incarceration in the past  Tobacco abuse x30 years

## 2019-08-04 NOTE — PROGRESS NOTES
0600 pt began complaining of increased abd and back pain,CCS notified, Morphine 2 mg given, no relief. O2 NC increased to 7 L NC, called RRT for comfort flow     0630 Pt acutely desat 80's, O2 up titrated per RT to 20 L 60% to maintain O2 sat >90%.    0635 CF up titration of CF to 25 L 80% to keep sats >90%. Pain 10/10, CCS notified again-to BS, Mophine 4 mg given with mild relief.     0715 pt placed on CPAP 12 40%, sats improved %. CCS at BS, Fellow TREVON Brand prep for intubation with Attending MAXIMO Bryson.

## 2019-08-04 NOTE — PROGRESS NOTES
"[facility] Pt is awake alert oriented X 4. She is pleasant and talkative.  She denies any pain but does state that " she doesn't feel well". VS are WNL while Levophed infusing at 1 mcg/kg/min. Levophed running at 205 ml/hr now reordered for max concentration. temp improved to 97.6 while warming blanket in place, up from 94.  Abd is distended and firm. O2 4 L NC sats 94-95 %.  Endorse nausea, black coffee ground emesis noted to front of gown. No acute distress at this time  "

## 2019-08-04 NOTE — ASSESSMENT & PLAN NOTE
CALCIFIED GRANULOMA OF LUNG    Pt with brief history of incarceration and initial CXR--for evaluation of dyspnea--revealing calcified granuloma and cavitary lesion. CT chest confirmed both findings of CXR but also revealed multiple GGO's and consolidations throughout both lungs. Differentials include bacterial infection vs mycobacterial infection vs neoplasm, unlikely.     Plan  - Sputum culture  - AFB culture and sputum   - Quantiferon gold   - Airborne isolation   - Atypical coverage with azithromycin 500 mg IV qd

## 2019-08-04 NOTE — NURSING
Notified Dr. Gonzales Paula with Critical Care pt desating to 86% on current vent setting. RT to obtain ABG, if successful will re-attempt art line if stick successful.  If not able to get ABG, okay to run VBG.

## 2019-08-04 NOTE — EICU
joseph called. Time out. Dr. Sarkis Judd at bedside for emergent intubation. Dr. Alex Bryson staff present. Patient bagged easily per Dr. Bryson. 0741 Etomidate 10 mg followed by Succinylcholine 80mg IVP per Kate Nettles RN under supervision of MD.. 0743 Inserted Mac 3 glide scope, inserted 7.5 ETT @ 23 cm, lip. \coplor change. Bilateral breath sounds. Tolerated well.

## 2019-08-04 NOTE — PROGRESS NOTES
Pharmacokinetic Assessment Follow Up: IV Vancomycin    Vancomycin Assessment and Plan:  · Vancomycin 1 g x 1 administered in ED overnight at 2052.   · Random level with AM labs resulted at 18.1 mcg/mL. Nephrology team consulted - plan for SLED.  · Because SLED will provide additional vancomycin clearance, administer vancomycin 500 mg x 1 now.  · Random level ordered with AM labs for Monday 8/5.    Pharmacy will continue to follow and monitor vancomycin. Please call with questions.    Samira Zarate, PharmD, [facility]  Critical Care Clinical Pharmacist  Spectralink: [id number]  _____________________________________________________________________________________________________________________   Patient brief summary:  Annette Esparza is a 49 y.o. female initiated on antimicrobial therapy with IV Vancomycin for treatment of suspected sepsis    The patient received a loading dose, followed by the current treatment regimen: random levels with plan to redose when level is less than 20 mcg/mL    Drug Allergies:   Review of patient's allergies indicates:   Allergen Reactions    Dilaudid [hydromorphone] Hallucinations       Actual Body Weight:   54.4 kg    Renal Function:   Estimated Creatinine Clearance: 18.6 mL/min (A) (based on SCr of 2.6 mg/dL (H)).,     Dialysis Method (if applicable):  SLED    CBC (last 72 hours):  Recent Labs   Lab Result Units 08/03/19  1903 08/04/19  0340 08/04/19  0815 08/04/19  1205   WBC K/uL 10.19 8.98  8.98 11.36 11.61   Hemoglobin g/dL 6.0* 5.3*  5.3* 7.6* 8.2*   Hematocrit % 23.6* 18.5*  18.5* 26.2* 27.1*   Platelets K/uL 274 188  188 224 192   Gran% % 86.5* 84.5*  84.5* 82.0*  --    Lymph% % 4.1* 5.3*  5.3* 7.1*  --    Mono% % 7.8 6.6  6.6 7.1  --    Eosinophil% % 0.0 1.6  1.6 0.0  --    Basophil% % 0.3 0.1  0.1 0.4  --    Differential Method  Automated Automated  Automated Automated  --        Metabolic Panel (last 72 hours):  Recent Labs   Lab Result Units 08/03/19  8148  08/03/19  2119 08/04/19  0302 08/04/19  0340 08/04/19  0815 08/04/19  1109   Sodium mmol/L 125*  --   --  124*  124* 122*  --    Potassium mmol/L 3.2*  --   --  2.8*  2.8* 3.5  --    Chloride mmol/L 92*  --   --  94*  94* 94*  --    CO2 mmol/L <5*  --   --  8*  8* 10*  --    Glucose mg/dL 36*  --   --  154*  154* 77  --    Glucose, UA   --  Negative  --   --   --  Negative   BUN, Bld mg/dL 24*  --   --  23*  23* 23*  --    Creatinine mg/dL 3.0*  --   --  2.6*  2.6* 2.6*  --    Creatinine, Random Ur mg/dL  --  31.0  --   --   --   --    Creatinine, Body Fluid mg/dL  --   --  2.4  --   --   --    Albumin g/dL 1.8*  --   --  3.1*  3.1* 3.1*  --    Total Bilirubin mg/dL 3.0*  --   --  5.1*  5.1* 5.8*  --    Alkaline Phosphatase U/L 196*  --   --  111  111 121  --    AST U/L 28  --   --  37  37 52*  --    ALT U/L 14  --   --  13  13 19  --    Magnesium mg/dL 2.0  --   --  1.4* 1.7  --    Phosphorus mg/dL 5.9*  --   --  4.5 5.6*  --        Vancomycin Administrations:  vancomycin given in the last 96 hours                   vancomycin in dextrose 5 % 1 gram/250 mL IVPB 1,000 mg (mg) 1,000 mg New Bag 08/03/19 2052                Drug levels (last 3 results):  Recent Labs   Lab Result Units 08/04/19  0339   Vancomycin, Random ug/mL 18.1       Microbiologic Results:  Microbiology Results (last 7 days)     Procedure Component Value Units Date/Time    Urine culture [[id number]] Collected:  08/04/19 1109    Order Status:  No result Specimen:  Urine Updated:  08/04/19 1139    Gram stain [[id number]] Collected:  08/04/19 0301    Order Status:  Completed Specimen:  Body Fluid from Ascites Updated:  08/04/19 0556     Gram Stain Result Moderate WBC's      No organisms seen    Aerobic culture [[id number]] Collected:  08/04/19 0301    Order Status:  Sent Specimen:  Body Fluid from Ascites Updated:  08/04/19 0345    Culture, Anaerobic [[id number]] Collected:  08/04/19 0301    Order Status:  Sent Specimen:  Body Fluid from  Ascites Updated:  08/04/19 0345    Fungus culture [[id number]] Collected:  08/04/19 0301    Order Status:  Sent Specimen:  Body Fluid from Ascites Updated:  08/04/19 0345    Blood culture x two cultures. Draw prior to antibiotics. [[id number]] Collected:  08/03/19 1934    Order Status:  Completed Specimen:  Blood from Peripheral, Hand, Left Updated:  08/04/19 0315     Blood Culture, Routine No Growth to date    Narrative:       Aerobic and anaerobic    Blood culture x two cultures. Draw prior to antibiotics. [[id number]] Collected:  08/03/19 1934    Order Status:  Completed Specimen:  Blood from Peripheral, Hand, Right Updated:  08/04/19 0315     Blood Culture, Routine No Growth to date    Narrative:       Aerobic and anaerobic    Fungus culture [[id number]]     Order Status:  No result Specimen:  Body Fluid from Sputum     Culture, Respiratory with Gram Stain [[id number]]     Order Status:  No result Specimen:  Respiratory     AFB Culture & Smear [[id number]]     Order Status:  Canceled Specimen:  Body Fluid from Sputum, Expectorated     Urine culture [[id number]] Collected:  08/03/19 2119    Order Status:  No result Specimen:  Urine Updated:  08/03/19 2243    Blood culture [[id number]]     Order Status:  Canceled Specimen:  Blood     Blood culture [[id number]]     Order Status:  Canceled Specimen:  Blood

## 2019-08-04 NOTE — H&P
[facility]  Critical Care Medicine  History & Physical    Patient Name: Annette Esparza  MRN: [id number]  Admission Date: 8/3/2019  Hospital Length of Stay: 1 days  Code Status: Full Code  Attending Physician: Tucker Su MD   Primary Care Provider: Endy Pfeiffer MD   Principal Problem: Decompensated hepatic cirrhosis    Subjective:     HPI:  Ms Esparza is a 48 yo F w PMHx significant for EtOH cirrhosis, portal hypertensive gastropathy (last EGD 8/2018) and osteopenia.     Pt presents to emergency department for evaluation of generalized pain of two weeks' duration. While generalized, her pain is worst across her lower abdomen, right shoulder and upper back near her right scapula. The pain is rated 9/10 and has progressively worsened. She also complains of concomitant worsening abdominal distension and fluid accumulation despite adherence to diuretic regimen. Over the past day, she has noticed becoming increasingly short of breath worse on exertion. These symptoms are also associated with a decrease in urine output over the last week. She denies burning with urination, fever or confusion. Although reporting adherence to diuretic regimen, she has not been taking her lactulose over the past two weeks because she was unsure of which provider to ask a refill.     Review of systems was positive for nausea (without emesis) and dark bowel movements. She reports taking iron supplementation and taking bismuth recently. During physical exam, she was noted to have an abrasion over her left knee and recalled a fall but could not ascribe when it happened. She denies hitting her head or losing consciousness during the fall.      She denies fever, chills, dysuria, radha red blood in her stool, syncope, weakness, chest pain, or chest discomfort.     SOCIAL  Pt reports last drink was two days ago.   Reports 25-day incarceration in the past  Tobacco abuse x30 years    Hospital/ICU Course:  ED  COURSE  Initial ED findings positive for hypothermia, tachycardia, tachypnea, urine output of 220 cc with in and out catheterization, Hgb of 6.0 g/dL, INR of 2.6, Na of 125, K of 3.2, glucose of 36, ammonia of 143, and lactic acid of >12. Blood cultures were drawn, 30 cc/kg IV fluid, and broad spectrum antibiotics (vanc/cefepime).      Past Medical History:   Diagnosis Date    Acute hypoxemic respiratory failure 2016    ANDREW (acute kidney injury) 2016    Alcohol dependence in remission     Alcoholic cirrhosis     Alcoholic cirrhosis of liver with ascites 10/22/2015    Anxiety     Ascites due to alcoholic cirrhosis 2016    Centrilobular emphysema 2016    Cigarette nicotine dependence without complication 2016    Folate deficiency 10/22/2015    Gallstones     Hepatic encephalopathy     Hepatorenal syndrome 2016    Hyperbilirubinemia 2016    Hyponatremia 2016    Iron deficiency anemia due to chronic blood loss 10/22/2015    Portal hypertensive gastropathy 10/22/2015    Subclinical hypothyroidism 10/22/2015       Past Surgical History:   Procedure Laterality Date    ADENOIDECTOMY      APPENDECTOMY      BIOPSY N/A 10/30/2014    Performed by Sawyer Ovalle [facility] at [facility] CATH LAB    BIOPSY, LIVER, TRANSJUGULAR APPROACH N/A 8/15/2018    Performed by [facility] Provider at [facility] OR 2ND FLR     SECTION      COLONOSCOPY      COLONOSCOPY N/A 2018    Performed by Jesus Whalen MD at [facility] ENDO (2ND FLR)    COLONOSCOPY N/A 2014    Performed by Maritza García MD at [facility] ENDO    ESOPHAGOGASTRODUODENOSCOPY (EGD) N/A 2018    Performed by [facility]alen MD at [facility] ENDO (2ND FLR)    ESOPHAGOGASTRODUODENOSCOPY (EGD) N/A 6/3/2016    Performed by [facility] at [facility] ENDO    ESOPHAGOGASTRODUODENOSCOPY (EGD) N/A 2015    Performed by Maritza García MD at [facility] ENDO    HYSTERECTOMY      25 yrs old    LIVER BIOPSY       OOPHORECTOMY Left     25 yrs old    OOPHORECTOMY Right     29 yrs old    TONSILLECTOMY      TUBAL LIGATION      UPPER GASTROINTESTINAL ENDOSCOPY         Review of patient's allergies indicates:   Allergen Reactions    Dilaudid [hydromorphone] Hallucinations       Family History     Problem Relation (Age of Onset)    Cancer Mother    No Known Problems Sister, Brother    Rheum arthritis Father        Tobacco Use    Smoking status: Current Every Day Smoker     Packs/day: 0.50     Years: 35.00     Pack years: 17.50     Types: Cigarettes    Smokeless tobacco: Never Used   Substance and Sexual Activity    Alcohol use: No     Alcohol/week: 0.0 oz     Comment: beer intake last ~2017, liquour last drink 9/2018    Drug use: No    Sexual activity: Yes     Partners: Male      Review of Systems   Constitutional: Negative for fever and unexpected weight change.   HENT: Negative for ear pain, sinus pressure, sneezing and sore throat.    Eyes: Negative for pain and visual disturbance.   Respiratory: Negative for cough, chest tightness, shortness of breath and wheezing.    Cardiovascular: Negative for chest pain, palpitations and leg swelling.   Gastrointestinal: Positive for abdominal distention, abdominal pain and nausea. Negative for constipation, diarrhea and vomiting.   Endocrine: Negative for polydipsia and polyuria.   Genitourinary: Positive for decreased urine volume and difficulty urinating. Negative for dysuria and urgency.   Musculoskeletal: Negative for arthralgias and myalgias.   Skin: Negative for color change and rash.   Allergic/Immunologic: Negative for environmental allergies and food allergies.   Neurological: Positive for weakness. Negative for dizziness, syncope, light-headedness and headaches.   Psychiatric/Behavioral: Positive for confusion. Negative for dysphoric mood. The patient is not nervous/anxious.      Objective:     Vital Signs (Most Recent):  Temp: (!) 94.6 °F (34.8 °C) (08/03/19  2325)  Pulse: 106 (08/03/19 2325)  Resp: 20 (08/03/19 2325)  BP: (!) 92/56 (08/03/19 2325)  SpO2: (!) 93 % (08/03/19 2325) Vital Signs (24h Range):  Temp:  [93.8 °F (34.3 °C)-94.9 °F (34.9 °C)] 94.6 °F (34.8 °C)  Pulse:  [] 106  Resp:  [16-25] 20  SpO2:  [91 %-100 %] 93 %  BP: ()/(47-68) 92/56   Weight: 54.4 kg (120 lb)  Body mass index is 23.44 kg/m².      Intake/Output Summary (Last 24 hours) at 8/4/2019 0002  Last data filed at 8/3/2019 2338  Gross per 24 hour   Intake 4309 ml   Output 220 ml   Net 4089 ml       Physical Exam   Constitutional: She is oriented to person, place, and time. She appears well-developed and well-nourished. She appears lethargic. She appears ill.   HENT:   Head: Normocephalic and atraumatic.   Eyes: Pupils are equal, round, and reactive to light. EOM are normal. Scleral icterus is present.   Neck: Normal range of motion. Neck supple.   Cardiovascular: Regular rhythm, normal heart sounds and intact distal pulses. Tachycardia present.   No murmur heard.  Pulmonary/Chest: Effort normal and breath sounds normal. No respiratory distress. She has no wheezes.   Abdominal: Soft. Bowel sounds are normal. She exhibits shifting dullness and ascites. She exhibits no distension. There is generalized tenderness and tenderness in the right lower quadrant, epigastric area and left lower quadrant.   Tense ascites   Neurological: She is oriented to person, place, and time. She appears lethargic.   Skin: Skin is warm and dry. Abrasion noted.        Psychiatric: She has a normal mood and affect. Her behavior is normal.   Vitals reviewed.      Vents:     Lines/Drains/Airways     Peripheral Intravenous Line                 Peripheral IV - Single Lumen 08/03/19 22 G Right Hand 1 day         Peripheral IV - Single Lumen 08/03/19 2004 18 G Right  less than 1 day              Significant Labs:    CBC/Anemia Profile:  Recent Labs   Lab 08/03/19  1903   WBC 10.19   HGB 6.0*   HCT 23.6*      MCV  111*   RDW 19.8*        Chemistries:  Recent Labs   Lab 08/03/19  1903   *   K 3.2*   CL 92*   CO2 <5*   BUN 24*   CREATININE 3.0*   CALCIUM 7.6*   ALBUMIN 1.8*   PROT 5.5*   BILITOT 3.0*   ALKPHOS 196*   ALT 14   AST 28   MG 2.0   PHOS 5.9*       All pertinent labs within the past 24 hours have been reviewed.    Significant Imaging: I have reviewed all pertinent imaging results/findings within the past 24 hours.    Assessment/Plan:     Psychiatric  Alcohol use disorder, severe, in early remission  Pt with history of alcohol abuse resulting in cirrhosis presents with decompensated alcoholic cirrhosis. Reports her last drink was two days ago, however also appears encephalopathic on exam. Denies history of seizures or current tremors.     Plan  - Serum ethanol  - PETH  - Lorazepam 1mg q4hr PRN for CIWA >8  - Thiamine 250 mg IV qd x3, then 100 mg PO TID x3 days, then 100 mg PO qd    Pulmonary  Cavitary lesion of lung  CALCIFIED GRANULOMA OF LUNG    Pt with brief history of incarceration and initial CXR--for evaluation of dyspnea--revealing calcified granuloma and cavitary lesion. CT chest confirmed both findings of CXR but also revealed multiple GGO's and consolidations throughout both lungs. Differentials include bacterial infection vs mycobacterial infection vs neoplasm, unlikely.     Plan  - Sputum culture  - AFB culture and sputum   - Quantiferon gold   - Airborne isolation   - Atypical coverage with azithromycin 500 mg IV qd    Acute hypoxemic respiratory failure  Pt presented with shortness of breath and VILLAR in the setting of tense ascites. As the night progressed and patient was administered greater amounts of IV medication, her oxygen requirements and respiratory support escalated. Eventually, she was noted to become hypoxic with laying flat. Over the course of her first night, she transitioned from room air to high-flow nasal cannula to CPAP and eventually invasive mechanical ventilation. Likely related  to IV fluids and medications administered on admission.     AC: 18 / 360 / 10 / 50%    Plan  - Continue MV  - ABG PRN  - Hesitant to use diuretics in this patient with septic shock requiring vasopressor support      Cardiac/Vascular  Hypotension  See septic shock    Renal/  ANDREW (acute kidney injury)  Pt with tense ascites and decreased urine output presents with elevated creatinine. Urine lytes consistent with pre-renal etiology. Differentials include hepatorenal syndrome vs abdominal compartment syndrome vs hypoperfusion.     Plan  - Consult nephrology  - Obtain bladder pressure  - RP ultrasound   - Would avoid lasix challenge currently    Hyponatremia  Likely related to her cirrhosis.     ID  Septic shock  Pt on broad spectrum antibiotic coverage and vasopressor support    - Vancomycin  - Cefepime   - Metronidazole   - Azithromycin  - Norepinephrine   - Vasopressin    Oncology  Anemia of chronic disease  IRON DEFICIENCY ANEMIA DUE TO CHRONIC BLOOD LOSS  NORMOCYTIC ANEMIA  ANEMIA, B12 DEFICIENCY     Currently with decrease hemoglobin despite resuscitation with blood products consistent more with acute blood loss anemia. Will continue to monitor.     - Serial CBC    Endocrine  Subclinical hypothyroidism  Repeat TSH    Folate deficiency  Continue folate replacement especially in setting of EtOH dependence    Vitamin D deficiency  Continue vit d supplementation    GI  * Decompensated hepatic cirrhosis  PORTAL HYPERTENSIVE GASTROPATHY  HEPATIC ENCEPHALOPATHY  SPONTANEOUS BACTERIAL PERITONITIS    Pt with history of EtOH cirrhosis who was previously able to abstain from alcohol intake unfortunately relapsed and has been non-compliant with medications. Current outpatient regimen did not include prophylactic antibiotics against SBP. Given history of portal hypertensive gastropathy, will treat acute blood loss anemia as variceal bleed until GI evaluation. Asterixis also present on admission. Initial ascitic leukocytes  impressively elevated.     Plan   - Consult gastroenterology  - Transfuse to goal Hgb >7  - Octreotide gtt x3 days  - Pantoprazole BID   - Cefepime 2g q8  - Vit K 10 mg IV  - Serial CBC, CMP and PT-INR        Other  Hemorrhagic shock  Will replace with blood products as necessary.        Critical Care Daily Checklist:    A: Awake: RASS Goal/Actual Goal:    Actual: Farfan Agitation Sedation Scale (RASS): Alert and calm   B: Spontaneous Breathing Trial Performed?     C: SAT & SBT Coordinated?  Not today                      D: Delirium: CAM-ICU Overall CAM-ICU: Negative   E: Early Mobility Performed? Yes   F: Feeding Goal:    Status:     Current Diet Order   Procedures    Diet NPO      AS: Analgesia/Sedation Fentanyl, precedex     T: Thromboembolic Prophylaxis GI bleed with elevated INR   H: HOB > 300 Yes   U: Stress Ulcer Prophylaxis (if needed) BID IV PPI   G: Glucose Control Replace as needed   B: Bowel Function     I: Indwelling Catheter (Lines & Brizuela) Necessity Brizuela and LIJ    D: De-escalation of Antimicrobials/Pharmacotherapies Vancomycin, cefepime, flagyl    Plan for the day/ETD n/a    Code Status:  Family/Goals of Care: Full Code  n/a       Critical secondary to Patient has a condition that poses threat to life and bodily function: Decompensated cirrhosis     Critical care was time spent personally by me on the following activities: development of treatment plan with patient or surrogate and bedside caregivers, discussions with consultants, evaluation of patient's response to treatment, examination of patient, ordering and performing treatments and interventions, ordering and review of laboratory studies, ordering and review of radiographic studies, pulse oximetry, re-evaluation of patient's condition. This critical care time did not overlap with that of any other provider or involve time for any procedures.     Behram Khan, MD  Critical Care Medicine  [facility]

## 2019-08-04 NOTE — SIGNIFICANT EVENT
Discussed critical illness of patient and need for CT with contrast in the setting of renal dysfunction. Family is aware of risks and wants to proceed with contrasted study.     Gonzales Paula M.D. PGY-3  [facility]  12:21 PM  8/4/2019  Pager [phone]

## 2019-08-04 NOTE — CONSULTS
[facility]  Adult Nutrition  Consult Note    SUMMARY     Recommendations    1. Initiate TF with Isosource 1.5 @10ml/hr and increase 10ml/hr Q4hrs to goal rate 36ml/hr (provides 1296 kcal, 59g pro, 656ml free water). Additional 150ml water flush Q6hrs or per MD. Hold for residuals >500ml.  Goals: 1. Pt to receive nutrition by RD follow up.  Nutrition Goal Status: new  Communication of RD Recs: (POC)    Reason for Assessment    Reason For Assessment: consult  Diagnosis: other (see comments)(unknown)  Relevant Medical History: ETOH cirrhosis, ETOH abuse, portal hypertensive gastropathy, severe malnutrition  General Information Comments: Pt intubated to vent. Per family members, pt has not had much of an appetite over last few months; unable to find previous weights and family does not know UBW. Completed NFPE: pt has mild muscle wasting and fat depletion and does not meet criteria for malnutrition at this time. Will monitor.  Nutrition Discharge Planning: Unable to assess at this time.    Nutrition Risk Screen         Nutrition/Diet History         Anthropometrics    Temp: 97.9 °F (36.6 °C)  Height Method: Estimated  Height: 5' (152.4 cm)  Height (inches): 60 in  Weight Method: Estimated  Weight: 54.4 kg (120 lb)  Weight (lb): 120 lb  Ideal Body Weight (IBW), Female: 100 lb  % Ideal Body Weight, Female (lb): 120 lb  BMI (Calculated): 23.5       Lab/Procedures/Meds    Pertinent Labs Reviewed: reviewed  Pertinent Labs Comments: Ma 122. C; 94. CO2 10, BUN 23, Creat 2.6, Phos 5.6, T. Bili 4.2  Pertinent Medications Reviewed: reviewed  Pertinent Medications Comments: norepinephrine drip, albumin, ondansetron, folic acid, thiamine, lactulose, pantoprazole      Estimated/Assessed Needs    Weight Used For Calorie Calculations: 54.4 kg (119 lb 14.9 oz)  Energy Calorie Requirements (kcal): 1294 kcal  Energy Need Method: [facility]  Protein Requirements: 65-76g  Weight Used For Protein Calculations: 54.4 kg  (119 lb 14.9 oz)        RDA Method (mL): 1294         Nutrition Prescription Ordered    Current Diet Order: NPO    Evaluation of Received Nutrient/Fluid Intake    IV Fluid (mL): 6223  I/O: +6.6L since admission  Energy Calories Required: not meeting needs  Protein Required: not meeting needs  Fluid Required: not meeting needs  % Intake of Estimated Energy Needs: 0 - 25 %  % Meal Intake: NPO    Nutrition Risk    Level of Risk/Frequency of Follow-up: high     Assessment and Plan    Nutrition Problem  Swallowing difficulty    Related to (etiology):   Pt is intubated    Signs and Symptoms (as evidenced by):   Pt requires enteral feeding    Interventions/Recommendations (treatment strategy):  Collaboration of care to providers.  Enteral nutrition formula: Isosource 1.5 @36ml/hr x 24 hrs    Nutrition Diagnosis Status:   New         Monitor and Evaluation    Food and Nutrient Intake: energy intake, enteral nutrition intake  Food and Nutrient Adminstration: enteral and parenteral nutrition administration  Anthropometric Measurements: weight, weight change, body mass index  Biochemical Data, Medical Tests and Procedures: electrolyte and renal panel, gastrointestinal profile, glucose/endocrine profile, inflammatory profile, lipid profile  Nutrition-Focused Physical Findings: overall appearance, extremities, muscles and bones, head and eyes, skin     Malnutrition Assessment                 Orbital Region (Subcutaneous Fat Loss): mild depletion  Upper Arm Region (Subcutaneous Fat Loss): mild depletion   Catholic Region (Muscle Loss): well nourished  Clavicle Bone Region (Muscle Loss): well nourished  Clavicle and Acromion Bone Region (Muscle Loss): mild depletion  Scapular Bone Region (Muscle Loss): mild depletion  Patellar Region (Muscle Loss): well nourished  Anterior Thigh Region (Muscle Loss): mild depletion  Posterior Calf Region (Muscle Loss): well nourished                 Nutrition Follow-Up    RD Follow-up?: Yes

## 2019-08-04 NOTE — SUBJECTIVE & OBJECTIVE
Past Medical History:   Diagnosis Date    Acute hypoxemic respiratory failure 2016    ANDREW (acute kidney injury) 2016    Alcohol dependence in remission     Alcoholic cirrhosis     Alcoholic cirrhosis of liver with ascites 10/22/2015    Anxiety     Ascites due to alcoholic cirrhosis 2016    Centrilobular emphysema 2016    Cigarette nicotine dependence without complication 2016    Folate deficiency 10/22/2015    Gallstones     Hepatic encephalopathy     Hepatorenal syndrome 2016    Hyperbilirubinemia 2016    Hyponatremia 2016    Iron deficiency anemia due to chronic blood loss 10/22/2015    Portal hypertensive gastropathy 10/22/2015    Subclinical hypothyroidism 10/22/2015       Past Surgical History:   Procedure Laterality Date    ADENOIDECTOMY      APPENDECTOMY      BIOPSY N/A 10/30/2014    Performed by [facility]yer Ovalle MD at [facility] CATH LAB    BIOPSY, LIVER, TRANSJUGULAR APPROACH N/A 8/15/2018    Performed by [facility] Diagnostic Provider at [facility] OR 2ND FLR     SECTION      COLONOSCOPY      COLONOSCOPY N/A 2018    Performed by Jesus Whalen MD at [facility] ENDO (2ND FLR)    COLONOSCOPY N/A 2014    Performed by Maritza García MD at [facility] ENDO    ESOPHAGOGASTRODUODENOSCOPY (EGD) N/A 2018    Performed by Jesus Whalen MD at [facility] ENDO (2ND FLR)    ESOPHAGOGASTRODUODENOSCOPY (EGD) N/A 6/3/2016    Performed by [facility]by Dixon [facility] at [facility] ENDO    ESOPHAGOGASTRODUODENOSCOPY (EGD) N/A 2015    Performed by Maritza García MD at [facility] ENDO    HYSTERECTOMY      25 yrs old    LIVER BIOPSY      OOPHORECTOMY Left     25 yrs old    OOPHORECTOMY Right     29 yrs old    TONSILLECTOMY      TUBAL LIGATION      UPPER GASTROINTESTINAL ENDOSCOPY         Review of patient's allergies indicates:   Allergen Reactions    Dilaudid [hydromorphone] Hallucinations       Family History     Problem Relation (Age of Onset)    Cancer  Mother    No Known Problems Sister, Brother    Rheum arthritis Father        Tobacco Use    Smoking status: Current Every Day Smoker     Packs/day: 0.50     Years: 35.00     Pack years: 17.50     Types: Cigarettes    Smokeless tobacco: Never Used   Substance and Sexual Activity    Alcohol use: No     Alcohol/week: 0.0 oz     Comment: beer intake last ~2017, liquour last drink 9/2018    Drug use: No    Sexual activity: Yes     Partners: Male      Review of Systems   Constitutional: Negative for fever and unexpected weight change.   HENT: Negative for ear pain, sinus pressure, sneezing and sore throat.    Eyes: Negative for pain and visual disturbance.   Respiratory: Negative for cough, chest tightness, shortness of breath and wheezing.    Cardiovascular: Negative for chest pain, palpitations and leg swelling.   Gastrointestinal: Positive for abdominal distention, abdominal pain and nausea. Negative for constipation, diarrhea and vomiting.   Endocrine: Negative for polydipsia and polyuria.   Genitourinary: Positive for decreased urine volume and difficulty urinating. Negative for dysuria and urgency.   Musculoskeletal: Negative for arthralgias and myalgias.   Skin: Negative for color change and rash.   Allergic/Immunologic: Negative for environmental allergies and food allergies.   Neurological: Positive for weakness. Negative for dizziness, syncope, light-headedness and headaches.   Psychiatric/Behavioral: Positive for confusion. Negative for dysphoric mood. The patient is not nervous/anxious.      Objective:     Vital Signs (Most Recent):  Temp: (!) 94.6 °F (34.8 °C) (08/03/19 2325)  Pulse: 106 (08/03/19 2325)  Resp: 20 (08/03/19 2325)  BP: (!) 92/56 (08/03/19 2325)  SpO2: (!) 93 % (08/03/19 2325) Vital Signs (24h Range):  Temp:  [93.8 °F (34.3 °C)-94.9 °F (34.9 °C)] 94.6 °F (34.8 °C)  Pulse:  [] 106  Resp:  [16-25] 20  SpO2:  [91 %-100 %] 93 %  BP: ()/(47-68) 92/56   Weight: 54.4 kg (120 lb)  Body  mass index is 23.44 kg/m².      Intake/Output Summary (Last 24 hours) at 8/4/2019 0002  Last data filed at 8/3/2019 2338  Gross per 24 hour   Intake 4309 ml   Output 220 ml   Net 4089 ml       Physical Exam   Constitutional: She is oriented to person, place, and time. She appears well-developed and well-nourished. She appears lethargic. She appears ill.   HENT:   Head: Normocephalic and atraumatic.   Eyes: Pupils are equal, round, and reactive to light. EOM are normal. Scleral icterus is present.   Neck: Normal range of motion. Neck supple.   Cardiovascular: Regular rhythm, normal heart sounds and intact distal pulses. Tachycardia present.   No murmur heard.  Pulmonary/Chest: Effort normal and breath sounds normal. No respiratory distress. She has no wheezes.   Abdominal: Soft. Bowel sounds are normal. She exhibits shifting dullness and ascites. She exhibits no distension. There is generalized tenderness and tenderness in the right lower quadrant, epigastric area and left lower quadrant.   Tense ascites   Neurological: She is oriented to person, place, and time. She appears lethargic.   Skin: Skin is warm and dry. Abrasion noted.        Psychiatric: She has a normal mood and affect. Her behavior is normal.   Vitals reviewed.      Vents:     Lines/Drains/Airways     Peripheral Intravenous Line                 Peripheral IV - Single Lumen 08/03/19 22 G Right Hand 1 day         Peripheral IV - Single Lumen 08/03/19 2004 18 G Right  less than 1 day              Significant Labs:    CBC/Anemia Profile:  Recent Labs   Lab 08/03/19  1903   WBC 10.19   HGB 6.0*   HCT 23.6*      *   RDW 19.8*        Chemistries:  Recent Labs   Lab 08/03/19  1903   *   K 3.2*   CL 92*   CO2 <5*   BUN 24*   CREATININE 3.0*   CALCIUM 7.6*   ALBUMIN 1.8*   PROT 5.5*   BILITOT 3.0*   ALKPHOS 196*   ALT 14   AST 28   MG 2.0   PHOS 5.9*       All pertinent labs within the past 24 hours have been reviewed.    Significant Imaging:  I have reviewed all pertinent imaging results/findings within the past 24 hours.

## 2019-08-04 NOTE — CONSULTS
[facility]  General Surgery  Consult Note    Consults  Subjective:     History of Present Illness: Annette Esparza is a 49 y.o. female with a medical hx of etoh induced ESLD with portal hypertension, who presented for abdominal pain. Patient deteriorated during hospital stay. Patient noted to have free air, we are consulted in this setting. Patient likely with perforated viscus yesterday, prior to paracentesis which showed bilious output. Patient in multisystem organ failure at time of consultation.     No current facility-administered medications on file prior to encounter.      Current Outpatient Medications on File Prior to Encounter   Medication Sig    ascorbic acid, vitamin C, (VITAMIN C) 500 MG tablet Take 500 mg by mouth once daily.    b complex vitamins tablet Take 1 tablet by mouth once daily.    cyanocobalamin (VITAMIN B-12) 1000 MCG tablet Take 1 tablet (1,000 mcg total) by mouth once daily.    ferrous sulfate (FEOSOL) 325 mg (65 mg iron) Tab tablet Take 325 mg by mouth once daily.    folic acid (FOLVITE) 1 MG tablet Take 1 tablet (1 mg total) by mouth once daily.    furosemide (LASIX) 80 MG tablet Take 0.5 tablets (40 mg total) by mouth once daily. (Patient taking differently: Take 40 mg by mouth 2 (two) times daily. )    HYDROcodone-acetaminophen (NORCO)  mg per tablet Take 1 tablet by mouth every 12 (twelve) hours as needed for Pain.    midodrine (PROAMATINE) 10 MG tablet Take 10 mg by mouth 3 (three) times daily with meals. Take 1 tablets by mouth 3 times daily  With meals    multivitamin (THERAGRAN) tablet Take 1 tablet by mouth once daily.    spironolactone (ALDACTONE) 100 MG tablet Take 1 tablet (100 mg total) by mouth once daily.    vitamin D 1000 units Tab Take 1,000 Units by mouth once daily.    alendronate (FOSAMAX) 70 MG tablet Take 1 tablet (70 mg total) by mouth every 7 days.       Review of patient's allergies indicates:   Allergen Reactions     Dilaudid [hydromorphone] Hallucinations       Past Medical History:   Diagnosis Date    Acute hypoxemic respiratory failure 2016    ANDREW (acute kidney injury) 2016    Alcohol dependence in remission     Alcoholic cirrhosis     Alcoholic cirrhosis of liver with ascites 10/22/2015    Anxiety     Ascites due to alcoholic cirrhosis 2016    Centrilobular emphysema 2016    Cigarette nicotine dependence without complication 2016    Folate deficiency 10/22/2015    Gallstones     Hepatic encephalopathy     Hepatorenal syndrome 2016    Hyperbilirubinemia 2016    Hyponatremia 2016    Iron deficiency anemia due to chronic blood loss 10/22/2015    Portal hypertensive gastropathy 10/22/2015    Subclinical hypothyroidism 10/22/2015     Past Surgical History:   Procedure Laterality Date    ADENOIDECTOMY      APPENDECTOMY      BIOPSY N/A 10/30/2014    Performed by Sawyer Ovalle MD at [facility] CATH LAB    BIOPSY, LIVER, TRANSJUGULAR APPROACH N/A 8/15/2018    Performed by [facility] Diagnostic Provider at [facility] OR 2ND FLR     SECTION      COLONOSCOPY      COLONOSCOPY N/A 2018    Performed by Jesus Whalen MD at [facility] ENDO (2ND FLR)    COLONOSCOPY N/A 2014    Performed by Maritza García MD at [facility] ENDO    ESOPHAGOGASTRODUODENOSCOPY (EGD) N/A 2018    Performed by Jesus Whalen MD at [facility] ENDO (2ND FLR)    ESOPHAGOGASTRODUODENOSCOPY (EGD) N/A 6/3/2016    Performed [facility] ENDO    ESOPHAGOGASTRODUODENOSCOPY (EGD) N/A 2015    Performed by Maritza García MD at [facility] ENDO    HYSTERECTOMY      25 yrs old    LIVER BIOPSY      OOPHORECTOMY Left     25 yrs old    OOPHORECTOMY Right     29 yrs old    TONSILLECTOMY      TUBAL LIGATION      UPPER GASTROINTESTINAL ENDOSCOPY       Family History     Problem Relation (Age of Onset)    Cancer Mother    No Known Problems Sister, Brother    Rheum arthritis Father         Tobacco Use    Smoking status: Current Every Day Smoker     Packs/day: 0.50     Years: 35.00     Pack years: 17.50     Types: Cigarettes    Smokeless tobacco: Never Used   Substance and Sexual Activity    Alcohol use: No     Alcohol/week: 0.0 oz     Comment: beer intake last ~2017, liquour last drink 9/2018    Drug use: No    Sexual activity: Yes     Partners: Male     Review of Systems  Objective:     Vital Signs (Most Recent):  Temp: 98.4 °F (36.9 °C) (08/04/19 1714)  Pulse: 96 (08/04/19 1714)  Resp: (!) 26 (08/04/19 1714)  BP: 115/65 (08/04/19 1714)  SpO2: 95 % (08/04/19 1714) Vital Signs (24h Range):  Temp:  [93.8 °F (34.3 °C)-98.4 °F (36.9 °C)] 98.4 °F (36.9 °C)  Pulse:  [] 96  Resp:  [16-31] 26  SpO2:  [88 %-100 %] 95 %  BP: ()/(38-81) 115/65     Weight: 54.4 kg (120 lb)  Body mass index is 23.44 kg/m².      Intake/Output Summary (Last 24 hours) at 8/4/2019 1725  Last data filed at 8/4/2019 1600  Gross per 24 hour   Intake 8347.25 ml   Output 405 ml   Net 7942.25 ml       Physical Exam   Constitutional: She appears toxic. She is sedated and intubated.   Pulmonary/Chest: She is intubated.   intubated   Abdominal: She exhibits distension.       Significant Labs:  ABGs:   Recent Labs   Lab 08/04/19  1523   PH 7.101*   PCO2 35.0   PO2 42   HCO3 10.9*   POCSATURATED 60*   BE -19     CBC:   Recent Labs   Lab 08/04/19  1205   WBC 11.61   RBC 2.86*   HGB 8.2*   HCT 27.1*      MCV 95   MCH 28.7   MCHC 30.3*     CMP:   Recent Labs   Lab 08/04/19  0815 08/04/19  1418   GLU 77 101   CALCIUM 6.6* 6.1*   ALBUMIN 3.1* 2.8*   PROT 5.6*  --    * 120*   K 3.5 3.8   CO2 10* 10*   CL 94* 92*   BUN 23* 25*   CREATININE 2.6* 2.7*   ALKPHOS 121  --    ALT 19  --    AST 52*  --    BILITOT 5.8*  --      Lactic Acid:   Recent Labs   Lab 08/04/19  1534   LACTATE 8.6*       Significant Diagnostics:  I have reviewed all pertinent imaging results/findings within the past 24 hours.    Assessment/Plan:      Active Diagnoses:    Diagnosis Date Noted POA    Septic shock [A41.9, R65.21] 08/04/2019 Yes    Hemorrhagic shock [R57.8] 08/04/2019 Yes    SBP (spontaneous bacterial peritonitis) [K65.2] 08/03/2019 Yes    Cavitary lesion of lung [J98.4] 08/03/2019 Yes    Calcified granuloma of lung [J84.10] 08/03/2019 Yes    Anemia, B12 deficiency [D51.9] 08/03/2019 Yes    ANDREW (acute kidney injury) [N17.9] 10/09/2018 Yes     Chronic    Decompensated hepatic cirrhosis [K72.90] 07/23/2018 Yes     Chronic    Normocytic anemia [D64.9] 07/17/2018 Yes    Hepatic encephalopathy [K72.90]  Yes    Hyponatremia [E87.1] 07/15/2018 Yes    Hypotension [I95.9] 11/30/2017 Yes    Acute hypoxemic respiratory failure [J96.01] 12/01/2016 Yes    Vitamin D deficiency [E55.9] 10/22/2015 Yes    Folate deficiency [E53.8] 10/22/2015 Yes    Iron deficiency anemia due to chronic blood loss [D50.0] 10/22/2015 Yes    Portal hypertensive gastropathy [K76.6, K31.89] 10/22/2015 Yes    Subclinical hypothyroidism [E03.9] 10/22/2015 Yes    Anemia of chronic disease [D63.8] 10/16/2015 Yes    Alcohol use disorder, severe, in early remission [F10.21]  Yes      Problems Resolved During this Admission:       Annette Esparza is a lady with ESLD with likely perforated viscus    Given the severity of her disease at this time and her underlying severe liver disease, any surgical intervention is believe to be futile at this time.  I discussed this with staff, the primary team, and the family. All are in agreement.  Focus on comfort care measures at this time.        Thank you for your consult. I will sign off. Please contact us if you have any additional questions.    Hi Perry MD  General Surgery  [facility]

## 2019-08-04 NOTE — PROCEDURES
Annette Esparza is a 49 y.o. female patient.    Temp: 97.2 °F (36.2 °C) (08/04/19 0722)  Pulse: (!) 112 (08/04/19 0722)  Resp: (!) 30 (08/04/19 0722)  BP: 117/68 (08/04/19 0722)  SpO2: (!) 94 % (08/04/19 0722)  Weight: 54.4 kg (120 lb) (08/03/19 1812)  Height: 5' (152.4 cm) (08/03/19 1812)       Intubation  Date/Time: 8/4/2019 7:49 AM  Location procedure was performed: [facility] CARDIAC MEDICAL ICU ([facility]ICU)  Performed by: Sarkis Brand MD  Authorized by: Sarkis Brand MD   Consent Done: Emergent Situation  Indications: airway protection,  respiratory distress and  hypoxemia  Intubation method: video-assisted  Patient status: paralyzed (RSI)  Preoxygenation: BVM  Sedatives: etomidate  Paralytic: succinylcholine  Laryngoscope size: Mac 3  Tube size: 7.5 mm  Tube type: cuffed  Number of attempts: 1  Cricoid pressure: no  Cords visualized: yes  Post-procedure assessment: chest rise and CO2 detector  Breath sounds: equal and rales/crackles  Cuff inflated: yes  ETT to lip: 23 cm  Tube secured with: ETT morris  Patient tolerance: Patient tolerated the procedure well with no immediate complications  Complications: No  Estimated blood loss (mL): 0  Specimens: No          Sarkis Brand  8/4/2019       I was present and supervised entire procedure. Tolerated well.     Alex Bryson M.D.  [facility]

## 2019-08-04 NOTE — HOSPITAL COURSE
ED COURSE  Initial ED findings positive for hypothermia, tachycardia, tachypnea, urine output of 220 cc with in and out catheterization, Hgb of 6.0 g/dL, INR of 2.6, Na of 125, K of 3.2, glucose of 36, ammonia of 143, and lactic acid of >12. Blood cultures were drawn, 30 cc/kg IV fluid, and broad spectrum antibiotics (vanc/cefepime).     ICU Course  Initial CT was positive for RUL cavitary lesion and diffuse ground glass opacification as well as tree-in-bud formations concerning for cavitating bacterial infection vs mycobacterial infection. Upon further examination of CT chest, there was an area of lucency by the abdominal wall that was initially interpreted as intra-luminal space, possibly in the transverse colon. Given presentation of generalized abdominal pain, hypothermia, hypotension and lactic acidosis, concern was high for spontaneous bacterial peritonitis. She was initiated on broad spectrum antibiotics. Given the tense distention of her abdomen and concern for resultant abdominal compartment syndrome as a cause for her acute renal failure, diagnostic and therapeutic paracentesis was pursued. Bedside ultrasound revealed large pocket of fluid (at least 5-8 cm in diameter) at her RLQ. Paracentesis yielded roughly 3.5 L of greenish-brown, turbid, and opaque fluid. Fluid was sent for studies. Due to her tenuous blood pressure, albumin was administered to prevent hemodynamic instability. Despite resuscitation with albumin, the pt still required vasopressor support, so norepinephrine was initiated. Following paracentesis, pt reported symptomatic improvement. Due to increasing vasopressor requirements, central venous catheter was placed into patient's right internal jugular vein. Throughout the night, pt's vasopressor requirements worsened and she experienced respiratory failure secondary to increased work of breathing in the setting of compensation for profound acidosis. She ultimately required intubation. While  her abdominal exam improved immediately following paracentesis, her abdominal exam became more peritonitic. STAT abdominal imaging revealed free air in the abdomen. Concerned for peritonitis, general surgery was consulted who assigned a diagnosis of perforation and recommended comfort. Family was called to bedside and agreed to terminal extubation and withdrawal of care.      Fluid Characteristics  PMNs: 7014  LDH: 781  Gram stain & culture: Strep mitis and Candida  Total protein: 1.3    Analysis of fluid allows for a differential including spontaneous bacterial peritonitis vs secondary bacterial peritonitis vs perforation. Given positive joseph criteria and polymicrobial culture, secondary bacterial peritonitis is likely.

## 2019-08-04 NOTE — PROCEDURES
"Annette Esparza is a 49 y.o. female patient.    Temp: 98.4 °F (36.9 °C) (19)  Pulse: 96 (19)  Resp: (!) 26 (19)  BP: 115/65 (19)  SpO2: 95 % (19)  Weight: 54.4 kg (120 lb) (19)  Height: 5' (152.4 cm) (19)       Paracentesis  Date/Time: 2019 6:03 PM  Location procedure was performed: [facility] CRITICAL CARE MEDICINE  Performed by: Behram Khan, MD  Authorized by: Behram Khan, MD   Pre-operative diagnosis: Decompensated cirrhosis  Post-operative diagnosis: Decompensated cirrhosis   Consent Done: Yes  Consent: Written consent obtained.  Consent given by: patient  Patient understanding: patient states understanding of the procedure being performed  Patient consent: the patient's understanding of the procedure matches consent given  Procedure consent: procedure consent matches procedure scheduled  Relevant documents: relevant documents present and verified  Test results: test results available and properly labeled  Site marked: the operative site was marked  Imaging studies: imaging studies available  Required items: required blood products, implants, devices, and special equipment available  Patient identity confirmed: , name, provided demographic data and verbally with patient  Time out: Immediately prior to procedure a "time out" was called to verify the correct patient, procedure, equipment, support staff and site/side marked as required.  Initial or subsequent exam: initial  Procedure purpose: diagnostic and therapeutic  Indications: abdominal discomfort secondary to ascites, respiratory distress secondary to ascites and suspected peritonitis  Anesthesia: local infiltration    Anesthesia:  Local Anesthetic: lidocaine 1% with epinephrine  Anesthetic total: 10 mL  Patient sedated: no  Preparation: Patient was prepped and draped in the usual sterile fashion.  Description of findings: Opaque, turbid, greenish-brown fluid    Needle " gauge: 18  Ultrasound guidance: yes  Puncture site: right lower quadrant  Fluid removed: 3500(ml)  Fluid appearance: cloudy  Dressing: 4x4 sterile gauze, gauze packing and pressure dressing  Complications: No  Estimated blood loss (mL): 3  Specimens: No  Implants: No          Behram Khan  8/4/2019

## 2019-08-04 NOTE — CONSULTS
Pt seen and evaluated by critical care medicine team. Pt to be accepted to MICU. Full H&P to follow. Please call [phone] with questions.     Behram Khan, MD  Internal Medicine, PGY-3  #[phone]  M: [phone]

## 2019-08-04 NOTE — ASSESSMENT & PLAN NOTE
Pt on broad spectrum antibiotic coverage and vasopressor support    - Vancomycin  - Cefepime   - Metronidazole   - Azithromycin  - Norepinephrine   - Vasopressin

## 2019-08-04 NOTE — ASSESSMENT & PLAN NOTE
PORTAL HYPERTENSIVE GASTROPATHY  HEPATIC ENCEPHALOPATHY  SPONTANEOUS BACTERIAL PERITONITIS    Pt with history of EtOH cirrhosis who was previously able to abstain from alcohol intake unfortunately relapsed and has been non-compliant with medications. Current outpatient regimen did not include prophylactic antibiotics against SBP. Given history of portal hypertensive gastropathy, will treat acute blood loss anemia as variceal bleed until GI evaluation. Asterixis also present on admission. Initial ascitic leukocytes impressively elevated.     Plan   - Consult gastroenterology  - Transfuse to goal Hgb >7  - Octreotide gtt x3 days  - Pantoprazole BID   - Cefepime 2g q8  - Vit K 10 mg IV  - Serial CBC, CMP and PT-INR

## 2019-08-04 NOTE — PLAN OF CARE
Problem: Adult Inpatient Plan of Care  Goal: Plan of Care Review  Recommendations    1. Initiate TF with Isosource 1.5 @10ml/hr and increase 10ml/hr Q4hrs to goal rate 36ml/hr (provides 1296 kcal, 59g pro, 656ml free water). Additional 150ml water flush Q6hrs or per MD. Hold for residuals >500ml.  Goals: 1. Pt to receive nutrition by RD follow up.  Nutrition Goal Status: new  Communication of RD Recs: (POC)

## 2019-08-04 NOTE — NURSING
Decreased bp, MAP less than 65, see flowsheets. Increased levo titration  Needed. Ordered that if nurse has to go to  0.22 mcg/kg/min on  Levo, restart vaso per MANUELITO Worthington with Critical Care.

## 2019-08-04 NOTE — ASSESSMENT & PLAN NOTE
Pt presented with shortness of breath and VILLAR in the setting of tense ascites. As the night progressed and patient was administered greater amounts of IV medication, her oxygen requirements and respiratory support escalated. Eventually, she was noted to become hypoxic with laying flat. Over the course of her first night, she transitioned from room air to high-flow nasal cannula to CPAP and eventually invasive mechanical ventilation. Likely related to IV fluids and medications administered on admission.     AC: 18 / 360 / 10 / 50%    Plan  - Continue MV  - ABG PRN  - Hesitant to use diuretics in this patient with septic shock requiring vasopressor support

## 2019-08-04 NOTE — ASSESSMENT & PLAN NOTE
IRON DEFICIENCY ANEMIA DUE TO CHRONIC BLOOD LOSS  NORMOCYTIC ANEMIA  ANEMIA, B12 DEFICIENCY     Currently with decrease hemoglobin despite resuscitation with blood products consistent more with acute blood loss anemia. Will continue to monitor.     - Serial CBC

## 2019-08-04 NOTE — ASSESSMENT & PLAN NOTE
49 year old female with a history of alcoholic cirrhosis on who GI is being consulted for suspected GI bleed. Patient critically ill in the ICU with multiple problem however we feel that a brisk upper bleed (such as from varices) is the main . In an abundance of caution can continue octreotide but will hold off on emergent/urgent EGD at this time. Agree with ICU plan to obtain cross sectional imaging.     Recommendations:  -NPO  -Maintain IV access with 2 large bore IVs  -Intravascular resuscitation/support with IVFs   -Serial H/H's and pRBCs transfusion as indicated  -Protonix 40 mg IV BID  -Octreotide gtt at 50mcg/hr  -Ceftriaxone 1g IV Q daily for primary SBP prophylaxis  -Discontinue all NSAIDs and Heparin products  -Please correct any coagulopathy with platelets and FFP to a goal of platelets >50K and INR <2.0  -Please promptly notify GI team if there is significant change in patient's clinical status

## 2019-08-04 NOTE — SIGNIFICANT EVENT
GoC Update    Patient w/ pneumoperitoneum and too high risk for surgery.  Discussed with family and informed them that this will likely be terminal.  Have made her DNR.  They are discussing possible terminal extubation and seem to be favoring this approach.  Awaiting their decision.    Sarkis Brand MD  [facility] Pulmonary/Critical Care Fellow RACHAEL

## 2019-08-04 NOTE — ED NOTES
As pt was being transported to ICU elevators CMICVASU Bryant RN informed that no negative pressure room were currently available samson to pt having Airborne isolation precautions ordered. ED charge RN and House Supervisor informed of situation. Pt is to moved to ED bed 19.

## 2019-08-04 NOTE — ASSESSMENT & PLAN NOTE
Pt with history of alcohol abuse resulting in cirrhosis presents with decompensated alcoholic cirrhosis. Reports her last drink was two days ago, however also appears encephalopathic on exam. Denies history of seizures or current tremors.     Plan  - Serum ethanol  - PETH  - Lorazepam 1mg q4hr PRN for CIWA >8  - Thiamine 250 mg IV qd x3, then 100 mg PO TID x3 days, then 100 mg PO qd

## 2019-08-05 LAB
BACTERIA UR CULT: NO GROWTH
HIV 1+2 AB+HIV1 P24 AG SERPL QL IA: NEGATIVE

## 2019-08-05 NOTE — NURSING
Per MANUELITO Worthington with Critical Care, based on pt prognosis and condition, will not tolerate CRRT. No dialysis to be started and no line to be placed.  Notified dialysis nurse. POC discussed with family.

## 2019-08-05 NOTE — NURSING
Spoke with Dr. Sarkis Brand with Critical Care. Due to pt prognosis and no surgical options for diagnosis, pt made DNR by family. Plans are to withdraw care when family ready.

## 2019-08-05 NOTE — NURSING
Per family request, ready to withdraw care. Dr. Sarkis Brand ordered to terminally extubate patient and provide comfort measures only with fentanyl and precedex gtt and ativan pushes.  Family present at bedside and agrees with POC. RT notified.

## 2019-08-05 NOTE — NURSING
Notified MANUELITO Reynoso and Dr. Su with Critical Care of VBG results since unable to obtain ABG after multiple attempts by RT. MD reviewed labs and will reassess after vent adjustments. No orders for bicarb at this time per provider.  Will repeat in approximately 2 hrs. Will continue to monitor.

## 2019-08-05 NOTE — SIGNIFICANT EVENT
Death Note    Called to bedside by patient's nurse. Nursing supervisor notified. Family at bedside.     Patient is not responding to verbal or tactile stimuli. Patient does not have a papillary or corneal reflex. Pupils are fixed and dilated. No heart or breath sounds on auscultation. No respirations. No palpable pulses.     Time of death: 1905    Cause of Death: Peritonitis      Behram Khan, MD  Internal Medicine, PGY-3  #[phone]  M: [phone]

## 2019-08-05 NOTE — NURSING
Notified MANUELITO Gaines, with Critical Care pt continues to require increased pressor titration. Jay Jay bumps ordered. Inquired if fem art line to be placed. Provider states will come to bedside to assess. See flowsheets for VS. Sedation decreased.

## 2019-08-05 NOTE — NURSING TRANSFER
Nursing Transfer Note      8/4/2019     Transfer To: CT scan    Transfer via bed    Transfer with Vent to O2, cardiac monitoring,     Transported by 2 RN RT and PA with CC team    Medicines sent: Pressors and sedation    Chart send with patient: No    Notified: son    Patient reassessed at: 1321    VS stable during transport

## 2019-08-05 NOTE — NURSING
Provided MANUELITO Worthington, with Critical CAre VBG results. Provider ordered amp of bicarb push. MD to come to bedside due to unstable bp as well, not responding to increase in pressors.

## 2019-08-05 NOTE — SIGNIFICANT EVENT
1855-Pt terminally extubated per family request by RT Zac. Family, RTx and Dr. Paula with [facility] at bedside. Comfort care initiated prior to this RN assuming care of pt.    1905-asystole noted on monitor and pt apneic. Reported to [facility]. MD to come to bedside to pronounce pt. Family at bedside with pt.  notified and coming to bedside. Condolences given.     Pt's belongings given to pt's boyfriend Regis while other family members at bedside by day shift DREW Love.

## 2019-08-05 NOTE — LOPA/MORA/SWTA/AOC/AEB
Pt ruled out for all tissue and eye donation d/t medical history. Pt belongings sent with pt's boyfriend Regis.  RAI #: [id number]. RAI screener: Pepe Farrell    [facility] 's office notified d/t pt being here with TB rule out and d/t recent incarceration. Juanito Purdy with  's office declined pt. He said to just send body to morgue and  home per normal routine.     Next of kin: Dereje (son): [phone]  MARTHA ARTEAGAN [phone] (FATHER)  AARON VALENCIA [phone] (PARTNER)

## 2019-08-05 NOTE — NURSING
Per Dr. Su, pt to be assessed for appropriateness of femoral arterial line.  Ordered to turn off vasopressin gtt at this time time to assess pt response.  Ordered to notify critical care team if pt MAP becomes unstable without gtt.

## 2019-08-06 LAB
BACTERIA SPEC AEROBE CULT: ABNORMAL
BACTERIA SPEC AEROBE CULT: ABNORMAL
BACTERIA SPEC AEROBE CULT: NO GROWTH
GRAM STN SPEC: NORMAL

## 2019-08-07 LAB
BACTERIA SPEC ANAEROBE CULT: NORMAL
BACTERIA UR CULT: ABNORMAL
GALACTOMANNAN AG SERPL IA-ACNC: 0.61 INDEX

## 2019-08-07 NOTE — DISCHARGE SUMMARY
[facility]  Critical Care Medicine  Discharge Summary      Patient Name: Annette Esparza  MRN: [id number]  Admission Date: 8/3/2019  Hospital Length of Stay: 1 days  Discharge Date and Time: 8/4/2019     Attending Physician: No att. providers found   Discharging Provider: Behram Khan, MD  Primary Care Provider: Endy Pfeiffer MD  Reason for Admission: Septic Shock    HPI:   Ms Esparza is a 48 yo F w PMHx significant for EtOH cirrhosis, portal hypertensive gastropathy (last EGD 8/2018) and osteopenia.     Pt presents to emergency department for evaluation of generalized pain of two weeks' duration. While generalized, her pain is worst across her lower abdomen, right shoulder and upper back near her right scapula. The pain is rated 9/10 and has progressively worsened. She also complains of concomitant worsening abdominal distension and fluid accumulation despite adherence to diuretic regimen. Over the past day, she has noticed becoming increasingly short of breath worse on exertion. These symptoms are also associated with a decrease in urine output over the last week. She denies burning with urination, fever or confusion. Although reporting adherence to diuretic regimen, she has not been taking her lactulose over the past two weeks because she was unsure of which provider to ask a refill.     Review of systems was positive for nausea (without emesis) and dark bowel movements. She reports taking iron supplementation and taking bismuth recently. During physical exam, she was noted to have an abrasion over her left knee and recalled a fall but could not ascribe when it happened. She denies hitting her head or losing consciousness during the fall.      She denies fever, chills, dysuria, radha red blood in her stool, syncope, weakness, chest pain, or chest discomfort.     SOCIAL  Pt reports last drink was two days ago.   Reports 25-day incarceration in the past  Tobacco abuse x30 years    *  No surgery found *    Indwelling Lines/Drains at Time of Discharge:   Lines/Drains/Airways     Central Venous Catheter Line                 Percutaneous Central Line Insertion/Assessment - triple lumen  08/04/19 0130 left internal jugular 3 days          Drain                 NG/OG Tube 08/04/19 0820 orogastric Center mouth 3 days         Urethral Catheter 08/04/19 0800 Non-latex 16 Fr. 3 days              Hospital Course:   ED COURSE  Initial ED findings positive for hypothermia, tachycardia, tachypnea, urine output of 220 cc with in and out catheterization, Hgb of 6.0 g/dL, INR of 2.6, Na of 125, K of 3.2, glucose of 36, ammonia of 143, and lactic acid of >12. Blood cultures were drawn, 30 cc/kg IV fluid, and broad spectrum antibiotics (vanc/cefepime).     ICU Course  Initial CT was positive for RUL cavitary lesion and diffuse ground glass opacification as well as tree-in-bud formations concerning for cavitating bacterial infection vs mycobacterial infection. Upon further examination of CT chest, there was an area of lucency by the abdominal wall that was initially interpreted as intra-luminal space, possibly in the transverse colon. Given presentation of generalized abdominal pain, hypothermia, hypotension and lactic acidosis, concern was high for spontaneous bacterial peritonitis. She was initiated on broad spectrum antibiotics. Given the tense distention of her abdomen and concern for resultant abdominal compartment syndrome as a cause for her acute renal failure, diagnostic and therapeutic paracentesis was pursued. Bedside ultrasound revealed large pocket of fluid (at least 5-8 cm in diameter) at her RLQ. Paracentesis yielded roughly 3.5 L of greenish-brown, turbid, and opaque fluid. Fluid was sent for studies. Due to her tenuous blood pressure, albumin was administered to prevent hemodynamic instability. Despite resuscitation with albumin, the pt still required vasopressor support, so norepinephrine was  initiated. Following paracentesis, pt reported symptomatic improvement. Due to increasing vasopressor requirements, central venous catheter was placed into patient's right internal jugular vein. Throughout the night, pt's vasopressor requirements worsened and she experienced respiratory failure secondary to increased work of breathing in the setting of compensation for profound acidosis. She ultimately required intubation. While her abdominal exam improved immediately following paracentesis, her abdominal exam became more peritonitic. STAT abdominal imaging revealed free air in the abdomen. Concerned for peritonitis, general surgery was consulted who assigned a diagnosis of perforation and recommended comfort. Family was called to bedside and agreed to terminal extubation and withdrawal of care.      Fluid Characteristics  PMNs: 7014  LDH: 781  Gram stain & culture: Strep mitis and Candida  Total protein: 1.3    Analysis of fluid allows for a differential including spontaneous bacterial peritonitis vs secondary bacterial peritonitis vs perforation. Given positive joseph criteria and polymicrobial culture, secondary bacterial peritonitis is likely.           Consults (From admission, onward)        Status Ordering Provider     Inpatient consult to Critical Care Medicine  Once     Provider:  (Not yet assigned)    Completed JOSHUA FRIED     Inpatient consult to Gastroenterology  Once     Provider:  (Not yet assigned)    Completed KHAN, BEHRAM     Inpatient consult to Nephrology  Once     Provider:  (Not yet assigned)    Completed KHAN, BEHRAM     Inpatient consult to Registered Dietitian/Nutritionist  Once     Provider:  (Not yet assigned)    Completed HERSON TRAN        Significant Labs:  All pertinent labs within the past 24 hours have been reviewed.    Significant Imaging:  I have reviewed all pertinent imaging results/findings within the past 24 hours.    Pending Diagnostic Studies:      Procedure Component Value Units Date/Time    Aspergillus antigen [[id number]] Collected:  08/04/19 0339    Order Status:  Sent Lab Status:  In process Updated:  08/04/19 0358    Specimen:  Blood     Basic metabolic panel [[id number]] Collected:  08/03/19 2137    Order Status:  Sent Lab Status:  In process Updated:  08/03/19 2138    Specimen:  Blood     Osmolality, Serum [[id number]] Collected:  08/03/19 2119    Order Status:  Sent Lab Status:  In process Updated:  08/03/19 2120    Specimen:  Blood     Renal function panel [[id number]] Collected:  08/04/19 1534    Order Status:  Sent Lab Status:  In process Updated:  08/04/19 1534    Specimen:  Blood         Final Active Diagnoses:    Diagnosis Date Noted POA    PRINCIPAL PROBLEM:  Septic shock [A41.9, R65.21] 08/04/2019 Yes    Hemorrhagic shock [R57.8] 08/04/2019 Yes    SBP (spontaneous bacterial peritonitis) [K65.2] 08/03/2019 Yes    Cavitary lesion of lung [J98.4] 08/03/2019 Yes    Calcified granuloma of lung [J84.10] 08/03/2019 Yes    Anemia, B12 deficiency [D51.9] 08/03/2019 Yes    ANDREW (acute kidney injury) [N17.9] 10/09/2018 Yes     Chronic    Decompensated hepatic cirrhosis [K72.90] 07/23/2018 Yes     Chronic    Normocytic anemia [D64.9] 07/17/2018 Yes    Hepatic encephalopathy [K72.90]  Yes    Hyponatremia [E87.1] 07/15/2018 Yes    Hypotension [I95.9] 11/30/2017 Yes    Acute hypoxemic respiratory failure [J96.01] 12/01/2016 Yes    Vitamin D deficiency [E55.9] 10/22/2015 Yes    Folate deficiency [E53.8] 10/22/2015 Yes    Iron deficiency anemia due to chronic blood loss [D50.0] 10/22/2015 Yes    Portal hypertensive gastropathy [K76.6, K31.89] 10/22/2015 Yes    Subclinical hypothyroidism [E03.9] 10/22/2015 Yes    Anemia of chronic disease [D63.8] 10/16/2015 Yes    Alcohol use disorder, severe, in early remission [F10.21]  Yes      Problems Resolved During this Admission:     Psychiatric  Alcohol use disorder, severe, in early remission  Pt with  history of alcohol abuse resulting in cirrhosis presents with decompensated alcoholic cirrhosis. Reports her last drink was two days ago, however also appears encephalopathic on exam. Denies history of seizures or current tremors.     Plan  - Serum ethanol  - PETH  - Lorazepam 1mg q4hr PRN for CIWA >8  - Thiamine 250 mg IV qd x3, then 100 mg PO TID x3 days, then 100 mg PO qd    Pulmonary  Cavitary lesion of lung  CALCIFIED GRANULOMA OF LUNG    Pt with brief history of incarceration and initial CXR--for evaluation of dyspnea--revealing calcified granuloma and cavitary lesion. CT chest confirmed both findings of CXR but also revealed multiple GGO's and consolidations throughout both lungs. Differentials include bacterial infection vs mycobacterial infection vs neoplasm, unlikely.     Plan  - Sputum culture  - AFB culture and sputum   - Quantiferon gold   - Airborne isolation   - Atypical coverage with azithromycin 500 mg IV qd    Acute hypoxemic respiratory failure  Pt presented with shortness of breath and VILLAR in the setting of tense ascites. As the night progressed and patient was administered greater amounts of IV medication, her oxygen requirements and respiratory support escalated. Eventually, she was noted to become hypoxic with laying flat. Over the course of her first night, she transitioned from room air to high-flow nasal cannula to CPAP and eventually invasive mechanical ventilation. Likely related to IV fluids and medications administered on admission.     AC: 18 / 360 / 10 / 50%    Plan  - Continue MV  - ABG PRN  - Hesitant to use diuretics in this patient with septic shock requiring vasopressor support      Cardiac/Vascular  Hypotension  See septic shock    Renal/  ANDREW (acute kidney injury)  Pt with tense ascites and decreased urine output presents with elevated creatinine. Urine lytes consistent with pre-renal etiology. Differentials include hepatorenal syndrome vs abdominal compartment syndrome vs  hypoperfusion.     Plan  - Consult nephrology  - Obtain bladder pressure  - RP ultrasound   - Would avoid lasix challenge currently    Hyponatremia  Likely related to her cirrhosis.     ID  * Septic shock  Pt on broad spectrum antibiotic coverage and vasopressor support    - Vancomycin  - Cefepime   - Metronidazole   - Azithromycin  - Norepinephrine   - Vasopressin    Oncology  Anemia of chronic disease  IRON DEFICIENCY ANEMIA DUE TO CHRONIC BLOOD LOSS  NORMOCYTIC ANEMIA  ANEMIA, B12 DEFICIENCY     Currently with decrease hemoglobin despite resuscitation with blood products consistent more with acute blood loss anemia. Will continue to monitor.     - Serial CBC    Endocrine  Subclinical hypothyroidism  Repeat TSH    Folate deficiency  Continue folate replacement especially in setting of EtOH dependence    Vitamin D deficiency  Continue vit d supplementation    GI  Decompensated hepatic cirrhosis  PORTAL HYPERTENSIVE GASTROPATHY  HEPATIC ENCEPHALOPATHY  SPONTANEOUS BACTERIAL PERITONITIS    Pt with history of EtOH cirrhosis who was previously able to abstain from alcohol intake unfortunately relapsed and has been non-compliant with medications. Current outpatient regimen did not include prophylactic antibiotics against SBP. Given history of portal hypertensive gastropathy, will treat acute blood loss anemia as variceal bleed until GI evaluation. Asterixis also present on admission. Initial ascitic leukocytes impressively elevated.     Plan   - Consult gastroenterology  - Transfuse to goal Hgb >7  - Octreotide gtt x3 days  - Pantoprazole BID   - Cefepime 2g q8  - Vit K 10 mg IV  - Serial CBC, CMP and PT-INR        Other  Hemorrhagic shock  Will replace with blood products as necessary.      Discharged Condition:     Disposition:       Patient Instructions:   No discharge procedures on file.  Medications:  None (patient  at medical facility)     Behram Khan, MD  Critical Care Medicine  [facility]

## 2019-08-08 LAB
BACTERIA BLD CULT: NORMAL
BACTERIA BLD CULT: NORMAL

## 2019-08-09 NOTE — PHYSICIAN QUERY
PT Name: Annette Esparza  MR #: [id number]  Physician Query Form - Renal Condition Clarification     CDS/: Tami Snow RN CDI      Contact information: [email or web address]    This form is a permanent document in the medical record.     QueryDate: August 9, 2019    By submitting this query, we are merely seeking further clarification of documentation. Please utilize your independent clinical judgment when addressing the question(s) below.    The Medical record contains the following:   Indicator Supporting Clinical Findings Location in Medical Record    Kidney (Renal) Insufficiency     X Kidney (Renal) Failure / Injury Renal/  ANDREW (acute kidney injury)  Pt with tense ascites and decreased urine output presents with elevated creatinine. Urine lytes consistent with pre-renal etiology. Differentials include hepatorenal syndrome vs abdominal compartment syndrome vs hypoperfusion.       Nephrology consulted for Andrew and metabolic acidosis.  ANDREW  Likely from ischemic ATN from hypotension requiring vasopressors and severe anemia.    ANDREW (acute kidney injury)  Pt with tense ascites and decreased urine output presents with elevated creatinine. Urine lytes consistent with pre-renal etiology. Differentials include hepatorenal syndrome vs abdominal compartment syndrome vs hypoperfusion.    Critical care H&P  8/4 1204 pm   ALEJANDRO Doty / NICOLE Su MD                Nephrology 8/4 1256 PM  JUSTEN Ferrer MD / LEIGHTON Durand MD        Discharge summary  8/4 1043 pm  ALEJANDRO Doty MD            Nephrotoxic Agents     X BUN/Creatinine GFR                      BUN   Cr      eGFR  8/3 -              24     3.0     17.4  8/4 - 0340    23      2.6     20.7  8/4 - 1418    25      2.8     19.8     Labs    Urine: Casts         Eosinophils      Dehydration      Nausea/Vomiting      Dialysis/CRRT     X Treatment: Plan  - Consult nephrology  - Obtain bladder pressure  - RP ultrasound   - Would avoid lasix challenge currently    Plan:  - Patient with  severe metabolic acidosis, oliguric, will start SLED  - Dialysate adjusted to most recent parameters  - Urine microscopy by MD (UPDATE: 8/4/2019 abundant coarse granular casts)  - Renal US  - U/A, UPCr  - MAP > 65  - Strict I/Os and chart  - Avoid nephrotoxic meds, NSAIDs, IV contrast, etc  - Hb > 7 gm/dL  - Panculture Critical care H&P  8/4 1204 pm   ALEJANDRO Doty / NICOLE Su MD        Nephrology 8/4 1256 PM  JUSTEN Ferrer MD / LEIGHTON Durand MD    Other:      Acute Kidney Injury / Acute Renal Failure has different defining criteria. A generally accepted guideline  is:   A greater than 100% (2X) rise in serum creatinine from baseline* occurring during the course of a single hospital stay.   *Baseline as determined by the providers judgment and consideration of previous lab values and other documentation, if available.    A diagnosis of Acute Kidney Injury/ Acute Renal Failure should incorporate abnormal labs and clinical findings that are clinically significant      References: 1. Hanny et al. Acute renal failure-definition, outcome measures, animal models, fluid therapy and information technology needs: the Second International Consensus Conference of the Acute Dialysis Quality Initiative (ADQI) Group. Crit Care 2004; 8:B204; 2. Sarah et al. Acute Kidney Injury Network: report of an initiative to improve outcomes in acute kidney injury. Crit Care 2007; 11:R31; 3. Kidney Disease: Improving Global Outcomes (KDIGO). Acute Kidney Injury Work Group. KDIGO clinical practice guidelines for acute kidney injury. Kidney Int Suppl 2012; 2:1.    The clinical guidelines noted below is only a system guideline, it does not replace the providers clinical judgment.    Provider, please specify the diagnosis or diagnoses associated with above clinical findings.    Provider, please further specify (ANDREW Acute Kidney Injury). Thank you.     [ X ] Acute Kidney Failure/Injury with Tubular Necrosis  Damage to the tubule cells of the kidney. Common  triggers: shock, hypotension, IV contrast, rhabdomyolysis, medications   [   ] Other Acute Kidney Failure/Injury (please specify): ____________       [   ] Unspecified Acute Kidney Failure/Injury        [   ] Other (please specify): _________________________________     [   ]  Clinically Undetermined       Please document in your progress notes daily for the duration of treatment until resolved and include in your discharge summary.

## 2019-08-09 NOTE — PHYSICIAN QUERY
PT Name: Annette Esparza  MR #: [id number]     Physician Query Form - Documentation Clarification      CDS/: Tami Snow RN CDI       Contact information: [email or web address]    This form is a permanent document in the medical record.     Query Date: August 9, 2019    By submitting this query, we are merely seeking further clarification of documentation. Please utilize your independent clinical judgment when addressing the question(s) below.    The Medical record reflects the following:    Supporting Clinical Findings Location in Medical Record   Given history of portal hypertensive gastropathy, will treat acute blood loss anemia as variceal bleed until GI evaluation.       H&P 8/4 1204 pm  TREVON Doty MD / PORTER Su MD   Patient critically ill in the ICU with multiple problem however we feel that a brisk upper bleed (such as from varices) is the main . In an abundance of caution can continue octreotide but will hold off on emergent/urgent EGD at this time. Agree with ICU plan to obtain cross sectional imaging.   GI consult 8/4 1113 am  EDGARDO Bolanos MD / LEIGHTON Ragland MD                                                                            Doctor, Please specify diagnosis or diagnoses associated with above clinical findings.    Doctor, please specify the location of the variceal bleed.    Provider Use Only        [   ]  Esophageal       [   ]  Other, specify__________       [X] Variceal bleed ruled out.                                                                                                                  [  ] Clinically Undetermined

## 2019-08-14 LAB — PHOSPHATIDYLETHANOL (PETH): 47 NG/ML

## 2019-09-05 LAB — FUNGUS SPEC CULT: ABNORMAL

## 2019-10-14 NOTE — ASSESSMENT & PLAN NOTE
PC from pt, refill Vit D & "pain med",pt is at work & does not know the name of the pain med  Please call to [address]  See septic shock

## 2023-11-10 NOTE — PLAN OF CARE
Problem: Patient Care Overview  Goal: Plan of Care Review  Outcome: Ongoing (interventions implemented as appropriate)  POC reviewed with pt and family. Stool negative for c-diff. No acute changes overnight. Patient A&O x 4. Vitals stable but for hypotension but it is per pt's trend. Pt complained of pain and nausea; PRNs adminstered. Pt walked to the bathroom with stand by assist. D/C tomorrow or Monday. KATY.       Respiratory distress